# Patient Record
Sex: MALE | Race: WHITE | Employment: UNEMPLOYED | URBAN - METROPOLITAN AREA
[De-identification: names, ages, dates, MRNs, and addresses within clinical notes are randomized per-mention and may not be internally consistent; named-entity substitution may affect disease eponyms.]

---

## 2017-09-02 ENCOUNTER — APPOINTMENT (OUTPATIENT)
Dept: CT IMAGING | Age: 52
DRG: 381 | End: 2017-09-02
Attending: EMERGENCY MEDICINE
Payer: SELF-PAY

## 2017-09-02 ENCOUNTER — HOSPITAL ENCOUNTER (INPATIENT)
Age: 52
LOS: 3 days | Discharge: HOME | DRG: 381 | End: 2017-09-07
Attending: EMERGENCY MEDICINE | Admitting: INTERNAL MEDICINE
Payer: SELF-PAY

## 2017-09-02 ENCOUNTER — APPOINTMENT (OUTPATIENT)
Dept: GENERAL RADIOLOGY | Age: 52
DRG: 381 | End: 2017-09-02
Attending: EMERGENCY MEDICINE
Payer: SELF-PAY

## 2017-09-02 DIAGNOSIS — F10.939 ALCOHOL WITHDRAWAL, WITH UNSPECIFIED COMPLICATION (HCC): ICD-10-CM

## 2017-09-02 DIAGNOSIS — K92.0 HEMATEMESIS WITH NAUSEA: Primary | ICD-10-CM

## 2017-09-02 PROBLEM — R74.8 ELEVATED LIPASE: Status: ACTIVE | Noted: 2017-09-02

## 2017-09-02 PROBLEM — Z79.1 NSAID LONG-TERM USE: Status: ACTIVE | Noted: 2017-09-02

## 2017-09-02 PROBLEM — F12.10 MILD TETRAHYDROCANNABINOL (THC) ABUSE: Status: ACTIVE | Noted: 2017-09-02

## 2017-09-02 PROBLEM — R63.4 WEIGHT LOSS: Status: ACTIVE | Noted: 2017-09-02

## 2017-09-02 PROBLEM — F19.10 POLYSUBSTANCE ABUSE (HCC): Status: ACTIVE | Noted: 2017-09-02

## 2017-09-02 PROBLEM — E87.20 LACTIC ACIDEMIA: Status: ACTIVE | Noted: 2017-09-02

## 2017-09-02 PROBLEM — F11.10 OPIOID ABUSE (HCC): Status: ACTIVE | Noted: 2017-09-02

## 2017-09-02 PROBLEM — R11.2 NAUSEA & VOMITING: Status: ACTIVE | Noted: 2017-09-02

## 2017-09-02 PROBLEM — R55 SYNCOPE: Status: ACTIVE | Noted: 2017-09-02

## 2017-09-02 PROBLEM — E86.0 DEHYDRATION: Status: ACTIVE | Noted: 2017-09-02

## 2017-09-02 LAB
ALBUMIN SERPL-MCNC: 4.1 G/DL (ref 3.5–5)
ALBUMIN/GLOB SERPL: 1.2 {RATIO} (ref 1.1–2.2)
ALP SERPL-CCNC: 70 U/L (ref 45–117)
ALT SERPL-CCNC: 54 U/L (ref 12–78)
AMPHET UR QL SCN: NEGATIVE
ANION GAP SERPL CALC-SCNC: 9 MMOL/L (ref 5–15)
APPEARANCE UR: CLEAR
AST SERPL-CCNC: 52 U/L (ref 15–37)
BACTERIA URNS QL MICRO: NEGATIVE /HPF
BARBITURATES UR QL SCN: NEGATIVE
BASOPHILS # BLD: 0 K/UL (ref 0–0.1)
BASOPHILS NFR BLD: 1 % (ref 0–1)
BENZODIAZ UR QL: NEGATIVE
BILIRUB SERPL-MCNC: 1.1 MG/DL (ref 0.2–1)
BILIRUB UR QL: NEGATIVE
BUN SERPL-MCNC: 5 MG/DL (ref 6–20)
BUN/CREAT SERPL: 5 (ref 12–20)
CALCIUM SERPL-MCNC: 8.6 MG/DL (ref 8.5–10.1)
CANNABINOIDS UR QL SCN: POSITIVE
CHLORIDE SERPL-SCNC: 101 MMOL/L (ref 97–108)
CO2 SERPL-SCNC: 29 MMOL/L (ref 21–32)
COCAINE UR QL SCN: NEGATIVE
COLOR UR: ABNORMAL
CREAT SERPL-MCNC: 0.93 MG/DL (ref 0.7–1.3)
DRUG SCRN COMMENT,DRGCM: ABNORMAL
EOSINOPHIL # BLD: 0.3 K/UL (ref 0–0.4)
EOSINOPHIL NFR BLD: 5 % (ref 0–7)
EPITH CASTS URNS QL MICRO: ABNORMAL /LPF
ERYTHROCYTE [DISTWIDTH] IN BLOOD BY AUTOMATED COUNT: 12.4 % (ref 11.5–14.5)
ETHANOL SERPL-MCNC: 367 MG/DL
GLOBULIN SER CALC-MCNC: 3.4 G/DL (ref 2–4)
GLUCOSE SERPL-MCNC: 133 MG/DL (ref 65–100)
GLUCOSE UR STRIP.AUTO-MCNC: NEGATIVE MG/DL
HCT VFR BLD AUTO: 46.5 % (ref 36.6–50.3)
HEMOCCULT STL QL: NEGATIVE
HGB BLD-MCNC: 16.9 G/DL (ref 12.1–17)
HGB UR QL STRIP: ABNORMAL
HYALINE CASTS URNS QL MICRO: ABNORMAL /LPF (ref 0–5)
INR PPP: 1 (ref 0.9–1.1)
KETONES UR QL STRIP.AUTO: NEGATIVE MG/DL
LACTATE SERPL-SCNC: 2.4 MMOL/L (ref 0.4–2)
LDH SERPL L TO P-CCNC: 208 U/L (ref 85–241)
LEUKOCYTE ESTERASE UR QL STRIP.AUTO: NEGATIVE
LIPASE SERPL-CCNC: 634 U/L (ref 73–393)
LYMPHOCYTES # BLD: 2.2 K/UL (ref 0.8–3.5)
LYMPHOCYTES NFR BLD: 41 % (ref 12–49)
MCH RBC QN AUTO: 36.8 PG (ref 26–34)
MCHC RBC AUTO-ENTMCNC: 36.3 G/DL (ref 30–36.5)
MCV RBC AUTO: 101.3 FL (ref 80–99)
METHADONE UR QL: NEGATIVE
MONOCYTES # BLD: 0.5 K/UL (ref 0–1)
MONOCYTES NFR BLD: 10 % (ref 5–13)
NEUTS SEG # BLD: 2.4 K/UL (ref 1.8–8)
NEUTS SEG NFR BLD: 43 % (ref 32–75)
NITRITE UR QL STRIP.AUTO: NEGATIVE
OPIATES UR QL: POSITIVE
PCP UR QL: NEGATIVE
PH UR STRIP: 7 [PH] (ref 5–8)
PLATELET # BLD AUTO: 164 K/UL (ref 150–400)
POTASSIUM SERPL-SCNC: 3.6 MMOL/L (ref 3.5–5.1)
PROT SERPL-MCNC: 7.5 G/DL (ref 6.4–8.2)
PROT UR STRIP-MCNC: 30 MG/DL
PROTHROMBIN TIME: 9.9 SEC (ref 9–11.1)
RBC # BLD AUTO: 4.59 M/UL (ref 4.1–5.7)
RBC #/AREA URNS HPF: ABNORMAL /HPF (ref 0–5)
SODIUM SERPL-SCNC: 139 MMOL/L (ref 136–145)
SP GR UR REFRACTOMETRY: 1.01 (ref 1–1.03)
UA: UC IF INDICATED,UAUC: ABNORMAL
UROBILINOGEN UR QL STRIP.AUTO: 0.2 EU/DL (ref 0.2–1)
WBC # BLD AUTO: 5.4 K/UL (ref 4.1–11.1)
WBC URNS QL MICRO: ABNORMAL /HPF (ref 0–4)

## 2017-09-02 PROCEDURE — 96376 TX/PRO/DX INJ SAME DRUG ADON: CPT

## 2017-09-02 PROCEDURE — 81001 URINALYSIS AUTO W/SCOPE: CPT | Performed by: EMERGENCY MEDICINE

## 2017-09-02 PROCEDURE — 96375 TX/PRO/DX INJ NEW DRUG ADDON: CPT

## 2017-09-02 PROCEDURE — 74011250636 HC RX REV CODE- 250/636: Performed by: EMERGENCY MEDICINE

## 2017-09-02 PROCEDURE — 74011250636 HC RX REV CODE- 250/636: Performed by: INTERNAL MEDICINE

## 2017-09-02 PROCEDURE — 82728 ASSAY OF FERRITIN: CPT | Performed by: INTERNAL MEDICINE

## 2017-09-02 PROCEDURE — 96361 HYDRATE IV INFUSION ADD-ON: CPT

## 2017-09-02 PROCEDURE — 93005 ELECTROCARDIOGRAM TRACING: CPT

## 2017-09-02 PROCEDURE — 74011250637 HC RX REV CODE- 250/637: Performed by: INTERNAL MEDICINE

## 2017-09-02 PROCEDURE — C9113 INJ PANTOPRAZOLE SODIUM, VIA: HCPCS | Performed by: EMERGENCY MEDICINE

## 2017-09-02 PROCEDURE — 82607 VITAMIN B-12: CPT | Performed by: INTERNAL MEDICINE

## 2017-09-02 PROCEDURE — 83605 ASSAY OF LACTIC ACID: CPT | Performed by: EMERGENCY MEDICINE

## 2017-09-02 PROCEDURE — 85025 COMPLETE CBC W/AUTO DIFF WBC: CPT | Performed by: EMERGENCY MEDICINE

## 2017-09-02 PROCEDURE — 83010 ASSAY OF HAPTOGLOBIN QUANT: CPT | Performed by: INTERNAL MEDICINE

## 2017-09-02 PROCEDURE — 96374 THER/PROPH/DIAG INJ IV PUSH: CPT

## 2017-09-02 PROCEDURE — 85610 PROTHROMBIN TIME: CPT | Performed by: INTERNAL MEDICINE

## 2017-09-02 PROCEDURE — 83615 LACTATE (LD) (LDH) ENZYME: CPT | Performed by: INTERNAL MEDICINE

## 2017-09-02 PROCEDURE — 80061 LIPID PANEL: CPT | Performed by: INTERNAL MEDICINE

## 2017-09-02 PROCEDURE — 80307 DRUG TEST PRSMV CHEM ANLYZR: CPT | Performed by: EMERGENCY MEDICINE

## 2017-09-02 PROCEDURE — 83690 ASSAY OF LIPASE: CPT | Performed by: EMERGENCY MEDICINE

## 2017-09-02 PROCEDURE — 74011636320 HC RX REV CODE- 636/320: Performed by: RADIOLOGY

## 2017-09-02 PROCEDURE — 80074 ACUTE HEPATITIS PANEL: CPT | Performed by: INTERNAL MEDICINE

## 2017-09-02 PROCEDURE — 74177 CT ABD & PELVIS W/CONTRAST: CPT

## 2017-09-02 PROCEDURE — 80053 COMPREHEN METABOLIC PANEL: CPT | Performed by: EMERGENCY MEDICINE

## 2017-09-02 PROCEDURE — 82272 OCCULT BLD FECES 1-3 TESTS: CPT | Performed by: EMERGENCY MEDICINE

## 2017-09-02 PROCEDURE — 71010 XR CHEST PORT: CPT

## 2017-09-02 PROCEDURE — 82746 ASSAY OF FOLIC ACID SERUM: CPT | Performed by: INTERNAL MEDICINE

## 2017-09-02 PROCEDURE — 83540 ASSAY OF IRON: CPT | Performed by: INTERNAL MEDICINE

## 2017-09-02 PROCEDURE — 86900 BLOOD TYPING SEROLOGIC ABO: CPT | Performed by: EMERGENCY MEDICINE

## 2017-09-02 PROCEDURE — 36415 COLL VENOUS BLD VENIPUNCTURE: CPT | Performed by: EMERGENCY MEDICINE

## 2017-09-02 PROCEDURE — 99285 EMERGENCY DEPT VISIT HI MDM: CPT

## 2017-09-02 RX ORDER — LORAZEPAM 2 MG/ML
0.5 INJECTION INTRAMUSCULAR ONCE
Status: COMPLETED | OUTPATIENT
Start: 2017-09-02 | End: 2017-09-02

## 2017-09-02 RX ORDER — PROPRANOLOL HYDROCHLORIDE 10 MG/1
20 TABLET ORAL 3 TIMES DAILY
COMMUNITY
End: 2021-01-06

## 2017-09-02 RX ORDER — ONDANSETRON 2 MG/ML
4 INJECTION INTRAMUSCULAR; INTRAVENOUS
Status: COMPLETED | OUTPATIENT
Start: 2017-09-02 | End: 2017-09-02

## 2017-09-02 RX ORDER — MORPHINE SULFATE 4 MG/ML
4 INJECTION, SOLUTION INTRAMUSCULAR; INTRAVENOUS ONCE
Status: COMPLETED | OUTPATIENT
Start: 2017-09-02 | End: 2017-09-02

## 2017-09-02 RX ORDER — DIAZEPAM 10 MG/2ML
10 INJECTION INTRAMUSCULAR
Status: DISCONTINUED | OUTPATIENT
Start: 2017-09-02 | End: 2017-09-05

## 2017-09-02 RX ORDER — PROPRANOLOL HYDROCHLORIDE 10 MG/1
10 TABLET ORAL 3 TIMES DAILY
Status: DISCONTINUED | OUTPATIENT
Start: 2017-09-02 | End: 2017-09-02

## 2017-09-02 RX ORDER — SODIUM CHLORIDE 0.9 % (FLUSH) 0.9 %
5-10 SYRINGE (ML) INJECTION AS NEEDED
Status: DISCONTINUED | OUTPATIENT
Start: 2017-09-02 | End: 2017-09-07 | Stop reason: HOSPADM

## 2017-09-02 RX ORDER — ONDANSETRON 2 MG/ML
4 INJECTION INTRAMUSCULAR; INTRAVENOUS
Status: DISCONTINUED | OUTPATIENT
Start: 2017-09-02 | End: 2017-09-07 | Stop reason: HOSPADM

## 2017-09-02 RX ORDER — MORPHINE SULFATE 4 MG/ML
1 INJECTION, SOLUTION INTRAMUSCULAR; INTRAVENOUS
Status: DISCONTINUED | OUTPATIENT
Start: 2017-09-02 | End: 2017-09-03

## 2017-09-02 RX ORDER — LORAZEPAM 2 MG/ML
1 INJECTION INTRAMUSCULAR
Status: COMPLETED | OUTPATIENT
Start: 2017-09-02 | End: 2017-09-02

## 2017-09-02 RX ORDER — CLONIDINE HYDROCHLORIDE 0.1 MG/1
0.1 TABLET ORAL 2 TIMES DAILY
Status: DISCONTINUED | OUTPATIENT
Start: 2017-09-02 | End: 2017-09-05

## 2017-09-02 RX ORDER — LISINOPRIL 10 MG/1
10 TABLET ORAL DAILY
COMMUNITY
End: 2021-01-06

## 2017-09-02 RX ORDER — PANTOPRAZOLE SODIUM 40 MG/10ML
40 INJECTION, POWDER, LYOPHILIZED, FOR SOLUTION INTRAVENOUS
Status: COMPLETED | OUTPATIENT
Start: 2017-09-02 | End: 2017-09-02

## 2017-09-02 RX ORDER — NALOXONE HYDROCHLORIDE 0.4 MG/ML
0.4 INJECTION, SOLUTION INTRAMUSCULAR; INTRAVENOUS; SUBCUTANEOUS AS NEEDED
Status: DISCONTINUED | OUTPATIENT
Start: 2017-09-02 | End: 2017-09-07 | Stop reason: HOSPADM

## 2017-09-02 RX ORDER — PROPRANOLOL HYDROCHLORIDE 10 MG/1
20 TABLET ORAL 3 TIMES DAILY
Status: DISCONTINUED | OUTPATIENT
Start: 2017-09-02 | End: 2017-09-05

## 2017-09-02 RX ORDER — ACETAMINOPHEN 325 MG/1
650 TABLET ORAL
Status: DISCONTINUED | OUTPATIENT
Start: 2017-09-02 | End: 2017-09-07 | Stop reason: HOSPADM

## 2017-09-02 RX ORDER — SODIUM CHLORIDE 0.9 % (FLUSH) 0.9 %
5-10 SYRINGE (ML) INJECTION EVERY 8 HOURS
Status: DISCONTINUED | OUTPATIENT
Start: 2017-09-02 | End: 2017-09-07 | Stop reason: HOSPADM

## 2017-09-02 RX ORDER — DIAZEPAM 10 MG/2ML
5 INJECTION INTRAMUSCULAR
Status: DISCONTINUED | OUTPATIENT
Start: 2017-09-02 | End: 2017-09-05

## 2017-09-02 RX ADMIN — SODIUM CHLORIDE 1000 ML: 900 INJECTION, SOLUTION INTRAVENOUS at 20:29

## 2017-09-02 RX ADMIN — SODIUM CHLORIDE 1000 ML: 900 INJECTION, SOLUTION INTRAVENOUS at 22:02

## 2017-09-02 RX ADMIN — Medication 10 ML: at 21:19

## 2017-09-02 RX ADMIN — CLONIDINE HYDROCHLORIDE 0.1 MG: 0.1 TABLET ORAL at 23:15

## 2017-09-02 RX ADMIN — Medication 4 MG: at 20:43

## 2017-09-02 RX ADMIN — IOPAMIDOL 95 ML: 755 INJECTION, SOLUTION INTRAVENOUS at 21:20

## 2017-09-02 RX ADMIN — LORAZEPAM 1 MG: 2 INJECTION INTRAMUSCULAR; INTRAVENOUS at 21:18

## 2017-09-02 RX ADMIN — PANTOPRAZOLE SODIUM 40 MG: 40 INJECTION, POWDER, FOR SOLUTION INTRAVENOUS at 20:29

## 2017-09-02 RX ADMIN — ONDANSETRON 4 MG: 2 INJECTION INTRAMUSCULAR; INTRAVENOUS at 20:29

## 2017-09-02 RX ADMIN — LORAZEPAM 0.5 MG: 2 INJECTION INTRAMUSCULAR; INTRAVENOUS at 23:15

## 2017-09-02 NOTE — IP AVS SNAPSHOT
[address] 
[phone] Patient: Alvaro Perkins MRN: [id number] :1965 You are allergic to the following No active allergies Recent Documentation Height Weight BMI Smoking Status 1.88 m 99.8 kg 28.25 kg/m2 Never Smoker Emergency Contacts Name Discharge Info Relation Home Work Mobile [address] [facility] CAREGIVER [3] Girlfriend [18] [phone] [address] [facility] CAREGIVER [3] Son [22]   [phone] About your hospitalization You were admitted on:  2017 You last received care in the:  [facility] 3 PRO CARE TELE 1 You were discharged on:  2017 Unit phone number:  [phone] Why you were hospitalized Your primary diagnosis was:  Alcohol Withdrawal (Hcc) Your diagnoses also included:  Dehydration, Alcohol Abuse, Nausea & Vomiting, Syncope, Opioid Abuse, Mild Tetrahydrocannabinol (Thc) Abuse, Polysubstance Abuse, Elevated Lipase, Lactic Acidemia, Hematemesis, Nsaid Long-Term Use, Weight Loss Providers Seen During Your Hospitalizations Provider Role Specialty Primary office phone Deborah Slade MD Attending Provider Emergency Medicine [phone] Waldo Cervantes MD Attending Provider Internal Medicine [phone] Juan Martínez MD Attending Provider Internal Medicine [phone] Salvador Hager MD Attending Provider Hospitalist [phone] Your Primary Care Physician (PCP) Primary Care Physician Office Phone Office Fax Lorene Barton [phone] Follow-up Information Follow up With Details Comments Contact Info Karen Momin MD   [address] [facility] [address] [place] [address] 
[phone] Current Discharge Medication List  
  
START taking these medications Dose & Instructions Dispensing Information Comments Morning Noon Evening Bedtime  
 folic acid 1 mg tablet Commonly known as:  Google Your last dose was: Your next dose is:    
   
   
 Dose:  1 mg Take 1 Tab by mouth daily. Quantity:  30 Tab Refills:  1 Omeprazole delayed release 20 mg tablet Commonly known as:  PRILOSEC D/R Your last dose was: Your next dose is:    
   
   
 Dose:  20 mg Take 1 Tab by mouth daily. Quantity:  30 Tab Refills:  1  
     
   
   
   
  
 oxyCODONE-acetaminophen 2.5-325 mg per tablet Commonly known as:  PERCOCET Your last dose was: Your next dose is:    
   
   
 Dose:  2 Tab Take 2 Tabs by mouth every four (4) hours as needed for Pain. Max Daily Amount: 12 Tabs. Quantity:  15 Tab Refills:  0 PHENobarbital 30 mg tablet Commonly known as:  LUMINAL Your last dose was: Your next dose is: Take 30 mg( one tablet) by mouth twice a day for 2 days; then take 30 mg ( one tablet) daily for 3 days and stop. Quantity:  7 Tab Refills:  0  
     
   
   
   
  
 thiamine 100 mg tablet Commonly known as:  B-1 Your last dose was: Your next dose is:    
   
   
 Dose:  100 mg Take 1 Tab by mouth daily. Quantity:  30 Tab Refills:  1 CONTINUE these medications which have NOT CHANGED Dose & Instructions Dispensing Information Comments Morning Noon Evening Bedtime  
 lisinopril 10 mg tablet Commonly known as:  Therisa Allison Your last dose was: Your next dose is:    
   
   
 Dose:  10 mg Take 10 mg by mouth daily. Refills:  0  
     
   
   
   
  
 propranolol 10 mg tablet Commonly known as:  INDERAL Your last dose was: Your next dose is:    
   
   
 Dose:  20 mg Take 20 mg by mouth three (3) times daily. Refills:  0 Where to Get Your Medications Information on where to get these meds will be given to you by the nurse or doctor. ! Ask your nurse or doctor about these medications  
  folic acid 1 mg tablet Omeprazole delayed release 20 mg tablet  
 oxyCODONE-acetaminophen 2.5-325 mg per tablet PHENobarbital 30 mg tablet  
 thiamine 100 mg tablet Discharge Instructions Alcohol and Drug Problems: Care Instructions Your Care Instructions You can improve your life and health by stopping your use of alcohol or drugs. Ending dependency on alcohol or drugs may help you feel and sleep better. You may get along better with your family, friends, and coworkers. There are medicines and programs that can help. Follow-up care is a key part of your treatment and safety. Be sure to make and go to all appointments, and call your doctor if you are having problems. It's also a good idea to know your test results and keep a list of the medicines you take. How can you care for yourself at home? · If you have been given medicine to help keep you sober or reduce your cravings, be sure to take it as prescribed. · Talk to your doctor about programs that can help you stop using drugs or drinking alcohol. · If your doctor prescribes disulfiram (Antabuse), do not drink any alcohol while you are taking this medicine. You may have severe, even life-threatening, side effects from even small amounts of alcohol. · Do not tempt yourself by keeping alcohol or drugs in your home. · Learn how to say no when other people drink or use drugs. Or don't spend time with people who drink or use drugs. · Use the time and money spent on drinking or drugs to do something fun with your family or friends. Preventing a relapse · Do not drink alcohol or use drugs at all. Using any amount of alcohol or drugs greatly increases your risk for relapse.  
· Seek help from organizations such as Alcoholics Anonymous, Narcotics Anonymous, or treatment facilities if you feel the need to drink alcohol or use drugs again. · Remember that recovery is a lifelong process. · Stay away from situations, friends, or places that may lead you to drink or use drugs. · Have a plan to spot and deal with relapse. Learn to recognize changes in your thinking that lead you to drink or use drugs. These are warning signs. Get help before you start to drink or use drugs again. · Get help as soon as you can if you relapse. Some people make a plan with another person that outlines what they want that person to do for them if they relapse. The plan usually includes how to handle the relapse and who to notify in case of relapse. · Don't give up. Remember that a relapse does not mean that you have failed. Use the experience to learn the triggers that lead you to drink or use drugs. Then quit again. Many people have several relapses before they are able to quit for good. When should you call for help? Call 911 anytime you think you may need emergency care. For example, call if: 
· You feel you cannot stop from hurting yourself or someone else. Call your doctor now or seek immediate medical care if: 
· You have serious withdrawal symptoms such as confusion, hallucinations, or severe trembling. Watch closely for changes in your health, and be sure to contact your doctor if: 
· You have a relapse. · You need more help or support to stop. Where can you learn more? Go to http://melly-gertrude.info/. Enter [phone] in the search box to learn more about \"Alcohol and Drug Problems: Care Instructions. \" Current as of: November 3, 2016 Content Version: 11.3 © 4994-8855 Healthwise, Incorporated. Care instructions adapted under license by [employer] (which disclaims liability or warranty for this information).  If you have questions about a medical condition or this instruction, always ask your healthcare professional. Gualberto Alexandra, [facility] disclaims any warranty or liability for your use of this information. Learning About Alcohol Misuse What is alcohol misuse? Alcohol misuse means drinking so much that it causes problems for you or others. Early problems with alcohol can start at home. You may argue with loved ones about how much you're drinking. Your job may be affected because of drinking. You may drink when it's dangerous or illegal, such as when you drive. Drinking too much for a long time can lead to health conditions like high blood pressure and liver problems. What are the symptoms? Symptoms of alcohol misuse may include: · Drinking much more than you planned. · Drinking even though it's causing problems for you or others. · Putting yourself in situations where you might get hurt. · Wanting to cut down or stop drinking, but not being able to. · Feeling guilty about how much you're drinking. How is alcohol misuse treated? Getting help for problems with alcohol is up to you. But you don't have to do it alone. There are many people and kinds of treatments to help with alcohol problems. Talking to your doctor is the first step. When you get a doctor's help, treatment for alcohol problems can be safer and quicker. Treatment options can include: · Treatment programs. Examples are group therapy, one or more types of counseling, and alcohol education. · Medicines. A doctor or counselor can help you know what kinds of medicines might help with cravings. · Free social support groups. These groups include AA (Alcoholics Anonymous) and SMART (Self-Management and Recovery Training). Your doctor can help you decide which type of program is best for you. Follow-up care is a key part of your treatment and safety. Be sure to make and go to all appointments, and call your doctor if you are having problems. It's also a good idea to know your test results and keep a list of the medicines you take. Where can you learn more? Go to http://duane.info/. Enter [id number] in the search box to learn more about \"Learning About Alcohol Misuse. \" Current as of: January 3, 2017 Content Version: 11.3 © 2368-0679 [employer]. Care instructions adapted under license by [employer] (which disclaims liability or warranty for this information). If you have questions about a medical condition or this instruction, always ask your healthcare professional. [address] any warranty or liability for your use of this information. ACUTE DIAGNOSES: 
Dehydration Alcohol withdrawal (HCC) 
abd pain CHRONIC MEDICAL DIAGNOSES: 
Problem List as of 9/7/2017  Date Reviewed: 9/7/2017 Codes Class Noted - Resolved * (Principal)Alcohol withdrawal (HCC) ICD-10-CM: O25.100 ICD-9-CM: 291.81  9/4/2017 - Present Alcohol abuse (Chronic) ICD-10-CM: F10.10 ICD-9-CM: 305.00  Unknown - Present Opioid abuse (Chronic) ICD-10-CM: F11.10 ICD-9-CM: 305.50  9/2/2017 - Present Mild tetrahydrocannabinol (THC) abuse (Chronic) ICD-10-CM: T09.91 ICD-9-CM: 305.20  9/2/2017 - Present Polysubstance abuse (Chronic) ICD-10-CM: F19.10 ICD-9-CM: 305.90  9/2/2017 - Present NSAID long-term use (Chronic) ICD-10-CM: Z79.1 ICD-9-CM: V58.64  9/2/2017 - Present Weight loss ICD-10-CM: R63.4 ICD-9-CM: 783.21  9/2/2017 - Present RESOLVED: Dehydration ICD-10-CM: E86.0 ICD-9-CM: 276.51  9/2/2017 - 9/4/2017 RESOLVED: Nausea & vomiting ICD-10-CM: R11.2 ICD-9-CM: 787.01  9/2/2017 - 9/4/2017 RESOLVED: Syncope ICD-10-CM: R55 
ICD-9-CM: 780.2  9/2/2017 - 9/7/2017 RESOLVED: Elevated lipase ICD-10-CM: R74.8 ICD-9-CM: 790.5  9/2/2017 - 9/4/2017 RESOLVED: Lactic acidemia ICD-10-CM: E87.2 ICD-9-CM: 276.2  9/2/2017 - 9/4/2017 RESOLVED: Hematemesis ICD-10-CM: K92.0 ICD-9-CM: 578.0  9/2/2017 - 9/7/2017 DISCHARGE MEDICATIONS:  
  
 
 
 · It is important that you take the medication exactly as they are prescribed. · Keep your medication in the bottles provided by the pharmacist and keep a list of the medication names, dosages, and times to be taken in your wallet. · Do not take other medications without consulting your doctor. DIET:  Regular Diet ACTIVITY: Activity as tolerated ADDITIONAL INFORMATION: If you experience any of the following symptoms then please call your primary care physician or return to the emergency room if you cannot get hold of your doctor: Fever, chills, nausea, vomiting, diarrhea, change in mentation, falling, bleeding, shortness of breath. FOLLOW UP CARE: 
Dr. Rosy Drake MD  you are to call and set up an appointment to see them in 2 weeks. Information obtained by : 
I understand that if any problems occur once I am at home I am to contact my physician. I understand and acknowledge receipt of the instructions indicated above. Physician's or R.N.'s Signature                                                                  Date/Time Patient or Representative Signature                                                          Date/Time Discharge Orders None [place] Announcement We are excited to announce that we are making your provider's discharge notes available to you in [employer]. You will see these notes when they are completed and signed by the physician that discharged you from your recent hospital stay.   If you have any questions or concerns about any information you see in [employer], please call the Health Information Department where you were seen or reach out to your Primary Care Provider for more information about your plan of care. Introducing [facility]! Agata Brink introduces Hashable patient portal. Now you can access parts of your medical record, email your doctor's office, and request medication refills online. 1. In your internet browser, go to [email or web address] 2. Click on the First Time User? Click Here link in the Sign In box. You will see the New Member Sign Up page. 3. Enter your Hashable Access Code exactly as it appears below. You will not need to use this code after youve completed the sign-up process. If you do not sign up before the expiration date, you must request a new code. · Hashable Access Code: [email or web address] Expires: 12/2/2017 12:23 PM 
 
4. Enter the last four digits of your Social Security Number (xxxx) and Date of Birth (mm/dd/yyyy) as indicated and click Submit. You will be taken to the next sign-up page. 5. Create a Hashable ID. This will be your Hashable login ID and cannot be changed, so think of one that is secure and easy to remember. 6. Create a Hashable password. You can change your password at any time. 7. Enter your Password Reset Question and Answer. This can be used at a later time if you forget your password. 8. Enter your e-mail address. You will receive e-mail notification when new information is available in [address] 9. Click Sign Up. You can now view and download portions of your medical record. 10. Click the Download Summary menu link to download a portable copy of your medical information. If you have questions, please visit the Frequently Asked Questions section of the Hashable website. Remember, Hashable is NOT to be used for urgent needs. For medical emergencies, dial 911. Now available from your iPhone and Android! General Information Please provide this summary of care documentation to your next provider.  
  
  
    
    
 Patient Signature: ____________________________________________________________ Date:  ____________________________________________________________  
  
Roxane Yifan Provider Signature:  ____________________________________________________________ Date:  ____________________________________________________________

## 2017-09-02 NOTE — IP AVS SNAPSHOT
[address] 
[phone] Patient: Ever Rebolledo MRN: [id number] :1965 Current Discharge Medication List  
  
START taking these medications Dose & Instructions Dispensing Information Comments Morning Noon Evening Bedtime  
 folic acid 1 mg tablet Commonly known as:  Google Your last dose was: Your next dose is:    
   
   
 Dose:  1 mg Take 1 Tab by mouth daily. Quantity:  30 Tab Refills:  1 Omeprazole delayed release 20 mg tablet Commonly known as:  PRILOSEC D/R Your last dose was: Your next dose is:    
   
   
 Dose:  20 mg Take 1 Tab by mouth daily. Quantity:  30 Tab Refills:  1  
     
   
   
   
  
 oxyCODONE-acetaminophen 2.5-325 mg per tablet Commonly known as:  PERCOCET Your last dose was: Your next dose is:    
   
   
 Dose:  2 Tab Take 2 Tabs by mouth every four (4) hours as needed for Pain. Max Daily Amount: 12 Tabs. Quantity:  15 Tab Refills:  0 PHENobarbital 30 mg tablet Commonly known as:  WILFREDO Your last dose was: Your next dose is: Take 30 mg( one tablet) by mouth twice a day for 2 days; then take 30 mg ( one tablet) daily for 3 days and stop. Quantity:  7 Tab Refills:  0  
     
   
   
   
  
 thiamine 100 mg tablet Commonly known as:  B-1 Your last dose was: Your next dose is:    
   
   
 Dose:  100 mg Take 1 Tab by mouth daily. Quantity:  30 Tab Refills:  1 CONTINUE these medications which have NOT CHANGED Dose & Instructions Dispensing Information Comments Morning Noon Evening Bedtime  
 lisinopril 10 mg tablet Commonly known as:  Mary Miller Your last dose was: Your next dose is:    
   
   
 Dose:  10 mg Take 10 mg by mouth daily. Refills:  0 propranolol 10 mg tablet Commonly known as:  INDERAL Your last dose was: Your next dose is:    
   
   
 Dose:  20 mg Take 20 mg by mouth three (3) times daily. Refills:  0 Where to Get Your Medications Information on where to get these meds will be given to you by the nurse or doctor. ! Ask your nurse or doctor about these medications  
  folic acid 1 mg tablet Omeprazole delayed release 20 mg tablet  
 oxyCODONE-acetaminophen 2.5-325 mg per tablet PHENobarbital 30 mg tablet  
 thiamine 100 mg tablet

## 2017-09-03 PROBLEM — F11.10 OPIOID ABUSE (HCC): Chronic | Status: ACTIVE | Noted: 2017-09-02

## 2017-09-03 PROBLEM — F19.10 POLYSUBSTANCE ABUSE (HCC): Chronic | Status: ACTIVE | Noted: 2017-09-02

## 2017-09-03 PROBLEM — Z79.1 NSAID LONG-TERM USE: Chronic | Status: ACTIVE | Noted: 2017-09-02

## 2017-09-03 PROBLEM — F12.10 MILD TETRAHYDROCANNABINOL (THC) ABUSE: Chronic | Status: ACTIVE | Noted: 2017-09-02

## 2017-09-03 LAB
ABO + RH BLD: NORMAL
ALBUMIN SERPL-MCNC: 3.2 G/DL (ref 3.5–5)
ALBUMIN/GLOB SERPL: 1.2 {RATIO} (ref 1.1–2.2)
ALP SERPL-CCNC: 53 U/L (ref 45–117)
ALT SERPL-CCNC: 43 U/L (ref 12–78)
ANION GAP SERPL CALC-SCNC: 7 MMOL/L (ref 5–15)
AST SERPL-CCNC: 41 U/L (ref 15–37)
BILIRUB SERPL-MCNC: 1.7 MG/DL (ref 0.2–1)
BLOOD GROUP ANTIBODIES SERPL: NORMAL
BUN SERPL-MCNC: 5 MG/DL (ref 6–20)
BUN/CREAT SERPL: 5 (ref 12–20)
CALCIUM SERPL-MCNC: 7.7 MG/DL (ref 8.5–10.1)
CHLORIDE SERPL-SCNC: 103 MMOL/L (ref 97–108)
CHOLEST SERPL-MCNC: 211 MG/DL
CO2 SERPL-SCNC: 27 MMOL/L (ref 21–32)
CREAT SERPL-MCNC: 0.98 MG/DL (ref 0.7–1.3)
ERYTHROCYTE [DISTWIDTH] IN BLOOD BY AUTOMATED COUNT: 12.3 % (ref 11.5–14.5)
FERRITIN SERPL-MCNC: 521 NG/ML (ref 26–388)
FOLATE SERPL-MCNC: 6.8 NG/ML (ref 5–21)
GLOBULIN SER CALC-MCNC: 2.6 G/DL (ref 2–4)
GLUCOSE SERPL-MCNC: 111 MG/DL (ref 65–100)
HAPTOGLOB SERPL-MCNC: <8 MG/DL (ref 30–200)
HCT VFR BLD AUTO: 37.7 % (ref 36.6–50.3)
HDLC SERPL-MCNC: 83 MG/DL
HDLC SERPL: 2.5 {RATIO} (ref 0–5)
HGB BLD-MCNC: 13.6 G/DL (ref 12.1–17)
IRON SATN MFR SERPL: 86 % (ref 20–50)
IRON SERPL-MCNC: 229 UG/DL (ref 35–150)
LACTATE SERPL-SCNC: 2 MMOL/L (ref 0.4–2)
LACTATE SERPL-SCNC: 2.5 MMOL/L (ref 0.4–2)
LDLC SERPL CALC-MCNC: 98 MG/DL (ref 0–100)
LIPASE SERPL-CCNC: 157 U/L (ref 73–393)
LIPID PROFILE,FLP: ABNORMAL
MAGNESIUM SERPL-MCNC: 1.4 MG/DL (ref 1.6–2.4)
MCH RBC QN AUTO: 35.7 PG (ref 26–34)
MCHC RBC AUTO-ENTMCNC: 36.1 G/DL (ref 30–36.5)
MCV RBC AUTO: 99 FL (ref 80–99)
PHOSPHATE SERPL-MCNC: 2.9 MG/DL (ref 2.6–4.7)
PLATELET # BLD AUTO: 95 K/UL (ref 150–400)
POTASSIUM SERPL-SCNC: 3.8 MMOL/L (ref 3.5–5.1)
PROT SERPL-MCNC: 5.8 G/DL (ref 6.4–8.2)
RBC # BLD AUTO: 3.81 M/UL (ref 4.1–5.7)
SODIUM SERPL-SCNC: 137 MMOL/L (ref 136–145)
SPECIMEN EXP DATE BLD: NORMAL
TIBC SERPL-MCNC: 267 UG/DL (ref 250–450)
TRIGL SERPL-MCNC: 150 MG/DL (ref ?–150)
VIT B12 SERPL-MCNC: 305 PG/ML (ref 211–911)
VLDLC SERPL CALC-MCNC: 30 MG/DL
WBC # BLD AUTO: 3.7 K/UL (ref 4.1–11.1)

## 2017-09-03 PROCEDURE — C9113 INJ PANTOPRAZOLE SODIUM, VIA: HCPCS | Performed by: INTERNAL MEDICINE

## 2017-09-03 PROCEDURE — 74011250636 HC RX REV CODE- 250/636: Performed by: INTERNAL MEDICINE

## 2017-09-03 PROCEDURE — 77010033678 HC OXYGEN DAILY

## 2017-09-03 PROCEDURE — 74011000250 HC RX REV CODE- 250: Performed by: INTERNAL MEDICINE

## 2017-09-03 PROCEDURE — 85027 COMPLETE CBC AUTOMATED: CPT | Performed by: INTERNAL MEDICINE

## 2017-09-03 PROCEDURE — 83735 ASSAY OF MAGNESIUM: CPT | Performed by: INTERNAL MEDICINE

## 2017-09-03 PROCEDURE — 83605 ASSAY OF LACTIC ACID: CPT | Performed by: INTERNAL MEDICINE

## 2017-09-03 PROCEDURE — 84100 ASSAY OF PHOSPHORUS: CPT | Performed by: INTERNAL MEDICINE

## 2017-09-03 PROCEDURE — 80053 COMPREHEN METABOLIC PANEL: CPT | Performed by: INTERNAL MEDICINE

## 2017-09-03 PROCEDURE — 74011250637 HC RX REV CODE- 250/637: Performed by: INTERNAL MEDICINE

## 2017-09-03 PROCEDURE — 99218 HC RM OBSERVATION: CPT

## 2017-09-03 PROCEDURE — 83690 ASSAY OF LIPASE: CPT | Performed by: INTERNAL MEDICINE

## 2017-09-03 PROCEDURE — 36415 COLL VENOUS BLD VENIPUNCTURE: CPT | Performed by: INTERNAL MEDICINE

## 2017-09-03 RX ORDER — CHLORPROMAZINE HYDROCHLORIDE 25 MG/1
25 TABLET, FILM COATED ORAL
Status: DISCONTINUED | OUTPATIENT
Start: 2017-09-03 | End: 2017-09-07 | Stop reason: HOSPADM

## 2017-09-03 RX ORDER — IBUPROFEN 200 MG
1 TABLET ORAL DAILY
Status: DISCONTINUED | OUTPATIENT
Start: 2017-09-03 | End: 2017-09-07 | Stop reason: HOSPADM

## 2017-09-03 RX ORDER — DEXTROSE, SODIUM CHLORIDE, AND POTASSIUM CHLORIDE 5; .45; .15 G/100ML; G/100ML; G/100ML
125 INJECTION INTRAVENOUS CONTINUOUS
Status: DISCONTINUED | OUTPATIENT
Start: 2017-09-03 | End: 2017-09-05

## 2017-09-03 RX ORDER — MORPHINE SULFATE 4 MG/ML
2 INJECTION, SOLUTION INTRAMUSCULAR; INTRAVENOUS
Status: DISCONTINUED | OUTPATIENT
Start: 2017-09-03 | End: 2017-09-06

## 2017-09-03 RX ORDER — SODIUM CHLORIDE 9 MG/ML
100 INJECTION, SOLUTION INTRAVENOUS CONTINUOUS
Status: DISCONTINUED | OUTPATIENT
Start: 2017-09-03 | End: 2017-09-07

## 2017-09-03 RX ORDER — DIPHENHYDRAMINE HCL 25 MG
25 CAPSULE ORAL
Status: DISCONTINUED | OUTPATIENT
Start: 2017-09-03 | End: 2017-09-07 | Stop reason: HOSPADM

## 2017-09-03 RX ADMIN — DIAZEPAM 5 MG: 5 INJECTION, SOLUTION INTRAMUSCULAR; INTRAVENOUS at 08:41

## 2017-09-03 RX ADMIN — DIAZEPAM 5 MG: 5 INJECTION, SOLUTION INTRAMUSCULAR; INTRAVENOUS at 05:41

## 2017-09-03 RX ADMIN — PROPRANOLOL HYDROCHLORIDE 20 MG: 10 TABLET ORAL at 21:21

## 2017-09-03 RX ADMIN — DIAZEPAM 5 MG: 5 INJECTION, SOLUTION INTRAMUSCULAR; INTRAVENOUS at 18:03

## 2017-09-03 RX ADMIN — CHLORPROMAZINE HYDROCHLORIDE 25 MG: 25 TABLET, SUGAR COATED ORAL at 08:38

## 2017-09-03 RX ADMIN — MORPHINE SULFATE 2 MG: 4 INJECTION, SOLUTION INTRAMUSCULAR; INTRAVENOUS at 13:07

## 2017-09-03 RX ADMIN — SODIUM CHLORIDE 40 MG: 9 INJECTION INTRAMUSCULAR; INTRAVENOUS; SUBCUTANEOUS at 08:39

## 2017-09-03 RX ADMIN — CLONIDINE HYDROCHLORIDE 0.1 MG: 0.1 TABLET ORAL at 17:00

## 2017-09-03 RX ADMIN — DIAZEPAM 5 MG: 5 INJECTION, SOLUTION INTRAMUSCULAR; INTRAVENOUS at 07:54

## 2017-09-03 RX ADMIN — Medication 10 ML: at 17:00

## 2017-09-03 RX ADMIN — DIAZEPAM 5 MG: 5 INJECTION, SOLUTION INTRAMUSCULAR; INTRAVENOUS at 05:00

## 2017-09-03 RX ADMIN — SODIUM CHLORIDE 40 MG: 9 INJECTION INTRAMUSCULAR; INTRAVENOUS; SUBCUTANEOUS at 19:31

## 2017-09-03 RX ADMIN — DIAZEPAM 5 MG: 5 INJECTION, SOLUTION INTRAMUSCULAR; INTRAVENOUS at 03:25

## 2017-09-03 RX ADMIN — CLONIDINE HYDROCHLORIDE 0.1 MG: 0.1 TABLET ORAL at 08:39

## 2017-09-03 RX ADMIN — ACETAMINOPHEN 650 MG: 325 TABLET ORAL at 19:32

## 2017-09-03 RX ADMIN — SODIUM CHLORIDE 100 ML/HR: 900 INJECTION, SOLUTION INTRAVENOUS at 06:53

## 2017-09-03 RX ADMIN — PROPRANOLOL HYDROCHLORIDE 20 MG: 10 TABLET ORAL at 15:03

## 2017-09-03 RX ADMIN — DEXTROSE MONOHYDRATE, SODIUM CHLORIDE, AND POTASSIUM CHLORIDE 125 ML/HR: 50; 4.5; 1.49 INJECTION, SOLUTION INTRAVENOUS at 18:04

## 2017-09-03 RX ADMIN — FOLIC ACID: 5 INJECTION, SOLUTION INTRAMUSCULAR; INTRAVENOUS; SUBCUTANEOUS at 10:15

## 2017-09-03 RX ADMIN — PROPRANOLOL HYDROCHLORIDE 20 MG: 10 TABLET ORAL at 08:39

## 2017-09-03 RX ADMIN — DIPHENHYDRAMINE HYDROCHLORIDE 25 MG: 25 CAPSULE ORAL at 19:33

## 2017-09-03 RX ADMIN — DIAZEPAM 5 MG: 5 INJECTION, SOLUTION INTRAMUSCULAR; INTRAVENOUS at 22:54

## 2017-09-03 RX ADMIN — DIAZEPAM 5 MG: 5 INJECTION, SOLUTION INTRAMUSCULAR; INTRAVENOUS at 21:20

## 2017-09-03 RX ADMIN — CHLORPROMAZINE HYDROCHLORIDE 25 MG: 25 TABLET, SUGAR COATED ORAL at 19:31

## 2017-09-03 RX ADMIN — DIAZEPAM 5 MG: 5 INJECTION, SOLUTION INTRAMUSCULAR; INTRAVENOUS at 14:50

## 2017-09-03 RX ADMIN — PROPRANOLOL HYDROCHLORIDE 20 MG: 10 TABLET ORAL at 00:18

## 2017-09-03 RX ADMIN — DIAZEPAM 5 MG: 5 INJECTION, SOLUTION INTRAMUSCULAR; INTRAVENOUS at 00:18

## 2017-09-03 RX ADMIN — DIAZEPAM 5 MG: 5 INJECTION, SOLUTION INTRAMUSCULAR; INTRAVENOUS at 02:12

## 2017-09-03 RX ADMIN — Medication 10 ML: at 06:54

## 2017-09-03 NOTE — CONSULTS
Abad Parry. Rosie Burleson MD  [phone] office  [phone] voicemail   Gastroenterology Consultation Note      Admit Date: 9/2/2017  Consult Date: 9/3/2017   I greatly appreciate your asking me to see Onur Preciado, thank you very much for the opportunity to participate in his care. Narrative Assessment and Plan   · Epigastric pain  · Mild elevation lipase  · Alcoholism  · Diverticulosis  · Alcoholic steatohepatitis    Lipase is 2xULN but CT without inflammatory changes in pancreas. Could be PUD, could be mild pancreatitis. No evidence of surgical process. I suggest analgesics, NPO, IV fluids, PPI BID, add carafate, get abdominal ultrasound. EGD Tuesday if symptoms persist.  Following. Subjective:     Chief Complaint: Abdominal Pain    History of Present Illness: Onur Preciado is a(n) 47 y.o. male who complains of abdominal pain. The pain is located in the epigastric area without radiation. Pain is described as sharp and stabbing and measures 9/10 in intensity. Onset of pain was 3 days ago. Since onset the pain is gradually improving. Aggravating factors include none. Alleviating factors include none. Associated symptoms include none. PCP:  Katherine Miller MD    Past Medical History:   Diagnosis Date    Alcohol abuse     Diverticulitis     Diverticulitis     Hypertension         Past Surgical History:   Procedure Laterality Date    HX COLECTOMY         Social History   Substance Use Topics    Smoking status: Never Smoker    Smokeless tobacco: Current User      Comment: dip    Alcohol use Yes      Comment: heavy liquor use daily        History reviewed. No pertinent family history. No Known Allergies         Home Medications:  Prior to Admission Medications   Prescriptions Last Dose Informant Patient Reported? Taking?   lisinopril (PRINIVIL, ZESTRIL) 10 mg tablet 9/2/2017 at am  Yes Yes   Sig: Take 10 mg by mouth daily.    propranolol (INDERAL) 10 mg tablet   Yes Yes   Sig: Take 20 mg by mouth three (3) times daily. Facility-Administered Medications: None       Hospital Medications:  Current Facility-Administered Medications   Medication Dose Route Frequency    dextrose 5% - 0.45% NaCl with KCl 20 mEq/L infusion  125 mL/hr IntraVENous CONTINUOUS    diphenhydrAMINE (BENADRYL) capsule 25 mg  25 mg Oral Q4H PRN    nicotine (NICODERM CQ) 21 mg/24 hr patch 1 Patch  1 Patch TransDERmal DAILY    0.9% sodium chloride infusion  100 mL/hr IntraVENous CONTINUOUS    morphine injection 2 mg  2 mg IntraVENous Q4H PRN    chlorproMAZINE (THORAZINE) tablet 25 mg  25 mg Oral Q6H PRN    sodium chloride (NS) flush 5-10 mL  5-10 mL IntraVENous Q8H    sodium chloride (NS) flush 5-10 mL  5-10 mL IntraVENous PRN    naloxone (NARCAN) injection 0.4 mg  0.4 mg IntraVENous PRN    acetaminophen (TYLENOL) tablet 650 mg  650 mg Oral Q4H PRN    ondansetron (ZOFRAN) injection 4 mg  4 mg IntraVENous Q4H PRN    diazePAM (VALIUM) injection 10 mg  10 mg IntraVENous Q1H PRN    diazePAM (VALIUM) injection 5 mg  5 mg IntraVENous Q1H PRN    0.9% sodium chloride 1,841 mL with folic acid 1 mg, thiamine 100 mg, mvi, adult no. 4 with vit K 10 mL infusion   IntraVENous DAILY    pantoprazole (PROTONIX) 40 mg in sodium chloride 0.9 % 10 mL injection  40 mg IntraVENous Q12H    cloNIDine HCl (CATAPRES) tablet 0.1 mg  0.1 mg Oral BID    propranolol (INDERAL) tablet 20 mg  20 mg Oral TID       Review of Systems: Admission ROS by Jose David Calvin MD from 9/2/2017 were reviewed with the patient and changes (other than per HPI) include: none      Objective:     Physical Exam:  Visit Vitals    /79 (BP 1 Location: Right arm, BP Patient Position: Supine)    Pulse 74    Temp 98.9 °F (37.2 °C)    Resp 19    Ht 6' 2\" (1.88 m)    Wt 99.8 kg (220 lb)    SpO2 96%    BMI 28.25 kg/m2     SpO2 Readings from Last 6 Encounters:   09/03/17 96%    O2 Flow Rate (L/min): 2 l/min     Intake/Output Summary (Last 24 hours) at 09/03/17 1341  Last data filed at 09/03/17 1022   Gross per 24 hour   Intake             2440 ml   Output             1100 ml   Net             1340 ml      General: no distress, comfortable  Skin:  No rash or palpable dermatologic mass lesions  HEENT: Pupils equal, sclera anicteric, oropharynx with no gross lesions  Cardiovascular: No abnormal audible heart sounds, well perfused, no edema  Respiratory:  No abnormal audible breath sounds, normal respiratory effort, no throacic deformity  GI:   Abdomen nondistended, tender epig, no mass, no free fluid, no rebound or guarding. Musculoskeletal:  No skeletal deformity nor acute arthritis noted. Neurological:  Motor and sensory function intact in upper extremeties  Psychiatric:  Normal affect, memory intact, appears to have insight into current illness  Lymphatic:  No cervical, supraclavicular, or periumbilic lymphadenopathy    Laboratory:    Recent Results (from the past 24 hour(s))   EKG, 12 LEAD, INITIAL    Collection Time: 09/02/17  8:07 PM   Result Value Ref Range    Ventricular Rate 89 BPM    Atrial Rate 89 BPM    P-R Interval 174 ms    QRS Duration 84 ms    Q-T Interval 334 ms    QTC Calculation (Bezet) 406 ms    Calculated P Axis -15 degrees    Calculated R Axis 11 degrees    Calculated T Axis 5 degrees    Diagnosis       Normal sinus rhythm  Low voltage QRS  Septal infarct , age undetermined  Abnormal ECG  No previous ECGs available     TYPE & SCREEN    Collection Time: 09/02/17  8:17 PM   Result Value Ref Range    Crossmatch Expiration 09/05/2017     ABO/Rh(D) A POSITIVE     Antibody screen NEG    CBC WITH AUTOMATED DIFF    Collection Time: 09/02/17  8:17 PM   Result Value Ref Range    WBC 5.4 4.1 - 11.1 K/uL    RBC 4.59 4. 10 - 5.70 M/uL    HGB 16.9 12.1 - 17.0 g/dL    HCT 46.5 36.6 - 50.3 %    .3 (H) 80.0 - 99.0 FL    MCH 36.8 (H) 26.0 - 34.0 PG    MCHC 36.3 30.0 - 36.5 g/dL    RDW 12.4 11.5 - 14.5 %    PLATELET 241 440 - 400 K/uL    NEUTROPHILS 43 32 - 75 %    LYMPHOCYTES 41 12 - 49 %    MONOCYTES 10 5 - 13 %    EOSINOPHILS 5 0 - 7 %    BASOPHILS 1 0 - 1 %    ABS. NEUTROPHILS 2.4 1.8 - 8.0 K/UL    ABS. LYMPHOCYTES 2.2 0.8 - 3.5 K/UL    ABS. MONOCYTES 0.5 0.0 - 1.0 K/UL    ABS. EOSINOPHILS 0.3 0.0 - 0.4 K/UL    ABS. BASOPHILS 0.0 0.0 - 0.1 K/UL   METABOLIC PANEL, COMPREHENSIVE    Collection Time: 09/02/17  8:17 PM   Result Value Ref Range    Sodium 139 136 - 145 mmol/L    Potassium 3.6 3.5 - 5.1 mmol/L    Chloride 101 97 - 108 mmol/L    CO2 29 21 - 32 mmol/L    Anion gap 9 5 - 15 mmol/L    Glucose 133 (H) 65 - 100 mg/dL    BUN 5 (L) 6 - 20 MG/DL    Creatinine 0.93 0.70 - 1.30 MG/DL    BUN/Creatinine ratio 5 (L) 12 - 20      GFR est AA >60 >60 ml/min/1.73m2    GFR est non-AA >60 >60 ml/min/1.73m2    Calcium 8.6 8.5 - 10.1 MG/DL    Bilirubin, total 1.1 (H) 0.2 - 1.0 MG/DL    ALT (SGPT) 54 12 - 78 U/L    AST (SGOT) 52 (H) 15 - 37 U/L    Alk.  phosphatase 70 45 - 117 U/L    Protein, total 7.5 6.4 - 8.2 g/dL    Albumin 4.1 3.5 - 5.0 g/dL    Globulin 3.4 2.0 - 4.0 g/dL    A-G Ratio 1.2 1.1 - 2.2     LIPASE    Collection Time: 09/02/17  8:17 PM   Result Value Ref Range    Lipase 634 (H) 73 - 393 U/L   LACTIC ACID    Collection Time: 09/02/17  8:17 PM   Result Value Ref Range    Lactic acid 2.4 (HH) 0.4 - 2.0 MMOL/L   ETHYL ALCOHOL    Collection Time: 09/02/17  8:17 PM   Result Value Ref Range    ALCOHOL(ETHYL),SERUM 367 (H) <10 MG/DL   OCCULT BLOOD, STOOL    Collection Time: 09/02/17  8:17 PM   Result Value Ref Range    Occult blood, stool NEGATIVE  NEG     PROTHROMBIN TIME + INR    Collection Time: 09/02/17  8:17 PM   Result Value Ref Range    INR 1.0 0.9 - 1.1      Prothrombin time 9.9 9.0 - 11.1 sec   URINALYSIS W/ REFLEX CULTURE    Collection Time: 09/02/17  9:55 PM   Result Value Ref Range    Color YELLOW/STRAW      Appearance CLEAR CLEAR      Specific gravity 1.014 1.003 - 1.030      pH (UA) 7.0 5.0 - 8.0      Protein 30 (A) NEG mg/dL Glucose NEGATIVE  NEG mg/dL    Ketone NEGATIVE  NEG mg/dL    Bilirubin NEGATIVE  NEG      Blood TRACE (A) NEG      Urobilinogen 0.2 0.2 - 1.0 EU/dL    Nitrites NEGATIVE  NEG      Leukocyte Esterase NEGATIVE  NEG      WBC 0-4 0 - 4 /hpf    RBC 0-5 0 - 5 /hpf    Epithelial cells FEW FEW /lpf    Bacteria NEGATIVE  NEG /hpf    UA:UC IF INDICATED CULTURE NOT INDICATED BY UA RESULT CNI      Hyaline cast 0-2 0 - 5 /lpf   DRUG SCREEN, URINE    Collection Time: 09/02/17  9:55 PM   Result Value Ref Range    AMPHETAMINES NEGATIVE  NEG      BARBITURATES NEGATIVE  NEG      BENZODIAZEPINE NEGATIVE  NEG      COCAINE NEGATIVE  NEG      METHADONE NEGATIVE  NEG      OPIATES POSITIVE (A) NEG      PCP(PHENCYCLIDINE) NEGATIVE  NEG      THC (TH-CANNABINOL) POSITIVE (A) NEG      Drug screen comment (NOTE)    VITAMIN B12    Collection Time: 09/02/17 10:56 PM   Result Value Ref Range    Vitamin B12 305 211 - 911 pg/mL   LIPID PANEL    Collection Time: 09/02/17 10:56 PM   Result Value Ref Range    LIPID PROFILE          Cholesterol, total 211 (H) <200 MG/DL    Triglyceride 150 (H) <150 MG/DL    HDL Cholesterol 83 MG/DL    LDL, calculated 98 0 - 100 MG/DL    VLDL, calculated 30 MG/DL    CHOL/HDL Ratio 2.5 0 - 5.0     LD    Collection Time: 09/02/17 10:56 PM   Result Value Ref Range     85 - 241 U/L   IRON PROFILE    Collection Time: 09/02/17 10:56 PM   Result Value Ref Range    Iron 229 (H) 35 - 150 ug/dL    TIBC 267 250 - 450 ug/dL    Iron % saturation 86 (H) 20 - 50 %   HAPTOGLOBIN    Collection Time: 09/02/17 10:56 PM   Result Value Ref Range    Haptoglobin <8 (L) 30 - 200 mg/dL   FOLATE    Collection Time: 09/02/17 10:56 PM   Result Value Ref Range    Folate 6.8 5.0 - 21.0 ng/mL   FERRITIN    Collection Time: 09/02/17 10:56 PM   Result Value Ref Range    Ferritin 521 (H) 26 - 388 NG/ML   LACTIC ACID    Collection Time: 09/03/17  5:21 AM   Result Value Ref Range    Lactic acid 2.5 (HH) 0.4 - 2.0 MMOL/L   LACTIC ACID    Collection Time: 09/03/17 11:52 AM   Result Value Ref Range    Lactic acid 2.0 0.4 - 2.0 MMOL/L   CBC W/O DIFF    Collection Time: 09/03/17  1:13 PM   Result Value Ref Range    WBC 3.7 (L) 4.1 - 11.1 K/uL    RBC 3.81 (L) 4.10 - 5.70 M/uL    HGB 13.6 12.1 - 17.0 g/dL    HCT 37.7 36.6 - 50.3 %    MCV 99.0 80.0 - 99.0 FL    MCH 35.7 (H) 26.0 - 34.0 PG    MCHC 36.1 30.0 - 36.5 g/dL    RDW 12.3 11.5 - 14.5 %    PLATELET 95 (L) 401 - 400 K/uL         Assessment/Plan:     Principal Problem:    Dehydration (9/2/2017)    Active Problems:    Alcohol abuse ()      Nausea & vomiting (9/2/2017)      Syncope (9/2/2017)      Opioid abuse (9/2/2017)      Mild tetrahydrocannabinol (THC) abuse (9/2/2017)      Polysubstance abuse (9/2/2017)      Elevated lipase (9/2/2017)      Lactic acidemia (9/2/2017)      Hematemesis (9/2/2017)      NSAID long-term use (9/2/2017)      Weight loss (9/2/2017)         See above narrative for full detail.

## 2017-09-03 NOTE — PROGRESS NOTES
SHIFT REPORT:  1900- Bedside shift change report given to Yin Ospina RN (oncoming nurse) by Jose F Gonzalez RN (offgoing nurse). Report included the following information SBAR, Kardex, Procedure Summary, Intake/Output, Recent Results and Cardiac Rhythm. SHIFT SUMMARY:  1940-Valium 5 mg given  2120-Valium 5mg IV given  2255-Valium 5 mg IVP given  0010-Valium 5 mg IVP  0210-Valium 5 mg IVP  0530-up to BR w 2 assist, fairly stable once upright; med green loose stool in BR; back to bed; Morphine and valium given w stable B/P; still trying to be  Manipulative. .  END OF SHIFT REPORT:  0715-Bedside shift change report given to Monico Nina RN (oncoming nurse) by Yin Ospina RN (offgoing nurse). Report included the following information SBAR, Kardex, Procedure Summary, Intake/Output, Recent Results and Cardiac Rhythm .

## 2017-09-03 NOTE — ED TRIAGE NOTES
Patient arrives with family with c/o syncopal episode. Family states they found him on the floor. Patient states he was \"out for 10 minutes\". Patient states he has stomach cancer and has been vomiting blood.

## 2017-09-03 NOTE — PROGRESS NOTES
[facility]: MED RECONCILIATION    Significant PMH/Disease States:   Past Medical History:   Diagnosis Date    Alcohol abuse     Diverticulitis     Diverticulitis     Hypertension      Chief Complaint for this Admission:   Chief Complaint   Patient presents with    Vomiting Blood     Allergies: Review of patient's allergies indicates no known allergies. Prior to Admission Medications:     Medication Documentation Review Audit       Reviewed by Olivia Alvarez [facility] (Pharmacist) on 09/02/17 at 2233         Medication Sig Documenting Provider Last Dose Status Taking?      lisinopril (PRINIVIL, ZESTRIL) 10 mg tablet Take 10 mg by mouth daily. Historical Provider 9/2/2017 am Active Yes    propranolol (INDERAL) 10 mg tablet Take 20 mg by mouth three (3) times daily. Historical Provider  Active Yes                    Thank you,    Joey Tijerina.  Magdiel CondeBanner Thunderbird Medical Center

## 2017-09-03 NOTE — ED NOTES
TRANSFER - OUT REPORT:    Verbal report given to MIKE CARDOZO(name) on Shaheed Muñiz  being transferred to Telemetry 326(unit) for routine progression of care       Report consisted of patients Situation, Background, Assessment and   Recommendations(SBAR). Information from the following report(s) SBAR, Kardex, ED Summary, Intake/Output, MAR, Recent Results and Cardiac Rhythm SR was reviewed with the receiving nurse. Lines:   Peripheral IV 09/02/17 Left Antecubital (Active)   Site Assessment Clean, dry, & intact 9/2/2017  8:10 PM   Phlebitis Assessment 0 9/2/2017  8:10 PM   Infiltration Assessment 0 9/2/2017  8:10 PM   Hub Color/Line Status Green 9/2/2017  8:10 PM        Opportunity for questions and clarification was provided.       Patient transported with:   Monitor  O2 @ 2 liters  Registered Nurse

## 2017-09-03 NOTE — PROGRESS NOTES
Fran Burgoselsen [address] [place] [address]  [address] [facility], [address] [place] [address]  [phone]      Medical Progress Note      NAME: Alvaro Perkins   :  1965  MRM:  [id number]    Date/Time: 9/3/2017  7:49 AM         Subjective:     Chief Complaint:  Pain: upper abdomen, moderate to severe, constant, burning, associated with hiccups    ROS:  (bold if positive, if negative)                        Tolerating Diet          Objective:       Vitals:          Last 24hrs VS reviewed since prior progress note.  Most recent are:    Visit Vitals    /80 (BP 1 Location: Right arm, BP Patient Position: Supine)    Pulse 68    Temp 98.3 °F (36.8 °C)    Resp 17    Ht 6' 2\" (1.88 m)    Wt 99.8 kg (220 lb)    SpO2 95%    BMI 28.25 kg/m2     SpO2 Readings from Last 6 Encounters:   17 95%    O2 Flow Rate (L/min): 2 l/min     Intake/Output Summary (Last 24 hours) at 17 0749  Last data filed at 17 0308   Gross per 24 hour   Intake              960 ml   Output             1100 ml   Net             -140 ml          Exam:     Physical Exam:    Gen:  Well-developed, overrweight, in no acute distress  HEENT:  Pink conjunctivae, PERRL, hearing intact to voice, moist mucous membranes  Neck:  Supple, without masses, thyroid non-tender  Resp:  No accessory muscle use, clear breath sounds without wheezes rales or rhonchi  Card:  No murmurs, normal S1, S2 without thrills, bruits or peripheral edema  Abd:  Soft, diffusely tender, non-distended, normoactive bowel sounds are present, no palpable organomegaly and no detectable hernias  Lymph:  No cervical or inguinal adenopathy  Musc:  No cyanosis or clubbing  Skin:  No rashes or ulcers, skin turgor is good  Neuro:  Cranial nerves are grossly intact, no focal motor weakness, follows commands appropriately  Psych:  Good insight, oriented to person, place and time, alert       Telemetry reviewed:   normal sinus rhythm    Medications Reviewed: (see below)    Lab Data Reviewed: (see below)    ______________________________________________________________________    Medications:     Current Facility-Administered Medications   Medication Dose Route Frequency    dextrose 5% - 0.45% NaCl with KCl 20 mEq/L infusion  125 mL/hr IntraVENous CONTINUOUS    diphenhydrAMINE (BENADRYL) capsule 25 mg  25 mg Oral Q4H PRN    nicotine (NICODERM CQ) 21 mg/24 hr patch 1 Patch  1 Patch TransDERmal DAILY    0.9% sodium chloride infusion  100 mL/hr IntraVENous CONTINUOUS    morphine injection 2 mg  2 mg IntraVENous Q4H PRN    chlorproMAZINE (THORAZINE) tablet 25 mg  25 mg Oral Q6H PRN    sodium chloride (NS) flush 5-10 mL  5-10 mL IntraVENous Q8H    sodium chloride (NS) flush 5-10 mL  5-10 mL IntraVENous PRN    naloxone (NARCAN) injection 0.4 mg  0.4 mg IntraVENous PRN    acetaminophen (TYLENOL) tablet 650 mg  650 mg Oral Q4H PRN    ondansetron (ZOFRAN) injection 4 mg  4 mg IntraVENous Q4H PRN    diazePAM (VALIUM) injection 10 mg  10 mg IntraVENous Q1H PRN    diazePAM (VALIUM) injection 5 mg  5 mg IntraVENous Q1H PRN    0.9% sodium chloride 9,206 mL with folic acid 1 mg, thiamine 100 mg, mvi, adult no. 4 with vit K 10 mL infusion   IntraVENous DAILY    pantoprazole (PROTONIX) 40 mg in sodium chloride 0.9 % 10 mL injection  40 mg IntraVENous Q12H    cloNIDine HCl (CATAPRES) tablet 0.1 mg  0.1 mg Oral BID    propranolol (INDERAL) tablet 20 mg  20 mg Oral TID            Lab Review:     Recent Labs      09/02/17 2017   WBC  5.4   HGB  16.9   HCT  46.5   PLT  164     Recent Labs      09/02/17 2017   NA  139   K  3.6   CL  101   CO2  29   GLU  133*   BUN  5*   CREA  0.93   CA  8.6   ALB  4.1   TBILI  1.1*   SGOT  52*   ALT  54   INR  1.0     No results found for: GLUCPOC  No results for input(s): PH, PCO2, PO2, HCO3, FIO2 in the last 72 hours.   Recent Labs      09/02/17 2017   INR  1.0     No results found for: SDES  No results found for: CULT         Assessment: Principal Problem:    Dehydration (9/2/2017)    Active Problems:    Alcohol abuse ()      Nausea & vomiting (9/2/2017)      Syncope (9/2/2017)      Opioid abuse (9/2/2017)      Mild tetrahydrocannabinol (THC) abuse (9/2/2017)      Polysubstance abuse (9/2/2017)      Elevated lipase (9/2/2017)      Lactic acidemia (9/2/2017)      Hematemesis (9/2/2017)      NSAID long-term use (9/2/2017)      Weight loss (9/2/2017)           Plan:     Principal Problem:    Dehydration (9/2/2017)/Nausea & vomiting (9/2/2017)/Syncope (9/2/2017)/Hematemesis (9/2/2017)/Lactic acidemia (9/2/2017)/Elevated lipase (9/2/2017)/NSAID long-term use (9/2/2017)   - combination of alcohol abuse and NSAID use   - likely has underlying PUD related to both   - continue hydration   - add thorazine PRN for hiccups   - increased IV morphine for pain   - continue IV PPI   - GI tosee    Active Problems:    Alcohol abuse ()/Opioid abuse (9/2/2017)/Mild tetrahydrocannabinol (THC) abuse (9/2/2017)/Polysubstance abuse (9/2/2017)   - counseled on cessation   - follow on CIWA protocol, no signs of withdrawal so far   - at high risk for DTs      Weight loss (9/2/2017)   - Nutrition consult   - due to alcohol abuse, etc      Total time spent with patient: 35 minutes                  Care Plan discussed with: Patient, Care Manager and Nursing Staff    Discussed:  Code Status, Care Plan and D/C Planning    Prophylaxis:  SCD's    Disposition:  Home w/Family           ___________________________________________________    Attending Physician: Jesus Brannon MD

## 2017-09-03 NOTE — H&P
[address]  [phone]    Hospitalist Admission Note      NAME:  Aric Rust   :   1965   MRN:  [id number]     PCP:  Bernie Nur MD     Date/Time:  2017 11:23 PM          Subjective:     CHIEF COMPLAINT: pain    HISTORY OF PRESENT ILLNESS:     Mr. Karin Martin is a 47 y.o.  male who presented to the Emergency Department complaining of pain. He is a vague historian, as he is intoxicated. Girlfriend is present, but provides no hx. He vaguely recalls a month of weight loss, a week of abdominal pain, days of vomiting red blood. He has used Aleve and alcohol and THC for the symptoms, without relief. His friend found him in pain on the floor nearly passed out. He has a hx of alcoholism, with a hx of DTs in the past.  In our ER he appears dehydrated, hemoccult is negative, lactic acid and lipase minimally elevated, alcohol level >350, CT abd unremarkable. We will admit him for observation. Past Medical History:   Diagnosis Date    Alcohol abuse     Diverticulitis     Diverticulitis     Hypertension         Past Surgical History:   Procedure Laterality Date    HX COLECTOMY         Social History   Substance Use Topics    Smoking status: Never Smoker    Smokeless tobacco: Current User      Comment: dip    Alcohol use Yes      Comment: heavy liquor use daily        History reviewed. No pertinent family history. No Known Allergies     Prior to Admission medications    Medication Sig Start Date End Date Taking? Authorizing Provider   lisinopril (PRINIVIL, ZESTRIL) 10 mg tablet Take 10 mg by mouth daily. Yes Historical Provider   propranolol (INDERAL) 10 mg tablet Take 20 mg by mouth three (3) times daily.    Yes Historical Provider       Review of Systems:  (bold if positive, if negative)    Gen:  weight lossfatigueEyes:  ENT:  CVS:   dizziness, syncopePulm:  Cough, dyspneaGI:  Abdominal pain, nausea, emesis,  :    MS:  PainarthritisSkin:  Psych:   depression, anxiety, Endo:    Hem:  Renal:    Neuro:  weakness      Objective:      VITALS:    Vital signs reviewed; most recent are:    Visit Vitals    BP (!) 153/93    Pulse 84    Temp 98.8 °F (37.1 °C)    Resp 18    Ht 6' 2\" (1.88 m)    Wt 99.8 kg (220 lb)    SpO2 96%    BMI 28.25 kg/m2     SpO2 Readings from Last 6 Encounters:   09/02/17 96%    O2 Flow Rate (L/min): 2 l/min     Intake/Output Summary (Last 24 hours) at 09/02/17 0795  Last data filed at 09/02/17 2201   Gross per 24 hour   Intake                0 ml   Output              600 ml   Net             -600 ml        Exam:     Physical Exam:    Gen:  Well-developed, well-nourished, in mild acute distress  HEENT:  Pink conjunctivae, PERRL, hearing intact to voice, dry mucous membranes  Neck:  Supple, without masses, thyroid non-tender  Resp:  No accessory muscle use, clear breath sounds without wheezes rales or rhonchi  Card:  No murmurs, tachycardic S1, S2 without thrills, bruits or peripheral edema  Abd:  Soft, minimally tender, non-distended, normoactive bowel sounds are present, no mass  Lymph:  No cervical or inguinal adenopathy  Musc:  No cyanosis or clubbing  Skin:  No rashes or ulcers, skin turgor is reduced  Neuro:  Cranial nerves are grossly intact, mild motor weakness, follows commands vaguely  Psych:  Poor insight, oriented to person, place, tangential, evasive     Labs:    Recent Labs      09/02/17 2017   WBC  5.4   HGB  16.9   HCT  46.5   PLT  164     Recent Labs      09/02/17 2017   NA  139   K  3.6   CL  101   CO2  29   GLU  133*   BUN  5*   CREA  0.93   CA  8.6   ALB  4.1   TBILI  1.1*   SGOT  52*   ALT  54     No results found for: GLUCPOC  No results for input(s): PH, PCO2, PO2, HCO3, FIO2 in the last 72 hours.   Recent Labs      09/02/17 2017   INR  1.0     All Micro Results     None          I have reviewed previous records       Assessment and Plan:      Dehydration / Nausea & vomiting / Syncope / Lactic acidemia - POA, due to poor po intake of non-alcoholic beverages, plus vomiting, exacerbated by alcohol. Monitor tele, but I doubt arrhythmia. Hydrate and follow labs. I do not think he has sepsis. Alcohol abuse - POA, he is acutely intoxicated. Low risk of DTs today. But start CIWA protocol in case he stays longer than 24hr. Goody bag. Check serologies. I strongly and bluntly advised him he will face death within a few years at the rate he drinks. Hematemesis / NSAID long-term use - He claims hematemesis, but hemoccult was negative, and HB is actually concentrated. GI consult. PPI. Clear diet. Hold NSAIDS. I do not know if GI will think urgent inpatient EGD is needed, or rather just discharge on PPI with advice to stop alcohol and NSAIDS. Polysubstance abuse / Opioid abuse / Mild tetrahydrocannabinol (THC) abuse - He admits alcohol and THC, though he denies opioids. All tested positive. Check hepatitis panel. Advised cessation and psychiatric counseling. Elevated lipase / Abd pain - Mild, and I think due to dehydration, alcohol and vomiting, but I do not think this is acute pancreatitis, based on negative CT and pain he describes as in his lower anterior abdomen. Weight loss - I do not trust the weight hx given by the patient, but if true, could be from alcoholism, vs cancer, vs other. PCP to follow. Needs routine cancer screenings. Had prior large bowel partial resection.     Telemetry reviewed:   normal sinus rhythm    Risk of deterioration: high      Total time spent with patient: 79 895 [address] discussed with: Patient, Family, Nursing Staff and >50% of time spent in counseling and coordination of care    Discussed:  Care Plan       ___________________________________________________    Attending Physician: Jose David Calvin MD

## 2017-09-03 NOTE — ED NOTES
Initial lactic acid 2.4, per protocol, order for redraw was placed  Verbal order from Dr. Maddie Yates to not redraw lactic now, and it will be ordered for am labs

## 2017-09-03 NOTE — ED NOTES
Discussed with Dr. Luisito Arauz being admitted to telemetry in regards to elevated CIWA   Per Dr. Luisito Arauz, patient is telemetry appropriate  Verbal order received for 0.5 mg IV ativan now

## 2017-09-03 NOTE — ED NOTES
CIWA 12  Lactic 2.4     Dr. Joseph Osorio notified    9:22 PM  Order was placed for 1 mg ativan and additional 1L normal saline bolus

## 2017-09-03 NOTE — ED PROVIDER NOTES
HPI Comments: 47 y.o. male with past medical hx significant for diverticulitis, partial colectomy presents from home accompanied by friends with chief complaint of hematemesis. Per friend, pt was on the second story of their home at which time he overheard the pt hiccuping loudly. The friend went upstairs to check on him at noticed that pt had \"passed out\". Friends deonna pt to ED. While in ED, pt reports that he has been vomiting blood x \"3-4 days\", today of which was most severe. He notes that the blood is red. Pt also states that he has been \"coughing up black stuff\". Pt further c/o dizziness, abdominal pain and weakness. He has not had a bowel movement in \"a couple days\" but denies blood in stool with previous movements. Pt states that his primarily care physician suspects that he has stomach cancer but that it has not yet been confirmed. He reports the moderate use of Aleve, is a smoker and consumes EtOH daily. Pt notes hx of DTs. Pt denies any other acute medical concerns at this time. Social hx: Lives with friends.  + Smoker, + daily EtOH  PCP: Grzegorz Jones MD  No oncologist.     Note written by Wil Armijo, as dictated by Marivel Mcintyre MD 8:22 PM    The history is provided by the patient and a friend. No  was used. No past medical history on file. No past surgical history on file. No family history on file. Social History     Social History    Marital status: SINGLE     Spouse name: N/A    Number of children: N/A    Years of education: N/A     Occupational History    Not on file. Social History Main Topics    Smoking status: Not on file    Smokeless tobacco: Not on file    Alcohol use Not on file    Drug use: Not on file    Sexual activity: Not on file     Other Topics Concern    Not on file     Social History Narrative         ALLERGIES: Review of patient's allergies indicates no known allergies.     Review of Systems Constitutional: Negative for fever. HENT: Negative for sore throat and trouble swallowing. Respiratory: Positive for cough (productive \"black stuff\"). Negative for shortness of breath. Cardiovascular: Negative for chest pain. Gastrointestinal: Positive for abdominal pain, constipation and vomiting (blood). Negative for anal bleeding, blood in stool, diarrhea and nausea. Genitourinary: Negative for hematuria. Neurological: Positive for dizziness, syncope and weakness. Negative for headaches. Psychiatric/Behavioral: The patient is not nervous/anxious. Vitals:    09/02/17 2001 09/02/17 2009 09/02/17 2015   BP:   (!) 152/114   Pulse: 88  84   Resp: 20  12   Temp: 97.7 °F (36.5 °C)     SpO2: 96% 95% 94%   Weight: 99.8 kg (220 lb)     Height: 6' 2\" (1.88 m)              Physical Exam   Constitutional: He is oriented to person, place, and time. He appears well-developed and well-nourished. He appears ill (moderately). No distress. HENT:   Head: Normocephalic and atraumatic. Eyes: Conjunctivae are normal. No scleral icterus. Neck: Neck supple. No tracheal deviation present. Cardiovascular: Normal rate, regular rhythm, normal heart sounds and intact distal pulses. Exam reveals no gallop and no friction rub. No murmur heard. Pulmonary/Chest: Effort normal and breath sounds normal. He has no wheezes. He has no rales. Abdominal: Soft. He exhibits no distension. There is tenderness in the epigastric area. There is no rebound and no guarding. Genitourinary:   Genitourinary Comments: Rectal exam: brown stool   Musculoskeletal: He exhibits no edema. Neurological: He is alert and oriented to person, place, and time. Skin: Skin is warm and dry. No rash noted. Psychiatric: He exhibits a depressed mood. Nursing note and vitals reviewed.    Note written by Wil Hardin, as dictated by Benny Khalil MD 8:29 PM    [facility]  ED Course       Procedures    ED EKG interpretation:  Rhythm: normal sinus rhythm; and regular . Rate (approx.): 89; Septal Q waves. T wave: normal.  Note written by Wil Santana, as dictated by Ferd Mohs, MD 8:20 PM    Consult note: Dr. Cecilia Davidson - will admit. Ferd Mohs, MD  10:23 PM    A/P: presents after a syncopal event with complaints of hematemesis for several days - unclear if this is accurate ahas he has been hypertensive and his Hgb is 17; he admits to heavy alcohol use with a hx of withdrawal requiring an ICU stay per girlfriend; alcohol level is 367; other lab abnormalities include a lipase of > 600, lactate of 2.4. I feel admission indicated based on hx of alcohol withdrawal, HTN, CIWA of 12, tremulousness requiring ativan in ED. Ferd Mohs, MD  10:27 PM    Total critical care time spend exclusive of procedures: 35 min.   Ferd Mohs, MD  10:27 PM

## 2017-09-03 NOTE — PROGRESS NOTES
TRANSFER - IN REPORT:    Verbal report received from Trav Bonds (name) on Jose Angel Noonan  being received from ED(unit) for routine progression of care      Report consisted of patients Situation, Background, Assessment and   Recommendations(SBAR). Information from the following report(s) SBAR, Kardex, [facility] and Recent Results was reviewed with the receiving nurse. Opportunity for questions and clarification was provided. Assessment completed upon patients arrival to unit and care assumed. Primary Nurse Didier Bonilla RN and Doni Powell RN performed a dual skin assessment on this patient No impairment noted, except some scratches and faded bruises on BLE. Patient has an old scab on right posterior elbow. Parker score is 18    [id number]  Spoke with Dr. Cecilia Davidson about lactic acid 2.5. Orders given for IVF @ 100.  0730  Bedside and Verbal shift change report given to April (oncoming nurse) by Katie Crews (offgoing nurse). Report included the following information SBAR, Kardex, MAR and Recent Results.

## 2017-09-04 ENCOUNTER — APPOINTMENT (OUTPATIENT)
Dept: ULTRASOUND IMAGING | Age: 52
DRG: 381 | End: 2017-09-04
Attending: INTERNAL MEDICINE
Payer: SELF-PAY

## 2017-09-04 PROBLEM — R74.8 ELEVATED LIPASE: Status: RESOLVED | Noted: 2017-09-02 | Resolved: 2017-09-04

## 2017-09-04 PROBLEM — E87.20 LACTIC ACIDEMIA: Status: RESOLVED | Noted: 2017-09-02 | Resolved: 2017-09-04

## 2017-09-04 PROBLEM — F10.939 ALCOHOL WITHDRAWAL (HCC): Status: ACTIVE | Noted: 2017-09-04

## 2017-09-04 PROBLEM — R11.2 NAUSEA & VOMITING: Status: RESOLVED | Noted: 2017-09-02 | Resolved: 2017-09-04

## 2017-09-04 PROBLEM — E86.0 DEHYDRATION: Status: RESOLVED | Noted: 2017-09-02 | Resolved: 2017-09-04

## 2017-09-04 LAB
ATRIAL RATE: 89 BPM
CALCULATED P AXIS, ECG09: -15 DEGREES
CALCULATED R AXIS, ECG10: 11 DEGREES
CALCULATED T AXIS, ECG11: 5 DEGREES
DIAGNOSIS, 93000: NORMAL
LIPASE SERPL-CCNC: 134 U/L (ref 73–393)
P-R INTERVAL, ECG05: 174 MS
Q-T INTERVAL, ECG07: 334 MS
QRS DURATION, ECG06: 84 MS
QTC CALCULATION (BEZET), ECG08: 406 MS
VENTRICULAR RATE, ECG03: 89 BPM

## 2017-09-04 PROCEDURE — 74011250636 HC RX REV CODE- 250/636: Performed by: INTERNAL MEDICINE

## 2017-09-04 PROCEDURE — 99218 HC RM OBSERVATION: CPT

## 2017-09-04 PROCEDURE — 74011000250 HC RX REV CODE- 250: Performed by: INTERNAL MEDICINE

## 2017-09-04 PROCEDURE — C9113 INJ PANTOPRAZOLE SODIUM, VIA: HCPCS | Performed by: INTERNAL MEDICINE

## 2017-09-04 PROCEDURE — 65660000000 HC RM CCU STEPDOWN

## 2017-09-04 PROCEDURE — 76700 US EXAM ABDOM COMPLETE: CPT

## 2017-09-04 PROCEDURE — 74011250637 HC RX REV CODE- 250/637: Performed by: INTERNAL MEDICINE

## 2017-09-04 RX ORDER — MAGNESIUM SULFATE HEPTAHYDRATE 40 MG/ML
2 INJECTION, SOLUTION INTRAVENOUS
Status: COMPLETED | OUTPATIENT
Start: 2017-09-04 | End: 2017-09-04

## 2017-09-04 RX ORDER — PHENOBARBITAL 32.4 MG/1
64.8 TABLET ORAL EVERY 6 HOURS
Status: DISCONTINUED | OUTPATIENT
Start: 2017-09-04 | End: 2017-09-05

## 2017-09-04 RX ADMIN — DEXTROSE MONOHYDRATE, SODIUM CHLORIDE, AND POTASSIUM CHLORIDE 125 ML/HR: 50; 4.5; 1.49 INJECTION, SOLUTION INTRAVENOUS at 02:01

## 2017-09-04 RX ADMIN — DIAZEPAM 5 MG: 5 INJECTION, SOLUTION INTRAMUSCULAR; INTRAVENOUS at 14:09

## 2017-09-04 RX ADMIN — DIAZEPAM 10 MG: 5 INJECTION, SOLUTION INTRAMUSCULAR; INTRAVENOUS at 22:40

## 2017-09-04 RX ADMIN — MORPHINE SULFATE 2 MG: 4 INJECTION, SOLUTION INTRAMUSCULAR; INTRAVENOUS at 16:25

## 2017-09-04 RX ADMIN — PHENOBARBITAL 64.8 MG: 32.4 TABLET ORAL at 20:39

## 2017-09-04 RX ADMIN — DIAZEPAM 5 MG: 5 INJECTION, SOLUTION INTRAMUSCULAR; INTRAVENOUS at 16:25

## 2017-09-04 RX ADMIN — SODIUM CHLORIDE 40 MG: 9 INJECTION INTRAMUSCULAR; INTRAVENOUS; SUBCUTANEOUS at 08:46

## 2017-09-04 RX ADMIN — PROPRANOLOL HYDROCHLORIDE 20 MG: 10 TABLET ORAL at 21:11

## 2017-09-04 RX ADMIN — MORPHINE SULFATE 2 MG: 4 INJECTION, SOLUTION INTRAMUSCULAR; INTRAVENOUS at 12:06

## 2017-09-04 RX ADMIN — SODIUM CHLORIDE 40 MG: 9 INJECTION INTRAMUSCULAR; INTRAVENOUS; SUBCUTANEOUS at 20:40

## 2017-09-04 RX ADMIN — DIAZEPAM 5 MG: 5 INJECTION, SOLUTION INTRAMUSCULAR; INTRAVENOUS at 00:08

## 2017-09-04 RX ADMIN — PROPRANOLOL HYDROCHLORIDE 20 MG: 10 TABLET ORAL at 16:25

## 2017-09-04 RX ADMIN — CLONIDINE HYDROCHLORIDE 0.1 MG: 0.1 TABLET ORAL at 08:45

## 2017-09-04 RX ADMIN — CLONIDINE HYDROCHLORIDE 0.1 MG: 0.1 TABLET ORAL at 18:54

## 2017-09-04 RX ADMIN — MORPHINE SULFATE 2 MG: 4 INJECTION, SOLUTION INTRAMUSCULAR; INTRAVENOUS at 23:25

## 2017-09-04 RX ADMIN — DIAZEPAM 5 MG: 5 INJECTION, SOLUTION INTRAMUSCULAR; INTRAVENOUS at 21:13

## 2017-09-04 RX ADMIN — MAGNESIUM SULFATE HEPTAHYDRATE 2 G: 40 INJECTION, SOLUTION INTRAVENOUS at 08:45

## 2017-09-04 RX ADMIN — DIAZEPAM 5 MG: 5 INJECTION, SOLUTION INTRAMUSCULAR; INTRAVENOUS at 08:46

## 2017-09-04 RX ADMIN — Medication 10 ML: at 21:15

## 2017-09-04 RX ADMIN — DIAZEPAM 5 MG: 5 INJECTION, SOLUTION INTRAMUSCULAR; INTRAVENOUS at 18:54

## 2017-09-04 RX ADMIN — PHENOBARBITAL 64.8 MG: 32.4 TABLET ORAL at 14:09

## 2017-09-04 RX ADMIN — PROPRANOLOL HYDROCHLORIDE 20 MG: 10 TABLET ORAL at 08:45

## 2017-09-04 RX ADMIN — MAGNESIUM SULFATE HEPTAHYDRATE 2 G: 40 INJECTION, SOLUTION INTRAVENOUS at 09:49

## 2017-09-04 RX ADMIN — DIAZEPAM 5 MG: 5 INJECTION, SOLUTION INTRAMUSCULAR; INTRAVENOUS at 12:06

## 2017-09-04 RX ADMIN — Medication 10 ML: at 16:25

## 2017-09-04 RX ADMIN — FOLIC ACID: 5 INJECTION, SOLUTION INTRAMUSCULAR; INTRAVENOUS; SUBCUTANEOUS at 18:54

## 2017-09-04 RX ADMIN — MORPHINE SULFATE 2 MG: 4 INJECTION, SOLUTION INTRAMUSCULAR; INTRAVENOUS at 05:46

## 2017-09-04 RX ADMIN — DIAZEPAM 5 MG: 5 INJECTION, SOLUTION INTRAMUSCULAR; INTRAVENOUS at 05:45

## 2017-09-04 RX ADMIN — MORPHINE SULFATE 2 MG: 4 INJECTION, SOLUTION INTRAMUSCULAR; INTRAVENOUS at 08:52

## 2017-09-04 RX ADMIN — PHENOBARBITAL 64.8 MG: 32.4 TABLET ORAL at 08:45

## 2017-09-04 RX ADMIN — DIAZEPAM 5 MG: 5 INJECTION, SOLUTION INTRAMUSCULAR; INTRAVENOUS at 01:59

## 2017-09-04 RX ADMIN — DEXTROSE MONOHYDRATE, SODIUM CHLORIDE, AND POTASSIUM CHLORIDE 125 ML/HR: 50; 4.5; 1.49 INJECTION, SOLUTION INTRAVENOUS at 09:49

## 2017-09-04 NOTE — PROGRESS NOTES
Bedside and Verbal shift change report given to [facility] (oncoming nurse) by Juaquin RN (offgoing nurse). Report included the following information SBAR, Kardex, Procedure Summary, Intake/Output, MAR, Accordion, Recent Results, Med Rec Status and Cardiac Rhythm sinus rhythm. Ramiroter informed me that patient stated \"I think I have colon cancer and no one wants to tell me\"  He appears to be paranoid as well as anxious. Bedside and Verbal shift change report given to [address] [place] [address] (oncoming nurse) by Violetta Essex RN (offgoing nurse). Report included the following information SBAR, Kardex, Procedure Summary, Intake/Output, MAR, Accordion, Recent Results, Med Rec Status and Cardiac Rhythm Sinus rhythm.

## 2017-09-04 NOTE — PROGRESS NOTES
Lucy Cast. Kanu Carter MD  [phone] office  [phone] voicemail     Gastroenterology Progress Note    September 4, 2017  Admit Date: 9/2/2017         Narrative Assessment and Plan   · Abdominal pain - now he reports his pain in LLQ. When I queried him about change in location he admonished me its always been in the lower abdomen which is contrary to what he advised yesterday. · Ethanol abuse and withdrawal  · History of diverticulitis requiring resection    He's had BM, no blood   Would continue PPI, and I failed to order ultrasound yesterday but Dr. Adis Hernandez has corrected my mistake  If ultrasound normal and he continues to complain of pain will need to consider endoscopic study. This is not scheduled as of yet, would like that he complete his ethanol withdrawal first.    Subjective:   · I'm still hurting doc    Location:  LLQ  Duration: days  Timing: constant  Radiation: none   Associated Symptoms: no vomiting no bleeding soft/liquid stool x1 since yesterday  Aggravating/Alleviating factors:     ROS:  The previous review of systems on initial consultation / H&P is noted and reviewed. Specific changes noted above in HPI.     Current Medications:     Current Facility-Administered Medications   Medication Dose Route Frequency    PHENobarbital (LUMINAL) tablet 64.8 mg  64.8 mg Oral Q6H    dextrose 5% - 0.45% NaCl with KCl 20 mEq/L infusion  125 mL/hr IntraVENous CONTINUOUS    diphenhydrAMINE (BENADRYL) capsule 25 mg  25 mg Oral Q4H PRN    nicotine (NICODERM CQ) 21 mg/24 hr patch 1 Patch  1 Patch TransDERmal DAILY    0.9% sodium chloride infusion  100 mL/hr IntraVENous CONTINUOUS    morphine injection 2 mg  2 mg IntraVENous Q4H PRN    chlorproMAZINE (THORAZINE) tablet 25 mg  25 mg Oral Q6H PRN    sodium chloride (NS) flush 5-10 mL  5-10 mL IntraVENous Q8H    sodium chloride (NS) flush 5-10 mL  5-10 mL IntraVENous PRN    naloxone (NARCAN) injection 0.4 mg  0.4 mg IntraVENous PRN  acetaminophen (TYLENOL) tablet 650 mg  650 mg Oral Q4H PRN    ondansetron (ZOFRAN) injection 4 mg  4 mg IntraVENous Q4H PRN    diazePAM (VALIUM) injection 10 mg  10 mg IntraVENous Q1H PRN    diazePAM (VALIUM) injection 5 mg  5 mg IntraVENous Q1H PRN    0.9% sodium chloride 9,976 mL with folic acid 1 mg, thiamine 100 mg, mvi, adult no. 4 with vit K 10 mL infusion   IntraVENous DAILY    pantoprazole (PROTONIX) 40 mg in sodium chloride 0.9 % 10 mL injection  40 mg IntraVENous Q12H    cloNIDine HCl (CATAPRES) tablet 0.1 mg  0.1 mg Oral BID    propranolol (INDERAL) tablet 20 mg  20 mg Oral TID       Objective:     VITALS:   Last 24hrs VS reviewed since prior progress note. Most recent are:  Visit Vitals    /90 (BP 1 Location: Left arm, BP Patient Position: Sitting; At rest)    Pulse 65    Temp 98.8 °F (37.1 °C)    Resp 21    Ht 6' 2\" (1.88 m)    Wt 99.8 kg (220 lb)    SpO2 95%    BMI 28.25 kg/m2     Temp (24hrs), Av.4 °F (36.9 °C), Min:97.8 °F (36.6 °C), Max:98.8 °F (37.1 °C)      Intake/Output Summary (Last 24 hours) at 17 1252  Last data filed at 17 9856   Gross per 24 hour   Intake          1620.83 ml   Output             3051 ml   Net         -1430.17 ml       EXAM:  General:  Comfortable, no distress    HEENT:  Atraumatic skull, pupils equal  Lungs:   No abnormal audible breath sounds. Speaking in complete sentences  Heart:   No abnormal audible heart sounds. Well perfused  Abdomen:   Nondistended, mild tender in LLQ; but today I cannot elicit tenderness in epigastrum.   No mass, guarding or rebound  Musc:   No skeletal defects or deformities  Neurologic:   Cranial nerves grossly intact, moves all 4 extremities  Psych:    Good insight. Not anxious nor agitated  Derm:   No rash, jaundice, nor palpable dermatologic mass    Lab Data Reviewed:   Recent Labs      17   1313  17   WBC  3.7*  5.4   HGB  13.6  16.9   HCT  37.7  46.5   PLT  95*  164     Recent Labs 09/03/17   1313  09/02/17 2017   NA  137  139   K  3.8  3.6   CL  103  101   CO2  27  29   GLU  111*  133*   BUN  5*  5*   CREA  0.98  0.93   CA  7.7*  8.6   MG  1.4*   --    PHOS  2.9   --    ALB  3.2*  4.1   TBILI  1.7*  1.1*   SGOT  41*  52*   ALT  43  54   INR   --   1.0     No results found for: GLUCPOC  No results for input(s): PH, PCO2, PO2, HCO3, FIO2 in the last 72 hours.   Recent Labs      09/02/17 2017   INR  1.0           Assessment:   (See above)  Principal Problem:    Alcohol withdrawal ([facility] [address] (9/4/2017)    Active Problems:    Alcohol abuse ()      Syncope (9/2/2017)      Opioid abuse (9/2/2017)      Mild tetrahydrocannabinol (THC) abuse (9/2/2017)      Polysubstance abuse (9/2/2017)      Hematemesis (9/2/2017)      NSAID long-term use (9/2/2017)      Weight loss (9/2/2017)        Plan:   (See above)      Signed By: Vinny Marino MD     9/4/2017  12:52 PM

## 2017-09-04 NOTE — PROGRESS NOTES
[facility] [address]  [address], [place], [address] [facility]  [phone]      Medical Progress Note      NAME: Leon Galvin   :  1965  MRM:  [id number]    Date/Time: 2017  7:47 AM          Subjective:     Chief Complaint:  Pain: upper abdomen, moderate to severe, constant, burning, was associated with hiccups but those are better, pain was better last night, seems to be back this AM    ROS:  (bold if positive, if negative)                        Tolerating Diet          Objective:       Vitals:          Last 24hrs VS reviewed since prior progress note.  Most recent are:    Visit Vitals    BP (!) 107/94 (BP 1 Location: Right arm, BP Patient Position: At rest)    Pulse 71    Temp 98.7 °F (37.1 °C)    Resp 20    Ht 6' 2\" (1.88 m)    Wt 99.8 kg (220 lb)    SpO2 97%    BMI 28.25 kg/m2     SpO2 Readings from Last 6 Encounters:   17 97%    O2 Flow Rate (L/min): 2 l/min       Intake/Output Summary (Last 24 hours) at 17 0746  Last data filed at 17 0594   Gross per 24 hour   Intake          3100.83 ml   Output             3051 ml   Net            49.83 ml          Exam:     Physical Exam:    Gen:  Well-developed, overrweight, in no acute distress  HEENT:  Pink conjunctivae, PERRL, hearing intact to voice, moist mucous membranes  Neck:  Supple, without masses, thyroid non-tender  Resp:  No accessory muscle use, clear breath sounds without wheezes rales or rhonchi  Card:  No murmurs, normal S1, S2 without thrills, bruits or peripheral edema  Abd:  Soft, diffusely tender, non-distended, normoactive bowel sounds are present, no palpable organomegaly and no detectable hernias  Lymph:  No cervical or inguinal adenopathy  Musc:  No cyanosis or clubbing  Skin:  No rashes or ulcers, skin turgor is good  Neuro:  Cranial nerves are grossly intact, no focal motor weakness, follows commands appropriately  Psych:  Good insight, oriented to person, place and time, alert       Telemetry reviewed:   normal sinus rhythm    Medications Reviewed: (see below)    Lab Data Reviewed: (see below)    ______________________________________________________________________    Medications:     Current Facility-Administered Medications   Medication Dose Route Frequency    magnesium sulfate 2 g/50 ml IVPB (premix or compounded)  2 g IntraVENous Q1H    PHENobarbital (LUMINAL) tablet 64.8 mg  64.8 mg Oral Q6H    dextrose 5% - 0.45% NaCl with KCl 20 mEq/L infusion  125 mL/hr IntraVENous CONTINUOUS    diphenhydrAMINE (BENADRYL) capsule 25 mg  25 mg Oral Q4H PRN    nicotine (NICODERM CQ) 21 mg/24 hr patch 1 Patch  1 Patch TransDERmal DAILY    0.9% sodium chloride infusion  100 mL/hr IntraVENous CONTINUOUS    morphine injection 2 mg  2 mg IntraVENous Q4H PRN    chlorproMAZINE (THORAZINE) tablet 25 mg  25 mg Oral Q6H PRN    sodium chloride (NS) flush 5-10 mL  5-10 mL IntraVENous Q8H    sodium chloride (NS) flush 5-10 mL  5-10 mL IntraVENous PRN    naloxone (NARCAN) injection 0.4 mg  0.4 mg IntraVENous PRN    acetaminophen (TYLENOL) tablet 650 mg  650 mg Oral Q4H PRN    ondansetron (ZOFRAN) injection 4 mg  4 mg IntraVENous Q4H PRN    diazePAM (VALIUM) injection 10 mg  10 mg IntraVENous Q1H PRN    diazePAM (VALIUM) injection 5 mg  5 mg IntraVENous Q1H PRN    0.9% sodium chloride 0,330 mL with folic acid 1 mg, thiamine 100 mg, mvi, adult no. 4 with vit K 10 mL infusion   IntraVENous DAILY    pantoprazole (PROTONIX) 40 mg in sodium chloride 0.9 % 10 mL injection  40 mg IntraVENous Q12H    cloNIDine HCl (CATAPRES) tablet 0.1 mg  0.1 mg Oral BID    propranolol (INDERAL) tablet 20 mg  20 mg Oral TID            Lab Review:     Recent Labs      09/03/17   1313  09/02/17 2017   WBC  3.7*  5.4   HGB  13.6  16.9   HCT  37.7  46.5   PLT  95*  164     Recent Labs      09/03/17   1313  09/02/17 2017   NA  137  139   K  3.8  3.6   CL  103  101   CO2  27  29   GLU  111*  133*   BUN  5*  5* CREA  0.98  0.93   CA  7.7*  8.6   MG  1.4*   --    PHOS  2.9   --    ALB  3.2*  4.1   TBILI  1.7*  1.1*   SGOT  41*  52*   ALT  43  54   INR   --   1.0     No results found for: GLUCPOC  No results for input(s): PH, PCO2, PO2, HCO3, FIO2 in the last 72 hours.   Recent Labs      09/02/17 2017   INR  1.0     No results found for: SDES  No results found for: CULT         Assessment:     Principal Problem:    Alcohol withdrawal ([address] (9/4/2017)    Active Problems:    Alcohol abuse ()      Syncope (9/2/2017)      Opioid abuse (9/2/2017)      Mild tetrahydrocannabinol (THC) abuse (9/2/2017)      Polysubstance abuse (9/2/2017)      Hematemesis (9/2/2017)      NSAID long-term use (9/2/2017)      Weight loss (9/2/2017)           Plan:     Principal Problem:    Alcohol withdrawal   - went into definite withdrawal yesterday   - stabilized after multiple doses of diazepam   - will start phenobarbital scheduled today      Active Problems:    Dehydration (9/2/2017)/Nausea & vomiting (9/2/2017)/Syncope (9/2/2017)/Hematemesis (9/2/2017)/Lactic acidemia (9/2/2017)/Elevated lipase (9/2/2017)/NSAID long-term use (9/2/2017)   - combination of alcohol abuse and NSAID use   - likely has underlying PUD or gastritis related to both   - continue hydration   - continue thorazine PRN for hiccups   - GI input appreciated   - I ordered abdominal US per Dr. Paul Long note   - suspect will need EGD as well       Alcohol abuse ()/Opioid abuse (9/2/2017)/Mild tetrahydrocannabinol (THC) abuse (9/2/2017)/Polysubstance abuse (9/2/2017)   - counseled on cessation   - follow on CIWA protocol, no signs of withdrawal so far   - at high risk for DTs      Weight loss (9/2/2017)   - Nutrition consult   - due to alcohol abuse, etc      Total time spent with patient: 35 minutes                  Care Plan discussed with: Patient, Care Manager and Nursing Staff    Discussed:  Code Status, Care Plan and D/C Planning    Prophylaxis:  SCD's    Disposition:  Home w/Family           ___________________________________________________    Attending Physician: Octaviano Lynn MD

## 2017-09-04 NOTE — PROGRESS NOTES
Problem: Falls - Risk of  Goal: *Absence of Falls  Document Marii Fall Risk and appropriate interventions in the flowsheet.    Outcome: Progressing Towards Goal  Fall Risk Interventions:  Mobility Interventions: Assess mobility with egress test, Patient to call before getting OOB, Communicate number of staff needed for ambulation/transfer     Mentation Interventions: Room close to nurse's station, Family/sitter at bedside     Medication Interventions: Bed/chair exit alarm, Patient to call before getting OOB     Elimination Interventions: Call light in reach, Patient to call for help with toileting needs     History of Falls Interventions: Door open when patient unattended, Investigate reason for fall, Room close to nurse's station

## 2017-09-04 NOTE — ROUTINE PROCESS
Bedside and Verbal shift change report given to Evelyn Ahn (oncoming nurse) by Trinity Hutchinson RN (offgoing nurse). Report included the following information SBAR, Kardex, Intake/Output, MAR, Accordion and Recent Results. 2243- Pt presents with CIWA of 13, administering PRN Valium as ordered     0031- Pt currently sleeping peacefully in bed at this time, sitter present at bedside. Bedside and Verbal shift change report given to Erik Narayan RN (oncoming nurse) by Paulo Razo RN (offgoing nurse). Report included the following information SBAR, Kardex, Intake/Output, MAR, Accordion and Recent Results.

## 2017-09-04 NOTE — PROGRESS NOTES
Nutrition Assessment:    RECOMMENDATIONS/INTERVENTION(S):   Continue CLD - advance as able  Monitor PO intakes, wt, BG  Monitor EGD results, diet advancement, need for ONS    ASSESSMENT:   9/4: 47 yr old male admitted for pain. PMHx: Polysubstance abuse. Diverticulitis requiring resection. Pt states \"I can't drink that clear liquid stuff anymore, I need real food. \" Pt claims his wt fluctuates between 220-230. Pt is on CLD. Pt thought he was going to have an EGD today. Pt may have to wait until after Etoh withdraw is complete to have EGD. Will await EGD results and diet advancement from MD. Pt states wt loss due to not eating within the last few days. Last BM last night. Pt appears uncomfortable, /93 at time of visit. Pt states he's very active, used to play baseball and chews tobacco but didn't always spit. Doesn't smoke but chews \"a lot. \" , states he eats lunch, but most days he comes home and is too exhausted to eat so he just drinks and falls asleep. Current intakes are poor due to not liking CLD and some abdominal pain. SUBJECTIVE/OBJECTIVE:   Diet Order: Clear liquids  % Eaten:  Patient Vitals for the past 72 hrs:   % Diet Eaten   09/04/17 1253 75 %   09/04/17 0845 10 %   09/03/17 1022 25 %     Pertinent Medications: [x] Reviewed    Chemistries:  Lab Results   Component Value Date/Time    Sodium 137 09/03/2017 01:13 PM    Potassium 3.8 09/03/2017 01:13 PM    Chloride 103 09/03/2017 01:13 PM    CO2 27 09/03/2017 01:13 PM    Anion gap 7 09/03/2017 01:13 PM    Glucose 111 09/03/2017 01:13 PM    BUN 5 09/03/2017 01:13 PM    Creatinine 0.98 09/03/2017 01:13 PM    BUN/Creatinine ratio 5 09/03/2017 01:13 PM    GFR est AA >60 09/03/2017 01:13 PM    GFR est non-AA >60 09/03/2017 01:13 PM    Calcium 7.7 09/03/2017 01:13 PM    AST (SGOT) 41 09/03/2017 01:13 PM    Alk.  phosphatase 53 09/03/2017 01:13 PM    Protein, total 5.8 09/03/2017 01:13 PM    Albumin 3.2 09/03/2017 01:13 PM    Globulin 2.6 09/03/2017 01:13 PM    A-G Ratio 1.2 09/03/2017 01:13 PM    ALT (SGPT) 43 09/03/2017 01:13 PM      Anthropometrics: Height: 6' 2\" (188 cm) Weight: 99.8 kg (220 lb)    IBW (%IBW):   ( ) UBW (%UBW):   (  %)    BMI: Body mass index is 28.25 kg/(m^2). This BMI is indicative of:   [] Underweight    [] Normal    [x] Overweight    []  Obesity    []  Extreme Obesity (BMI>40)  Estimated Nutrition Needs (Based on): 2237 Kcals/day (SBD(3709I1. 3)-250) , 80 g (-100g/day (0.8-1.0g/kg)) Protein  Carbohydrate: At Least 130 g/day  Fluids: 2200 mL/day    Last BM: 9/3   [x]Active     []Hyperactive  []Hypoactive       [] Absent   BS  Skin:    [x] Intact   [] Incision  [] Breakdown   [] DTI   [] Tears/Excoriation/Abrasion  []Edema [] Other:    Wt Readings from Last 30 Encounters:   09/02/17 99.8 kg (220 lb)      NUTRITION DIAGNOSES:   Problem:  Inadequate protein-energy intake     Etiology: related to decreased ability to consume sufficient energy     Signs/Symptoms: as evidenced by CLD - poor PO intakes,       NUTRITION INTERVENTIONS:  Meals/Snacks: General/healthful diet                  GOAL:   Diet advance and tolerate within 2-4 days    Cultural, Mu-ism, or Ethnic Dietary Needs: None     LEARNING NEEDS (Diet, Food/Nutrient-Drug Interaction):    [x] None Identified   [] Identified and Education Provided/Documented   [] Identified and Pt declined/was not appropriate      [x] Interdisciplinary Care Plan Reviewed/Documented    [x] Discharge Needs:   TBD   [] No Nutrition Related Discharge Needs    NUTRITION RISK:   Pt Is At Nutrition Risk  [x]     No Nutrition Risk Identified  []       PT SEEN FOR:    [x]  MD Consult: []Calorie Count      []Diabetic Diet Education        []Diet Education     []Electrolyte Management     [x]General Nutrition Management and Supplements     []Management of Tube Feeding     []TPN Recommendations    []  RN Referral:  []MST score >=2     []Enteral/Parenteral Nutrition PTA     []Pregnant: Gestational DM or Multigestation                 [] Pressure Ulcer      []  Low BMI      []  Length of Stay       [] Dysphagia Diet         [] Ventilator      []  Follow-Up      Previous Recommendations:   [] Implemented          [] Not Implemented          [x] Not Applicable    Previous Goal:   [] Met              [] Progressing Towards Goal              [] Not Progressing Towards Goal   [x] Not Applicable              Angelo Abebe, [address] [place] [address]   Pager [phone]

## 2017-09-05 LAB
ANION GAP SERPL CALC-SCNC: 8 MMOL/L (ref 5–15)
ATRIAL RATE: 54 BPM
BUN SERPL-MCNC: 1 MG/DL (ref 6–20)
BUN/CREAT SERPL: 1 (ref 12–20)
CALCIUM SERPL-MCNC: 8.5 MG/DL (ref 8.5–10.1)
CALCULATED P AXIS, ECG09: 41 DEGREES
CALCULATED R AXIS, ECG10: 69 DEGREES
CALCULATED T AXIS, ECG11: 56 DEGREES
CHLORIDE SERPL-SCNC: 104 MMOL/L (ref 97–108)
CO2 SERPL-SCNC: 26 MMOL/L (ref 21–32)
CREAT SERPL-MCNC: 0.93 MG/DL (ref 0.7–1.3)
DIAGNOSIS, 93000: NORMAL
ERYTHROCYTE [DISTWIDTH] IN BLOOD BY AUTOMATED COUNT: 12.2 % (ref 11.5–14.5)
GLUCOSE BLD STRIP.AUTO-MCNC: 117 MG/DL (ref 65–100)
GLUCOSE SERPL-MCNC: 96 MG/DL (ref 65–100)
HCT VFR BLD AUTO: 40.1 % (ref 36.6–50.3)
HGB BLD-MCNC: 14.4 G/DL (ref 12.1–17)
MAGNESIUM SERPL-MCNC: 2 MG/DL (ref 1.6–2.4)
MCH RBC QN AUTO: 35.6 PG (ref 26–34)
MCHC RBC AUTO-ENTMCNC: 35.9 G/DL (ref 30–36.5)
MCV RBC AUTO: 99.3 FL (ref 80–99)
P-R INTERVAL, ECG05: 184 MS
PHOSPHATE SERPL-MCNC: 3.7 MG/DL (ref 2.6–4.7)
PLATELET # BLD AUTO: 91 K/UL (ref 150–400)
POTASSIUM SERPL-SCNC: 3.6 MMOL/L (ref 3.5–5.1)
Q-T INTERVAL, ECG07: 408 MS
QRS DURATION, ECG06: 98 MS
QTC CALCULATION (BEZET), ECG08: 386 MS
RBC # BLD AUTO: 4.04 M/UL (ref 4.1–5.7)
SERVICE CMNT-IMP: ABNORMAL
SODIUM SERPL-SCNC: 138 MMOL/L (ref 136–145)
VENTRICULAR RATE, ECG03: 54 BPM
WBC # BLD AUTO: 3.2 K/UL (ref 4.1–11.1)

## 2017-09-05 PROCEDURE — 84100 ASSAY OF PHOSPHORUS: CPT | Performed by: INTERNAL MEDICINE

## 2017-09-05 PROCEDURE — 36415 COLL VENOUS BLD VENIPUNCTURE: CPT | Performed by: INTERNAL MEDICINE

## 2017-09-05 PROCEDURE — 74011250637 HC RX REV CODE- 250/637: Performed by: INTERNAL MEDICINE

## 2017-09-05 PROCEDURE — 85027 COMPLETE CBC AUTOMATED: CPT | Performed by: INTERNAL MEDICINE

## 2017-09-05 PROCEDURE — 74011000250 HC RX REV CODE- 250: Performed by: SPECIALIST

## 2017-09-05 PROCEDURE — 74011000250 HC RX REV CODE- 250: Performed by: INTERNAL MEDICINE

## 2017-09-05 PROCEDURE — 83735 ASSAY OF MAGNESIUM: CPT | Performed by: INTERNAL MEDICINE

## 2017-09-05 PROCEDURE — 80048 BASIC METABOLIC PNL TOTAL CA: CPT | Performed by: INTERNAL MEDICINE

## 2017-09-05 PROCEDURE — 74011250636 HC RX REV CODE- 250/636: Performed by: INTERNAL MEDICINE

## 2017-09-05 PROCEDURE — 65660000000 HC RM CCU STEPDOWN

## 2017-09-05 PROCEDURE — 82962 GLUCOSE BLOOD TEST: CPT

## 2017-09-05 PROCEDURE — 93005 ELECTROCARDIOGRAM TRACING: CPT

## 2017-09-05 PROCEDURE — C9113 INJ PANTOPRAZOLE SODIUM, VIA: HCPCS | Performed by: INTERNAL MEDICINE

## 2017-09-05 RX ORDER — MULTIVITAMIN WITH IRON
1 TABLET ORAL DAILY
Status: DISCONTINUED | OUTPATIENT
Start: 2017-09-05 | End: 2017-09-07 | Stop reason: HOSPADM

## 2017-09-05 RX ORDER — LORAZEPAM 2 MG/ML
4 INJECTION INTRAMUSCULAR
Status: DISCONTINUED | OUTPATIENT
Start: 2017-09-05 | End: 2017-09-06

## 2017-09-05 RX ORDER — HYDRALAZINE HYDROCHLORIDE 20 MG/ML
20 INJECTION INTRAMUSCULAR; INTRAVENOUS
Status: DISCONTINUED | OUTPATIENT
Start: 2017-09-05 | End: 2017-09-07 | Stop reason: HOSPADM

## 2017-09-05 RX ORDER — PHENOBARBITAL 32.4 MG/1
32.4 TABLET ORAL
Status: COMPLETED | OUTPATIENT
Start: 2017-09-05 | End: 2017-09-05

## 2017-09-05 RX ORDER — PHENOBARBITAL 32.4 MG/1
97.2 TABLET ORAL EVERY 6 HOURS
Status: DISCONTINUED | OUTPATIENT
Start: 2017-09-05 | End: 2017-09-07 | Stop reason: HOSPADM

## 2017-09-05 RX ORDER — LANOLIN ALCOHOL/MO/W.PET/CERES
100 CREAM (GRAM) TOPICAL DAILY
Status: DISCONTINUED | OUTPATIENT
Start: 2017-09-05 | End: 2017-09-07 | Stop reason: HOSPADM

## 2017-09-05 RX ORDER — FOLIC ACID 1 MG/1
1 TABLET ORAL DAILY
Status: DISCONTINUED | OUTPATIENT
Start: 2017-09-05 | End: 2017-09-07 | Stop reason: HOSPADM

## 2017-09-05 RX ORDER — LORAZEPAM 2 MG/ML
2 INJECTION INTRAMUSCULAR
Status: DISCONTINUED | OUTPATIENT
Start: 2017-09-05 | End: 2017-09-06

## 2017-09-05 RX ADMIN — Medication 100 MG: at 11:35

## 2017-09-05 RX ADMIN — Medication 10 ML: at 15:54

## 2017-09-05 RX ADMIN — MORPHINE SULFATE 2 MG: 4 INJECTION, SOLUTION INTRAMUSCULAR; INTRAVENOUS at 07:40

## 2017-09-05 RX ADMIN — SODIUM CHLORIDE 100 ML/HR: 900 INJECTION, SOLUTION INTRAVENOUS at 22:18

## 2017-09-05 RX ADMIN — LORAZEPAM 2 MG: 2 INJECTION INTRAMUSCULAR; INTRAVENOUS at 22:05

## 2017-09-05 RX ADMIN — POLYETHYLENE GLYCOL-3350 AND ELECTROLYTES 2000 ML: 236; 6.74; 5.86; 2.97; 22.74 POWDER, FOR SOLUTION ORAL at 21:16

## 2017-09-05 RX ADMIN — PHENOBARBITAL 64.8 MG: 32.4 TABLET ORAL at 07:40

## 2017-09-05 RX ADMIN — CLONIDINE HYDROCHLORIDE 0.1 MG: 0.1 TABLET ORAL at 07:40

## 2017-09-05 RX ADMIN — SODIUM CHLORIDE 40 MG: 9 INJECTION INTRAMUSCULAR; INTRAVENOUS; SUBCUTANEOUS at 07:41

## 2017-09-05 RX ADMIN — SODIUM CHLORIDE 40 MG: 9 INJECTION INTRAMUSCULAR; INTRAVENOUS; SUBCUTANEOUS at 21:08

## 2017-09-05 RX ADMIN — MORPHINE SULFATE 2 MG: 4 INJECTION, SOLUTION INTRAMUSCULAR; INTRAVENOUS at 11:54

## 2017-09-05 RX ADMIN — LORAZEPAM 2 MG: 2 INJECTION INTRAMUSCULAR; INTRAVENOUS at 11:54

## 2017-09-05 RX ADMIN — DEXTROSE MONOHYDRATE, SODIUM CHLORIDE, AND POTASSIUM CHLORIDE 125 ML/HR: 50; 4.5; 1.49 INJECTION, SOLUTION INTRAVENOUS at 01:32

## 2017-09-05 RX ADMIN — LORAZEPAM 4 MG: 2 INJECTION INTRAMUSCULAR; INTRAVENOUS at 08:59

## 2017-09-05 RX ADMIN — LORAZEPAM 4 MG: 2 INJECTION INTRAMUSCULAR; INTRAVENOUS at 15:49

## 2017-09-05 RX ADMIN — DIAZEPAM 5 MG: 5 INJECTION, SOLUTION INTRAMUSCULAR; INTRAVENOUS at 03:30

## 2017-09-05 RX ADMIN — PHENOBARBITAL 97.2 MG: 32.4 TABLET ORAL at 20:11

## 2017-09-05 RX ADMIN — Medication 10 ML: at 22:07

## 2017-09-05 RX ADMIN — FOLIC ACID 1 MG: 1 TABLET ORAL at 11:35

## 2017-09-05 RX ADMIN — MORPHINE SULFATE 2 MG: 4 INJECTION, SOLUTION INTRAMUSCULAR; INTRAVENOUS at 15:49

## 2017-09-05 RX ADMIN — Medication 10 ML: at 06:00

## 2017-09-05 RX ADMIN — PROPRANOLOL HYDROCHLORIDE 20 MG: 10 TABLET ORAL at 07:40

## 2017-09-05 RX ADMIN — POLYETHYLENE GLYCOL-3350 AND ELECTROLYTES 2000 ML: 236; 6.74; 5.86; 2.97; 22.74 POWDER, FOR SOLUTION ORAL at 15:54

## 2017-09-05 RX ADMIN — PHENOBARBITAL 64.8 MG: 32.4 TABLET ORAL at 03:09

## 2017-09-05 RX ADMIN — PHENOBARBITAL 97.2 MG: 32.4 TABLET ORAL at 15:48

## 2017-09-05 RX ADMIN — Medication 1 TABLET: at 11:35

## 2017-09-05 RX ADMIN — MORPHINE SULFATE 2 MG: 4 INJECTION, SOLUTION INTRAMUSCULAR; INTRAVENOUS at 21:08

## 2017-09-05 RX ADMIN — PHENOBARBITAL 32.4 MG: 32.4 TABLET ORAL at 09:00

## 2017-09-05 RX ADMIN — DEXTROSE MONOHYDRATE, SODIUM CHLORIDE, AND POTASSIUM CHLORIDE 125 ML/HR: 50; 4.5; 1.49 INJECTION, SOLUTION INTRAVENOUS at 09:26

## 2017-09-05 RX ADMIN — ONDANSETRON 4 MG: 2 INJECTION INTRAMUSCULAR; INTRAVENOUS at 20:17

## 2017-09-05 NOTE — PROGRESS NOTES
[address]  [address] [facility], [address] [place] [address]  [phone]      Medical Progress Note      NAME: Jose Angel Noonan   :  1965  MRM:  [id number]    Date/Time: 2017  8:31 AM           Subjective:     Chief Complaint:  Pain: upper abdomen, moderate to severe, constant, burning, continues since admission. Also reports anxiety and tremors c/w alcohol withdrawal    ROS:  (bold if positive, if negative)                        Tolerating Diet          Objective:       Vitals:          Last 24hrs VS reviewed since prior progress note.  Most recent are:    Visit Vitals    BP (!) 149/102 (BP 1 Location: Right arm, BP Patient Position: At rest;Sitting)    Pulse 69    Temp 97.6 °F (36.4 °C)    Resp 20    Ht 6' 2\" (1.88 m)    Wt 99.8 kg (220 lb)    SpO2 99%    BMI 28.25 kg/m2     SpO2 Readings from Last 6 Encounters:   17 99%    O2 Flow Rate (L/min): 2 l/min       Intake/Output Summary (Last 24 hours) at 17 0831  Last data filed at 17 0400   Gross per 24 hour   Intake          1985.83 ml   Output             1355 ml   Net           630.83 ml          Exam:     Physical Exam:    Gen:  Well-developed, overrweight, in no acute distress  HEENT:  Pink conjunctivae, PERRL, hearing intact to voice, moist mucous membranes  Neck:  Supple, without masses, thyroid non-tender  Resp:  No accessory muscle use, clear breath sounds without wheezes rales or rhonchi  Card:  No murmurs, normal S1, S2 without thrills, bruits or peripheral edema  Abd:  Soft, diffusely tender, non-distended, normoactive bowel sounds are present, no palpable organomegaly and no detectable hernias  Lymph:  No cervical or inguinal adenopathy  Musc:  No cyanosis or clubbing  Skin:  No rashes or ulcers, skin turgor is good  Neuro:  Cranial nerves are grossly intact, no focal motor weakness, follows commands appropriately  Psych:  Good insight, oriented to person, place and time, alert Telemetry reviewed:   normal sinus rhythm    Medications Reviewed: (see below)    Lab Data Reviewed: (see below)    ______________________________________________________________________    Medications:     Current Facility-Administered Medications   Medication Dose Route Frequency    LORazepam (ATIVAN) injection 2 mg  2 mg IntraVENous Q1H PRN    LORazepam (ATIVAN) injection 4 mg  4 mg IntraVENous Q1H PRN    PHENobarbital (LUMINAL) tablet 97.2 mg  97.2 mg Oral Q6H    PHENobarbital (LUMINAL) tablet 32.4 mg  32.4 mg Oral NOW    dextrose 5% - 0.45% NaCl with KCl 20 mEq/L infusion  125 mL/hr IntraVENous CONTINUOUS    diphenhydrAMINE (BENADRYL) capsule 25 mg  25 mg Oral Q4H PRN    nicotine (NICODERM CQ) 21 mg/24 hr patch 1 Patch  1 Patch TransDERmal DAILY    0.9% sodium chloride infusion  100 mL/hr IntraVENous CONTINUOUS    morphine injection 2 mg  2 mg IntraVENous Q4H PRN    chlorproMAZINE (THORAZINE) tablet 25 mg  25 mg Oral Q6H PRN    sodium chloride (NS) flush 5-10 mL  5-10 mL IntraVENous Q8H    sodium chloride (NS) flush 5-10 mL  5-10 mL IntraVENous PRN    naloxone (NARCAN) injection 0.4 mg  0.4 mg IntraVENous PRN    acetaminophen (TYLENOL) tablet 650 mg  650 mg Oral Q4H PRN    ondansetron (ZOFRAN) injection 4 mg  4 mg IntraVENous Q4H PRN    0.9% sodium chloride 6,976 mL with folic acid 1 mg, thiamine 100 mg, mvi, adult no. 4 with vit K 10 mL infusion   IntraVENous DAILY    pantoprazole (PROTONIX) 40 mg in sodium chloride 0.9 % 10 mL injection  40 mg IntraVENous Q12H    cloNIDine HCl (CATAPRES) tablet 0.1 mg  0.1 mg Oral BID    propranolol (INDERAL) tablet 20 mg  20 mg Oral TID            Lab Review:     Recent Labs      09/05/17   0110  09/03/17   1313  09/02/17 2017   WBC  3.2*  3.7*  5.4   HGB  14.4  13.6  16.9   HCT  40.1  37.7  46.5   PLT  91*  95*  164     Recent Labs      09/05/17   0110  09/03/17   1313  09/02/17 2017   NA  138  137  139   K  3.6  3.8  3.6   CL  104  103  101   CO2 26  27  29   GLU  96  111*  133*   BUN  1*  5*  5*   CREA  0.93  0.98  0.93   CA  8.5  7.7*  8.6   MG  2.0  1.4*   --    PHOS  3.7  2.9   --    ALB   --   3.2*  4.1   TBILI   --   1.7*  1.1*   SGOT   --   41*  52*   ALT   --   43  54   INR   --    --   1.0     No results found for: GLUCPOC  No results for input(s): PH, PCO2, PO2, HCO3, FIO2 in the last 72 hours. Recent Labs      09/02/17 2017   INR  1.0     No results found for: SDES  No results found for: CULT         Assessment:     Principal Problem:    Alcohol withdrawal ([address] (9/4/2017)    Active Problems:    Alcohol abuse ()      Syncope (9/2/2017)      Opioid abuse (9/2/2017)      Mild tetrahydrocannabinol (THC) abuse (9/2/2017)      Polysubstance abuse (9/2/2017)      Hematemesis (9/2/2017)      NSAID long-term use (9/2/2017)      Weight loss (9/2/2017)           Plan:     Principal Problem:    Alcohol withdrawal   - went into definite withdrawal yesterday   - still requiring multiple doses of diazepam (changing to lorazepam due to Pharmacy shortage)   - increase phenobarbital scheduled    Active Problems:    Dehydration (9/2/2017)/Nausea & vomiting (9/2/2017)/Syncope (9/2/2017)/Hematemesis (9/2/2017)/Lactic acidemia (9/2/2017)/Elevated lipase (9/2/2017)/NSAID long-term use (9/2/2017)   - combination of alcohol abuse and NSAID use   - likely has underlying PUD or gastritis related to both   - continue hydration   - continue thorazine PRN for hiccups   - GI input appreciated   - US with steatosis, otherwise unremarkable      Alcohol abuse ()/Opioid abuse (9/2/2017)/Mild tetrahydrocannabinol (THC) abuse (9/2/2017)/Polysubstance abuse (9/2/2017)   - counseled on cessation   - follow on CIWA protocol   - at high risk for DTs      Weight loss (9/2/2017)   - Nutrition consult appreciated   - due to alcohol abuse, etc      ADDENDUM: patient had sinus bradycardia while Dr. Trinidad Das was evaluating him.   Will stop his propranolol and clonidine and continue to monitor on telemetry.       Total time spent with patient: 25 minutes                  Care Plan discussed with: Patient, Care Manager and Nursing Staff    Discussed:  Code Status, Care Plan and D/C Planning    Prophylaxis:  SCD's    Disposition:  Home w/Family           ___________________________________________________    Attending Physician: Richi Piper MD

## 2017-09-05 NOTE — PROGRESS NOTES
Jamey Mckeon. Kanu Carter MD  [phone] office  [phone] [facility]     Gastroenterology Progress Note    September 5, 2017  Admit Date: 9/2/2017         Narrative Assessment and Plan   · Malaise/fatigue  · Ectopy on monitor  · Sinus bradycardia on monitor  · Abdominal pain  · Alcohol abuse    I suggest prep for EGD and colonsocopy tomorrow but we'll need to figure out if his cardiac rhythm is actually abnormal or not. I've asked for 12 lead to be done  His somatic complaints of feeling 'weird' might be from his having been given phenobarbitol and a benzodiazepine, or might be from his ethanol withdrawal, but they have been associated with bradycardia this afternoon although BP is normal.    Once 12 lead is done I'll decide if it looks like he could have endoscopic study tomorrow or not. Subjective:   · I feel weird    Location:  Abdominal pain continues  Duration: left side midabdomen today  Timing: no vomiting   Radiation: no bleedin   Associated Symptoms:   Aggravating/Alleviating factors:     ROS:  The previous review of systems on initial consultation / H&P is noted and reviewed. Specific changes noted above in HPI.     Current Medications:     Current Facility-Administered Medications   Medication Dose Route Frequency    LORazepam (ATIVAN) injection 2 mg  2 mg IntraVENous Q1H PRN    LORazepam (ATIVAN) injection 4 mg  4 mg IntraVENous Q1H PRN    PHENobarbital (LUMINAL) tablet 97.2 mg  97.2 mg Oral Q6H    multivitamin with iron tablet 1 Tab  1 Tab Oral DAILY    thiamine (B-1) tablet 100 mg  100 mg Oral DAILY    folic acid (FOLVITE) tablet 1 mg  1 mg Oral DAILY    diphenhydrAMINE (BENADRYL) capsule 25 mg  25 mg Oral Q4H PRN    nicotine (NICODERM CQ) 21 mg/24 hr patch 1 Patch  1 Patch TransDERmal DAILY    0.9% sodium chloride infusion  100 mL/hr IntraVENous CONTINUOUS    morphine injection 2 mg  2 mg IntraVENous Q4H PRN    chlorproMAZINE (THORAZINE) tablet 25 mg  25 mg Oral Q6H PRN    sodium chloride (NS) flush 5-10 mL  5-10 mL IntraVENous Q8H    sodium chloride (NS) flush 5-10 mL  5-10 mL IntraVENous PRN    naloxone (NARCAN) injection 0.4 mg  0.4 mg IntraVENous PRN    acetaminophen (TYLENOL) tablet 650 mg  650 mg Oral Q4H PRN    ondansetron (ZOFRAN) injection 4 mg  4 mg IntraVENous Q4H PRN    pantoprazole (PROTONIX) 40 mg in sodium chloride 0.9 % 10 mL injection  40 mg IntraVENous Q12H    cloNIDine HCl (CATAPRES) tablet 0.1 mg  0.1 mg Oral BID    propranolol (INDERAL) tablet 20 mg  20 mg Oral TID       Objective:     VITALS:   Last 24hrs VS reviewed since prior progress note. Most recent are:  Visit Vitals    BP (!) 142/98 (BP 1 Location: Right arm, BP Patient Position: At rest)    Pulse 70    Temp 97.8 °F (36.6 °C)    Resp 20    Ht 6' 2\" (1.88 m)    Wt 99.8 kg (220 lb)    SpO2 99%    BMI 28.25 kg/m2     Temp (24hrs), Av.2 °F (36.8 °C), Min:97.6 °F (36.4 °C), Max:99 °F (37.2 °C)      Intake/Output Summary (Last 24 hours) at 17 1322  Last data filed at 17 0400   Gross per 24 hour   Intake          1625.83 ml   Output                5 ml   Net          1620.83 ml       EXAM:  General:  Comfortable, no distress    HEENT:  Atraumatic skull, pupils equal  Lungs:   No abnormal audible breath sounds. Speaking in complete sentences  Heart:   No abnormal audible heart sounds. Well perfused. His pulse is regular even though his telemetry shows bradycardia and strange ectopy. ON the tele it looks like the markings are being made by the unit as if its set for syncronized defib or pacing. Abdomen:   Nondistended, nontender.   No mass, guarding or rebound  Musc:   No skeletal defects or deformities  Neurologic:   Cranial nerves grossly intact, moves all 4 extremities  Psych:    Good insight. Not anxious nor agitated  Derm:   No rash, jaundice, nor palpable dermatologic mass    Lab Data Reviewed:   Recent Labs      17   0110  17   1313  17   2017 WBC  3.2*  3.7*  5.4   HGB  14.4  13.6  16.9   HCT  40.1  37.7  46.5   PLT  91*  95*  164     Recent Labs      09/05/17   0110  09/03/17   1313  09/02/17 2017   NA  138  137  139   K  3.6  3.8  3.6   CL  104  103  101   CO2  26  27  29   GLU  96  111*  133*   BUN  1*  5*  5*   CREA  0.93  0.98  0.93   CA  8.5  7.7*  8.6   MG  2.0  1.4*   --    PHOS  3.7  2.9   --    ALB   --   3.2*  4.1   TBILI   --   1.7*  1.1*   SGOT   --   41*  52*   ALT   --   43  54   INR   --    --   1.0     Lab Results   Component Value Date/Time    Glucose (POC) 117 09/05/2017 01:17 PM     No results for input(s): PH, PCO2, PO2, HCO3, FIO2 in the last 72 hours.   Recent Labs      09/02/17 2017   INR  1.0           Assessment:   (See above)  Principal Problem:    Alcohol withdrawal ([facility] (9/4/2017)    Active Problems:    Alcohol abuse ()      Syncope (9/2/2017)      Opioid abuse (9/2/2017)      Mild tetrahydrocannabinol (THC) abuse (9/2/2017)      Polysubstance abuse (9/2/2017)      Hematemesis (9/2/2017)      NSAID long-term use (9/2/2017)      Weight loss (9/2/2017)        Plan:   (See above)      Signed By: Luiz Ugarte MD     9/5/2017  1:22 PM

## 2017-09-05 NOTE — PROGRESS NOTES
Met with pt for initial assessment and d/c planning. Pt resides with two roommates in a three story house with a second floor bedroom. PTA pt was independent with ADLs and driving. 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 He has never had [facility] before and does not own any DME. Pt was provided an application for the [employer] and a discount prescription drug card. He uses the [facility] pharmacy on [address]. Pt was offered substance abuse counseling resources but declined; pt stated that he was at the [facility] about a year and a half ago and \"has a network\" of people he relies on. PCP is Dr. Kaz David. Roxy Murphy [facility]    Care Management Interventions  PCP Verified by CM: Yes (Dr. Kaz David)  Palliative Care Consult (Criteria: CHF and RRAT>21): Yes  Discharge Durable Medical Equipment: No  Physical Therapy Consult: No  Occupational Therapy Consult: No  Speech Therapy Consult: No  Current Support Network:  Other (Lives with two roommates)  Confirm Follow Up Transport: Friends  Plan discussed with Pt/Family/Caregiver: Yes  Discharge Location  Discharge Placement: Home

## 2017-09-05 NOTE — CONSULTS
Initial Psychiatric Consultation  Dr. Sharon Bryan, Psychiatrist, [facility]    John Metz male 47 y.o. Chief Complaint: Suicidal ideation    History of Present Illness:  47year old male being treated for alcohol withdrawal.  Expressed suicidal ideation previously in admission to staff - no suicidal ideation expressed today to staff. Has sitter. Examined patient in presence of sitter, spoke with staff, reviewed chart and charted. This is a patient with a long history of alcohol use disorder who has been drinking very heavily in the past several months. He understands that alcohol is very detrimental especially to his physical health. He has stopped drinking before with the help of AA/Sponsor and feels that he needs to quit again or he will die from medical complications. He does have a history of DTs but not withdrawal seizures. He has required 10 mg of ativan today on top of scheduled phenobarbital as below. He denies any hallucinations. He denies anhedonia or significant depression/anxiety. No history of psychiatric hospitalizations. He tried to commit suicidal at age 22 but denies any current or recent suicidal ideation. He states he would never kill himself because of his 23year old daughter who is in college. ROS:  Gen - WdWn patient. Psych - No Suicidal ideation/Homicidal ideation/Psychotic/Manic symptoms.     Past Medical History:  Patient Active Problem List    Diagnosis Date Noted    Alcohol withdrawal ([facility] [address] 09/04/2017    Syncope 09/02/2017    Opioid abuse 09/02/2017    Mild tetrahydrocannabinol (THC) abuse 09/02/2017    Polysubstance abuse 09/02/2017    Hematemesis 09/02/2017    NSAID long-term use 09/02/2017    Weight loss 09/02/2017    Alcohol abuse      Past Medical History:   Diagnosis Date    Alcohol abuse     Diverticulitis     Diverticulitis     Hypertension        Past Psychiatric History:  See hpi    Current Inpatient Medications:  Current Facility-Administered Medications   Medication Dose Route Frequency    LORazepam (ATIVAN) injection 2 mg  2 mg IntraVENous Q1H PRN    LORazepam (ATIVAN) injection 4 mg  4 mg IntraVENous Q1H PRN    PHENobarbital (LUMINAL) tablet 97.2 mg  97.2 mg Oral Q6H    multivitamin with iron tablet 1 Tab  1 Tab Oral DAILY    thiamine (B-1) tablet 100 mg  100 mg Oral DAILY    folic acid (FOLVITE) tablet 1 mg  1 mg Oral DAILY    peg 3350-electrolytes (COLYTE) 4000 mL  2,000 mL Oral ONCE    hydrALAZINE (APRESOLINE) 20 mg/mL injection 20 mg  20 mg IntraVENous Q6H PRN    diphenhydrAMINE (BENADRYL) capsule 25 mg  25 mg Oral Q4H PRN    nicotine (NICODERM CQ) 21 mg/24 hr patch 1 Patch  1 Patch TransDERmal DAILY    0.9% sodium chloride infusion  100 mL/hr IntraVENous CONTINUOUS    morphine injection 2 mg  2 mg IntraVENous Q4H PRN    chlorproMAZINE (THORAZINE) tablet 25 mg  25 mg Oral Q6H PRN    sodium chloride (NS) flush 5-10 mL  5-10 mL IntraVENous Q8H    sodium chloride (NS) flush 5-10 mL  5-10 mL IntraVENous PRN    naloxone (NARCAN) injection 0.4 mg  0.4 mg IntraVENous PRN    acetaminophen (TYLENOL) tablet 650 mg  650 mg Oral Q4H PRN    ondansetron (ZOFRAN) injection 4 mg  4 mg IntraVENous Q4H PRN    pantoprazole (PROTONIX) 40 mg in sodium chloride 0.9 % 10 mL injection  40 mg IntraVENous Q12H        Social History:  Has family support and support from girlfriend.  but likely unemployed. Care management has given him paperwork for care card. Alcohol as above. Also uses cannabis. Psychiatric Family History:  Extensive for alcohol. Mother has been sober 30 years. Mental Status Examination:  Level of Consciousness:  Alert  Orientation:  Fully oriented  Appearance:  Normal grooming for situation.   Cooperation:  Cooperative  Speech:  Normal rate/rhythm  Mood:  Euthymic  Affect:  Normal range  Thought process:  Linear/Goal-directed  Suicidal Ideation:  None  Homicidal Ideation: None  Perceptual Disturbances:  None  Memory:  Grossly Normal  Judgement/Insight:  Fair  Attention/Concentration:  Adequate for interview  Psychomotor Agitation:  None    Vital Signs:    Visit Vitals    BP (!) 142/98 (BP 1 Location: Right arm, BP Patient Position: At rest)    Pulse 70    Temp 97.8 °F (36.6 °C)    Resp 20    Ht 6' 2\" (1.88 m)    Wt 99.8 kg (220 lb)    SpO2 99%    BMI 28.25 kg/m2       Pertinent Labs/Studies:    Recent Results (from the past 24 hour(s))   MAGNESIUM    Collection Time: 09/05/17  1:10 AM   Result Value Ref Range    Magnesium 2.0 1.6 - 2.4 mg/dL   METABOLIC PANEL, BASIC    Collection Time: 09/05/17  1:10 AM   Result Value Ref Range    Sodium 138 136 - 145 mmol/L    Potassium 3.6 3.5 - 5.1 mmol/L    Chloride 104 97 - 108 mmol/L    CO2 26 21 - 32 mmol/L    Anion gap 8 5 - 15 mmol/L    Glucose 96 65 - 100 mg/dL    BUN 1 (L) 6 - 20 MG/DL    Creatinine 0.93 0.70 - 1.30 MG/DL    BUN/Creatinine ratio 1 (L) 12 - 20      GFR est AA >60 >60 ml/min/1.73m2    GFR est non-AA >60 >60 ml/min/1.73m2    Calcium 8.5 8.5 - 10.1 MG/DL   PHOSPHORUS    Collection Time: 09/05/17  1:10 AM   Result Value Ref Range    Phosphorus 3.7 2.6 - 4.7 MG/DL   CBC W/O DIFF    Collection Time: 09/05/17  1:10 AM   Result Value Ref Range    WBC 3.2 (L) 4.1 - 11.1 K/uL    RBC 4.04 (L) 4.10 - 5.70 M/uL    HGB 14.4 12.1 - 17.0 g/dL    HCT 40.1 36.6 - 50.3 %    MCV 99.3 (H) 80.0 - 99.0 FL    MCH 35.6 (H) 26.0 - 34.0 PG    MCHC 35.9 30.0 - 36.5 g/dL    RDW 12.2 11.5 - 14.5 %    PLATELET 91 (L) 417 - 400 K/uL   GLUCOSE, POC    Collection Time: 09/05/17  1:17 PM   Result Value Ref Range    Glucose (POC) 117 (H) 65 - 100 mg/dL    Performed by Cali Ortiz        Impression:  Alcohol use disorder severe, alcohol withdrawal, cannabis use disorder. Patient expressed suicidal ideation to staff earlier in admission but none since according to patient and staff. Upon assessment, Suicide risk is low given no ideation.   Protective factors include social support and awareness of resources (has utilized them previously). Risk factors include gender and substance abuse and medical illness. We discussed his alcohol use, resources, medical consequences. Sobriety was strongly encouraged, and we discussed what worked for him previously. Plan:  Dw pt in full. Continued medical detoxification in this patient with a history of complicated withdrawal.  Strongly recommend substance abuse treatment as an outpatient (AA/CSB or would encourage care management to provided additional services available through care card prior to discharge). At this time, I do not see a reason for psychiatric medication treatment or hospitalization. I will discontinue sitter given low suicide risk. He does have some family conflict which concerns him, and I encouraged psychotherapy as an outpatient (CSB or care card resources) as a source of helpful support. 50 minutes spent on floor. >50% in counseling/coordination of care activities as described above. I will sign off. Please call with further concerns. Heber Hassan.  COOPER Apodaca.

## 2017-09-05 NOTE — PROGRESS NOTES
GI note  Ultrasound unrevealing  If he is still having pain I'd suggest EGD and colonoscopy tomorrow  Will discuss with him on rounds today, which will be at around [address] Henning MD

## 2017-09-05 NOTE — PROGRESS NOTES
Shift Summary  9/5/2017  [id number]    Pt awake at time of bedside shift report received from [facility]. AM vitals, assessment, and meds complete without difficulty, AM rhythm strip shows NSR. Reviewed today's plan of care and goals with patient/family; plan of care today includes monitor & manage ETOH withdrawal symptoms, monitor for GI bleed. Maintain 1:1 sitter for suicide precautions. Significant Events:    0732 Pt c/o abdominal pain, morphine given. Pt having mild anxiety/restlessness, CIWA medication not indicated at this time, pt given 2mg IV morphine for abdominal pain. Pt has had periods of restlessness throughout the day, most scoring on CIWA scale r/t self-reported anxiety. Pt has been medicated with PRN Ativan, and has remained alert & oriented x4 even with 4mg IV Ativan administration. Pt began prep for colonoscopy this afternoon. No stools yet to report. Bedside and Verbal shift change report given to Elana (oncoming nurse) by Zenaida Nice (offgoing nurse). Report included the following information SBAR, Kardex, Intake/Output, MAR, Accordion, Recent Results and Cardiac Rhythm NSR.

## 2017-09-06 ENCOUNTER — ANESTHESIA EVENT (OUTPATIENT)
Dept: ENDOSCOPY | Age: 52
DRG: 381 | End: 2017-09-06
Payer: SELF-PAY

## 2017-09-06 ENCOUNTER — ANESTHESIA (OUTPATIENT)
Dept: ENDOSCOPY | Age: 52
DRG: 381 | End: 2017-09-06
Payer: SELF-PAY

## 2017-09-06 LAB
ALBUMIN SERPL-MCNC: 3.3 G/DL (ref 3.5–5)
ALBUMIN/GLOB SERPL: 1.1 {RATIO} (ref 1.1–2.2)
ALP SERPL-CCNC: 53 U/L (ref 45–117)
ALT SERPL-CCNC: 39 U/L (ref 12–78)
ANION GAP SERPL CALC-SCNC: 8 MMOL/L (ref 5–15)
AST SERPL-CCNC: 35 U/L (ref 15–37)
BILIRUB SERPL-MCNC: 1.9 MG/DL (ref 0.2–1)
BUN SERPL-MCNC: 2 MG/DL (ref 6–20)
BUN/CREAT SERPL: 2 (ref 12–20)
CALCIUM SERPL-MCNC: 8.4 MG/DL (ref 8.5–10.1)
CHLORIDE SERPL-SCNC: 101 MMOL/L (ref 97–108)
CO2 SERPL-SCNC: 27 MMOL/L (ref 21–32)
CREAT SERPL-MCNC: 0.87 MG/DL (ref 0.7–1.3)
ERYTHROCYTE [DISTWIDTH] IN BLOOD BY AUTOMATED COUNT: 12.3 % (ref 11.5–14.5)
GLOBULIN SER CALC-MCNC: 3 G/DL (ref 2–4)
GLUCOSE SERPL-MCNC: 96 MG/DL (ref 65–100)
HAV IGM SER QL: NONREACTIVE
HBV CORE IGM SER QL: NONREACTIVE
HBV SURFACE AG SER QL: <0.1 INDEX
HBV SURFACE AG SER QL: NEGATIVE
HCT VFR BLD AUTO: 39.7 % (ref 36.6–50.3)
HCV AB SERPL QL IA: NONREACTIVE
HCV COMMENT,HCGAC: NORMAL
HGB BLD-MCNC: 14.5 G/DL (ref 12.1–17)
MAGNESIUM SERPL-MCNC: 1.6 MG/DL (ref 1.6–2.4)
MCH RBC QN AUTO: 36.4 PG (ref 26–34)
MCHC RBC AUTO-ENTMCNC: 36.5 G/DL (ref 30–36.5)
MCV RBC AUTO: 99.7 FL (ref 80–99)
PHOSPHATE SERPL-MCNC: 4 MG/DL (ref 2.6–4.7)
PLATELET # BLD AUTO: 111 K/UL (ref 150–400)
POTASSIUM SERPL-SCNC: 3.5 MMOL/L (ref 3.5–5.1)
PROT SERPL-MCNC: 6.3 G/DL (ref 6.4–8.2)
RBC # BLD AUTO: 3.98 M/UL (ref 4.1–5.7)
SODIUM SERPL-SCNC: 136 MMOL/L (ref 136–145)
SP1: NORMAL
SP2: NORMAL
SP3: NORMAL
WBC # BLD AUTO: 4.6 K/UL (ref 4.1–11.1)

## 2017-09-06 PROCEDURE — 65660000000 HC RM CCU STEPDOWN

## 2017-09-06 PROCEDURE — 97535 SELF CARE MNGMENT TRAINING: CPT

## 2017-09-06 PROCEDURE — 74011000250 HC RX REV CODE- 250: Performed by: INTERNAL MEDICINE

## 2017-09-06 PROCEDURE — 80053 COMPREHEN METABOLIC PANEL: CPT | Performed by: INTERNAL MEDICINE

## 2017-09-06 PROCEDURE — 85027 COMPLETE CBC AUTOMATED: CPT | Performed by: INTERNAL MEDICINE

## 2017-09-06 PROCEDURE — 74011250636 HC RX REV CODE- 250/636

## 2017-09-06 PROCEDURE — 77030013992 HC SNR POLYP ENDOSC BSC -B: Performed by: SPECIALIST

## 2017-09-06 PROCEDURE — 74011250637 HC RX REV CODE- 250/637: Performed by: INTERNAL MEDICINE

## 2017-09-06 PROCEDURE — 88305 TISSUE EXAM BY PATHOLOGIST: CPT | Performed by: SPECIALIST

## 2017-09-06 PROCEDURE — 76040000019: Performed by: SPECIALIST

## 2017-09-06 PROCEDURE — 74011250636 HC RX REV CODE- 250/636: Performed by: INTERNAL MEDICINE

## 2017-09-06 PROCEDURE — 83735 ASSAY OF MAGNESIUM: CPT | Performed by: INTERNAL MEDICINE

## 2017-09-06 PROCEDURE — 0DJ08ZZ INSPECTION OF UPPER INTESTINAL TRACT, VIA NATURAL OR ARTIFICIAL OPENING ENDOSCOPIC: ICD-10-PCS | Performed by: SPECIALIST

## 2017-09-06 PROCEDURE — 97161 PT EVAL LOW COMPLEX 20 MIN: CPT

## 2017-09-06 PROCEDURE — 74011000250 HC RX REV CODE- 250

## 2017-09-06 PROCEDURE — 84100 ASSAY OF PHOSPHORUS: CPT | Performed by: INTERNAL MEDICINE

## 2017-09-06 PROCEDURE — 76060000031 HC ANESTHESIA FIRST 0.5 HR: Performed by: SPECIALIST

## 2017-09-06 PROCEDURE — 77030009426 HC FCPS BIOP ENDOSC BSC -B: Performed by: SPECIALIST

## 2017-09-06 PROCEDURE — 36415 COLL VENOUS BLD VENIPUNCTURE: CPT | Performed by: INTERNAL MEDICINE

## 2017-09-06 PROCEDURE — 0DBK8ZZ EXCISION OF ASCENDING COLON, VIA NATURAL OR ARTIFICIAL OPENING ENDOSCOPIC: ICD-10-PCS | Performed by: SPECIALIST

## 2017-09-06 PROCEDURE — C9113 INJ PANTOPRAZOLE SODIUM, VIA: HCPCS | Performed by: INTERNAL MEDICINE

## 2017-09-06 PROCEDURE — 97165 OT EVAL LOW COMPLEX 30 MIN: CPT

## 2017-09-06 RX ORDER — LORAZEPAM 1 MG/1
2 TABLET ORAL
Status: DISCONTINUED | OUTPATIENT
Start: 2017-09-06 | End: 2017-09-07 | Stop reason: HOSPADM

## 2017-09-06 RX ORDER — PROPOFOL 10 MG/ML
INJECTION, EMULSION INTRAVENOUS AS NEEDED
Status: DISCONTINUED | OUTPATIENT
Start: 2017-09-06 | End: 2017-09-06 | Stop reason: HOSPADM

## 2017-09-06 RX ORDER — PROPOFOL 10 MG/ML
INJECTION, EMULSION INTRAVENOUS
Status: DISCONTINUED | OUTPATIENT
Start: 2017-09-06 | End: 2017-09-06 | Stop reason: HOSPADM

## 2017-09-06 RX ORDER — MORPHINE SULFATE 2 MG/ML
2 INJECTION, SOLUTION INTRAMUSCULAR; INTRAVENOUS
Status: DISCONTINUED | OUTPATIENT
Start: 2017-09-06 | End: 2017-09-07 | Stop reason: HOSPADM

## 2017-09-06 RX ORDER — LIDOCAINE HYDROCHLORIDE 20 MG/ML
INJECTION, SOLUTION EPIDURAL; INFILTRATION; INTRACAUDAL; PERINEURAL AS NEEDED
Status: DISCONTINUED | OUTPATIENT
Start: 2017-09-06 | End: 2017-09-06 | Stop reason: HOSPADM

## 2017-09-06 RX ORDER — LORAZEPAM 1 MG/1
4 TABLET ORAL
Status: DISCONTINUED | OUTPATIENT
Start: 2017-09-06 | End: 2017-09-07 | Stop reason: HOSPADM

## 2017-09-06 RX ORDER — PANTOPRAZOLE SODIUM 40 MG/1
40 TABLET, DELAYED RELEASE ORAL
Status: DISCONTINUED | OUTPATIENT
Start: 2017-09-07 | End: 2017-09-07 | Stop reason: HOSPADM

## 2017-09-06 RX ADMIN — SODIUM CHLORIDE 40 MG: 9 INJECTION INTRAMUSCULAR; INTRAVENOUS; SUBCUTANEOUS at 13:00

## 2017-09-06 RX ADMIN — Medication 100 MG: at 12:38

## 2017-09-06 RX ADMIN — PHENOBARBITAL 97.2 MG: 32.4 TABLET ORAL at 20:16

## 2017-09-06 RX ADMIN — Medication 10 ML: at 12:52

## 2017-09-06 RX ADMIN — Medication 10 ML: at 05:34

## 2017-09-06 RX ADMIN — LIDOCAINE HYDROCHLORIDE 40 MG: 20 INJECTION, SOLUTION EPIDURAL; INFILTRATION; INTRACAUDAL; PERINEURAL at 10:36

## 2017-09-06 RX ADMIN — MORPHINE SULFATE 2 MG: 2 INJECTION, SOLUTION INTRAMUSCULAR; INTRAVENOUS at 17:58

## 2017-09-06 RX ADMIN — LORAZEPAM 2 MG: 2 INJECTION INTRAMUSCULAR; INTRAVENOUS at 03:15

## 2017-09-06 RX ADMIN — PHENOBARBITAL 97.2 MG: 32.4 TABLET ORAL at 01:06

## 2017-09-06 RX ADMIN — MORPHINE SULFATE 2 MG: 4 INJECTION, SOLUTION INTRAMUSCULAR; INTRAVENOUS at 00:49

## 2017-09-06 RX ADMIN — PROPOFOL 100 MG: 10 INJECTION, EMULSION INTRAVENOUS at 10:36

## 2017-09-06 RX ADMIN — PHENOBARBITAL 97.2 MG: 32.4 TABLET ORAL at 12:38

## 2017-09-06 RX ADMIN — PROPOFOL 160 MCG/KG/MIN: 10 INJECTION, EMULSION INTRAVENOUS at 10:36

## 2017-09-06 RX ADMIN — SODIUM CHLORIDE: 900 INJECTION, SOLUTION INTRAVENOUS at 10:15

## 2017-09-06 RX ADMIN — MORPHINE SULFATE 2 MG: 2 INJECTION, SOLUTION INTRAMUSCULAR; INTRAVENOUS at 23:30

## 2017-09-06 RX ADMIN — MORPHINE SULFATE 2 MG: 4 INJECTION, SOLUTION INTRAMUSCULAR; INTRAVENOUS at 12:35

## 2017-09-06 RX ADMIN — Medication 1 TABLET: at 12:37

## 2017-09-06 RX ADMIN — SODIUM CHLORIDE 100 ML/HR: 900 INJECTION, SOLUTION INTRAVENOUS at 12:34

## 2017-09-06 RX ADMIN — FOLIC ACID 1 MG: 1 TABLET ORAL at 12:37

## 2017-09-06 NOTE — PERIOP NOTES
Renny Gomez  1965  [id number]    Situation:    Scheduled Procedure: Procedure(s):  ESOPHAGOGASTRODUODENOSCOPY (EGD)/COLONOSCOPY   Verbal report received from: Shae Quiros RN  Preoperative diagnosis: abd pain    Background:    Procedure: Procedure(s):  ESOPHAGOGASTRODUODENOSCOPY (EGD)/COLONOSCOPY   Physician performing procedure; Dr. Liss Garber RN    NPO Status/Last PO Intake: midnight except a few ice chips    Pregnancy Test:Not applicable If yes, result: none    Is the patient taking Blood Thinners: NO If yes, list:  and last taken   Is the patient diabetic:no          Does the patient have a Pacemaker/Defibrillator in place?: no   Does the patient need antibiotics before/during/after procedure: no   If the patient is having a colon, How much prep was drank? all   What were the Colon prep results? Stools liquid yellow   Does the patient have SCD in place:no   Is patient on CONTACT precautions:no        If yes, what kind of CONTACT precautions:     Assessment:  Are the vital signs stable prior to patient coming to ENDO? Yes. Bradycardia with heart rate 43-53/min. Will discuss with Dr. Curtis Gonzalez. 12 lead EKG obtained yesterday. Is the patient alert/oriented and able to sign consent for the procedures:yes    Does the patient have a patient IV in place? yes     Recommendation:  Family or Friend present no     Permission to share finding with Family or Friend n/a    Consult/notes from Dr. Curtis Gonzalez reviewed.

## 2017-09-06 NOTE — PROGRESS NOTES
Problem: Falls - Risk of  Goal: *Absence of Falls  Document Marii Fall Risk and appropriate interventions in the flowsheet.    Outcome: Progressing Towards Goal  Fall Risk Interventions:  Mobility Interventions: Bed/chair exit alarm, Communicate number of staff needed for ambulation/transfer, Patient to call before getting OOB     Mentation Interventions: Adequate sleep, hydration, pain control, Bed/chair exit alarm, Evaluate medications/consider consulting pharmacy, More frequent rounding, Room close to nurse's station, Toileting rounds     Medication Interventions: Bed/chair exit alarm, Patient to call before getting OOB     Elimination Interventions: Bed/chair exit alarm, Call light in reach, Patient to call for help with toileting needs, Toilet paper/wipes in reach     History of Falls Interventions: Bed/chair exit alarm, Door open when patient unattended, Evaluate medications/consider consulting pharmacy, Room close to nurse's station

## 2017-09-06 NOTE — PROGRESS NOTES
Problem: Self Care Deficits Care Plan (Adult)  Goal: *Acute Goals and Plan of Care (Insert Text)  Occupational Therapy Goals  Initiated 9/6/2017  1. Patient will perform grooming with independence standing at sink for 10 minutes within 7 day(s). 2. Patient will perform lower body dressing with independence within 7 day(s). 4. Patient will perform toilet transfers with independence within 7 day(s). 5. Patient will perform all aspects of toileting with independence within 7 day(s). 6. Patient will participate in upper extremity therapeutic exercise/activities with independence for 10 minutes within 7 day(s). 7. Patient will utilize energy conservation techniques during functional activities with minimal verbal cues within 7 day(s). OCCUPATIONAL THERAPY EVALUATION  Patient: Silvestre Wilder (80 y.o. male)  Date: 9/6/2017  Primary Diagnosis: Dehydration  Alcohol withdrawal (HCC)  abd pain  Procedure(s) (LRB):  ESOPHAGOGASTRODUODENOSCOPY (EGD)/COLONOSCOPY  (N/A)  COLONOSCOPY (N/A)  ENDOSCOPIC POLYPECTOMY (N/A) Day of Surgery   Precautions: Fall, Seizure        ASSESSMENT :  Cleared by RN to see pt for therapy session. Based on the objective data described below, the patient presents with decreased balance, impulsivity, and poor motor planning following admission for dehydration/alcohol withdrawal. Pt previously I with ADLs, IADLs and functional mobility, lives in a home with 2 roommates. He reports neck pain from a previous C3-C4 compression injury due, but otherwise no complaints of pain or fatigue during session. 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 During session, pt performed bed mobility with supervision, good sitting balance at EOB. Pt doffed socks with mod I while sitting EOB. Pt ambulated in hallway with CGA to min A, demonstrated wide MANUELA, unsteadiness and mild LOB and rapid pace.  Required cueing to slow pace, pt with poor insight into balance deficits. Pt performed toilet transfer with CGA, stood from toilet without using grabbar and then returned to sitting EOB. Pt reports occasional falls prior to the one before this admission. Due to his poor insight into deficits, poor safety awareness and decreased balance, pt is a high fall risk. He reports he does not have DME at home as he \"has not needed it. \" Pt will benefit from continued OT to improve functional performance, particularly to provide home safety and fall prevention/energy conservation education. Pending progress, recommend HHOT upon discharge. Patient will benefit from skilled intervention to address the above impairments. Patients rehabilitation potential is considered to be Good  Factors which may influence rehabilitation potential include:   [X]             None noted  [ ]             Mental ability/status  [ ]             Medical condition  [ ]             Home/family situation and support systems  [ ]             Safety awareness  [ ]             Pain tolerance/management  [ ]             Other:        PLAN :  Recommendations and Planned Interventions:  [X]               Self Care Training                  [X]        Therapeutic Activities  [X]               Functional Mobility Training    [X]        Cognitive Retraining  [X]               Therapeutic Exercises           [X]        Endurance Activities  [X]               Balance Training                   [ ]        Neuromuscular Re-Education  [ ]               Visual/Perceptual Training     [X]   Home Safety Training  [X]               Patient Education                 [X]        Family Training/Education  [ ]               Other (comment):     Frequency/Duration: Patient will be followed by occupational therapy 3 times a week to address goals.   Discharge Recommendations: Home Health  Further Equipment Recommendations for Discharge: TBD, potentially shower chair       SUBJECTIVE:   Patient stated I'm a competitive , I haven't needed anything to help me walk.       OBJECTIVE DATA SUMMARY:   HISTORY:   Past Medical History:   Diagnosis Date    Alcohol abuse      Diverticulitis      Diverticulitis      Hypertension       Past Surgical History:   Procedure Laterality Date    HX COLECTOMY            Prior Level of Function/Home Situation: Pt previously I with ADLs, IADLs and functional mobility, lives in a home with 2 roommates. Home Situation  Home Environment: Private residence  # Steps to Enter: 5  Rails to Enter: Yes  One/Two Story Residence: Two story  # of Interior Steps: 8  Interior Rails: Both  Living Alone: No (lives with roommates)  Support Systems: Friends \ neighbors, Child(jareth)  Patient Expects to be Discharged to[de-identified] Private residence  Current DME Used/Available at Home: None  Tub or Shower Type: Tub/Shower combination  [X]  Right hand dominant             [ ]  Left hand dominant     EXAMINATION OF PERFORMANCE DEFICITS:  Cognitive/Behavioral Status:  Neurologic State: Alert  Orientation Level: Oriented X4  Cognition: Follows commands; Impulsive;Poor safety awareness  Perception: Appears intact  Perseveration: No perseveration noted  Safety/Judgement: Decreased awareness of need for assistance;Decreased awareness of need for safety; Fall prevention;Home safety     Hearing: Auditory  Auditory Impairment: None     Vision/Perceptual:         Acuity: Able to read employee name badge without difficulty    Corrective Lenses: Reading glasses     Range of Motion:  AROM: Within functional limits  PROM: Within functional limits     Strength:  Strength: Within functional limits     Coordination:  Coordination: Within functional limits  Fine Motor Skills-Upper: Left Intact; Right Intact    Gross Motor Skills-Upper: Left Intact; Right Intact     Tone & Sensation:  Tone: Normal  Sensation: Intact        Balance:  Sitting: Intact; Without support  Standing: Impaired; Without support  Standing - Static: Fair  Standing - Dynamic : Fair     Functional Mobility and Transfers for ADLs:  Bed Mobility:  Supine to Sit: Supervision  Sit to Supine: Supervision     Transfers:  Sit to Stand: Contact guard assistance  Stand to Sit: Contact guard assistance  Toilet Transfer : Contact guard assistance     ADL Assessment:  Feeding: Independent     Oral Facial Hygiene/Grooming: Independent     Bathing: Supervision     Upper Body Dressing: Independent     Lower Body Dressing: Supervision     Toileting: Supervision        ADL Intervention and task modifications:   Pt sat EOB to don socks with mod I. Pt with poor standing balance, will require at least CGA for standing ADLs at this time. Lower Body Dressing Assistance  Socks: Modified independent           Cognitive Retraining  Safety/Judgement: Decreased awareness of need for assistance;Decreased awareness of need for safety; Fall prevention;Home safety     Functional Measure:  Barthel Index:      Bathin  Bladder: 10  Bowels: 10  Groomin  Dressin  Feeding: 10  Mobility: 10  Stairs: 5  Toilet Use: 10  Transfer (Bed to Chair and Back): 10  Total: 75         Barthel and G-code impairment scale:  Percentage of impairment CH  0% CI  1-19% CJ  20-39% CK  40-59% CL  60-79% CM  80-99% CN  100%   Barthel Score 0-100 100 99-80 79-60 59-40 20-39 1-19    0   Barthel Score 0-20 20 17-19 13-16 9-12 5-8 1-4 0      The Barthel ADL Index: Guidelines  1. The index should be used as a record of what a patient does, not as a record of what a patient could do. 2. The main aim is to establish degree of independence from any help, physical or verbal, however minor and for whatever reason. 3. The need for supervision renders the patient not independent. 4. A patient's performance should be established using the best available evidence.  Asking the patient, friends/relatives and nurses are the usual sources, but direct observation and common sense are also important. However direct testing is not needed. 5. Usually the patient's performance over the preceding 24-48 hours is important, but occasionally longer periods will be relevant. 6. Middle categories imply that the patient supplies over 50 per cent of the effort. 7. Use of aids to be independent is allowed. Letha Settler., Barthel, D.W. ([id number]). Functional evaluation: the Barthel Index. [address] [facility] (14)2. Elzbietaesequiel Chang gordon BLAS [place], Sarah Beth Suh., Cynthia Miller., La Rodriguez, [address] [facility] (1999). Measuring the change indisability after inpatient rehabilitation; comparison of the responsiveness of the Barthel Index and Functional Pound Measure. Journal of Neurology, Neurosurgery, and Psychiatry, 66(4), 213-587. TOYIN Samaniego, VIOLETA Espinal, & Margaret Melton M.A. (2004.) Assessment of post-stroke quality of life in cost-effectiveness studies: The usefulness of the Barthel Index and the EuroQoL-5D. Quality of Life Research, 13, 721-19         G codes: In compliance with CMSs Claims Based Outcome Reporting, the following G-code set was chosen for this patient based on their primary functional limitation being treated: The outcome measure chosen to determine the severity of the functional limitation was the Barthel Index with a score of 75/100 which was correlated with the impairment scale.       · Self Care:               - CURRENT STATUS:    CJ - 20%-39% impaired, limited or restricted               - GOAL STATUS:            - 0% impaired, limited or restricted               - D/C STATUS:                       ---------------To be determined---------------      Occupational Therapy Evaluation Charge Determination   History Examination Decision-Making   LOW Complexity : Brief history review  LOW Complexity : 1-3 performance deficits relating to physical, cognitive , or psychosocial skils that result in activity limitations and / or participation restrictions  LOW Complexity : No comorbidities that affect functional and no verbal or physical assistance needed to complete eval tasks       Based on the above components, the patient evaluation is determined to be of the following complexity level: LOW   Pain:  Pain Scale 1: Visual  Pain Intensity 1: 0  Pain Location 1: Abdomen  Pain Orientation 1: Anterior  Pain Description 1: Aching  Pain Intervention(s) 1: Medication (see MAR)  Activity Tolerance:   Good  Please refer to the flowsheet for vital signs taken during this treatment. After treatment:   [ ] Patient left in no apparent distress sitting up in chair  [X] Patient left in no apparent distress in bed  [X] Call bell left within reach  [X] Nursing notified   [ ] Caregiver present  [ ] Bed alarm activated      COMMUNICATION/EDUCATION:   The patients plan of care was discussed with: Physical Therapist and Registered Nurse.  [X] Home safety education was provided and the patient/caregiver indicated understanding. [X] Patient/family have participated as able in goal setting and plan of care. [X] Patient/family agree to work toward stated goals and plan of care. [ ] Patient understands intent and goals of therapy, but is neutral about his/her participation. [ ] Patient is unable to participate in goal setting and plan of care. This patients plan of care is appropriate for delegation to hospitals.      Thank you for this referral.  Sterling Amador OT  Time Calculation: 24 mins

## 2017-09-06 NOTE — PROGRESS NOTES
Occupational Therapy Note:    Orders acknowledged and chart reviewed. Attempted to see pt for OT evaluation, and pt currently off the floor for a procedure. Will attempt to see pt again for evaluation this afternoon as able.  Thank you for this referral    Reny Elena OTR/L

## 2017-09-06 NOTE — PERIOP NOTES
Received report from [employer]. See anesthesia record. ABD remains soft and non-tender post procedure. Pt has no complaints at this time and tolerated the procedure well.

## 2017-09-06 NOTE — PROCEDURES
[address] [facility] ARON Zuniga MD  [phone]      2017    Esophagogastroduodenoscopy & Colonoscopy Procedure Note  Denzel Gomez  : 1965  [facility] Medical Record Number: [id number]      Indications:    Abdominal pain, epigastric, Abdominal pain, LUQ Abdominal pain, generalized  and Abdominal pain, LLQ  Referring Physician:  Rula Crystal MD  Anesthesia/Sedation: Conscious Sedation/Moderate Sedation/MAC  Endoscopist:  Dr. Marialuisa Porter  Complications:  None  Estimated Blood Loss:  None    Permit:  The indications, risks, benefits and alternatives were reviewed with the patient or their decision maker who was provided an opportunity to ask questions and all questions were answered. The specific risks of esophagogastroduodenoscopy with conscious sedation were reviewed, including but not limited to anesthetic complication, bleeding, adverse drug reaction, missed lesion, infection, IV site reactions, and intestinal perforation which would lead to the need for surgical repair. Alternatives to EGD and colonoscopy including radiographic imaging, observation without testing, or laboratory testing were reviewed as well as the limitations of those alternatives discussed. After considering the options and having all their questions answered, the patient or their decision maker provided both verbal and written consent to proceed. -----------EGD------------   Procedure in Detail:  After obtaining informed consent, positioning of the patient in the left lateral decubitus position, and conduction of a pre-procedure pause or \"time out\" the endoscope was introduced into the mouth and advanced to the duodenum. A careful inspection was made, and findings or interventions are described below. Findings:   Esophagus: LA Class B erosive/reflux esophagitis.     Stomach: normal   Duodenum/jejunum: normal        ----------Colonoscopy-----------    Procedure in Detail:  After obtaining informed consent, positioning of the patient in the left lateral decubitus position, and conduction of a pre-procedure pause or \"time out\" the endoscope was introduced into the anus and advanced to the cecum, which was identified by the ileocecal valve and appendiceal orifice. The quality of the colonic preparation was good. A careful inspection was made as the colonoscope was withdrawn, findings and interventions are described below. Findings:   Previous sigmoid colectomy with side to end anastomosis. The anastomosis is wide open without inflammation or narrowing at all. There is diverticulosis in the sigmoid colon without complications such as bleeding, inflammatory change, or luminal narrowing. There are two small polyps of the ascending colon 4mm and 5mm taken with cold snare and all samples retrieved. Hemostasis confirmed. ------------------------------  Specimens:    See above    Complications:   None; patient tolerated the procedure well. Impressions:  EGD:  Reflux esophagitis. Colonoscopy: Colon polyps, diverticulosis and previous surgery. Recommendations:     - Await pathology. -Acid suppression with a proton pump inhibitor.   -Discharge planning, ethanol avoidance. Thank you for entrusting me with this patient's care. Please do not hesitate to contact me with any questions or if I can be of assistance with any of your other patients' GI needs.     Signed By: Luiz Ugarte MD                        September 6, 2017

## 2017-09-06 NOTE — PERIOP NOTES
TRANSFER - OUT REPORT:    Verbal report given to Jillian BROOKS RN(name) on Juan Daley  being transferred to [facility](unit) for routine progression of care       Report consisted of patients Situation, Background, Assessment and   Recommendations(SBAR). Information from the following report(s) Procedure Summary, Intake/Output and Recent Results was reviewed with the receiving nurse. Lines:   Peripheral IV 09/05/17 Right Antecubital (Active)   Site Assessment Clean, dry, & intact 9/6/2017  7:55 AM   Phlebitis Assessment 0 9/6/2017  7:55 AM   Infiltration Assessment 0 9/6/2017  7:55 AM   Dressing Status Clean, dry, & intact 9/6/2017  7:55 AM   Dressing Type Transparent 9/6/2017  7:55 AM   Hub Color/Line Status Blue 9/6/2017  7:55 AM   Action Taken Open ports on tubing capped 9/6/2017  7:55 AM   Alcohol Cap Used Yes 9/6/2017  7:55 AM        Opportunity for questions and clarification was provided.       Patient transported with:   [employer]

## 2017-09-06 NOTE — PROGRESS NOTES
0887 Pt went to Endo. MD Samuel Capellan said hold his meds till pt gets back and Md is going to assess pt.

## 2017-09-06 NOTE — PROGRESS NOTES
Bedside and Verbal shift change report given to Gene Quintanilla (oncoming nurse) by Carlos Alberto Wilkins (offgoing nurse).  Report included the following information SBAR, Kardex, Procedure Summary, Intake/Output, MAR, Accordion, Recent Results and Cardiac Rhythm SB.

## 2017-09-06 NOTE — PROGRESS NOTES
0815 Pt went down to Endo. 1215 Pt back in room, alert.    1302 Phenobarbital given late, pt was off unit. Protonix given IV after checked with pharmacists. 1427 Pt anxious want to go home. His mom is in hospital and he needs to take care of some issues. Bedside and Verbal shift change report given to Juan (oncoming nurse) by Samantha Currie RN (offgoing nurse). Report included the following information SBAR, Kardex, Intake/Output and MAR.

## 2017-09-06 NOTE — ANESTHESIA POSTPROCEDURE EVALUATION
Post-Anesthesia Evaluation and Assessment    Patient: Carlos Harmon MRN: [id number]  SSN: [id number]    YOB: 1965  Age: 47 y.o. Sex: male       Cardiovascular Function/Vital Signs  Visit Vitals    /80    Pulse 60    Temp 36.5 °C (97.7 °F)    Resp 15    Ht 6' 2\" (1.88 m)    Wt 99.8 kg (220 lb)    SpO2 99%    BMI 28.25 kg/m2       Patient is status post MAC anesthesia for Procedure(s):  ESOPHAGOGASTRODUODENOSCOPY (EGD)/COLONOSCOPY   COLONOSCOPY  ENDOSCOPIC POLYPECTOMY. Nausea/Vomiting: None    Postoperative hydration reviewed and adequate. Pain:  Pain Scale 1: Numeric (0 - 10) (09/06/17 0855)  Pain Intensity 1: 8 (09/06/17 0855)   Managed    Neurological Status:   Neuro  Neurologic State: (P) Alert (09/06/17 0755)  Orientation Level: (P) Oriented X4 (09/06/17 0755)   At baseline    Mental Status and Level of Consciousness: Arousable    Pulmonary Status:   O2 Device: Nasal cannula (09/06/17 1048)   Adequate oxygenation and airway patent    Complications related to anesthesia: None    Post-anesthesia assessment completed.  No concerns    Signed By: Du Jordan MD     September 6, 2017

## 2017-09-06 NOTE — ANESTHESIA PREPROCEDURE EVALUATION
Anesthetic History   No history of anesthetic complications            Review of Systems / Medical History  Patient summary reviewed, nursing notes reviewed and pertinent labs reviewed    Pulmonary  Within defined limits                 Neuro/Psych   Within defined limits           Cardiovascular  Within defined limits  Hypertension              Exercise tolerance: >4 METS     GI/Hepatic/Renal  Within defined limits              Endo/Other  Within defined limits           Other Findings   Comments: Polysubstance abuse, EtOH abuse  Diverticular disease           Physical Exam    Airway  Mallampati: II    Neck ROM: normal range of motion   Mouth opening: Normal     Cardiovascular  Regular rate and rhythm,  S1 and S2 normal,  no murmur, click, rub, or gallop  Rhythm: regular  Rate: normal         Dental  No notable dental hx       Pulmonary  Breath sounds clear to auscultation               Abdominal  GI exam deferred       Other Findings            Anesthetic Plan    ASA: 3  Anesthesia type: MAC          Induction: Intravenous  Anesthetic plan and risks discussed with: Patient

## 2017-09-06 NOTE — PROGRESS NOTES
Problem: Mobility Impaired (Adult and Pediatric)  Goal: *Acute Goals and Plan of Care (Insert Text)  Physical Therapy Goals  Initiated 9/6/2017    1. Patient will ambulate with independence for 250 feet without device within 7 day(s). 2. Patient will ascend/descend 12 stairs with 1 handrail(s) with independence within 7 day(s). 3. Patient will perform higher level balance activities such that he is able to score >52/56 on NOVOA Balance Test within 7 days. PHYSICAL THERAPY EVALUATION  Patient: Camille Burgos (48 y.o. male)  Date: 9/6/2017  Primary Diagnosis: Dehydration  Alcohol withdrawal (HCC)  abd pain  Procedure(s) (LRB):  ESOPHAGOGASTRODUODENOSCOPY (EGD)/COLONOSCOPY  (N/A)  COLONOSCOPY (N/A)  ENDOSCOPIC POLYPECTOMY (N/A) Day of Surgery   Precautions: falls        ASSESSMENT :  Based on the objective data described below, the patient presents decreased insight into deficits, ataxic accelerated gait pattern with two episodes of loss of balance anteriorly. Patient does not self correct speed without prompting. Patient talkative and appears to confabulate portions of history. He reports history of toe and metatarsal fractures due to injuries sustained during tennis games. He reports he is a near pro  but currently is far from baseline. Patient admitted to [facility] with abdominal pain and dehydration after GLF at home. Patient has history of ETOH abuse as well as abuse cannibus. Patient with deficits of higher level balance activities as well as ataxic gait with impaired balance. Recommend PT 3x week while in house to address goals of reaching complete independence with gait and stairs. Patient lives with 2 room mates on 2nd floor of [address] [facility]. He is unemployed  and reports he has daughter 24 yo in college. Patient tends to digress and very talkative. Do not anticipate follow up DME needs or need for PT services at discharge.       Patient will benefit from skilled intervention to address the above impairments. Patients rehabilitation potential is considered to be Fair  Factors which may influence rehabilitation potential include:   [ ]         None noted  [X]         Mental ability/status  [X]         Medical condition  [X]         Home/family situation and support systems  [X]         Safety awareness  [ ]         Pain tolerance/management  [ ]         Other:        PLAN :  Recommendations and Planned Interventions:  [ ]           Bed Mobility Training             [X]    Neuromuscular Re-Education  [ ]           Transfer Training                   [ ]    Orthotic/Prosthetic Training  [X]           Gait Training                         [ ]    Modalities  [X]           Therapeutic Exercises           [ ]    Edema Management/Control  [ ]           Therapeutic Activities            [X]    Patient and Family Training/Education  [ ]           Other (comment):     Frequency/Duration: Patient will be followed by physical therapy  3 times a week to address goals. Discharge Recommendations: None  Further Equipment Recommendations for Discharge: none       SUBJECTIVE:   Patient stated I was playing competitive tennis regularly before I began getting sick.       OBJECTIVE DATA SUMMARY:   HISTORY:    Past Medical History:   Diagnosis Date    Alcohol abuse      Diverticulitis      Diverticulitis      Hypertension       Past Surgical History:   Procedure Laterality Date    HX COLECTOMY         Prior Level of Function/Home Situation: independent with mobility and ADLs without DME; unemployed - by trade, lives with 2 room mates on 2nd floor of home[facility]  Personal factors and/or comorbidities impacting plan of care: h/o ETOH and drug abuse, h/o diverticulitis, minimal family support,      Home Situation  Home Environment: Private residence  # Steps to Enter: 5  Rails to Enter: Yes  One/Two Story Residence: Two story  # of Interior Steps: [address]: Both  Living Alone: No (lives with roommates)  Support Systems: Friends \ neighbors, Child(jareth)  Patient Expects to be Discharged to[de-identified] Private residence  Current DME Used/Available at Home: None  Tub or Shower Type: Tub/Shower combination     EXAMINATION/PRESENTATION/DECISION MAKING:   Critical Behavior:  Neurologic State: Alert  Orientation Level: Oriented X4  Cognition: Follows commands, Impulsive, Poor safety awareness  Safety/Judgement: Decreased awareness of need for assistance, Decreased awareness of need for safety, Fall prevention, Home safety  Hearing: Auditory  Auditory Impairment: None     Range Of Motion:  AROM: Within functional limits           PROM: Within functional limits           Strength:    Strength: Within functional limits                    Tone & Sensation:   Tone: Normal              Sensation: Intact               Coordination:  Coordination: Within functional limits  Vision:   Acuity: Able to read employee name badge without difficulty  Corrective Lenses: Reading glasses  Functional Mobility:  Bed Mobility:     Supine to Sit: Supervision  Sit to Supine: Supervision     Transfers:  Sit to Stand: Contact guard assistance  Stand to Sit: Contact guard assistance  Stand Pivot Transfers: Contact guard assistance     Bed to Chair: Contact guard assistance              Balance:   Sitting: Intact; Without support  Standing: Impaired; Without support  Standing - Static: Fair  Standing - Dynamic : Fair  Ambulation/Gait Training:  Distance (ft): 250 Feet (ft)  Assistive Device: Gait belt  Ambulation - Level of Assistance: Contact guard assistance        Gait Abnormalities: Ataxic;Path deviations        Base of Support: Widened     Speed/Yelitza: Accelerated        Stairs - Level of Assistance:  (NT)                    Functional Measure:  Tinetti test:      Sitting Balance: 1  Arises: 2  Attempts to Rise: 2  Immediate Standing Balance: 2  Standing Balance: 2  Nudged: 2  Eyes Closed: 1  Turn 360 Degrees - Continuous/Discontinuous: 1  Turn 360 Degrees - Steady/Unsteady: 0  Sitting Down: 2  Balance Score: 15  Indication of Gait: 1  R Step Length/Height: 1  L Step Length/Height: 1  R Foot Clearance: 1  L Foot Clearance: 1  Step Symmetry: 1  Step Continuity: 1  Path: 2  Trunk: 2  Walking Time: 1  Gait Score: 12  Total Score: 27         Tinetti Test and G-code impairment scale:  Percentage of Impairment CH     0%    CI     1-19% CJ     20-39% CK     40-59% CL     60-79% CM     80-99% CN      100%   Tinetti  Score 0-28 28 23-27 17-22 12-16 6-11 1-5 0          Tinetti Tool Score Risk of Falls  <19 = High Fall Risk  19-24 = Moderate Fall Risk  25-28 = Low Fall Risk  Tinetti ME. Performance-Oriented Assessment of Mobility Problems in Elderly Patients. [place] 66; [id number]. (Scoring Description: PT Bulletin Feb. 10, 1993)     Older adults: Edison Rhodes et al, 2009; n = 1000 [facility] elderly evaluated with ABC, EFRA, ADL, and IADL)  · Mean EFRA score for males aged 69-68 years = 26.21(3.40)  · Mean EFRA score for females age 69-68 years = 25.16(4.30)  · Mean EFRA score for males over 80 years = 23.29(6.02)  · Mean EFRA score for females over 80 years = 17.20(8.32)         G codes: In compliance with CMSs Claims Based Outcome Reporting, the following G-code set was chosen for this patient based on their primary functional limitation being treated: The outcome measure chosen to determine the severity of the functional limitation was the Tinetti Test with a score of 27/28 which was correlated with the impairment scale.       · Mobility - Walking and Moving Around:               - CURRENT STATUS:    CI - 1%-19% impaired, limited or restricted               - GOAL STATUS:           CH - 0% impaired, limited or restricted               - D/C STATUS:                       ---------------To be determined---------------      Physical Therapy Evaluation Charge Determination   History Examination Presentation Decision-Making   MEDIUM  Complexity : 1-2 comorbidities / personal factors will impact the outcome/ POC  MEDIUM Complexity : 3 Standardized tests and measures addressing body structure, function, activity limitation and / or participation in recreation  LOW Complexity : Stable, uncomplicated  Other outcome measures Tinetti test  LOW       Based on the above components, the patient evaluation is determined to be of the following complexity level: LOW      Pain:  Pain Scale 1: Visual  Pain Intensity 1: 0  Pain Location 1: Abdomen  Pain Orientation 1: Anterior  Pain Description 1: Aching  Pain Intervention(s) 1: Medication (see MAR)  Activity Tolerance:   WFLs  Please refer to the flowsheet for vital signs taken during this treatment. After treatment:   [X]         Patient left in no apparent distress sitting up in chair  [ ]         Patient left in no apparent distress in bed  [X]         Call bell left within reach  [X]         Nursing notified  [ ]         Caregiver present  [ ]         Bed alarm activated      COMMUNICATION/EDUCATION:   The patients plan of care was discussed with: Registered Nurse.  [X]         Fall prevention education was provided and the patient/caregiver indicated understanding. [X]         Patient/family have participated as able in goal setting and plan of care. [ ]         Patient/family agree to work toward stated goals and plan of care. [ ]         Patient understands intent and goals of therapy, but is neutral about his/her participation. [ ]         Patient is unable to participate in goal setting and plan of care.      Thank you for this referral.  Cristiana Cardona, PT   Time Calculation: 20 mins

## 2017-09-06 NOTE — PROGRESS NOTES
Fran [facility] [place] 78  [address] [facility], [address] [place] [address]  [phone]      Medical Progress Note      NAME: Stephen Schneider   :  1965  MRM:  [id number]    Date/Time: 2017  7:03 AM       Assessment and Plan:   1. Alcohol withdrawal. Continue CIWA protocol. More alert. Continue phonbarb. 2. Sinus bradycardia. Asymptomatic. clonidine and propranolol discontinued.       3.  Dehydration (2017)/Nausea & vomiting (2017)/Syncope (2017)/Hematemesis (2017)/ NSAID long-term use (2017). Will have EGD and colonoscopy. Continue IVF                       4.  Alcohol abuse /Polysubstance abuse (2017). Counseled on cessation. Evaluated by psychiatry. 5.  Thrombocytopenia. Likely due to alcohol abuse. Subjective:     Chief Complaint:  Follow up of pt who was admitted with alcohol withdrawal. C/o LUQ pain. ROS:  (bold if positive, if negative)    Abd Pain  Tolerating PT  Tolerating Diet        Objective:     Last 24hrs VS reviewed since prior progress note.  Most recent are:    Visit Vitals    BP (!) 171/95    Pulse (!) 48    Temp 97.8 °F (36.6 °C)    Resp 16    Ht 6' 2\" (1.88 m)    Wt 99.8 kg (220 lb)    SpO2 98%    BMI 28.25 kg/m2     SpO2 Readings from Last 6 Encounters:   17 98%    O2 Flow Rate (L/min): 2 l/min     Intake/Output Summary (Last 24 hours) at 17 0703  Last data filed at 17 0536   Gross per 24 hour   Intake              675 ml   Output             1000 ml   Net             -325 ml        Physical Exam:    Gen:  Well-developed, well-nourished, in no acute distress  HEENT:  Pink conjunctivae, PERRL, hearing intact to voice, moist mucous membranes  Neck:  Supple, without masses, thyroid non-tender  Resp:  No accessory muscle use, clear breath sounds without wheezes rales or rhonchi  Card:  No murmurs, normal S1, S2 without thrills, bruits or peripheral edema  Abd:  Soft, non-tender, non-distended, normoactive bowel sounds are present, no palpable organomegaly and no detectable hernias  Lymph:  No cervical or inguinal adenopathy  Musc:  No cyanosis or clubbing  Skin:  No rashes or ulcers, skin turgor is good  Neuro:  Cranial nerves are grossly intact, no focal motor weakness, follows commands appropriately  Psych:  Good insight, oriented to person, place and time, alert  __________________________________________________________________  Medications Reviewed: (see below)  Medications:     Current Facility-Administered Medications   Medication Dose Route Frequency    LORazepam (ATIVAN) injection 2 mg  2 mg IntraVENous Q1H PRN    LORazepam (ATIVAN) injection 4 mg  4 mg IntraVENous Q1H PRN    PHENobarbital (LUMINAL) tablet 97.2 mg  97.2 mg Oral Q6H    multivitamin with iron tablet 1 Tab  1 Tab Oral DAILY    thiamine (B-1) tablet 100 mg  100 mg Oral DAILY    folic acid (FOLVITE) tablet 1 mg  1 mg Oral DAILY    hydrALAZINE (APRESOLINE) 20 mg/mL injection 20 mg  20 mg IntraVENous Q6H PRN    diphenhydrAMINE (BENADRYL) capsule 25 mg  25 mg Oral Q4H PRN    nicotine (NICODERM CQ) 21 mg/24 hr patch 1 Patch  1 Patch TransDERmal DAILY    0.9% sodium chloride infusion  100 mL/hr IntraVENous CONTINUOUS    morphine injection 2 mg  2 mg IntraVENous Q4H PRN    chlorproMAZINE (THORAZINE) tablet 25 mg  25 mg Oral Q6H PRN    sodium chloride (NS) flush 5-10 mL  5-10 mL IntraVENous Q8H    sodium chloride (NS) flush 5-10 mL  5-10 mL IntraVENous PRN    naloxone (NARCAN) injection 0.4 mg  0.4 mg IntraVENous PRN    acetaminophen (TYLENOL) tablet 650 mg  650 mg Oral Q4H PRN    ondansetron (ZOFRAN) injection 4 mg  4 mg IntraVENous Q4H PRN    pantoprazole (PROTONIX) 40 mg in sodium chloride 0.9 % 10 mL injection  40 mg IntraVENous Q12H        Lab Data Reviewed: (see below)  Lab Review:     Recent Labs      09/06/17   0156  09/05/17   0110  09/03/17   1313   WBC  4.6  3.2* 3.7*   HGB  14.5  14.4  13.6   HCT  39.7  40.1  37.7   PLT  111*  91*  95*     Recent Labs      09/06/17   0156  09/05/17   0110  09/03/17   1313   NA  136  138  137   K  3.5  3.6  3.8   CL  101  104  103   CO2  27  26  27   GLU  96  96  111*   BUN  2*  1*  5*   CREA  0.87  0.93  0.98   CA  8.4*  8.5  7.7*   MG  1.6  2.0  1.4*   PHOS  4.0  3.7  2.9   ALB  3.3*   --   3.2*   TBILI  1.9*   --   1.7*   SGOT  35   --   41*   ALT  39   --   43     Lab Results   Component Value Date/Time    Glucose (POC) 117 09/05/2017 01:17 PM     No results for input(s): PH, PCO2, PO2, HCO3, FIO2 in the last 72 hours. No results for input(s): INR in the last 72 hours. No lab exists for component: INREXT  All Micro Results     None          I have reviewed notes of prior 24hr. Other pertinent lab:       Total time spent with patient: [place] [address] discussed with: Patient, Nursing Staff and >50% of time spent in counseling and coordination of care    Discussed:  Care Plan    Prophylaxis:  scd    Disposition:  Home w/Family           ___________________________________________________    Attending Physician: Pedro Rendon MD

## 2017-09-06 NOTE — ROUTINE PROCESS
[facility]  1965  [id number]    Situation:  Verbal report received from: Reina Banegas RN  Procedure: Procedure(s):  ESOPHAGOGASTRODUODENOSCOPY (EGD)/COLONOSCOPY   COLONOSCOPY  ENDOSCOPIC POLYPECTOMY    Background:    Preoperative diagnosis: abd pain  Postoperative diagnosis: * No post-op diagnosis entered *    :  Dr. Odette Valencia  Assistant(s): Endoscopy Technician-1: Giovanna Yap  Endoscopy Technician-2: Sung Harrison  Endoscopy RN-1: Reina Banegas RN    Specimens:   ID Type Source Tests Collected by Time Destination   1 : polyp Preservative Colon, Ascending  Vinny Marino MD 9/6/2017 1043 Pathology     H. Pylori  no    Assessment:  Intra-procedure medications   Anesthesia gave intra-procedure sedation and medications, see anesthesia flow sheet yes    Intravenous fluids: NS@ KVO     Vital signs stable      Abdominal assessment: round and soft     Recommendation:  Discharge patient per MD order.   Return to floor  Permission to share finding with family or friend yes

## 2017-09-07 VITALS
SYSTOLIC BLOOD PRESSURE: 167 MMHG | WEIGHT: 220 LBS | HEART RATE: 97 BPM | OXYGEN SATURATION: 98 % | BODY MASS INDEX: 28.23 KG/M2 | RESPIRATION RATE: 16 BRPM | HEIGHT: 74 IN | DIASTOLIC BLOOD PRESSURE: 89 MMHG | TEMPERATURE: 98 F

## 2017-09-07 PROBLEM — K92.0 HEMATEMESIS: Status: RESOLVED | Noted: 2017-09-02 | Resolved: 2017-09-07

## 2017-09-07 PROBLEM — R55 SYNCOPE: Status: RESOLVED | Noted: 2017-09-02 | Resolved: 2017-09-07

## 2017-09-07 LAB
ANION GAP SERPL CALC-SCNC: 9 MMOL/L (ref 5–15)
BUN SERPL-MCNC: 4 MG/DL (ref 6–20)
BUN/CREAT SERPL: 4 (ref 12–20)
CALCIUM SERPL-MCNC: 8.7 MG/DL (ref 8.5–10.1)
CHLORIDE SERPL-SCNC: 100 MMOL/L (ref 97–108)
CO2 SERPL-SCNC: 27 MMOL/L (ref 21–32)
CREAT SERPL-MCNC: 1.08 MG/DL (ref 0.7–1.3)
GLUCOSE SERPL-MCNC: 131 MG/DL (ref 65–100)
MAGNESIUM SERPL-MCNC: 1.7 MG/DL (ref 1.6–2.4)
POTASSIUM SERPL-SCNC: 3.6 MMOL/L (ref 3.5–5.1)
SODIUM SERPL-SCNC: 136 MMOL/L (ref 136–145)

## 2017-09-07 PROCEDURE — 97530 THERAPEUTIC ACTIVITIES: CPT

## 2017-09-07 PROCEDURE — 74011250637 HC RX REV CODE- 250/637: Performed by: SPECIALIST

## 2017-09-07 PROCEDURE — 74011250637 HC RX REV CODE- 250/637: Performed by: INTERNAL MEDICINE

## 2017-09-07 PROCEDURE — 80048 BASIC METABOLIC PNL TOTAL CA: CPT | Performed by: INTERNAL MEDICINE

## 2017-09-07 PROCEDURE — 97116 GAIT TRAINING THERAPY: CPT

## 2017-09-07 PROCEDURE — 83735 ASSAY OF MAGNESIUM: CPT | Performed by: INTERNAL MEDICINE

## 2017-09-07 PROCEDURE — 74011250636 HC RX REV CODE- 250/636: Performed by: INTERNAL MEDICINE

## 2017-09-07 PROCEDURE — 36415 COLL VENOUS BLD VENIPUNCTURE: CPT | Performed by: INTERNAL MEDICINE

## 2017-09-07 PROCEDURE — 97535 SELF CARE MNGMENT TRAINING: CPT

## 2017-09-07 RX ORDER — OXYCODONE AND ACETAMINOPHEN 2.5; 325 MG/1; MG/1
2 TABLET ORAL
Qty: 15 TAB | Refills: 0 | Status: SHIPPED | OUTPATIENT
Start: 2017-09-07 | End: 2021-01-06

## 2017-09-07 RX ORDER — FOLIC ACID 1 MG/1
1 TABLET ORAL DAILY
Qty: 30 TAB | Refills: 1 | Status: SHIPPED | OUTPATIENT
Start: 2017-09-07 | End: 2021-01-06

## 2017-09-07 RX ORDER — PHENOBARBITAL 30 MG/1
TABLET ORAL
Qty: 7 TAB | Refills: 0 | Status: SHIPPED | OUTPATIENT
Start: 2017-09-07 | End: 2021-01-06

## 2017-09-07 RX ORDER — PHENOL/SODIUM PHENOLATE
20 AEROSOL, SPRAY (ML) MUCOUS MEMBRANE DAILY
Qty: 30 TAB | Refills: 1 | Status: SHIPPED | OUTPATIENT
Start: 2017-09-07 | End: 2021-01-06

## 2017-09-07 RX ORDER — LANOLIN ALCOHOL/MO/W.PET/CERES
100 CREAM (GRAM) TOPICAL DAILY
Qty: 30 TAB | Refills: 1 | Status: SHIPPED | OUTPATIENT
Start: 2017-09-07 | End: 2021-01-06

## 2017-09-07 RX ADMIN — Medication 1 TABLET: at 08:48

## 2017-09-07 RX ADMIN — Medication 100 MG: at 08:48

## 2017-09-07 RX ADMIN — Medication 10 ML: at 05:59

## 2017-09-07 RX ADMIN — FOLIC ACID 1 MG: 1 TABLET ORAL at 08:48

## 2017-09-07 RX ADMIN — PANTOPRAZOLE SODIUM 40 MG: 40 TABLET, DELAYED RELEASE ORAL at 08:48

## 2017-09-07 RX ADMIN — PHENOBARBITAL 97.2 MG: 32.4 TABLET ORAL at 02:31

## 2017-09-07 RX ADMIN — Medication 10 ML: at 16:50

## 2017-09-07 RX ADMIN — PHENOBARBITAL 97.2 MG: 32.4 TABLET ORAL at 17:28

## 2017-09-07 RX ADMIN — PHENOBARBITAL 97.2 MG: 32.4 TABLET ORAL at 08:48

## 2017-09-07 RX ADMIN — MORPHINE SULFATE 2 MG: 2 INJECTION, SOLUTION INTRAMUSCULAR; INTRAVENOUS at 08:49

## 2017-09-07 RX ADMIN — LORAZEPAM 2 MG: 1 TABLET ORAL at 10:29

## 2017-09-07 NOTE — PROGRESS NOTES
Bedside and Verbal shift change report given to Shannan Barry RN (oncoming nurse) by Ryan Khan RN (offgoing nurse). Report included the following information SBAR, Kardex, ED Summary, Procedure Summary, Intake/Output, Recent Results, Med Rec Status and Cardiac Rhythm NSR.     0556: Notified by [facility] of monitor displaying a 10 beat run of Wide complex of V-Tach. Pt alert, oriented, was up, standing at bedside using urinal. Pt reported sharp left sided flank pain while urinating. Denies chest pain. Skin warm, dry, denies dizziness. Pt returned to bed, left flank pain subsided quickly. Bowel sounds present on oscultation in all four quadrants, abdomen semi-soft, pt denies pain on palpation. Bedside and Verbal shift change report given to Cammie Jean RN (oncoming nurse) by Shannan Barry RN (offgoing nurse). Report included the following information SBAR, Kardex, ED Summary, Procedure Summary, Intake/Output, Recent Results, Med Rec Status and Cardiac Rhythm NSR/ SA/SB.

## 2017-09-07 NOTE — PROGRESS NOTES
9-7-2017 CASE MANAGEMENT NOTE:  I have reviewed the pt's EMR and will assist with discharge planning as needed.  KIN Dillard, CM

## 2017-09-07 NOTE — DISCHARGE SUMMARY
Hospitalist Discharge Summary     Patient ID:    Anthony Dang  [id number]  25 y.o.  1965    Admit date: 9/2/2017    Discharge date and time: 9/7/2017    Admission Diagnoses: Dehydration  Alcohol withdrawal ([facility] [address]  abd pain    Chronic Diagnoses:    Problem List as of 9/7/2017  Date Reviewed: 9/7/2017          Codes Class Noted - Resolved    * (Principal)Alcohol withdrawal ([facility] ICD-10-CM: R84.022  ICD-9-CM: 291.81  9/4/2017 - Present        Alcohol abuse (Chronic) ICD-10-CM: F10.10  ICD-9-CM: 305.00  Unknown - Present        Opioid abuse (Chronic) ICD-10-CM: F11.10  ICD-9-CM: 305.50  9/2/2017 - Present        Mild tetrahydrocannabinol (THC) abuse (Chronic) ICD-10-CM: F12.10  ICD-9-CM: 305.20  9/2/2017 - Present        Polysubstance abuse (Chronic) ICD-10-CM: F19.10  ICD-9-CM: 305.90  9/2/2017 - Present        NSAID long-term use (Chronic) ICD-10-CM: Z79.1  ICD-9-CM: V58.64  9/2/2017 - Present        Weight loss ICD-10-CM: R63.4  ICD-9-CM: 783.21  9/2/2017 - Present        RESOLVED: Dehydration ICD-10-CM: E86.0  ICD-9-CM: 276.51  9/2/2017 - 9/4/2017        RESOLVED: Nausea & vomiting ICD-10-CM: R11.2  ICD-9-CM: 787.01  9/2/2017 - 9/4/2017        RESOLVED: Syncope ICD-10-CM: R55  ICD-9-CM: 780.2  9/2/2017 - 9/7/2017        RESOLVED: Elevated lipase ICD-10-CM: R74.8  ICD-9-CM: 790.5  9/2/2017 - 9/4/2017        RESOLVED: Lactic acidemia ICD-10-CM: E87.2  ICD-9-CM: 276.2  9/2/2017 - 9/4/2017        RESOLVED: Hematemesis ICD-10-CM: K92.0  ICD-9-CM: 578.0  9/2/2017 - 9/7/2017              Discharge Medications:   Current Discharge Medication List      START taking these medications    Details   folic acid (FOLVITE) 1 mg tablet Take 1 Tab by mouth daily. Qty: 30 Tab, Refills: 1      thiamine (B-1) 100 mg tablet Take 1 Tab by mouth daily.   Qty: 30 Tab, Refills: 1      PHENobarbital (LUMINAL) 30 mg tablet Take 30 mg( one tablet) by mouth twice a day for 2 days; then take 30 mg ( one tablet) daily for 3 days and stop.  Qty: 7 Tab, Refills: 0      oxyCODONE-acetaminophen (PERCOCET) 2.5-325 mg per tablet Take 2 Tabs by mouth every four (4) hours as needed for Pain. Max Daily Amount: 12 Tabs. Qty: 15 Tab, Refills: 0         CONTINUE these medications which have NOT CHANGED    Details   lisinopril (PRINIVIL, ZESTRIL) 10 mg tablet Take 10 mg by mouth daily. propranolol (INDERAL) 10 mg tablet Take 20 mg by mouth three (3) times daily. Follow up Care:    1. Raheem Adams MD in 1-2 weeks  2. Diet:  Cardiac Diet    Disposition:  Home. Advanced Directive:    Discharge Exam:  See today's note. CONSULTATIONS: GI and Psychiatry    Significant Diagnostic Studies:   Recent Labs      09/06/17 0156 09/05/17   0110   WBC  4.6  3.2*   HGB  14.5  14.4   HCT  39.7  40.1   PLT  111*  91*     Recent Labs      09/07/17   1129  09/06/17 0156 09/05/17   0110   NA  136  136  138   K  3.6  3.5  3.6   CL  100  101  104   CO2  27  27  26   BUN  4*  2*  1*   CREA  1.08  0.87  0.93   GLU  131*  96  96   CA  8.7  8.4*  8.5   MG  1.7  1.6  2.0   PHOS   --   4.0  3.7     Recent Labs      09/06/17 0156   SGOT  35   ALT  39   AP  53   TBILI  1.9*   TP  6.3*   ALB  3.3*   GLOB  3.0     No results for input(s): INR, PTP, APTT in the last 72 hours. No lab exists for component: INREXT   No results for input(s): FE, TIBC, PSAT, FERR in the last 72 hours. No results for input(s): PH, PCO2, PO2 in the last 72 hours. No results for input(s): CPK, CKMB in the last 72 hours. No lab exists for component: TROPONINI  Lab Results   Component Value Date/Time    Glucose (POC) 117 09/05/2017 01:17 PM             HOSPITAL COURSE & TREATMENT RENDERED:   1. Alcohol withdrawal. Better and stable. Continue phonbarb in tapering dose. Advised repeatedly not to drink alcohol.       2. Sinus bradycardia. Asymptomatic. Resolved. Now adenike tachycardic. Continue propranolol.        3.   Dehydration (9/2/2017)/Nausea & vomiting (9/2/2017)/Syncope (9/2/2017)/Hematemesis (9/2/2017)/ NSAID long-term use (9/2/2017). s/p EGD and colonoscopy: erosive esophagitis and colonic polyp. CPPI      4. Alcohol abuse /Polysubstance abuse (9/2/2017). Counseled on cessation. Evaluated by psychiatry.      5. Thrombocytopenia. Likely due to alcohol abuse. 6.  HTN. Continue propranolol and lisinopril.      Pt is discharged in improved condition       Signed:  Dileep Alegria MD  9/7/2017  4:07 PM

## 2017-09-07 NOTE — PROGRESS NOTES
GI note  I will contact with polyp results when available. Treatment of esophagitis may take 8 weeks to reach maximum effect, suggest PPI daily. No alcohol- already discussed with him several times and he indicated his understanding. GI will sign off, as from my perspective he could be discharged. Call if needed.   Luiz Ugarte MD

## 2017-09-07 NOTE — DISCHARGE INSTRUCTIONS
Alcohol and Drug Problems: Care Instructions  Your Care Instructions  You can improve your life and health by stopping your use of alcohol or drugs. Ending dependency on alcohol or drugs may help you feel and sleep better. You may get along better with your family, friends, and coworkers. There are medicines and programs that can help. Follow-up care is a key part of your treatment and safety. Be sure to make and go to all appointments, and call your doctor if you are having problems. It's also a good idea to know your test results and keep a list of the medicines you take. How can you care for yourself at home? · If you have been given medicine to help keep you sober or reduce your cravings, be sure to take it as prescribed. · Talk to your doctor about programs that can help you stop using drugs or drinking alcohol. · If your doctor prescribes disulfiram (Antabuse), do not drink any alcohol while you are taking this medicine. You may have severe, even life-threatening, side effects from even small amounts of alcohol. · Do not tempt yourself by keeping alcohol or drugs in your home. · Learn how to say no when other people drink or use drugs. Or don't spend time with people who drink or use drugs. · Use the time and money spent on drinking or drugs to do something fun with your family or friends. Preventing a relapse  · Do not drink alcohol or use drugs at all. Using any amount of alcohol or drugs greatly increases your risk for relapse. · Seek help from organizations such as Alcoholics Anonymous, Narcotics Anonymous, or treatment facilities if you feel the need to drink alcohol or use drugs again. · Remember that recovery is a lifelong process. · Stay away from situations, friends, or places that may lead you to drink or use drugs. · Have a plan to spot and deal with relapse. Learn to recognize changes in your thinking that lead you to drink or use drugs. These are warning signs.  Get help before you start to drink or use drugs again. · Get help as soon as you can if you relapse. Some people make a plan with another person that outlines what they want that person to do for them if they relapse. The plan usually includes how to handle the relapse and who to notify in case of relapse. · Don't give up. Remember that a relapse does not mean that you have failed. Use the experience to learn the triggers that lead you to drink or use drugs. Then quit again. Many people have several relapses before they are able to quit for good. When should you call for help? Call 911 anytime you think you may need emergency care. For example, call if:  · You feel you cannot stop from hurting yourself or someone else. Call your doctor now or seek immediate medical care if:  · You have serious withdrawal symptoms such as confusion, hallucinations, or severe trembling. Watch closely for changes in your health, and be sure to contact your doctor if:  · You have a relapse. · You need more help or support to stop. Where can you learn more? Go to http://melly-gertrude.info/. Enter [phone] in the search box to learn more about \"Alcohol and Drug Problems: Care Instructions. \"  Current as of: November 3, 2016  Content Version: 11.3  © 1300-1010 [employer]. Care instructions adapted under license by [employer] (which disclaims liability or warranty for this information). If you have questions about a medical condition or this instruction, always ask your healthcare professional. [place] [address] any warranty or liability for your use of this information. Learning About Alcohol Misuse  What is alcohol misuse? Alcohol misuse means drinking so much that it causes problems for you or others. Early problems with alcohol can start at home. You may argue with loved ones about how much you're drinking. Your job may be affected because of drinking.  You may drink when it's dangerous or illegal, such as when you drive. Drinking too much for a long time can lead to health conditions like high blood pressure and liver problems. What are the symptoms? Symptoms of alcohol misuse may include:  · Drinking much more than you planned. · Drinking even though it's causing problems for you or others. · Putting yourself in situations where you might get hurt. · Wanting to cut down or stop drinking, but not being able to. · Feeling guilty about how much you're drinking. How is alcohol misuse treated? Getting help for problems with alcohol is up to you. But you don't have to do it alone. There are many people and kinds of treatments to help with alcohol problems. Talking to your doctor is the first step. When you get a doctor's help, treatment for alcohol problems can be safer and quicker. Treatment options can include:  · Treatment programs. Examples are group therapy, one or more types of counseling, and alcohol education. · Medicines. A doctor or counselor can help you know what kinds of medicines might help with cravings. · Free social support groups. These groups include AA (Alcoholics Anonymous) and SMART (Self-Management and Recovery Training). Your doctor can help you decide which type of program is best for you. Follow-up care is a key part of your treatment and safety. Be sure to make and go to all appointments, and call your doctor if you are having problems. It's also a good idea to know your test results and keep a list of the medicines you take. Where can you learn more? Go to http://melly-gertrude.info/. Enter [id number] in the search box to learn more about \"Learning About Alcohol Misuse. \"  Current as of: January 3, 2017  Content Version: 11.3  © 0192-0531 [employer]. Care instructions adapted under license by [employer] (which disclaims liability or warranty for this information).  If you have questions about a medical condition or this instruction, always ask your healthcare professional. [place] [address] any warranty or liability for your use of this information. ACUTE DIAGNOSES:  Dehydration  Alcohol withdrawal (HCC)  abd pain    CHRONIC MEDICAL DIAGNOSES:  Problem List as of 9/7/2017  Date Reviewed: 9/7/2017          Codes Class Noted - Resolved    * (Principal)Alcohol withdrawal ([facility] [address] ICD-10-CM: N12.163  ICD-9-CM: 291.81  9/4/2017 - Present        Alcohol abuse (Chronic) ICD-10-CM: F10.10  ICD-9-CM: 305.00  Unknown - Present        Opioid abuse (Chronic) ICD-10-CM: F11.10  ICD-9-CM: 305.50  9/2/2017 - Present        Mild tetrahydrocannabinol (THC) abuse (Chronic) ICD-10-CM: F12.10  ICD-9-CM: 305.20  9/2/2017 - Present        Polysubstance abuse (Chronic) ICD-10-CM: F19.10  ICD-9-CM: 305.90  9/2/2017 - Present        NSAID long-term use (Chronic) ICD-10-CM: Z79.1  ICD-9-CM: V58.64  9/2/2017 - Present        Weight loss ICD-10-CM: R63.4  ICD-9-CM: 783.21  9/2/2017 - Present        RESOLVED: Dehydration ICD-10-CM: E86.0  ICD-9-CM: 276.51  9/2/2017 - 9/4/2017        RESOLVED: Nausea & vomiting ICD-10-CM: R11.2  ICD-9-CM: 787.01  9/2/2017 - 9/4/2017        RESOLVED: Syncope ICD-10-CM: R55  ICD-9-CM: 780.2  9/2/2017 - 9/7/2017        RESOLVED: Elevated lipase ICD-10-CM: R74.8  ICD-9-CM: 790.5  9/2/2017 - 9/4/2017        RESOLVED: Lactic acidemia ICD-10-CM: F43.9  ICD-9-CM: 276.2  9/2/2017 - 9/4/2017        RESOLVED: Hematemesis ICD-10-CM: K92.0  ICD-9-CM: 578.0  9/2/2017 - 9/7/2017              DISCHARGE MEDICATIONS:          · It is important that you take the medication exactly as they are prescribed. · Keep your medication in the bottles provided by the pharmacist and keep a list of the medication names, dosages, and times to be taken in your wallet. · Do not take other medications without consulting your doctor.        DIET:  Regular Diet    ACTIVITY: Activity as tolerated    ADDITIONAL INFORMATION: If you experience any of the following symptoms then please call your primary care physician or return to the emergency room if you cannot get hold of your doctor: Fever, chills, nausea, vomiting, diarrhea, change in mentation, falling, bleeding, shortness of breath. FOLLOW UP CARE:  Dr. Rosa Hernandez MD  you are to call and set up an appointment to see them in 2 weeks. Information obtained by :  I understand that if any problems occur once I am at home I am to contact my physician. I understand and acknowledge receipt of the instructions indicated above.                                                                                                                                            Physician's or R.N.'s Signature                                                                  Date/Time                                                                                                                                              Patient or Representative Signature                                                          Date/Time

## 2017-09-07 NOTE — PROGRESS NOTES
Problem: Patient Education: Go to Patient Education Activity  Goal: Patient/Family Education  PHYSICAL THERAPY TREATMENT  Patient: Leon Galvin (6 y.o. male)  Date: 9/7/2017  Diagnosis: Dehydration  Alcohol withdrawal ([facility] [address]  abd pain Alcohol withdrawal ([facility] [address]  Procedure(s) (LRB):  ESOPHAGOGASTRODUODENOSCOPY (EGD)/COLONOSCOPY  (N/A)  COLONOSCOPY (N/A)  ENDOSCOPIC POLYPECTOMY (N/A) 1 Day Post-Op  Precautions:    Chart, physical therapy assessment, plan of care and goals were reviewed. ASSESSMENT:  Pt SBA sit to stand. Pt ambulated 180ft with no device SBA. Pts balance fair to good on level surface. Pt progressing well with mobility. Continue goals. Progression toward goals:  [X]      Improving appropriately and progressing toward goals  [ ]      Improving slowly and progressing toward goals  [ ]      Not making progress toward goals and plan of care will be adjusted       PLAN:  Patient continues to benefit from skilled intervention to address the above impairments. Continue treatment per established plan of care. Discharge Recommendations:  None  Further Equipment Recommendations for Discharge:  none       SUBJECTIVE:          OBJECTIVE DATA SUMMARY:   Critical Behavior:  Neurologic State: Alert, Appropriate for age, Eyes open spontaneously  Orientation Level: Appropriate for age, Oriented X4  Cognition: Appropriate decision making, Appropriate for age attention/concentration, Appropriate safety awareness, Follows commands  Safety/Judgement: Decreased awareness of need for assistance, Decreased awareness of need for safety, Fall prevention, Home safety  Functional Mobility Training:                       Transfers:  Sit to Stand: Stand-by asssistance  Stand to Sit: Stand-by asssistance                             Balance:  Sitting: Intact  Standing: Intact; With support  Ambulation/Gait Training:  Distance (ft): 180 Feet (ft)  Assistive Device: Gait belt  Ambulation - Level of Assistance: Stand-by asssistance Base of Support: Narrowed     Speed/Yelitza: Pace decreased (<100 feet/min)           Pain:  Pain Scale 1: Numeric (0 - 10)  Pain Intensity 1: 7  Pain Location 1: Abdomen  Pain Orientation 1: Lower  Pain Description 1: Aching;Dull  Pain Intervention(s) 1: Medication (see MAR)  Activity Tolerance:   Pt tolerated treatment well. Please refer to the flowsheet for vital signs taken during this treatment.   After treatment:   [X] Patient left in no apparent distress sitting up in chair  [ ] Patient left in no apparent distress in bed  [ ] Call bell left within reach  [ ] Nursing notified  [ ] Caregiver present  [ ] Bed alarm activated      COMMUNICATION/COLLABORATION:   The patients plan of care was discussed with: Physical Therapist     Paulo Cantu PTA   Time Calculation: 23 mins

## 2017-09-07 NOTE — PROGRESS NOTES
Fran Goodman [facility] [place] [address]  [address] [facility], [address] [place] [address]  [phone]      Medical Progress Note      NAME: Juan Daley   :  1965  MRM:  [id number]    Date/Time: 2017  7:03 AM       Assessment and Plan:   1. Alcohol withdrawal. Continue CIWA protocol. More alert. Continue phonbarb. 2. Sinus bradycardia. Asymptomatic. Resolved. clonidine and propranolol discontinued.       3.  Dehydration (2017)/Nausea & vomiting (2017)/Syncope (2017)/Hematemesis (2017)/ NSAID long-term use (2017). s/p EGD and colonoscopy: erosive esophagitis and colonic polyp. CPPI                       4.  Alcohol abuse /Polysubstance abuse (2017). Counseled on cessation. Evaluated by psychiatry. 5.  Thrombocytopenia. Likely due to alcohol abuse. Subjective:     Chief Complaint:  Follow up of pt who was admitted with alcohol withdrawal. C/o LT side abd pain. ROS:  (bold if positive, if negative)    Abd Pain  Tolerating PT  Tolerating Diet        Objective:     Last 24hrs VS reviewed since prior progress note. Most recent are:    Visit Vitals    /85 (BP 1 Location: Left arm, BP Patient Position: At rest;Supine; Head of bed elevated (Comment degrees))    Pulse 67    Temp 97.9 °F (36.6 °C)    Resp 16    Ht 6' 2\" (1.88 m)    Wt 99.8 kg (220 lb)    SpO2 100%    BMI 28.25 kg/m2     SpO2 Readings from Last 6 Encounters:   17 100%    O2 Flow Rate (L/min): 2 l/min       Intake/Output Summary (Last 24 hours) at 17 0658  Last data filed at 17 0559   Gross per 24 hour   Intake          1818.34 ml   Output             2250 ml   Net          -431.66 ml        Physical Exam:    Gen:  Well-developed, well-nourished, in no acute distress  HEENT:  Pink conjunctivae, PERRL, hearing intact to voice, moist mucous membranes  Neck:  Supple, without masses, thyroid non-tender  Resp:  No accessory muscle use, clear breath sounds without wheezes rales or rhonchi  Card:  No murmurs, normal S1, S2 without thrills, bruits or peripheral edema  Abd:  Soft, non-tender, non-distended, normoactive bowel sounds are present, no palpable organomegaly and no detectable hernias  Lymph:  No cervical or inguinal adenopathy  Musc:  No cyanosis or clubbing  Skin:  No rashes or ulcers, skin turgor is good  Neuro:  Cranial nerves are grossly intact, no focal motor weakness, follows commands appropriately  Psych:  Good insight, oriented to person, place and time, alert  __________________________________________________________________  Medications Reviewed: (see below)  Medications:     Current Facility-Administered Medications   Medication Dose Route Frequency    LORazepam (ATIVAN) tablet 2 mg  2 mg Oral Q1H PRN    LORazepam (ATIVAN) tablet 4 mg  4 mg Oral Q1H PRN    pantoprazole (PROTONIX) tablet 40 mg  40 mg Oral ACB    morphine injection 2 mg  2 mg IntraVENous Q4H PRN    PHENobarbital (LUMINAL) tablet 97.2 mg  97.2 mg Oral Q6H    multivitamin with iron tablet 1 Tab  1 Tab Oral DAILY    thiamine (B-1) tablet 100 mg  100 mg Oral DAILY    folic acid (FOLVITE) tablet 1 mg  1 mg Oral DAILY    hydrALAZINE (APRESOLINE) 20 mg/mL injection 20 mg  20 mg IntraVENous Q6H PRN    diphenhydrAMINE (BENADRYL) capsule 25 mg  25 mg Oral Q4H PRN    nicotine (NICODERM CQ) 21 mg/24 hr patch 1 Patch  1 Patch TransDERmal DAILY    0.9% sodium chloride infusion  100 mL/hr IntraVENous CONTINUOUS    chlorproMAZINE (THORAZINE) tablet 25 mg  25 mg Oral Q6H PRN    sodium chloride (NS) flush 5-10 mL  5-10 mL IntraVENous Q8H    sodium chloride (NS) flush 5-10 mL  5-10 mL IntraVENous PRN    naloxone (NARCAN) injection 0.4 mg  0.4 mg IntraVENous PRN    acetaminophen (TYLENOL) tablet 650 mg  650 mg Oral Q4H PRN    ondansetron (ZOFRAN) injection 4 mg  4 mg IntraVENous Q4H PRN        Lab Data Reviewed: (see below)  Lab Review: Recent Labs      09/06/17   0156  09/05/17   0110   WBC  4.6  3.2*   HGB  14.5  14.4   HCT  39.7  40.1   PLT  111*  91*     Recent Labs      09/06/17   0156  09/05/17   0110   NA  136  138   K  3.5  3.6   CL  101  104   CO2  27  26   GLU  96  96   BUN  2*  1*   CREA  0.87  0.93   CA  8.4*  8.5   MG  1.6  2.0   PHOS  4.0  3.7   ALB  3.3*   --    TBILI  1.9*   --    SGOT  35   --    ALT  39   --      Lab Results   Component Value Date/Time    Glucose (POC) 117 09/05/2017 01:17 PM     No results for input(s): PH, PCO2, PO2, HCO3, FIO2 in the last 72 hours. No results for input(s): INR in the last 72 hours. No lab exists for component: INREXT, INREXT  All Micro Results     None          I have reviewed notes of prior 24hr. Other pertinent lab:       Total time spent with patient: [place] 59 discussed with: Patient, Nursing Staff and >50% of time spent in counseling and coordination of care    Discussed:  Care Plan    Prophylaxis:  scd    Disposition:  Home w/Family           ___________________________________________________    Attending Physician: Mushtaq Lai MD

## 2017-09-07 NOTE — PROGRESS NOTES
Problem: Self Care Deficits Care Plan (Adult)  Goal: *Acute Goals and Plan of Care (Insert Text)  Occupational Therapy Goals  Initiated 9/6/2017  1. Patient will perform grooming with independence standing at sink for 10 minutes within 7 day(s). 2. Patient will perform lower body dressing with independence within 7 day(s). 4. Patient will perform toilet transfers with independence within 7 day(s). 5. Patient will perform all aspects of toileting with independence within 7 day(s). 6. Patient will participate in upper extremity therapeutic exercise/activities with independence for 10 minutes within 7 day(s). 7. Patient will utilize energy conservation techniques during functional activities with minimal verbal cues within 7 day(s). OCCUPATIONAL THERAPY TREATMENT/DISCHARGE  Patient: Camille Burgos (51 y.o. male)  Date: 9/7/2017  Diagnosis: Dehydration  Alcohol withdrawal (HCC)  abd pain Alcohol withdrawal ([address]  Procedure(s) (LRB):  ESOPHAGOGASTRODUODENOSCOPY (EGD)/COLONOSCOPY  (N/A)  COLONOSCOPY (N/A)  ENDOSCOPIC POLYPECTOMY (N/A) 1 Day Post-Op  Precautions:  fall  Chart, occupational therapy assessment, plan of care, and goals were reviewed. ASSESSMENT:  Mr. Taran Gil has made good progress, met all goals, and discharged from OT services. Patient requires supervision for all ADL transfers. He was independent to mod I level for ADLs including grooming (standing at sink), toileting, and dressing. He is able to stand at sink for >8 minutes with no LOB. Education provided regarding general home safety, pacing techniques, energy conservation techniques, and general body mechanics with functional activities. Patient is expected to potentially discharge home today and will not need any follow-up OT services.     Progression toward goals:  [X]          Improving appropriately and progressing toward goals  [ ]          Improving slowly and progressing toward goals  [ ]          Not making progress toward goals and plan of care will be adjusted       PLAN:  Patient will be discharged from occupational therapy at this time. Rationale for discharge:  [X] Goals Achieved  [ ] Richad Bee  [ ] Patient not participating in therapy  [ ] Other:  Discharge Recommendations:  None  Further Equipment Recommendations for Discharge:  none       SUBJECTIVE:   Patient agreeable to tx. OBJECTIVE DATA SUMMARY:   Cognitive/Behavioral Status:  Neurologic State: Alert  Orientation Level: Oriented X4  Cognition: Follows commands  Safety/Judgement: Awareness of environment  Functional Mobility and Transfers for ADLs:     Transfers:  Sit to Stand: Supervision              Functional Transfers  Toilet Transfer : Supervision      Balance:  Sitting: Intact  Standing: Intact; With support      ADL Intervention:  Feeding  Feeding Assistance: Independent     Grooming  Grooming Assistance: Independent  Washing Face: Independent  Washing Hands: Independent  Cues: Verbal cues provided (safety + pacing)     Upper Body Dressing Assistance  Dressing Assistance: Independent     Lower Body Dressing Assistance  Dressing Assistance: Independent  Socks: Independent  Leg Crossed Method Used: Yes  Position Performed: Seated edge of bed  Cues: Don;Doff     Toileting  Toileting Assistance: Independent  Bladder Hygiene: Independent  Bowel Hygiene: Independent  Clothing Management: Independent  Cues: Verbal cues provided     Cognitive Retraining  Safety/Judgement: Awareness of environment     Activity Tolerance:    Patient tolerated tx well. After treatment:   [X]  Patient left in no apparent distress sitting up in chair  [ ]  Patient left in no apparent distress in bed  [X]  Call bell left within reach  [X]  Nursing notified  [ ]  Caregiver present  [ ]  Bed alarm activated      COMMUNICATION/COLLABORATION:   The patients plan of care was discussed with: Registered Nurse and patient.      Nico Strong OTR/L  Time Calculation: 24 mins

## 2017-09-08 NOTE — PROGRESS NOTES
Bedside and Verbal shift change report given to Sonia Lei RN (oncoming nurse) by Valeriy Burr RN (offgoing nurse). Report included the following information SBAR, Kardex, [facility] and Cardiac Rhythm Sinus Arrhythmia. 1010 -  Patient very emotional with tears. Complains of headache pain 4/10, moderate agitation and moderate anxiety. CIWA score of 9. Patient stated \"Was just told I don't have a place to stay anymore. \" Asked the patient if he would like to see a  or [facility] or needed to talk with someone. Patient refused to speak with anyone. \"I've seen so many  and counselors. \" \"I already know what they're going to say. \" Patient stated he has a friend who will let him stay with them. Will continue to monitor. Reviewed discharge instructions with patient. Alcohol education provided by nurse. Scripts given to patient. IVs removed. Telemetry box removed. Patient receiving cab voucher from Nursing supervisor. 0899 - Patient taken to discharge lobby via wheelchair and placed in cab by nurse tech.

## 2021-01-06 ENCOUNTER — NURSE TRIAGE (OUTPATIENT)
Dept: OTHER | Facility: CLINIC | Age: 56
End: 2021-01-06

## 2021-01-06 ENCOUNTER — APPOINTMENT (OUTPATIENT)
Dept: GENERAL RADIOLOGY | Age: 56
End: 2021-01-06
Attending: EMERGENCY MEDICINE
Payer: MEDICAID

## 2021-01-06 ENCOUNTER — HOSPITAL ENCOUNTER (INPATIENT)
Age: 56
LOS: 3 days | Discharge: PSYCHIATRIC OR SUBSTANCE USE FACILITY | End: 2021-01-10
Attending: EMERGENCY MEDICINE | Admitting: HOSPITALIST
Payer: MEDICAID

## 2021-01-06 DIAGNOSIS — R51.9 ACUTE NONINTRACTABLE HEADACHE, UNSPECIFIED HEADACHE TYPE: ICD-10-CM

## 2021-01-06 DIAGNOSIS — R45.851 SUICIDAL IDEATION: ICD-10-CM

## 2021-01-06 DIAGNOSIS — R06.02 SOB (SHORTNESS OF BREATH): ICD-10-CM

## 2021-01-06 DIAGNOSIS — F10.930 ALCOHOL WITHDRAWAL SYNDROME WITHOUT COMPLICATION (HCC): Primary | ICD-10-CM

## 2021-01-06 PROBLEM — R06.00 ACUTE DYSPNEA: Status: ACTIVE | Noted: 2021-01-06

## 2021-01-06 PROBLEM — E87.1 HYPONATREMIA: Status: ACTIVE | Noted: 2021-01-06

## 2021-01-06 PROBLEM — E87.6 HYPOKALEMIA: Status: ACTIVE | Noted: 2021-01-06

## 2021-01-06 PROBLEM — Z20.822 SUSPECTED COVID-19 VIRUS INFECTION: Status: ACTIVE | Noted: 2021-01-06

## 2021-01-06 PROBLEM — D69.6 THROMBOCYTOPENIA (HCC): Status: ACTIVE | Noted: 2021-01-06

## 2021-01-06 PROBLEM — R17 ELEVATED BILIRUBIN: Status: ACTIVE | Noted: 2021-01-06

## 2021-01-06 LAB
ALBUMIN SERPL-MCNC: 3.7 G/DL (ref 3.5–5)
ALBUMIN/GLOB SERPL: 1.3 {RATIO} (ref 1.1–2.2)
ALP SERPL-CCNC: 64 U/L (ref 45–117)
ALT SERPL-CCNC: 40 U/L (ref 12–78)
AMPHET UR QL SCN: NEGATIVE
ANION GAP SERPL CALC-SCNC: 14 MMOL/L (ref 5–15)
APAP SERPL-MCNC: <2 UG/ML (ref 10–30)
AST SERPL-CCNC: 26 U/L (ref 15–37)
ATRIAL RATE: 85 BPM
BARBITURATES UR QL SCN: NEGATIVE
BASOPHILS # BLD: 0 K/UL (ref 0–0.1)
BASOPHILS NFR BLD: 0 % (ref 0–1)
BENZODIAZ UR QL: NEGATIVE
BILIRUB SERPL-MCNC: 1.6 MG/DL (ref 0.2–1)
BUN SERPL-MCNC: 12 MG/DL (ref 6–20)
BUN/CREAT SERPL: 14 (ref 12–20)
CALCIUM SERPL-MCNC: 8.4 MG/DL (ref 8.5–10.1)
CALCULATED P AXIS, ECG09: 76 DEGREES
CALCULATED R AXIS, ECG10: 34 DEGREES
CALCULATED T AXIS, ECG11: 47 DEGREES
CANNABINOIDS UR QL SCN: POSITIVE
CHLORIDE SERPL-SCNC: 97 MMOL/L (ref 97–108)
CO2 SERPL-SCNC: 23 MMOL/L (ref 21–32)
COCAINE UR QL SCN: NEGATIVE
COMMENT, HOLDF: NORMAL
COVID-19 RAPID TEST, COVR: NOT DETECTED
CREAT SERPL-MCNC: 0.84 MG/DL (ref 0.7–1.3)
DIAGNOSIS, 93000: NORMAL
DIFFERENTIAL METHOD BLD: ABNORMAL
DRUG SCRN COMMENT,DRGCM: ABNORMAL
EOSINOPHIL # BLD: 0 K/UL (ref 0–0.4)
EOSINOPHIL NFR BLD: 0 % (ref 0–7)
ERYTHROCYTE [DISTWIDTH] IN BLOOD BY AUTOMATED COUNT: 11.9 % (ref 11.5–14.5)
ETHANOL SERPL-MCNC: 310 MG/DL
GLOBULIN SER CALC-MCNC: 2.9 G/DL (ref 2–4)
GLUCOSE SERPL-MCNC: 206 MG/DL (ref 65–100)
HCT VFR BLD AUTO: 37.1 % (ref 36.6–50.3)
HEALTH STATUS, XMCV2T: NORMAL
HEMOCCULT STL QL: NEGATIVE
HGB BLD-MCNC: 13.8 G/DL (ref 12.1–17)
IMM GRANULOCYTES # BLD AUTO: 0.1 K/UL (ref 0–0.04)
IMM GRANULOCYTES NFR BLD AUTO: 1 % (ref 0–0.5)
INR PPP: 1 (ref 0.9–1.1)
LYMPHOCYTES # BLD: 1.2 K/UL (ref 0.8–3.5)
LYMPHOCYTES NFR BLD: 20 % (ref 12–49)
MCH RBC QN AUTO: 35.8 PG (ref 26–34)
MCHC RBC AUTO-ENTMCNC: 37.2 G/DL (ref 30–36.5)
MCV RBC AUTO: 96.4 FL (ref 80–99)
METHADONE UR QL: NEGATIVE
MONOCYTES # BLD: 0.6 K/UL (ref 0–1)
MONOCYTES NFR BLD: 9 % (ref 5–13)
NEUTS SEG # BLD: 4.3 K/UL (ref 1.8–8)
NEUTS SEG NFR BLD: 70 % (ref 32–75)
NRBC # BLD: 0 K/UL (ref 0–0.01)
NRBC BLD-RTO: 0 PER 100 WBC
OPIATES UR QL: NEGATIVE
P-R INTERVAL, ECG05: 166 MS
PCP UR QL: NEGATIVE
PLATELET # BLD AUTO: 126 K/UL (ref 150–400)
PMV BLD AUTO: 8.6 FL (ref 8.9–12.9)
POTASSIUM SERPL-SCNC: 3.4 MMOL/L (ref 3.5–5.1)
PROT SERPL-MCNC: 6.6 G/DL (ref 6.4–8.2)
PROTHROMBIN TIME: 9.9 SEC (ref 9–11.1)
Q-T INTERVAL, ECG07: 346 MS
QRS DURATION, ECG06: 88 MS
QTC CALCULATION (BEZET), ECG08: 411 MS
RBC # BLD AUTO: 3.85 M/UL (ref 4.1–5.7)
SALICYLATES SERPL-MCNC: <1.7 MG/DL (ref 2.8–20)
SAMPLES BEING HELD,HOLD: NORMAL
SODIUM SERPL-SCNC: 134 MMOL/L (ref 136–145)
SOURCE, COVRS: NORMAL
SPECIMEN SOURCE, FCOV2M: NORMAL
SPECIMEN TYPE, XMCV1T: NORMAL
TROPONIN I SERPL-MCNC: <0.05 NG/ML
VENTRICULAR RATE, ECG03: 85 BPM
WBC # BLD AUTO: 6.2 K/UL (ref 4.1–11.1)

## 2021-01-06 PROCEDURE — 80307 DRUG TEST PRSMV CHEM ANLYZR: CPT

## 2021-01-06 PROCEDURE — 96366 THER/PROPH/DIAG IV INF ADDON: CPT

## 2021-01-06 PROCEDURE — 85610 PROTHROMBIN TIME: CPT

## 2021-01-06 PROCEDURE — 82272 OCCULT BLD FECES 1-3 TESTS: CPT

## 2021-01-06 PROCEDURE — 80053 COMPREHEN METABOLIC PANEL: CPT

## 2021-01-06 PROCEDURE — 84484 ASSAY OF TROPONIN QUANT: CPT

## 2021-01-06 PROCEDURE — 71045 X-RAY EXAM CHEST 1 VIEW: CPT

## 2021-01-06 PROCEDURE — 82077 ASSAY SPEC XCP UR&BREATH IA: CPT

## 2021-01-06 PROCEDURE — 74011000250 HC RX REV CODE- 250: Performed by: EMERGENCY MEDICINE

## 2021-01-06 PROCEDURE — 74011250637 HC RX REV CODE- 250/637: Performed by: EMERGENCY MEDICINE

## 2021-01-06 PROCEDURE — 36415 COLL VENOUS BLD VENIPUNCTURE: CPT

## 2021-01-06 PROCEDURE — 74011250636 HC RX REV CODE- 250/636: Performed by: EMERGENCY MEDICINE

## 2021-01-06 PROCEDURE — 99285 EMERGENCY DEPT VISIT HI MDM: CPT

## 2021-01-06 PROCEDURE — 74011250636 HC RX REV CODE- 250/636: Performed by: HOSPITALIST

## 2021-01-06 PROCEDURE — 85025 COMPLETE CBC W/AUTO DIFF WBC: CPT

## 2021-01-06 PROCEDURE — 96375 TX/PRO/DX INJ NEW DRUG ADDON: CPT

## 2021-01-06 PROCEDURE — 80179 DRUG ASSAY SALICYLATE: CPT

## 2021-01-06 PROCEDURE — 87635 SARS-COV-2 COVID-19 AMP PRB: CPT

## 2021-01-06 PROCEDURE — 93005 ELECTROCARDIOGRAM TRACING: CPT

## 2021-01-06 PROCEDURE — 96376 TX/PRO/DX INJ SAME DRUG ADON: CPT

## 2021-01-06 PROCEDURE — 80143 DRUG ASSAY ACETAMINOPHEN: CPT

## 2021-01-06 PROCEDURE — 96365 THER/PROPH/DIAG IV INF INIT: CPT

## 2021-01-06 RX ORDER — ZINC SULFATE 50(220)MG
1 CAPSULE ORAL DAILY
Status: DISCONTINUED | OUTPATIENT
Start: 2021-01-07 | End: 2021-01-08

## 2021-01-06 RX ORDER — LORAZEPAM 2 MG/ML
1 INJECTION INTRAMUSCULAR
Status: COMPLETED | OUTPATIENT
Start: 2021-01-06 | End: 2021-01-06

## 2021-01-06 RX ORDER — DIPHENHYDRAMINE HYDROCHLORIDE 50 MG/ML
25 INJECTION, SOLUTION INTRAMUSCULAR; INTRAVENOUS
Status: DISCONTINUED | OUTPATIENT
Start: 2021-01-06 | End: 2021-01-10 | Stop reason: HOSPADM

## 2021-01-06 RX ORDER — OXYCODONE HYDROCHLORIDE 5 MG/1
10 TABLET ORAL
Status: DISCONTINUED | OUTPATIENT
Start: 2021-01-06 | End: 2021-01-06

## 2021-01-06 RX ORDER — IBUPROFEN 200 MG
1 TABLET ORAL DAILY
Status: DISCONTINUED | OUTPATIENT
Start: 2021-01-06 | End: 2021-01-10 | Stop reason: HOSPADM

## 2021-01-06 RX ORDER — LORAZEPAM 2 MG/ML
4 INJECTION INTRAMUSCULAR
Status: DISCONTINUED | OUTPATIENT
Start: 2021-01-06 | End: 2021-01-10 | Stop reason: HOSPADM

## 2021-01-06 RX ORDER — LABETALOL HCL 20 MG/4 ML
20 SYRINGE (ML) INTRAVENOUS
Status: DISCONTINUED | OUTPATIENT
Start: 2021-01-06 | End: 2021-01-10 | Stop reason: HOSPADM

## 2021-01-06 RX ORDER — ALBUTEROL SULFATE 0.83 MG/ML
2.5 SOLUTION RESPIRATORY (INHALATION)
Status: DISCONTINUED | OUTPATIENT
Start: 2021-01-06 | End: 2021-01-07 | Stop reason: DRUGHIGH

## 2021-01-06 RX ORDER — IBUPROFEN 200 MG
1 TABLET ORAL DAILY
Status: DISCONTINUED | OUTPATIENT
Start: 2021-01-07 | End: 2021-01-06

## 2021-01-06 RX ORDER — MAG HYDROX/ALUMINUM HYD/SIMETH 200-200-20
30 SUSPENSION, ORAL (FINAL DOSE FORM) ORAL
Status: DISCONTINUED | OUTPATIENT
Start: 2021-01-06 | End: 2021-01-10 | Stop reason: HOSPADM

## 2021-01-06 RX ORDER — DIPHENHYDRAMINE HCL 25 MG
25 CAPSULE ORAL
Status: DISCONTINUED | OUTPATIENT
Start: 2021-01-06 | End: 2021-01-10 | Stop reason: HOSPADM

## 2021-01-06 RX ORDER — LORAZEPAM 2 MG/ML
2 INJECTION INTRAMUSCULAR
Status: DISCONTINUED | OUTPATIENT
Start: 2021-01-06 | End: 2021-01-10 | Stop reason: HOSPADM

## 2021-01-06 RX ORDER — ACETAMINOPHEN 325 MG/1
650 TABLET ORAL
Status: DISCONTINUED | OUTPATIENT
Start: 2021-01-06 | End: 2021-01-10 | Stop reason: HOSPADM

## 2021-01-06 RX ORDER — HYDRALAZINE HYDROCHLORIDE 20 MG/ML
20 INJECTION INTRAMUSCULAR; INTRAVENOUS
Status: DISCONTINUED | OUTPATIENT
Start: 2021-01-06 | End: 2021-01-08

## 2021-01-06 RX ORDER — HYDROXYZINE HYDROCHLORIDE 25 MG/ML
25 INJECTION, SOLUTION INTRAMUSCULAR
Status: DISCONTINUED | OUTPATIENT
Start: 2021-01-06 | End: 2021-01-10 | Stop reason: HOSPADM

## 2021-01-06 RX ORDER — LANOLIN ALCOHOL/MO/W.PET/CERES
3 CREAM (GRAM) TOPICAL
Status: DISCONTINUED | OUTPATIENT
Start: 2021-01-06 | End: 2021-01-10 | Stop reason: HOSPADM

## 2021-01-06 RX ORDER — ASCORBIC ACID 500 MG
500 TABLET ORAL 2 TIMES DAILY
Status: DISCONTINUED | OUTPATIENT
Start: 2021-01-07 | End: 2021-01-08

## 2021-01-06 RX ORDER — MORPHINE SULFATE 2 MG/ML
2 INJECTION, SOLUTION INTRAMUSCULAR; INTRAVENOUS
Status: DISCONTINUED | OUTPATIENT
Start: 2021-01-06 | End: 2021-01-06

## 2021-01-06 RX ORDER — SIMETHICONE 80 MG
80 TABLET,CHEWABLE ORAL
Status: DISCONTINUED | OUTPATIENT
Start: 2021-01-06 | End: 2021-01-10 | Stop reason: HOSPADM

## 2021-01-06 RX ORDER — MELATONIN
2000 DAILY
Status: DISCONTINUED | OUTPATIENT
Start: 2021-01-07 | End: 2021-01-10 | Stop reason: HOSPADM

## 2021-01-06 RX ORDER — ACETAMINOPHEN 650 MG/1
650 SUPPOSITORY RECTAL
Status: DISCONTINUED | OUTPATIENT
Start: 2021-01-06 | End: 2021-01-10 | Stop reason: HOSPADM

## 2021-01-06 RX ORDER — SODIUM CHLORIDE AND POTASSIUM CHLORIDE .9; .15 G/100ML; G/100ML
SOLUTION INTRAVENOUS CONTINUOUS
Status: DISCONTINUED | OUTPATIENT
Start: 2021-01-06 | End: 2021-01-10

## 2021-01-06 RX ORDER — OXYCODONE HYDROCHLORIDE 5 MG/1
5 TABLET ORAL
Status: DISCONTINUED | OUTPATIENT
Start: 2021-01-06 | End: 2021-01-06

## 2021-01-06 RX ORDER — LORAZEPAM 2 MG/ML
2 INJECTION INTRAMUSCULAR
Status: DISPENSED | OUTPATIENT
Start: 2021-01-06 | End: 2021-01-07

## 2021-01-06 RX ORDER — ENOXAPARIN SODIUM 100 MG/ML
30 INJECTION SUBCUTANEOUS EVERY 12 HOURS
Status: DISCONTINUED | OUTPATIENT
Start: 2021-01-06 | End: 2021-01-10 | Stop reason: HOSPADM

## 2021-01-06 RX ADMIN — FOLIC ACID: 5 INJECTION, SOLUTION INTRAMUSCULAR; INTRAVENOUS; SUBCUTANEOUS at 15:11

## 2021-01-06 RX ADMIN — LORAZEPAM 1 MG: 2 INJECTION INTRAMUSCULAR; INTRAVENOUS at 18:47

## 2021-01-06 RX ADMIN — LORAZEPAM 2 MG: 2 INJECTION INTRAMUSCULAR; INTRAVENOUS at 23:47

## 2021-01-06 RX ADMIN — POTASSIUM CHLORIDE AND SODIUM CHLORIDE: 900; 150 INJECTION, SOLUTION INTRAVENOUS at 23:45

## 2021-01-06 RX ADMIN — LORAZEPAM 1 MG: 2 INJECTION INTRAMUSCULAR; INTRAVENOUS at 21:15

## 2021-01-06 NOTE — TELEPHONE ENCOUNTER
Reason for Disposition   MODERATE difficulty breathing (e.g., speaks in phrases, SOB even at rest, pulse 100-120)    Answer Assessment - Initial Assessment Questions  1. COVID-19 DIAGNOSIS: \"Who made your Coronavirus (COVID-19) diagnosis? \" \"Was it confirmed by a positive lab test?\" If not diagnosed by a HCP, ask \"Are there lots of cases (community spread) where you live? \" (See public health department website, if unsure)      Not tested    2. COVID-19 EXPOSURE: \"Was there any known exposure to COVID before the symptoms began? \" CDC Definition of close contact: within 6 feet (2 meters) for a total of 15 minutes or more over a 24-hour period. Yes    3. ONSET: \"When did the COVID-19 symptoms start?\"       1/6    4. WORST SYMPTOM: \"What is your worst symptom? \" (e.g., cough, fever, shortness of breath, muscle aches)      Sob    5. COUGH: \"Do you have a cough? \" If so, ask: \"How bad is the cough? \"          6. FEVER: \"Do you have a fever? \" If so, ask: \"What is your temperature, how was it measured, and when did it start?\"        7. RESPIRATORY STATUS: \"Describe your breathing? \" (e.g., shortness of breath, wheezing, unable to speak)       Sob wheezing    8. BETTER-SAME-WORSE: Gregery Johann you getting better, staying the same or getting worse compared to yesterday? \"  If getting worse, ask, \"In what way? \"      Worse    9. HIGH RISK DISEASE: \"Do you have any chronic medical problems? \" (e.g., asthma, heart or lung disease, weak immune system, obesity, etc.)      Copd, hx pe,     10. PREGNANCY: \"Is there any chance you are pregnant? \" \"When was your last menstrual period? \"          11. OTHER SYMPTOMS: \"Do you have any other symptoms? \"  (e.g., chills, fatigue, headache, loss of smell or taste, muscle pain, sore throat; new loss of smell or taste especially support the diagnosis of COVID-19)        Sob, wheezing, fatigue doesn't feel well    Protocols used: CORONAVIRUS (COVID-19) DIAGNOSED OR SUSPECTED-ADULT-    Pt called on covid line    Triage as above pt to go to er now for sob wheezing and extreme fatigue    Triage to go to er now    Care advice given and pt verbalized understanding    Please do  respond to this writer for this encounter

## 2021-01-06 NOTE — ED TRIAGE NOTES
Pt here for SOB starting several days ago. Reports headache, nasal congestion, and sore throat. Pt reports 2 exposures to covid at work. Pt has COPD. Of note, pt reports he is an alcoholic and last drink was this a.m .

## 2021-01-06 NOTE — ED NOTES
Monitoring patient due to increased CIWA score and pt agitation. Provider aware. Will continue to monitor.

## 2021-01-06 NOTE — BSMART NOTE
Comprehensive Assessment Form Part 1    Section I - Disposition    Alcohol Use Disorder, Severe  Alcohol Intoxication Syndrome  Cannabis Use Disorder  Major Depressive Disorder, Recurrent, Severe without Psychotic features vs. Alcohol-Induced depressive disorder    The Medical Doctor to Psychiatrist conference was not completed. The Medical Doctor is in agreement with Psychiatrist disposition because of (reason) the pt meets criteria for inpatient admission and he consents to a voluntary admission. The plan is for the pt to be admitted to an accepting facility. The on-call Psychiatrist consulted was Dr. Tuan Leon. The admitting Psychiatrist will be Dr. Carla Aguilar. The admitting Diagnosis is depression. The Payor source is ____. Assessment completed via [employer]. Per Dr. Tuan Leon, pt is a high COVID-19 risk due to exposure and COPD. He recommends a medical admission with psychiatric follow-up. Section II - Integrated Summary  Summary:  Pt is a 59-year-old male who drove himself to the ED from his home in [place]. Pt's BAL upon admission was 310. Pt states that he was exposed to 2 people with COVID-19 several days ago and he started feeling strange today. Pt notes that he is a long-timer alcoholic and he has been in several placements to address this, including [place] [id number] (unsure of last admission). I can find no record of an admission at [facility]. Pt has been drinking since he was an adolescent. His longest period of sobriety was 3 years after completing a program a [employer] [facility] and regularly attending [place] [id number] meetings. Pt is also a long-time cannabis smoker. Of concern is growing depression and hopelessness from the pt, starting in November when he was injured and underwent spinal surgery. He has been trying to get help for his depression from [facility], but has not had any luck. Mr. Lauren Crowley lives by himself on a large tract of land in [place].  He has been isolating himself more from others. His only daughter lives in [place] and his brother lives closer. He states he has been having thoughts of killing himself for several days as he believes things will not get better. He recently asked to borrow a shotgun from his brother and he states that is how he would kill himself. Pt made a suicide attempt as a young man by overdosing on his father's medication, resulting in a medical and then psychiatric hospital admission. Pt has also experienced withdrawal seizures in the past.    Mr. Maddie Ramirez denies any homicidal ideations. Rapport was quickly established, and despite a high BAL, pt's speech was clear. Pt very tearful and hopeless. States he wants help, but is unsure if he can sustain help. He denies hallucinations and did not appear to be responding to internal stimuli. No evidence of delusions. Pt realizes that he needs help and he is willing to accept help. The patient has demonstrated mental capacity to provide informed consent. The information is given by the patient. The Chief Complaint is suicidal, alcohol withdrawal.  The Precipitant Factors are psychosocial, BING. Previous Hospitalizations: several, including [facility]--unsure of last admission  The patient has not previously been in restraints. Current Psychiatrist and/or  is none. Lethality Assessment:    The potential for suicide noted by the following: previous history of attempts which occured when the pt was a young man in the form(s) of overdose, defined plan, ideation and current substance abuse. The potential for homicide is not noted. The patient has not been a perpetrator of sexual or physical abuse. There are not pending charges. 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 The patient is felt to be at risk for self harm or harm to others.   The attending nurse was advised to remove potentially harmful or dangerous items from the patient's room , to remove patient clothing and place it out of immediate access to the patient, the patient is at risk for self harm and the patient needs supervision. Section III - Psychosocial  The patient's overall mood and attitude is depressed. Feelings of helplessness and hopelessness are observed by pt's statements of hopelessness and suicidal ideation with a plan. Generalized anxiety is not observed. Panic is not observed. Phobias are not observed. Obsessive compulsive tendencies are not observed. Section IV - Mental Status Exam  The patient's appearance shows no evidence of impairment. The patient's behavior shows no evidence of impairment. The patient is oriented to time, place, person and situation. The patient's speech shows no evidence of impairment. The patient's mood is depressed and is sad. The range of affect is constricted. The patient's thought content demonstrates no evidence of impairment. The thought process shows no evidence of impairment. The patient's perception shows no evidence of impairment. The patient's memory is impaired. The patient's appetite shows no evidence of impairment. The patient's sleep shows no evidence of impairment. The patient shows little insight. The patient's judgement shows no evidence of impairment. Section V - Substance Abuse  The patient is using substances. The patient is using alcohol for greater than 10 years with last use on today and cannabis by inhalation for greater than 10 years with last use on today. The patient has experienced the following withdrawal symptoms: tremors and seizures. Section VI - Living Arrangements  The patient is single. The patient lives alone. The patient has one child age 25. The patient does plan to return home upon discharge. The patient does not have legal issues pending. The patient's source of income comes from unemployment benefits. Mosque and cultural practices have been noted and include: Orthodoxy.     The patient's greatest support comes from his brother and this person will be involved with the treatment. The patient has not been in an event described as horrible or outside the realm of ordinary life experience either currently or in the past.  It is not known if the patient has been a victim of sexual/physical abuse. Section VII - Other Areas of Clinical Concern  The highest grade achieved is 1 year technical training () with the overall quality of school experience being described as good. The patient is currently unemployed and speaks [place] as a primary language. The patient has no communication impairments affecting communication. The patient's preference for learning can be described as: can read and write adequately.   The patient's hearing is normal.  The patient's vision is normal.      Fanny Nath LCSW

## 2021-01-07 LAB
ALBUMIN SERPL-MCNC: 3.3 G/DL (ref 3.5–5)
ALBUMIN/GLOB SERPL: 1.3 {RATIO} (ref 1.1–2.2)
ALP SERPL-CCNC: 56 U/L (ref 45–117)
ALT SERPL-CCNC: 34 U/L (ref 12–78)
ANION GAP SERPL CALC-SCNC: 6 MMOL/L (ref 5–15)
AST SERPL-CCNC: 25 U/L (ref 15–37)
BILIRUB SERPL-MCNC: 2.1 MG/DL (ref 0.2–1)
BUN SERPL-MCNC: 13 MG/DL (ref 6–20)
BUN/CREAT SERPL: 16 (ref 12–20)
CALCIUM SERPL-MCNC: 8.2 MG/DL (ref 8.5–10.1)
CHLORIDE SERPL-SCNC: 101 MMOL/L (ref 97–108)
CO2 SERPL-SCNC: 27 MMOL/L (ref 21–32)
CREAT SERPL-MCNC: 0.8 MG/DL (ref 0.7–1.3)
D DIMER PPP FEU-MCNC: 0.99 MG/L FEU (ref 0–0.65)
ERYTHROCYTE [DISTWIDTH] IN BLOOD BY AUTOMATED COUNT: 11.8 % (ref 11.5–14.5)
GLOBULIN SER CALC-MCNC: 2.6 G/DL (ref 2–4)
GLUCOSE SERPL-MCNC: 158 MG/DL (ref 65–100)
HCT VFR BLD AUTO: 34.6 % (ref 36.6–50.3)
HEALTH STATUS, XMCV2T: NORMAL
HGB BLD-MCNC: 12.4 G/DL (ref 12.1–17)
MAGNESIUM SERPL-MCNC: 2 MG/DL (ref 1.6–2.4)
MCH RBC QN AUTO: 35.6 PG (ref 26–34)
MCHC RBC AUTO-ENTMCNC: 35.8 G/DL (ref 30–36.5)
MCV RBC AUTO: 99.4 FL (ref 80–99)
NRBC # BLD: 0.02 K/UL (ref 0–0.01)
NRBC BLD-RTO: 0.5 PER 100 WBC
PLATELET # BLD AUTO: 113 K/UL (ref 150–400)
PMV BLD AUTO: 8.9 FL (ref 8.9–12.9)
POTASSIUM SERPL-SCNC: 3.3 MMOL/L (ref 3.5–5.1)
PROT SERPL-MCNC: 5.9 G/DL (ref 6.4–8.2)
RBC # BLD AUTO: 3.48 M/UL (ref 4.1–5.7)
SARS-COV-2, COV2: NOT DETECTED
SODIUM SERPL-SCNC: 134 MMOL/L (ref 136–145)
SOURCE, COVRS: NORMAL
SPECIMEN SOURCE, FCOV2M: NORMAL
SPECIMEN TYPE, XMCV1T: NORMAL
WBC # BLD AUTO: 3.8 K/UL (ref 4.1–11.1)

## 2021-01-07 PROCEDURE — 94640 AIRWAY INHALATION TREATMENT: CPT

## 2021-01-07 PROCEDURE — 74011250636 HC RX REV CODE- 250/636: Performed by: INTERNAL MEDICINE

## 2021-01-07 PROCEDURE — 65660000000 HC RM CCU STEPDOWN

## 2021-01-07 PROCEDURE — 74011000250 HC RX REV CODE- 250: Performed by: INTERNAL MEDICINE

## 2021-01-07 PROCEDURE — 36415 COLL VENOUS BLD VENIPUNCTURE: CPT

## 2021-01-07 PROCEDURE — 83735 ASSAY OF MAGNESIUM: CPT

## 2021-01-07 PROCEDURE — 74011250636 HC RX REV CODE- 250/636: Performed by: HOSPITALIST

## 2021-01-07 PROCEDURE — 94664 DEMO&/EVAL PT USE INHALER: CPT

## 2021-01-07 PROCEDURE — 85379 FIBRIN DEGRADATION QUANT: CPT

## 2021-01-07 PROCEDURE — 74011250637 HC RX REV CODE- 250/637: Performed by: EMERGENCY MEDICINE

## 2021-01-07 PROCEDURE — 80053 COMPREHEN METABOLIC PANEL: CPT

## 2021-01-07 PROCEDURE — 74011250637 HC RX REV CODE- 250/637: Performed by: HOSPITALIST

## 2021-01-07 PROCEDURE — 85027 COMPLETE CBC AUTOMATED: CPT

## 2021-01-07 PROCEDURE — 74011250637 HC RX REV CODE- 250/637: Performed by: INTERNAL MEDICINE

## 2021-01-07 RX ORDER — BACLOFEN 10 MG/1
5 TABLET ORAL
Status: DISCONTINUED | OUTPATIENT
Start: 2021-01-07 | End: 2021-01-10 | Stop reason: HOSPADM

## 2021-01-07 RX ORDER — PHENOBARBITAL SODIUM 65 MG/ML
65 INJECTION INTRAMUSCULAR 3 TIMES DAILY
Status: DISCONTINUED | OUTPATIENT
Start: 2021-01-07 | End: 2021-01-08

## 2021-01-07 RX ORDER — ALBUTEROL SULFATE 90 UG/1
2 AEROSOL, METERED RESPIRATORY (INHALATION)
Status: DISCONTINUED | OUTPATIENT
Start: 2021-01-07 | End: 2021-01-10 | Stop reason: HOSPADM

## 2021-01-07 RX ADMIN — LORAZEPAM 4 MG: 2 INJECTION INTRAMUSCULAR; INTRAVENOUS at 15:17

## 2021-01-07 RX ADMIN — Medication 2 TABLET: at 08:45

## 2021-01-07 RX ADMIN — THIAMINE HYDROCHLORIDE: 100 INJECTION, SOLUTION INTRAMUSCULAR; INTRAVENOUS at 20:12

## 2021-01-07 RX ADMIN — ZINC SULFATE 220 MG (50 MG) CAPSULE 1 CAPSULE: CAPSULE at 08:45

## 2021-01-07 RX ADMIN — BACLOFEN 5 MG: 10 TABLET ORAL at 09:54

## 2021-01-07 RX ADMIN — OXYCODONE HYDROCHLORIDE AND ACETAMINOPHEN 500 MG: 500 TABLET ORAL at 08:45

## 2021-01-07 RX ADMIN — LORAZEPAM 4 MG: 2 INJECTION INTRAMUSCULAR; INTRAVENOUS at 13:40

## 2021-01-07 RX ADMIN — LORAZEPAM 4 MG: 2 INJECTION INTRAMUSCULAR; INTRAVENOUS at 07:36

## 2021-01-07 RX ADMIN — POTASSIUM CHLORIDE AND SODIUM CHLORIDE: 900; 150 INJECTION, SOLUTION INTRAVENOUS at 03:06

## 2021-01-07 RX ADMIN — LORAZEPAM 4 MG: 2 INJECTION INTRAMUSCULAR; INTRAVENOUS at 17:18

## 2021-01-07 RX ADMIN — LORAZEPAM 2 MG: 2 INJECTION INTRAMUSCULAR; INTRAVENOUS at 00:46

## 2021-01-07 RX ADMIN — LORAZEPAM 4 MG: 2 INJECTION INTRAMUSCULAR; INTRAVENOUS at 21:08

## 2021-01-07 RX ADMIN — BACLOFEN 5 MG: 10 TABLET ORAL at 17:17

## 2021-01-07 RX ADMIN — OXYCODONE HYDROCHLORIDE AND ACETAMINOPHEN 500 MG: 500 TABLET ORAL at 17:17

## 2021-01-07 RX ADMIN — LORAZEPAM 2 MG: 2 INJECTION INTRAMUSCULAR; INTRAVENOUS at 08:46

## 2021-01-07 RX ADMIN — POTASSIUM CHLORIDE AND SODIUM CHLORIDE: 900; 150 INJECTION, SOLUTION INTRAVENOUS at 12:23

## 2021-01-07 RX ADMIN — ENOXAPARIN SODIUM 30 MG: 30 INJECTION SUBCUTANEOUS at 08:43

## 2021-01-07 RX ADMIN — ALBUTEROL SULFATE 2 PUFF: 90 AEROSOL, METERED RESPIRATORY (INHALATION) at 12:22

## 2021-01-07 RX ADMIN — LORAZEPAM 4 MG: 2 INJECTION INTRAMUSCULAR; INTRAVENOUS at 11:13

## 2021-01-07 RX ADMIN — LORAZEPAM 4 MG: 2 INJECTION INTRAMUSCULAR; INTRAVENOUS at 12:25

## 2021-01-07 RX ADMIN — LORAZEPAM 2 MG: 2 INJECTION INTRAMUSCULAR; INTRAVENOUS at 03:15

## 2021-01-07 RX ADMIN — PHENOBARBITAL SODIUM 65 MG: 65 INJECTION INTRAMUSCULAR at 21:08

## 2021-01-07 RX ADMIN — LORAZEPAM 4 MG: 2 INJECTION INTRAMUSCULAR; INTRAVENOUS at 19:04

## 2021-01-07 RX ADMIN — ENOXAPARIN SODIUM 30 MG: 30 INJECTION SUBCUTANEOUS at 21:08

## 2021-01-07 RX ADMIN — BACLOFEN 5 MG: 10 TABLET ORAL at 21:09

## 2021-01-07 RX ADMIN — PHENOBARBITAL SODIUM 65 MG: 65 INJECTION INTRAMUSCULAR at 15:17

## 2021-01-07 RX ADMIN — PHENOBARBITAL SODIUM 65 MG: 65 INJECTION INTRAMUSCULAR at 13:41

## 2021-01-07 NOTE — PROGRESS NOTES
[facility]: MED RECONCILIATION    Comments/Recommendations:   Patient states he should be on Losartan (dose unknown) and Viibryd, but cannot afford it due to lack of insurance. He ran out of his medication a month (or so) ago. Case management will need to follow up for medication assistance. Allergies: Patient has no known allergies.     Prior to Admission Medications:     NONE      Med Witt, ANTONYD

## 2021-01-07 NOTE — PROGRESS NOTES
Bedside and Verbal shift change report given to Benjamin Short (oncoming nurse) by Onofre Tripathi (offgoing nurse). Report included the following information SBAR, Kardex, Intake/Output, MAR and Recent Results. [facility] Dr. Christy Reddy notified of [address] of 21. [place] would like to continue to monitor and re-assess    1215 pt requesting something for breathing. Pt states he feels SOB to the sitter in the room. RN will notify respiratory therapy    [facility] notified Dr. Christy Reddy about concerns for ICU level of care and phenobarbital medication orders. Orders for meds will be placed by [place]    [phone] test negative, will notify [place]    [phone] Notified Dr. Christy Reddy that pt CIWA 15 at this time. [place] would like to monitor how phenobarbital will effect pt. Will continue to monitor    1616 pt asleep. Sitter at bedside    Bedside and Verbal shift change report given to Wendie Barraza (oncoming nurse) by Benjamin Short (offgoing nurse). Report included the following information SBAR, Kardex, Intake/Output, MAR and Recent Results.

## 2021-01-07 NOTE — ED NOTES
TRANSFER - OUT REPORT:    Verbal report given to [facility], RN on Nancy Amor  being transferred to [address] [place] [address] for routine progression of care       Report consisted of patients Situation, Background, Assessment and   Recommendations(SBAR). Information from the following report(s) SBAR, ED Summary, [facility] and Recent Results was reviewed with the receiving nurse. Opportunity for questions and clarification was provided.

## 2021-01-07 NOTE — H&P
[address]  [address] [facility]  [phone]     Hospitalist Admission Note      NAME: José Doyle   :  1965   MRN:  [id number]     Date/Time:  2021 11:24 PM    Patient PCP: Nadine Parry MD    Emergency Contact:    Extended Emergency Contact Information  Primary Emergency Contact: [address] Phone: [phone]  Relation: Girlfriend  Secondary Emergency Contact: [address] [address]  Mobile Phone: [phone]  Relation: Son      Code: Full Code    Isolation Precautions: Droplet Plus        Subjective:     CHIEF COMPLAINT: SOB. HISTORY OF PRESENT ILLNESS:     Mr. Lauren Crowley is a 63 y.o. male with history of COPD not on home O2,  EtOH abuse, polysubstance abuse, and DTs presented to ER because of feeling very lethargic, weak, headache, with mild to moderate SOB worse on exertion a/w dry cough and dyspnea which started this morning and has been worsening. He found out that he had recent exposure to 2 people with COVID-19 one of the contacts stayed at his home. He also wants help with his alcohol abuse and suicidal ideations especially when he feels lonely and with alcohol. He wants to live but feels still continues having the fleeting thoughts. He reports an attempted suicide in his mid 25s. No Known Allergies    Home Medications:  Losartan or lisinopril he is not sure. Vybrid for anxiety.       Past Medical History:   Diagnosis Date    Alcohol abuse     Diverticulitis     Diverticulitis     Hypertension         Past Surgical History:   Procedure Laterality Date    COLONOSCOPY N/A 2017    COLONOSCOPY performed by Chintan Bloom MD at [facility] HX COLECTOMY         Social History     Tobacco Use    Smoking status: Never Smoker    Smokeless tobacco: Current User    Tobacco comment: dip   Substance Use Topics    Alcohol use: Yes     Comment: heavy liquor use daily        FH: Mother  of lung cancer    [address] and medications were reviewed. Review of Systems (14 point ROS):  (bold if positive, if negative)    Gen:   , , , chills, fatigueEyes:  , , ENT:   , , , , CVS:   , , , , , , , Pulm:  Cough, dyspnea, , , wheezingGI:       , , , , , , , :     , , , , MS:     , , , Skin:   , , , , Psy:    , , , , , , Endo: , , , Hem:  , , , Jeremías:   , , , , Melissa:   , , , , , , headache,         Objective:      Visit Vitals  BP (!) 140/75   Pulse 99   Temp 98.7 °F (37.1 °C)   Resp 23   Ht 6' 2\" (1.88 m)   Wt 99.8 kg (220 lb)   SpO2 97%   BMI 28.25 kg/m²       Exam:     Physical Exam:    General:  Alert, cooperative, NAD  Head: Normocephalic, atraumatic  Eyes: PERRL and EOMI sclera clear  ENT: Lips, mucosa, and tongue normal.   Neck: supple, no tenderness  Lungs:  Normal respiratory effort but with decreased BS bilaterally and wheezing. Heart: S1-S2, tachycardic. Abd: SNTBS(+), No HSM  Ext: no cyanosis, no edema    Pulses: 2+ and symmetric  Skin: Skin color, texture, turgor normal. No rashes or lesions  Neuro: Alert and oriented x 4. CN II-XII grossly intact. Fine tremor a/w EtOH WD. Psych: Not anxious, cooperative, normal affect    Labs:    Recent Labs     21  1417   WBC 6.2   HGB 13.8   HCT 37.1   *     Recent Labs     21  1417   *   K 3.4*   CL 97   CO2 23   *   BUN 12   CREA 0.84   CA 8.4*   ALB 3.7   TBILI 1.6*   ALT 40     Lab Results   Component Value Date/Time    Glucose (POC) 117 (H) 2017 01:17 PM     No results for input(s): PH, PCO2, PO2, HCO3, FIO2 in the last 72 hours. Recent Labs     21  1417   INR 1.0       Radiology and EKG reviewed:     Xr Chest Port    Result Date: 2021  IMPRESSION: No acute cardiopulmonary process radiographically. Mild bibasilar atelectasis. I personally reviewed and interpreted the imaging studies and agree with official reading.      **Chart reviewed in [facility]** Assessment/Plan:      Alcohol withdrawal ([facility] / Alcohol abuse /  Mild tetrahydrocannabinol (THC) abuse / Polysubstance abuse ([facility]):  EtOH withdrawal protocol: CIWA, consider librium or ativan IV prn, banana bag & seizure precautions. Psych consult. Avoid narcotic uses as much as possible. Advised to quit use. Suicidal ideations (1/6/2021):  Not currvently suicidal but still has fleeting thoughts due to apparent depression. Psych consult. Suicidal precautions. Acute dyspnea / Suspected COVID-19 virus infection (1/6/2021):  COVID-19 order set with DROPLET PLUS precautions. Give Zinc, Vit. C, Vit. D, pepcid. Bronchodilator(s), systemic steroids if dyspneic and hypoxic. Maintain O2 sats at least 92-94% oxygen per protocol. IVFs if needed, supportive care, incentive spirometry. Follow inflammatory markers and renal function. Headache (1/6/2021): Likely related to viral syndrome vs tension vs EtOH WD. Tread above issues and try using tylenol initially bur may require stronger medications. Hyponatremia / Hypokalemia (1/6/2021): Replace (for now IV) and monitor. Thrombocytopenia ([facility] (1/6/2021):  Likely related to alcohol abuse. No active bleeding. Will use SCDs. Monitor labs. Elevated bilirubin (1/6/2021): Will recheck in am.  If still elevated consider direct bili and imaging studies (i.e. US). Body mass index is 28.25 kg/m².: 25.0 - 29.9 Overweight        Risk of deterioration: high      Total time spent with patient: 79 Minutes **I personally saw and examined the patient during this time period**                 Care Plan discussed with:  [x]Patient  []Family  []Care Giver  [x]ED Doc  [x]RN  []Specialist  []Care Manager     Discussed:  Code Status and Care Plan    Prophylaxis:  SCD's    Probable Disposition:  TBD depending on psych evaluation outcome.     Patient was seen:  Before midnight 1/6/2021 ___________________________________________________    Admitting Physician: Griffin Massey MD

## 2021-01-07 NOTE — PROGRESS NOTES
[facility] [place] [facility]  [address] [facility], [place], [id number] [place] [address]  [phone]      Medical Progress Note      NAME:         Shakira Curran   :        1965  MRM:        [id number]    Date of service: 2021      Subjective: Patient has been seen and examined as a follow up for multiple medical issues. Chart, labs, diagnostics reviewed. He feels anxious. Has chronic back pain. Mildly SOB. Mild cough. No fever    Objective:    Vital Signs:    Visit Vitals  BP (!) 146/95   Pulse 78   Temp 97.9 °F (36.6 °C)   Resp 24   Ht 6' 2\" (1.88 m)   Wt 99.8 kg (220 lb)   SpO2 97%   BMI 28.25 kg/m²          Intake/Output Summary (Last 24 hours) at 2021 0851  Last data filed at 2021 2221  Gross per 24 hour   Intake 1000 ml   Output --   Net 1000 ml        Physical Examination:    General:   Weak looking but not in any acute distress   Eyes:   pink conjunctivae, PERRLA with no discharge. ENT:   no ottorrhea or rhinorrhea with dry mucous membranes  Neck: no masses, thyroid non-tender and trachea central.  Pulm:  no accessory muscle use, decreased but clear breath sounds without crackles or wheezes  Card:  no JVD or murmurs, has regular and normal S1, S2 without thrills, bruits or peripheral edema  Abd:  Soft, non-tender, non-distended, normoactive bowel sounds with no palpable organomegaly  Musc:  No cyanosis, clubbing, atrophy or deformities. Skin:  No rashes, bruising or ulcers. Neuro: Awake and alert. Anxious. Tremulous.  Generally a non focal exam.   Psych:  Has a good insight and is oriented x 3    Current Facility-Administered Medications   Medication Dose Route Frequency    nicotine (NICODERM CQ) 21 mg/24 hr patch 1 Patch  1 Patch TransDERmal DAILY    LORazepam (ATIVAN) injection 2 mg  2 mg IntraVENous Q1H PRN    LORazepam (ATIVAN) injection 4 mg  4 mg IntraVENous Q1H PRN    0.9% sodium chloride 6,892 mL with folic acid 1 mg, thiamine 100 mg, mvi, adult no. 4 with vit K 10 mL infusion   IntraVENous DAILY    prochlorperazine (COMPAZINE) with saline injection 10 mg  10 mg IntraVENous Q6H PRN    0.9% sodium chloride with KCl 20 mEq/L infusion   IntraVENous CONTINUOUS    enoxaparin (LOVENOX) injection 30 mg  30 mg SubCUTAneous Q12H    acetaminophen (TYLENOL) tablet 650 mg  650 mg Oral Q6H PRN    Or    acetaminophen (TYLENOL) suppository 650 mg  650 mg Rectal Q6H PRN    cholecalciferol (VITAMIN D3) (1000 Units /25 mcg) tablet 2 Tab  2,000 Units Oral DAILY    ascorbic acid (vitamin C) (VITAMIN C) tablet 500 mg  500 mg Oral BID    zinc sulfate (ZINCATE) 220 (50) mg capsule 1 Cap  1 Cap Oral DAILY    labetaloL (NORMODYNE;TRANDATE) 20 mg/4 mL (5 mg/mL) injection 20 mg  20 mg IntraVENous Q4H PRN    hydrALAZINE (APRESOLINE) 20 mg/mL injection 20 mg  20 mg IntraVENous Q4H PRN    melatonin tablet 3 mg  3 mg Oral QHS PRN    alum-mag hydroxide-simeth (MYLANTA) oral suspension 30 mL  30 mL Oral Q4H PRN    diphenhydrAMINE (BENADRYL) injection 25 mg  25 mg IntraVENous Q6H PRN    diphenhydrAMINE (BENADRYL) capsule 25 mg  25 mg Oral Q6H PRN    hydrOXYzine (VISTARIL) 25 mg/mL injection 25 mg  25 mg IntraMUSCular Q6H PRN    albuterol (PROVENTIL VENTOLIN) nebulizer solution 2.5 mg  2.5 mg Nebulization Q4H PRN    simethicone (MYLICON) tablet 80 mg  80 mg Oral QID PRN        Laboratory data and review:    Recent Labs     01/07/21  0340 01/06/21  1417   WBC 3.8* 6.2   HGB 12.4 13.8   HCT 34.6* 37.1   * 126*     Recent Labs     01/07/21  0340 01/06/21  1417   * 134*   K 3.3* 3.4*    97   CO2 27 23   * 206*   BUN 13 12   CREA 0.80 0.84   CA 8.2* 8.4*   ALB 3.3* 3.7   ALT 34 40   INR  --  1.0     No components found for: [place]    Diagnostics: Imaging studies have been reviewed    Telemetry reviewed by me:   normal sinus rhythm    Assessment and Plan:    Alcohol withdrawal ([facility] [address] (9/4/2017) / Alcohol abuse / Polysubstance abuse (HCC) (9/2/2017) POA: symptomatic. UDS+ for THC. Tremulous. At risk for worsening. Continue CIWA protocol, goody bag    Depression / Suicidal ideations (1/6/2021) POA: he confirms he has had thoughts prior but not now. Could not afford Viibryd. Suicide precautions pending a psychiatry evaluation    Hypokalemia (1/6/2021) / Hyponatremia POA: mild. Likely from poor intake. Hydrate and replete K+ . Check Mag    Thrombocytopenia (HCC) (1/6/2021) POA: likely due to chronic alcohol use. Not bleeding. Monitor    Suspected COVID-19 virus infection (1/6/2021) / Dyspnea POA: CXR clear. I suspect partly his symptoms are anxiety driven. Not hypoxic. Follow SARS-CoV    Chronic back pain POA: he says he was due for follow up. Pain control, muscle relaxants PRN    Total time spent for the patient's care: 35 Minutes                  Care Plan discussed with: Patient, Care Manager and Nursing Staff    Discussed:  Care Plan and D/C Planning    Prophylaxis:  Lovenox    Anticipated Disposition:  Home w/Family           ___________________________________________________    Attending Physician:   Aron Arias MD

## 2021-01-07 NOTE — PROGRESS NOTES
3:22 PM  01/07/21       Reason for Admission:   alcohol withdrawal                    RUR Score:   15%                  Plan for utilizing home health:      None anticipated     PCP: First and Last name:  Noe Almaraz    Name of Practice:  Family Medicine   Are you a current patient: Yes/No: Yes    Approximate date of last visit: 12/10/2020   Can you participate in a virtual visit with your PCP: Yes                    Current Advanced Directive/Advance Care Plan: Full code                          Transition of Care Plan:   The patient was admitted with alcohol withdrawal . The patient reports he lives alone and that his brother Hetal Eagle is his emergency Contact [phone]     The brother is supportive and will transport . The patient does not have any DME and has never used home health services. The patient lives in a 2 story home with 3 steps to enter and his bedroom and bathroom are on the second floor . The patient has prescription coverage and obtains his proscriptions at [facility]. Gave patient outpatient substance abuse centers he refused         Transition of Care Plan   1. Monitor Medical management  2. Phychiatric Consult pending   Discharge home to family   3. Outpateint follow up with PCP and specialities   4. Brother supportive will transport   5.  CM to follow for discharge needs               Care Management Interventions  PCP Verified by CM: Yes(Gil Pillai )  Last Visit to PCP: 12/10/20  Mode of Transport at Discharge: Self  Transition of Care Consult (CM Consult): Discharge Planning  Discharge Durable Medical Equipment: No  Current Support Network: Lives Alone  Confirm Follow Up Transport: Family       Devika Bedoya RN [facility]

## 2021-01-07 NOTE — PROGRESS NOTES
Problem: Alcohol Withdrawal  Goal: *STG: Participates in treatment plan  Outcome: Progressing Towards Goal  Goal: *STG: Remains safe in hospital  Outcome: Progressing Towards Goal  Goal: *STG: Vital signs within defined limits  Outcome: Progressing Towards Goal  Goal: Interventions  Outcome: Progressing Towards Goal     Problem: Suicide  Goal: *STG: Remains safe in hospital  Outcome: Progressing Towards Goal  Goal: *STG/LTG: No longer expresses self destructive or suicidal thoughts  Outcome: Progressing Towards Goal

## 2021-01-07 NOTE — PROGRESS NOTES
0245-TRANSFER - IN REPORT:    Verbal report received from [address] [facility] (name) on Facundo Tan  being received from ED (unit) for routine progression of care      Report consisted of patients Situation, Background, Assessment and   Recommendations(SBAR). Information from the following report(s) SBAR, Kardex, ED Summary, Intake/Output, MAR, Accordion, Recent Results, Med Rec Status and Cardiac Rhythm NSR. was reviewed with the receiving nurse. Opportunity for questions and clarification was provided. Assessment completed upon patients arrival to unit and care assumed. 0300- CIWA 8.    0315- 2mg ativan administered. 0415- CIWA 4.     0700- Bedside and Verbal shift change report given to Geno Lowery (oncoming nurse) by Branden Maynard (offgoing nurse). Report included the following information Intake/Output, MAR, Accordion, Recent Results and Med Rec Status. This patient was assisted with Intentional Toileting every 2 hours during this shift. Documentation of ambulation and output reflected on Flowsheet.

## 2021-01-07 NOTE — ED PROVIDER NOTES
26-year-old male with a history of COPD, alcohol abuse, marijuana abuse presents with multiple complaints including shortness of breath, headache. He had exposure to individuals with coronavirus 2 to 3 days ago. He also states that he has been having nosebleeds. He also complains of black stools. He has chronic neck pain secondary to an old injury. He is an alcoholic and drinks 63-76 shots a day as well as several beers. He also endorses thoughts of suicide for the past few days. He does not have a specific plan. He states that [place] and [place] very [place] He last drank alcohol this morning. Shortness of Breath  Associated symptoms include headaches. Headache   Associated symptoms include shortness of breath. Past Medical History:   Diagnosis Date    Alcohol abuse     Diverticulitis     Diverticulitis     Hypertension        Past Surgical History:   Procedure Laterality Date    COLONOSCOPY N/A 9/6/2017    COLONOSCOPY performed by Theo Tai MD at [facility] 112 HX COLECTOMY           No family history on file.     Social History     Socioeconomic History    Marital status: SINGLE     Spouse name: Not on file    Number of children: Not on file    Years of education: Not on file    Highest education level: Not on file   Occupational History    Not on file   Social Needs    Financial resource strain: Not on file    Food insecurity     Worry: Not on file     Inability: Not on file    Transportation needs     Medical: Not on file     Non-medical: Not on file   Tobacco Use    Smoking status: Never Smoker    Smokeless tobacco: Current User    Tobacco comment: dip   Substance and Sexual Activity    Alcohol use: Yes     Comment: heavy liquor use daily    Drug use: Yes     Types: Marijuana     Comment: occasional    Sexual activity: Not on file   Lifestyle    Physical activity Days per week: Not on file     Minutes per session: Not on file    Stress: Not on file   Relationships    Social connections     Talks on phone: Not on file     Gets together: Not on file     Attends Advent service: Not on file     Active member of club or organization: Not on file     Attends meetings of clubs or organizations: Not on file     Relationship status: Not on file    Intimate partner violence     Fear of current or ex partner: Not on file     Emotionally abused: Not on file     Physically abused: Not on file     Forced sexual activity: Not on file   Other Topics Concern    Not on file   Social History Narrative    Not on file         ALLERGIES: Patient has no known allergies. Review of Systems   Respiratory: Positive for shortness of breath. Neurological: Positive for headaches. All other systems reviewed and are negative. Vitals:    01/06/21 1600 01/06/21 1700 01/06/21 1800 01/06/21 1819   BP: 134/69 (!) 145/95 (!) 144/75 (!) 145/75   Pulse:       Resp:       Temp:       SpO2: 93% 96% 97% 98%   Weight:       Height:                Physical Exam  Vitals signs and nursing note reviewed. Constitutional:       General: He is not in acute distress. HENT:      Head: Normocephalic and atraumatic. Eyes:      General: No scleral icterus. Conjunctiva/sclera: Conjunctivae normal.      Pupils: Pupils are equal, round, and reactive to light. Neck:      Musculoskeletal: No neck rigidity. Trachea: No tracheal deviation. Cardiovascular:      Rate and Rhythm: Normal rate and regular rhythm. Pulmonary:      Effort: Pulmonary effort is normal. No respiratory distress. Breath sounds: Normal breath sounds. No stridor. Abdominal:      General: There is no distension. Palpations: Abdomen is soft. Tenderness: There is no abdominal tenderness. Genitourinary:     Comments: deferred  Musculoskeletal:         General: No deformity.    Skin:     General: Skin is warm and dry.   Neurological:      General: No focal deficit present. Mental Status: He is alert. Psychiatric:         Mood and Affect: Mood normal.         Behavior: Behavior normal.          MDM  Number of Diagnoses or Management Options  Acute nonintractable headache, unspecified headache type  Alcohol withdrawal syndrome without complication (HCC)  SOB (shortness of breath)  Suicidal ideation  Diagnosis management comments: 76-year-old male presents with multiple complaints including suicidal ideation. Psychiatry was consulted and the behavioral health counselor wanted to admit the patient however the psychiatrist refused. I spoke to the psychiatrist Dr. Foreign An on the phone. He was unwilling to admit the patient because of the high alcohol level. Stated that the patient can be reassessed on the alcohol was below 200. Patient required multiple doses of Ativan for his anxiety and what he states was was withdrawal.  He will be admitted for alcohol withdrawal and then psych placement. ED Course as of Jan 06 2112 Wed Jan 06, 2021   1409 EKG shows normal sinus rhythm at rate 85, normal intervals, normal axis, normal ST segments and T waves, possible prior septal infarct    [TT]      ED Course User Index  [TT] Jennie Zuluaga MD       Procedures          Perfect Serve Consult for Admission  9:12 PM    ED Room Number: ER07/07  Patient Name and age:  Franck English 63 y.o.  male  Working Diagnosis:   1. Alcohol withdrawal syndrome without complication ([address]    2. Suicidal ideation    3. SOB (shortness of breath)    4. Acute nonintractable headache, unspecified headache type        COVID-19 Suspicion:  yes  Sepsis present:  no  Reassessment needed: N/A  Code Status:  Full Code  Readmission: no  Isolation Requirements:  no  Recommended Level of Care:  telemetry  Department: ED - [phone]  Other: Needs treatment for alcohol withdrawal before he can go to psych.   Came in also because of concern for Covid. Rapid test negative.

## 2021-01-08 LAB
ALBUMIN SERPL-MCNC: 3 G/DL (ref 3.5–5)
ALBUMIN/GLOB SERPL: 1.1 {RATIO} (ref 1.1–2.2)
ALP SERPL-CCNC: 55 U/L (ref 45–117)
ALT SERPL-CCNC: 30 U/L (ref 12–78)
ANION GAP SERPL CALC-SCNC: 2 MMOL/L (ref 5–15)
AST SERPL-CCNC: 20 U/L (ref 15–37)
BASOPHILS # BLD: 0 K/UL (ref 0–0.1)
BASOPHILS NFR BLD: 0 % (ref 0–1)
BILIRUB SERPL-MCNC: 1.7 MG/DL (ref 0.2–1)
BUN SERPL-MCNC: 7 MG/DL (ref 6–20)
BUN/CREAT SERPL: 10 (ref 12–20)
CALCIUM SERPL-MCNC: 8 MG/DL (ref 8.5–10.1)
CHLORIDE SERPL-SCNC: 104 MMOL/L (ref 97–108)
CO2 SERPL-SCNC: 28 MMOL/L (ref 21–32)
COMMENT, HOLDF: NORMAL
CREAT SERPL-MCNC: 0.72 MG/DL (ref 0.7–1.3)
DIFFERENTIAL METHOD BLD: ABNORMAL
EOSINOPHIL # BLD: 0 K/UL (ref 0–0.4)
EOSINOPHIL NFR BLD: 0 % (ref 0–7)
ERYTHROCYTE [DISTWIDTH] IN BLOOD BY AUTOMATED COUNT: 11.8 % (ref 11.5–14.5)
EST. AVERAGE GLUCOSE BLD GHB EST-MCNC: 186 MG/DL
GLOBULIN SER CALC-MCNC: 2.7 G/DL (ref 2–4)
GLUCOSE BLD STRIP.AUTO-MCNC: 168 MG/DL (ref 65–100)
GLUCOSE BLD STRIP.AUTO-MCNC: 184 MG/DL (ref 65–100)
GLUCOSE BLD STRIP.AUTO-MCNC: 203 MG/DL (ref 65–100)
GLUCOSE SERPL-MCNC: 210 MG/DL (ref 65–100)
HBA1C MFR BLD: 8.1 % (ref 4–5.6)
HCT VFR BLD AUTO: 34 % (ref 36.6–50.3)
HGB BLD-MCNC: 12.2 G/DL (ref 12.1–17)
IMM GRANULOCYTES # BLD AUTO: 0 K/UL (ref 0–0.04)
IMM GRANULOCYTES NFR BLD AUTO: 1 % (ref 0–0.5)
LYMPHOCYTES # BLD: 0.7 K/UL (ref 0.8–3.5)
LYMPHOCYTES NFR BLD: 26 % (ref 12–49)
MAGNESIUM SERPL-MCNC: 1.9 MG/DL (ref 1.6–2.4)
MCH RBC QN AUTO: 35.5 PG (ref 26–34)
MCHC RBC AUTO-ENTMCNC: 35.9 G/DL (ref 30–36.5)
MCV RBC AUTO: 98.8 FL (ref 80–99)
MONOCYTES # BLD: 0.3 K/UL (ref 0–1)
MONOCYTES NFR BLD: 9 % (ref 5–13)
NEUTS SEG # BLD: 1.8 K/UL (ref 1.8–8)
NEUTS SEG NFR BLD: 64 % (ref 32–75)
NRBC # BLD: 0.03 K/UL (ref 0–0.01)
NRBC BLD-RTO: 1.1 PER 100 WBC
PHOSPHATE SERPL-MCNC: 2.7 MG/DL (ref 2.6–4.7)
PLATELET # BLD AUTO: 92 K/UL (ref 150–400)
PMV BLD AUTO: 9.2 FL (ref 8.9–12.9)
POTASSIUM SERPL-SCNC: 3 MMOL/L (ref 3.5–5.1)
PROT SERPL-MCNC: 5.7 G/DL (ref 6.4–8.2)
RBC # BLD AUTO: 3.44 M/UL (ref 4.1–5.7)
RBC MORPH BLD: ABNORMAL
SAMPLES BEING HELD,HOLD: NORMAL
SERVICE CMNT-IMP: ABNORMAL
SODIUM SERPL-SCNC: 134 MMOL/L (ref 136–145)
WBC # BLD AUTO: 2.8 K/UL (ref 4.1–11.1)

## 2021-01-08 PROCEDURE — 74011250637 HC RX REV CODE- 250/637: Performed by: HOSPITALIST

## 2021-01-08 PROCEDURE — 65660000000 HC RM CCU STEPDOWN

## 2021-01-08 PROCEDURE — 82962 GLUCOSE BLOOD TEST: CPT

## 2021-01-08 PROCEDURE — 80053 COMPREHEN METABOLIC PANEL: CPT

## 2021-01-08 PROCEDURE — 36415 COLL VENOUS BLD VENIPUNCTURE: CPT

## 2021-01-08 PROCEDURE — 74011250637 HC RX REV CODE- 250/637: Performed by: EMERGENCY MEDICINE

## 2021-01-08 PROCEDURE — 83735 ASSAY OF MAGNESIUM: CPT

## 2021-01-08 PROCEDURE — 74011250637 HC RX REV CODE- 250/637: Performed by: INTERNAL MEDICINE

## 2021-01-08 PROCEDURE — 74011250636 HC RX REV CODE- 250/636: Performed by: HOSPITALIST

## 2021-01-08 PROCEDURE — 74011636637 HC RX REV CODE- 636/637: Performed by: INTERNAL MEDICINE

## 2021-01-08 PROCEDURE — 84100 ASSAY OF PHOSPHORUS: CPT

## 2021-01-08 PROCEDURE — 83036 HEMOGLOBIN GLYCOSYLATED A1C: CPT

## 2021-01-08 PROCEDURE — 85025 COMPLETE CBC W/AUTO DIFF WBC: CPT

## 2021-01-08 RX ORDER — GLIPIZIDE 5 MG/1
5 TABLET ORAL
Status: DISCONTINUED | OUTPATIENT
Start: 2021-01-09 | End: 2021-01-10 | Stop reason: HOSPADM

## 2021-01-08 RX ORDER — ASPIRIN 325 MG/1
100 TABLET, FILM COATED ORAL DAILY
Status: DISCONTINUED | OUTPATIENT
Start: 2021-01-08 | End: 2021-01-10 | Stop reason: HOSPADM

## 2021-01-08 RX ORDER — DEXTROSE 50 % IN WATER (D50W) INTRAVENOUS SYRINGE
12.5-25 AS NEEDED
Status: DISCONTINUED | OUTPATIENT
Start: 2021-01-08 | End: 2021-01-10 | Stop reason: HOSPADM

## 2021-01-08 RX ORDER — POTASSIUM CHLORIDE 750 MG/1
40 TABLET, FILM COATED, EXTENDED RELEASE ORAL
Status: COMPLETED | OUTPATIENT
Start: 2021-01-08 | End: 2021-01-08

## 2021-01-08 RX ORDER — THERA TABS 400 MCG
1 TAB ORAL DAILY
Status: DISCONTINUED | OUTPATIENT
Start: 2021-01-08 | End: 2021-01-10 | Stop reason: HOSPADM

## 2021-01-08 RX ORDER — FOLIC ACID 1 MG/1
1 TABLET ORAL DAILY
Status: DISCONTINUED | OUTPATIENT
Start: 2021-01-08 | End: 2021-01-10 | Stop reason: HOSPADM

## 2021-01-08 RX ORDER — PHENOBARBITAL 32.4 MG/1
32.4 TABLET ORAL EVERY 8 HOURS
Status: DISCONTINUED | OUTPATIENT
Start: 2021-01-08 | End: 2021-01-10

## 2021-01-08 RX ORDER — METFORMIN HYDROCHLORIDE 500 MG/1
500 TABLET ORAL 2 TIMES DAILY WITH MEALS
Status: DISCONTINUED | OUTPATIENT
Start: 2021-01-08 | End: 2021-01-10 | Stop reason: HOSPADM

## 2021-01-08 RX ORDER — MAGNESIUM SULFATE 100 %
4 CRYSTALS MISCELLANEOUS AS NEEDED
Status: DISCONTINUED | OUTPATIENT
Start: 2021-01-08 | End: 2021-01-10 | Stop reason: HOSPADM

## 2021-01-08 RX ORDER — INSULIN LISPRO 100 [IU]/ML
INJECTION, SOLUTION INTRAVENOUS; SUBCUTANEOUS
Status: DISCONTINUED | OUTPATIENT
Start: 2021-01-08 | End: 2021-01-10 | Stop reason: HOSPADM

## 2021-01-08 RX ORDER — OXYCODONE HYDROCHLORIDE 5 MG/1
5 TABLET ORAL
Status: DISCONTINUED | OUTPATIENT
Start: 2021-01-08 | End: 2021-01-10 | Stop reason: HOSPADM

## 2021-01-08 RX ADMIN — ACETAMINOPHEN 650 MG: 325 TABLET ORAL at 08:42

## 2021-01-08 RX ADMIN — POTASSIUM CHLORIDE 40 MEQ: 750 TABLET, FILM COATED, EXTENDED RELEASE ORAL at 08:42

## 2021-01-08 RX ADMIN — OXYCODONE HYDROCHLORIDE 5 MG: 5 TABLET ORAL at 13:49

## 2021-01-08 RX ADMIN — Medication 2 TABLET: at 08:42

## 2021-01-08 RX ADMIN — BACLOFEN 5 MG: 10 TABLET ORAL at 03:54

## 2021-01-08 RX ADMIN — ENOXAPARIN SODIUM 30 MG: 30 INJECTION SUBCUTANEOUS at 21:39

## 2021-01-08 RX ADMIN — FOLIC ACID 1 MG: 1 TABLET ORAL at 09:34

## 2021-01-08 RX ADMIN — LORAZEPAM 4 MG: 2 INJECTION INTRAMUSCULAR; INTRAVENOUS at 23:47

## 2021-01-08 RX ADMIN — ZINC SULFATE 220 MG (50 MG) CAPSULE 1 CAPSULE: CAPSULE at 08:42

## 2021-01-08 RX ADMIN — LORAZEPAM 4 MG: 2 INJECTION INTRAMUSCULAR; INTRAVENOUS at 04:44

## 2021-01-08 RX ADMIN — OXYCODONE HYDROCHLORIDE AND ACETAMINOPHEN 500 MG: 500 TABLET ORAL at 08:42

## 2021-01-08 RX ADMIN — BACLOFEN 5 MG: 10 TABLET ORAL at 15:07

## 2021-01-08 RX ADMIN — PHENOBARBITAL 32.4 MG: 32.4 TABLET ORAL at 08:42

## 2021-01-08 RX ADMIN — INSULIN LISPRO 2 UNITS: 100 INJECTION, SOLUTION INTRAVENOUS; SUBCUTANEOUS at 18:10

## 2021-01-08 RX ADMIN — THERA TABS 1 TABLET: TAB at 09:34

## 2021-01-08 RX ADMIN — LABETALOL HYDROCHLORIDE 20 MG: 5 INJECTION, SOLUTION INTRAVENOUS at 22:12

## 2021-01-08 RX ADMIN — LORAZEPAM 2 MG: 2 INJECTION INTRAMUSCULAR; INTRAVENOUS at 15:07

## 2021-01-08 RX ADMIN — Medication 100 MG: at 10:47

## 2021-01-08 RX ADMIN — METFORMIN HYDROCHLORIDE 500 MG: 500 TABLET ORAL at 18:10

## 2021-01-08 RX ADMIN — OXYCODONE HYDROCHLORIDE 5 MG: 5 TABLET ORAL at 21:39

## 2021-01-08 RX ADMIN — ENOXAPARIN SODIUM 30 MG: 30 INJECTION SUBCUTANEOUS at 08:42

## 2021-01-08 RX ADMIN — LORAZEPAM 4 MG: 2 INJECTION INTRAMUSCULAR; INTRAVENOUS at 09:34

## 2021-01-08 RX ADMIN — LORAZEPAM 2 MG: 2 INJECTION INTRAMUSCULAR; INTRAVENOUS at 05:47

## 2021-01-08 RX ADMIN — INSULIN LISPRO 2 UNITS: 100 INJECTION, SOLUTION INTRAVENOUS; SUBCUTANEOUS at 13:49

## 2021-01-08 RX ADMIN — PHENOBARBITAL 32.4 MG: 32.4 TABLET ORAL at 21:39

## 2021-01-08 RX ADMIN — LORAZEPAM 4 MG: 2 INJECTION INTRAMUSCULAR; INTRAVENOUS at 03:47

## 2021-01-08 RX ADMIN — INSULIN LISPRO 3 UNITS: 100 INJECTION, SOLUTION INTRAVENOUS; SUBCUTANEOUS at 09:34

## 2021-01-08 RX ADMIN — LORAZEPAM 4 MG: 2 INJECTION INTRAMUSCULAR; INTRAVENOUS at 10:46

## 2021-01-08 RX ADMIN — PHENOBARBITAL 32.4 MG: 32.4 TABLET ORAL at 13:49

## 2021-01-08 NOTE — ROUTINE PROCESS
Bedside and Verbal shift change report given to Mary Smalls (oncoming nurse) by Tucker Kerr (offgoing nurse). Report included the following information SBAR, Kardex, ED Summary, Procedure Summary, Intake/Output, MAR, Recent Results and Cardiac Rhythm NSR.

## 2021-01-08 NOTE — PROGRESS NOTES
0730- Bedside and Verbal shift change report given to Levy Cage RN (oncoming nurse) by Estelita Aldridge RN (offgoing nurse). Report included the following information SBAR, Kardex, ED Summary, OR Summary, Procedure Summary, Intake/Output, MAR, Accordion, and Recent Results. 1:1 sitter at bedside     This patient was assisted with Intentional Toileting every 2 hours during this shift. Documentation of ambulation and output reflected on Flowsheet. 1930-Bedside and Verbal shift change report given to Isak Meredith RN (oncoming nurse) by Levy Cage RN(offgoing nurse). Report included the following information SBAR, Kardex, ED Summary, OR Summary, Procedure Summary, Intake/Output, MAR, Accordion, Recent Results, and Med Rec Status.

## 2021-01-08 NOTE — CONSULTS
PSYCHIATRY CONSULT NOTE:    REASON FOR CONSULT: SI/ ETOH withdrawal      HISTORY OF PRESENTING COMPLAINT:  Shakira Curran is a 63 y.o. WHITE OR  male who is currently admitted to the medical floor at [place] [address] for alcohol withdrawal. This morning he is cooperative but states \"you can't expect me to answer questions right now. \" He endorses hallucinations due to alcohol withdrawal, he denies having AH/VH outside of the context of alcohol withdrawal. He does express suicidal ideation at this time and also states \"just take me outside and shoot me. \" He endorses low mood over the last 2 weeks. Has been coping with his emotions by consuming alcohol. PAST PSYCHIATRIC HISTORY and SUBSTANCE ABUSE HISTORY:  Reports a suicide attempt in his 25s      PAST MEDICAL HISTORY:  Please see H&P for details. Past Medical History:   Diagnosis Date    Alcohol abuse     Diverticulitis     Diverticulitis     Hypertension            Lab Results   Component Value Date/Time    WBC 2.8 (L) 01/08/2021 03:28 AM    HGB 12.2 01/08/2021 03:28 AM    HCT 34.0 (L) 01/08/2021 03:28 AM    PLATELET 92 (L) 23/83/4799 03:28 AM    MCV 98.8 01/08/2021 03:28 AM      Lab Results   Component Value Date/Time    Sodium 134 (L) 01/08/2021 03:28 AM    Potassium 3.0 (L) 01/08/2021 03:28 AM    Chloride 104 01/08/2021 03:28 AM    CO2 28 01/08/2021 03:28 AM    Anion gap 2 (L) 01/08/2021 03:28 AM    Glucose 210 (H) 01/08/2021 03:28 AM    BUN 7 01/08/2021 03:28 AM    Creatinine 0.72 01/08/2021 03:28 AM    BUN/Creatinine ratio 10 (L) 01/08/2021 03:28 AM    GFR est AA >60 01/08/2021 03:28 AM    GFR est non-AA >60 01/08/2021 03:28 AM    Calcium 8.0 (L) 01/08/2021 03:28 AM    Bilirubin, total 1.7 (H) 01/08/2021 03:28 AM    Alk.  phosphatase 55 01/08/2021 03:28 AM    Protein, total 5.7 (L) 01/08/2021 03:28 AM    Albumin 3.0 (L) 01/08/2021 03:28 AM    Globulin 2.7 01/08/2021 03:28 AM    A-G Ratio 1.1 01/08/2021 03:28 AM    ALT (SGPT) 30 01/08/2021 03:28 AM          PSYCHOSOCIAL HISTORY:  States that he lives by himself, is disabled      MENTAL STATUS EXAM:    General appearance: Lying in hospital bed with eyes closed   Eye contact: poor  Speech: Spontaneous, soft, decreased output. Affect : flat  Mood: \"bad \"  Thought Process: Logical, goal directed  Perception: +AH/VH  Thought Content: +SI  Insight: Partial  Judgement: Fair  Cognition: Intact grossly. ASSESSMENT AND PLAN:  Pretty Truong meets criteria for a diagnosis of major depressive disorder, recurrent, severe and alcohol use disorder. He requests admission to inpatient psychiatry after he is medically clear. He repeatedly states \"I just want help. \"     Continue 1:1 sitter at this time due to current suicidal ideation    Thank you for the consultation.     Matheus Morales, GERMANP-BC  January 8, 2021

## 2021-01-08 NOTE — PROGRESS NOTES
Transition Plan of Care  RUR 18%    Plan:  1. Monitor patient's response to treatment. 2. Psych was consulted and recommended inpt psych. I started the process at [address] When patient Is ready to be discharged to [facility] acute [facility] call Jaydon Rodgers [phone] for bed availability. 3. Patient has a supportive brother. 4. Patient is agreeable to go to inpatient psych to receive treatment. 5. Case Management to follow.       Eric Gonzalez BS

## 2021-01-08 NOTE — PROGRESS NOTES
Problem: Alcohol Withdrawal  Goal: *STG: Participates in treatment plan  1/7/2021 2335 by Cecelia Gains., RN  Outcome: Progressing Towards Goal  1/7/2021 2335 by Cecelia Gains., RN  Outcome: Progressing Towards Goal  Goal: *STG: Remains safe in hospital  1/7/2021 2335 by Cecelia Gains., RN  Outcome: Progressing Towards Goal  1/7/2021 2335 by Cecelia Gains., RN  Outcome: Progressing Towards Goal  Goal: *STG: Seeks staff when symptoms of withdrawal increase  1/7/2021 2335 by Cecelia Gains., RN  Outcome: Progressing Towards Goal  1/7/2021 2335 by Cecelia Gains., RN  Outcome: Progressing Towards Goal  Goal: *STG: Complies with medication therapy  1/7/2021 2335 by Cecelia Gains., RN  Outcome: Progressing Towards Goal  1/7/2021 2335 by Cecelia Gains., RN  Outcome: Progressing Towards Goal  Goal: *STG: Attends activities and groups  1/7/2021 2335 by Cecelia Gains., RN  Outcome: Progressing Towards Goal  1/7/2021 2335 by Cecelia Gains., RN  Outcome: Progressing Towards Goal  Goal: *STG: Will identify negative impact of chemical dependency including the use of tobacco, alcohol, and other substances  1/7/2021 2335 by Cecelia Gains., RN  Outcome: Progressing Towards Goal  1/7/2021 2335 by Cecelia Gains., RN  Outcome: Progressing Towards Goal  Goal: *STG: Verbalizes abstinence as an achievable goal  1/7/2021 2335 by Cecelia Gains., RN  Outcome: Progressing Towards Goal  1/7/2021 2335 by Cecelia Gains., RN  Outcome: Progressing Towards Goal  Goal: *STG: Agrees to participate in outpatient after care program to support ongoing mental health  1/7/2021 2335 by Cecelia Gains., RN  Outcome: Progressing Towards Goal  1/7/2021 2335 by Cecelia Gains., RN  Outcome: Progressing Towards Goal  Goal: *STG: Able to indentify relapse triggers including interpersonal/social and familial factors  1/7/2021 2335 by Cecelia Gains., RN  Outcome: Progressing Towards Goal  1/7/2021 2335 by Cecelia Gains., RN  Outcome: Progressing Towards Goal  Goal: *STG: Identify lifestyle changes to support long term sobriety such as vocation, employment, education, and legal issues  1/7/2021 2335 by Marly Messina., RN  Outcome: Progressing Towards Goal  1/7/2021 2335 by Marly Messina., RN  Outcome: Progressing Towards Goal  Goal: *STG: Maintains appropriate nutrition and hydration  1/7/2021 2335 by Marly Messina., RN  Outcome: Progressing Towards Goal  1/7/2021 2335 by Marly Messina., RN  Outcome: Progressing Towards Goal  Goal: *STG: Vital signs within defined limits  1/7/2021 2335 by Marly Messina., RN  Outcome: Progressing Towards Goal  1/7/2021 2335 by Marly Messina., RN  Outcome: Progressing Towards Goal  Goal: *STG/LTG: Relapse prevention plan in place to include housing/aftercare, leisure activities, and spirituality  1/7/2021 2335 by Marly Messina., RN  Outcome: Progressing Towards Goal  1/7/2021 2335 by Marly Messina., RN  Outcome: Progressing Towards Goal  Goal: Interventions  1/7/2021 2335 by Marly Messina., RN  Outcome: Progressing Towards Goal  1/7/2021 2335 by Marly Messina., RN  Outcome: Progressing Towards Goal     Problem: Patient Education: Go to Patient Education Activity  Goal: Patient/Family Education  1/7/2021 2335 by Marly Messina., RN  Outcome: Progressing Towards Goal  1/7/2021 2335 by Marly Messina., RN  Outcome: Progressing Towards Goal     Problem: Suicide  Goal: *STG: Remains safe in hospital  1/7/2021 2335 by Marly Messina., RN  Outcome: Progressing Towards Goal  1/7/2021 2335 by Marly Messina., RN  Outcome: Progressing Towards Goal  Goal: *STG: Seeks staff when feelings of self harm or harm towards others arise  1/7/2021 2335 by Mraly Messina., RN  Outcome: Progressing Towards Goal  1/7/2021 2335 by Marly Messina., RN  Outcome: Progressing Towards Goal  Goal: *STG: Attends activities and groups  1/7/2021 2335 by Marly Messina., RN  Outcome: Progressing Towards Goal  1/7/2021 2335 by Marly Messina., RN  Outcome: Progressing Towards Goal  Goal: *STG:  Verbalizes alternative ways of dealing with maladaptive feelings/behaviors  1/7/2021 2335 by Cecelia Gains., RN  Outcome: Progressing Towards Goal  1/7/2021 2335 by Cecelia Gains., RN  Outcome: Progressing Towards Goal  Goal: *STG/LTG: Complies with medication therapy  1/7/2021 2335 by Cecelia Gains., RN  Outcome: Progressing Towards Goal  1/7/2021 2335 by Cecelia Gains., RN  Outcome: Progressing Towards Goal  Goal: *STG/LTG: No longer expresses self destructive or suicidal thoughts  1/7/2021 2335 by Cecelia Gains., RN  Outcome: Progressing Towards Goal  1/7/2021 2335 by Cecelia Gains., RN  Outcome: Progressing Towards Goal  Goal: *LTG:  Identifies available community resources  1/7/2021 2335 by Cecelia Gains., RN  Outcome: Progressing Towards Goal  1/7/2021 2335 by Cecelia Gains., RN  Outcome: Progressing Towards Goal  Goal: *LTG:  Develops proactive suicide prevention plan  1/7/2021 2335 by Cecelia Gains., RN  Outcome: Progressing Towards Goal  1/7/2021 2335 by Cecelia Gains., RN  Outcome: Progressing Towards Goal  Goal: Interventions  1/7/2021 2335 by Cecelia Gains., RN  Outcome: Progressing Towards Goal  1/7/2021 2335 by Cecelia Yip., RN  Outcome: Progressing Towards Goal     Problem: Patient Education: Go to Patient Education Activity  Goal: Patient/Family Education  1/7/2021 2335 by Cecelia Yip., RN  Outcome: Progressing Towards Goal  1/7/2021 2335 by Cecelia Yip., RN  Outcome: Progressing Towards Goal     Problem: Falls - Risk of  Goal: *Absence of Falls  Description: Document Evens Chris Fall Risk and appropriate interventions in the flowsheet.   1/7/2021 2335 by Cecelia Yip., RN  Outcome: Progressing Towards Goal  Note: Fall Risk Interventions:  Mobility Interventions: Assess mobility with egress test, Bed/chair exit alarm, Communicate number of staff needed for ambulation/transfer, Patient to call before getting OOB         Medication Interventions: Bed/chair exit alarm, Patient to call before getting OOB, Teach patient to arise slowly                1/7/2021 2335 by Cecelia Charles RN  Outcome: Progressing Towards Goal  Note: Fall Risk Interventions:  Mobility Interventions: Assess mobility with egress test, Bed/chair exit alarm, Communicate number of staff needed for ambulation/transfer, Patient to call before getting OOB         Medication Interventions: Bed/chair exit alarm, Patient to call before getting OOB, Teach patient to arise slowly                   Problem: Patient Education: Go to Patient Education Activity  Goal: Patient/Family Education  1/7/2021 2335 by Cecelia Charles RN  Outcome: Progressing Towards Goal  1/7/2021 2335 by Cecelia Charles RN  Outcome: Progressing Towards Goal

## 2021-01-09 LAB
ANION GAP SERPL CALC-SCNC: 6 MMOL/L (ref 5–15)
BUN SERPL-MCNC: 4 MG/DL (ref 6–20)
BUN/CREAT SERPL: 6 (ref 12–20)
CALCIUM SERPL-MCNC: 8.3 MG/DL (ref 8.5–10.1)
CHLORIDE SERPL-SCNC: 101 MMOL/L (ref 97–108)
CO2 SERPL-SCNC: 27 MMOL/L (ref 21–32)
COMMENT, HOLDF: NORMAL
CREAT SERPL-MCNC: 0.68 MG/DL (ref 0.7–1.3)
ERYTHROCYTE [DISTWIDTH] IN BLOOD BY AUTOMATED COUNT: 11.9 % (ref 11.5–14.5)
GLUCOSE BLD STRIP.AUTO-MCNC: 139 MG/DL (ref 65–100)
GLUCOSE BLD STRIP.AUTO-MCNC: 154 MG/DL (ref 65–100)
GLUCOSE BLD STRIP.AUTO-MCNC: 179 MG/DL (ref 65–100)
GLUCOSE SERPL-MCNC: 185 MG/DL (ref 65–100)
HCT VFR BLD AUTO: 34.2 % (ref 36.6–50.3)
HGB BLD-MCNC: 12.4 G/DL (ref 12.1–17)
MAGNESIUM SERPL-MCNC: 1.9 MG/DL (ref 1.6–2.4)
MCH RBC QN AUTO: 35.4 PG (ref 26–34)
MCHC RBC AUTO-ENTMCNC: 36.3 G/DL (ref 30–36.5)
MCV RBC AUTO: 97.7 FL (ref 80–99)
NRBC # BLD: 0.02 K/UL (ref 0–0.01)
NRBC BLD-RTO: 0.8 PER 100 WBC
PHOSPHATE SERPL-MCNC: 3.5 MG/DL (ref 2.6–4.7)
PLATELET # BLD AUTO: 103 K/UL (ref 150–400)
PMV BLD AUTO: 8.8 FL (ref 8.9–12.9)
POTASSIUM SERPL-SCNC: 3.2 MMOL/L (ref 3.5–5.1)
RBC # BLD AUTO: 3.5 M/UL (ref 4.1–5.7)
SAMPLES BEING HELD,HOLD: NORMAL
SERVICE CMNT-IMP: ABNORMAL
SODIUM SERPL-SCNC: 134 MMOL/L (ref 136–145)
WBC # BLD AUTO: 2.6 K/UL (ref 4.1–11.1)

## 2021-01-09 PROCEDURE — 74011250637 HC RX REV CODE- 250/637: Performed by: HOSPITALIST

## 2021-01-09 PROCEDURE — 80048 BASIC METABOLIC PNL TOTAL CA: CPT

## 2021-01-09 PROCEDURE — 36415 COLL VENOUS BLD VENIPUNCTURE: CPT

## 2021-01-09 PROCEDURE — 84100 ASSAY OF PHOSPHORUS: CPT

## 2021-01-09 PROCEDURE — 74011250637 HC RX REV CODE- 250/637: Performed by: INTERNAL MEDICINE

## 2021-01-09 PROCEDURE — 74011250636 HC RX REV CODE- 250/636: Performed by: INTERNAL MEDICINE

## 2021-01-09 PROCEDURE — 82962 GLUCOSE BLOOD TEST: CPT

## 2021-01-09 PROCEDURE — 83735 ASSAY OF MAGNESIUM: CPT

## 2021-01-09 PROCEDURE — 85027 COMPLETE CBC AUTOMATED: CPT

## 2021-01-09 PROCEDURE — 74011636637 HC RX REV CODE- 636/637: Performed by: INTERNAL MEDICINE

## 2021-01-09 PROCEDURE — 74011250636 HC RX REV CODE- 250/636: Performed by: HOSPITALIST

## 2021-01-09 PROCEDURE — 65660000000 HC RM CCU STEPDOWN

## 2021-01-09 PROCEDURE — 74011250637 HC RX REV CODE- 250/637: Performed by: EMERGENCY MEDICINE

## 2021-01-09 RX ORDER — POTASSIUM CHLORIDE 750 MG/1
40 TABLET, FILM COATED, EXTENDED RELEASE ORAL 2 TIMES DAILY
Status: COMPLETED | OUTPATIENT
Start: 2021-01-09 | End: 2021-01-09

## 2021-01-09 RX ADMIN — THERA TABS 1 TABLET: TAB at 08:52

## 2021-01-09 RX ADMIN — GLIPIZIDE 5 MG: 5 TABLET ORAL at 06:24

## 2021-01-09 RX ADMIN — POTASSIUM CHLORIDE 40 MEQ: 750 TABLET, FILM COATED, EXTENDED RELEASE ORAL at 12:21

## 2021-01-09 RX ADMIN — FOLIC ACID 1 MG: 1 TABLET ORAL at 08:52

## 2021-01-09 RX ADMIN — OXYCODONE HYDROCHLORIDE 5 MG: 5 TABLET ORAL at 13:54

## 2021-01-09 RX ADMIN — OXYCODONE HYDROCHLORIDE 5 MG: 5 TABLET ORAL at 08:49

## 2021-01-09 RX ADMIN — PHENOBARBITAL 32.4 MG: 32.4 TABLET ORAL at 13:54

## 2021-01-09 RX ADMIN — PHENOBARBITAL 32.4 MG: 32.4 TABLET ORAL at 06:24

## 2021-01-09 RX ADMIN — BACLOFEN 5 MG: 10 TABLET ORAL at 12:24

## 2021-01-09 RX ADMIN — Medication 100 MG: at 08:52

## 2021-01-09 RX ADMIN — POTASSIUM CHLORIDE AND SODIUM CHLORIDE: 900; 150 INJECTION, SOLUTION INTRAVENOUS at 13:54

## 2021-01-09 RX ADMIN — METFORMIN HYDROCHLORIDE 500 MG: 500 TABLET ORAL at 17:19

## 2021-01-09 RX ADMIN — OXYCODONE HYDROCHLORIDE 5 MG: 5 TABLET ORAL at 21:08

## 2021-01-09 RX ADMIN — Medication 2 TABLET: at 08:52

## 2021-01-09 RX ADMIN — INSULIN LISPRO 2 UNITS: 100 INJECTION, SOLUTION INTRAVENOUS; SUBCUTANEOUS at 08:52

## 2021-01-09 RX ADMIN — LORAZEPAM 2 MG: 2 INJECTION INTRAMUSCULAR; INTRAVENOUS at 15:14

## 2021-01-09 RX ADMIN — LORAZEPAM 4 MG: 2 INJECTION INTRAMUSCULAR; INTRAVENOUS at 08:49

## 2021-01-09 RX ADMIN — ENOXAPARIN SODIUM 30 MG: 30 INJECTION SUBCUTANEOUS at 08:51

## 2021-01-09 RX ADMIN — INSULIN LISPRO 2 UNITS: 100 INJECTION, SOLUTION INTRAVENOUS; SUBCUTANEOUS at 17:19

## 2021-01-09 RX ADMIN — POTASSIUM CHLORIDE 40 MEQ: 750 TABLET, FILM COATED, EXTENDED RELEASE ORAL at 17:19

## 2021-01-09 RX ADMIN — METFORMIN HYDROCHLORIDE 500 MG: 500 TABLET ORAL at 08:52

## 2021-01-09 RX ADMIN — ENOXAPARIN SODIUM 30 MG: 30 INJECTION SUBCUTANEOUS at 21:08

## 2021-01-09 RX ADMIN — PHENOBARBITAL 32.4 MG: 32.4 TABLET ORAL at 21:08

## 2021-01-09 NOTE — PROGRESS NOTES
[facility] [address]  [address] [place] [address]  [phone]      Medical Progress Note      NAME: Lily Julian   :  1965  MRM:  [id number]    Date/Time of service: 2021  10:06 AM       Subjective:     Chief Complaint:  Patient was personally seen and examined by me during this time period. Chart reviewed. More awake. Tremors improving       Objective:       Vitals:       Last 24hrs VS reviewed since prior progress note. Most recent are:    Visit Vitals  BP (!) 141/96 (BP 1 Location: Left arm, BP Patient Position: At rest)   Pulse 91   Temp 98.3 °F (36.8 °C)   Resp 17   Ht 6' 2\" (1.88 m)   Wt 99.8 kg (220 lb)   SpO2 95%   BMI 28.25 kg/m²     SpO2 Readings from Last 6 Encounters:   21 95%   17 98%            Intake/Output Summary (Last 24 hours) at 2021 1006  Last data filed at 2021 0404  Gross per 24 hour   Intake 1540 ml   Output 1200 ml   Net 340 ml        Exam:     Physical Exam:    Gen:  Disheveled, ill-appearing, NAD  HEENT:  Pink conjunctivae, PERRL, hearing intact to voice, moist mucous membranes  Neck:  Supple, without masses, thyroid non-tender  Resp:  No accessory muscle use, clear breath sounds without wheezes rales or rhonchi  Card:  No murmurs, normal S1, S2 without thrills, bruits or peripheral edema  Abd:  Soft, non-tender, non-distended, normoactive bowel sounds are present  Musc:  No cyanosis or clubbing  Skin:  No rashes   Neuro:  Cranial nerves 3-12 are grossly intact, follows commands appropriately  Psych:  Fair insight, oriented to person, place and time, alert.   Depressed     Medications Reviewed: (see below)    Lab Data Reviewed: (see below)    ______________________________________________________________________    Medications:     Current Facility-Administered Medications   Medication Dose Route Frequency    potassium chloride SR (KLOR-CON 10) tablet 40 mEq  40 mEq Oral BID    therapeutic multivitamin (THERAGRAN) tablet 1 Tab  1 Tab Oral DAILY    thiamine mononitrate (B-1) tablet 100 mg  100 mg Oral DAILY    folic acid (FOLVITE) tablet 1 mg  1 mg Oral DAILY    insulin lispro (HUMALOG) injection   SubCUTAneous TIDAC    glucose chewable tablet 16 g  4 Tab Oral PRN    dextrose (D50W) injection syrg 12.5-25 g  12.5-25 g IntraVENous PRN    glucagon (GLUCAGEN) injection 1 mg  1 mg IntraMUSCular PRN    PHENobarbitaL (LUMINAL) tablet 32.4 mg  32.4 mg Oral Q8H    oxyCODONE IR (ROXICODONE) tablet 5 mg  5 mg Oral Q4H PRN    metFORMIN (GLUCOPHAGE) tablet 500 mg  500 mg Oral BID WITH MEALS    glipiZIDE (GLUCOTROL) tablet 5 mg  5 mg Oral ACB    baclofen (LIORESAL) tablet 5 mg  5 mg Oral TID PRN    albuterol (PROVENTIL HFA, VENTOLIN HFA, PROAIR HFA) inhaler 2 Puff  2 Puff Inhalation Q4H PRN    influenza vaccine 2020-21 (6 mos+)(PF) (FLUARIX/FLULAVAL/FLUZONE QUAD) injection 0.5 mL  0.5 mL IntraMUSCular PRIOR TO DISCHARGE    nicotine (NICODERM CQ) 21 mg/24 hr patch 1 Patch  1 Patch TransDERmal DAILY    LORazepam (ATIVAN) injection 2 mg  2 mg IntraVENous Q1H PRN    LORazepam (ATIVAN) injection 4 mg  4 mg IntraVENous Q1H PRN    prochlorperazine (COMPAZINE) with saline injection 10 mg  10 mg IntraVENous Q6H PRN    0.9% sodium chloride with KCl 20 mEq/L infusion   IntraVENous CONTINUOUS    enoxaparin (LOVENOX) injection 30 mg  30 mg SubCUTAneous Q12H    acetaminophen (TYLENOL) tablet 650 mg  650 mg Oral Q6H PRN    Or    acetaminophen (TYLENOL) suppository 650 mg  650 mg Rectal Q6H PRN    cholecalciferol (VITAMIN D3) (1000 Units /25 mcg) tablet 2 Tab  2,000 Units Oral DAILY    labetaloL (NORMODYNE;TRANDATE) 20 mg/4 mL (5 mg/mL) injection 20 mg  20 mg IntraVENous Q4H PRN    melatonin tablet 3 mg  3 mg Oral QHS PRN    alum-mag hydroxide-simeth (MYLANTA) oral suspension 30 mL  30 mL Oral Q4H PRN    diphenhydrAMINE (BENADRYL) injection 25 mg  25 mg IntraVENous Q6H PRN    diphenhydrAMINE (BENADRYL) capsule 25 mg  25 mg Oral Q6H PRN    hydrOXYzine (VISTARIL) 25 mg/mL injection 25 mg  25 mg IntraMUSCular Q6H PRN    simethicone (MYLICON) tablet 80 mg  80 mg Oral QID PRN          Lab Review:     Recent Labs     01/09/21 0355 01/08/21 0328 01/07/21  0340   WBC 2.6* 2.8* 3.8*   HGB 12.4 12.2 12.4   HCT 34.2* 34.0* 34.6*   * 92* 113*     Recent Labs     01/09/21 0355 01/08/21 0328 01/07/21  0340 01/06/21  1417   * 134* 134* 134*   K 3.2* 3.0* 3.3* 3.4*    104 101 97   CO2 27 28 27 23   * 210* 158* 206*   BUN 4* 7 13 12   CREA 0.68* 0.72 0.80 0.84   CA 8.3* 8.0* 8.2* 8.4*   MG 1.9 1.9 2.0  --    PHOS 3.5 2.7  --   --    ALB  --  3.0* 3.3* 3.7   TBILI  --  1.7* 2.1* 1.6*   ALT  --  30 34 40   INR  --   --   --  1.0     Lab Results   Component Value Date/Time    Glucose (POC) 179 (H) 01/09/2021 08:08 AM    Glucose (POC) 168 (H) 01/08/2021 05:07 PM    Glucose (POC) 184 (H) 01/08/2021 12:13 PM    Glucose (POC) 203 (H) 01/08/2021 08:42 AM    Glucose (POC) 117 (H) 09/05/2017 01:17 PM          Assessment / Plan:     62 yo hx of HTN, etoh abuse, presented w/ dyspnea, etoh withdrawal, suicidal ideation    1) Alcohol withdrawal/abuse: still active. Cont CIWA, IV Ativan prn, phenorbarb, MVI, thiamine, folic acid    2) Depression/suicidal ideation: still active. Cont suicide precaution, 1:1 sitter. Psych recommended inpatient psych once etoh w/d improves    3) Leukopenia/thrombocytopenia: due to etoh abuse. Will monitor     4) HypoK/phos: cont IV repletion     5) Chronic pain: cont pain control, muscle relaxants prn    6) New diabetes/hyperglycemia: A1C 8.1%. Started glipizide, metformin. Educated about diet, weight loss, exercise.   Needs outpatient f/u     Total time spent with patient: 35 min  **I personally saw and examined the patient during this time period**                 Care Plan discussed with: Patient, nursing     Discussed:  Care Plan    Prophylaxis:  Lovenox    Disposition:  inpatient psych           ___________________________________________________    Attending Physician: Heather Farmer, MD

## 2021-01-09 NOTE — PROGRESS NOTES
0730- Bedside and Verbal shift change report given to Kurt Anthony RN (oncoming nurse) by Jonas Galindo RN (offgoing nurse). Report included the following information SBAR, Kardex, ED Summary, OR Summary, Procedure Summary, Intake/Output, MAR, Accordion, and Recent Results. This patient was assisted with Intentional Toileting every 2 hours during this shift. Documentation of ambulation and output reflected on Flowsheet. 1930-Bedside and Verbal shift change report given to RN (oncoming nurse) by Kurt Anthony RN(offgoing nurse). Report included the following information SBAR, Kardex, ED Summary, OR Summary, Procedure Summary, Intake/Output, MAR, Accordion, Recent Results, and Med Rec Status.

## 2021-01-09 NOTE — PROGRESS NOTES
1:45 PM   01/09/21     RUR 19%    Transition Plan of Care    1. Monitor patient's response to treatment. 2. Psych recommended inpt psych. When patient Is ready to be discharged to [facility] call [facility] [phone] for bed availability. 3. Patient has a supportive brother. 4. Case Management to follow.     Ofe Samuel RN CCM

## 2021-01-09 NOTE — PROGRESS NOTES
Shift Change:    Bedside and Verbal shift change report given to Edel (oncoming nurse) by Emma Cobb (offgoing nurse). Report included the following information SBAR, Kardex, and Recent Results. Summary:  3543: /114. PRN labetalol given. Will reassess in an hr. Shift Report:    Bedside and Verbal shift change report given to Mary Smalls (oncoming nurse) by Jim Koehler (offgoing nurse). Report included the following information SBAR, Kardex, and Recent Results.

## 2021-01-10 ENCOUNTER — HOSPITAL ENCOUNTER (INPATIENT)
Age: 56
LOS: 2 days | Discharge: HOME | DRG: 753 | End: 2021-01-12
Attending: PSYCHIATRY & NEUROLOGY | Admitting: PSYCHIATRY & NEUROLOGY
Payer: MEDICAID

## 2021-01-10 VITALS
RESPIRATION RATE: 11 BRPM | BODY MASS INDEX: 28.23 KG/M2 | HEIGHT: 74 IN | HEART RATE: 112 BPM | TEMPERATURE: 99.1 F | SYSTOLIC BLOOD PRESSURE: 142 MMHG | DIASTOLIC BLOOD PRESSURE: 93 MMHG | WEIGHT: 220 LBS | OXYGEN SATURATION: 94 %

## 2021-01-10 PROBLEM — F32.9 MAJOR DEPRESSION: Status: ACTIVE | Noted: 2021-01-10

## 2021-01-10 LAB
GLUCOSE BLD STRIP.AUTO-MCNC: 160 MG/DL (ref 65–100)
GLUCOSE BLD STRIP.AUTO-MCNC: 99 MG/DL (ref 65–100)
SERVICE CMNT-IMP: ABNORMAL
SERVICE CMNT-IMP: NORMAL

## 2021-01-10 PROCEDURE — 65220000003 HC RM SEMIPRIVATE PSYCH

## 2021-01-10 PROCEDURE — 74011250636 HC RX REV CODE- 250/636: Performed by: HOSPITALIST

## 2021-01-10 PROCEDURE — 74011250637 HC RX REV CODE- 250/637: Performed by: INTERNAL MEDICINE

## 2021-01-10 PROCEDURE — 74011250636 HC RX REV CODE- 250/636: Performed by: INTERNAL MEDICINE

## 2021-01-10 PROCEDURE — 82962 GLUCOSE BLOOD TEST: CPT

## 2021-01-10 PROCEDURE — 74011250637 HC RX REV CODE- 250/637: Performed by: EMERGENCY MEDICINE

## 2021-01-10 PROCEDURE — 74011250637 HC RX REV CODE- 250/637: Performed by: NURSE PRACTITIONER

## 2021-01-10 PROCEDURE — 74011636637 HC RX REV CODE- 636/637: Performed by: INTERNAL MEDICINE

## 2021-01-10 PROCEDURE — 74011250637 HC RX REV CODE- 250/637: Performed by: HOSPITALIST

## 2021-01-10 RX ORDER — HALOPERIDOL 5 MG/ML
5 INJECTION INTRAMUSCULAR
Status: DISCONTINUED | OUTPATIENT
Start: 2021-01-10 | End: 2021-01-12 | Stop reason: HOSPADM

## 2021-01-10 RX ORDER — BENZTROPINE MESYLATE 1 MG/1
1 TABLET ORAL
Status: DISCONTINUED | OUTPATIENT
Start: 2021-01-10 | End: 2021-01-12 | Stop reason: HOSPADM

## 2021-01-10 RX ORDER — ALPRAZOLAM 0.5 MG/1
0.5 TABLET ORAL
Status: DISCONTINUED | OUTPATIENT
Start: 2021-01-10 | End: 2021-01-10 | Stop reason: HOSPADM

## 2021-01-10 RX ORDER — DIPHENHYDRAMINE HYDROCHLORIDE 50 MG/ML
50 INJECTION, SOLUTION INTRAMUSCULAR; INTRAVENOUS
Status: DISCONTINUED | OUTPATIENT
Start: 2021-01-10 | End: 2021-01-12 | Stop reason: HOSPADM

## 2021-01-10 RX ORDER — DM/P-EPHED/ACETAMINOPH/DOXYLAM 30-7.5/3
2 LIQUID (ML) ORAL
Status: DISCONTINUED | OUTPATIENT
Start: 2021-01-10 | End: 2021-01-11

## 2021-01-10 RX ORDER — TRAZODONE HYDROCHLORIDE 50 MG/1
50 TABLET ORAL
Status: DISCONTINUED | OUTPATIENT
Start: 2021-01-10 | End: 2021-01-11

## 2021-01-10 RX ORDER — PHENOBARBITAL 32.4 MG/1
32.4 TABLET ORAL 2 TIMES DAILY
Status: DISCONTINUED | OUTPATIENT
Start: 2021-01-10 | End: 2021-01-10 | Stop reason: HOSPADM

## 2021-01-10 RX ORDER — METFORMIN HYDROCHLORIDE 500 MG/1
500 TABLET ORAL 2 TIMES DAILY WITH MEALS
Qty: 60 TAB | Refills: 0 | Status: SHIPPED
Start: 2021-01-10 | End: 2021-02-08 | Stop reason: DRUGHIGH

## 2021-01-10 RX ORDER — TRAMADOL HYDROCHLORIDE 50 MG/1
50 TABLET ORAL
Status: DISCONTINUED | OUTPATIENT
Start: 2021-01-10 | End: 2021-01-10

## 2021-01-10 RX ORDER — CAPSAICIN 0.03 G/100G
CREAM TOPICAL
Status: DISCONTINUED | OUTPATIENT
Start: 2021-01-10 | End: 2021-01-12 | Stop reason: HOSPADM

## 2021-01-10 RX ORDER — LIDOCAINE 4 G/100G
1-3 PATCH TOPICAL EVERY 24 HOURS
Status: DISCONTINUED | OUTPATIENT
Start: 2021-01-11 | End: 2021-01-12 | Stop reason: HOSPADM

## 2021-01-10 RX ORDER — ADHESIVE BANDAGE
30 BANDAGE TOPICAL DAILY PRN
Status: DISCONTINUED | OUTPATIENT
Start: 2021-01-10 | End: 2021-01-12 | Stop reason: HOSPADM

## 2021-01-10 RX ORDER — LANOLIN ALCOHOL/MO/W.PET/CERES
100 CREAM (GRAM) TOPICAL DAILY
Status: DISCONTINUED | OUTPATIENT
Start: 2021-01-11 | End: 2021-01-12 | Stop reason: HOSPADM

## 2021-01-10 RX ORDER — PHENOBARBITAL 64.8 MG/1
64.8 TABLET ORAL
Status: DISCONTINUED | OUTPATIENT
Start: 2021-01-10 | End: 2021-01-11

## 2021-01-10 RX ORDER — LISINOPRIL 5 MG/1
5 TABLET ORAL DAILY
Status: DISCONTINUED | OUTPATIENT
Start: 2021-01-11 | End: 2021-01-11

## 2021-01-10 RX ORDER — LORAZEPAM 2 MG/ML
1 INJECTION INTRAMUSCULAR
Status: DISCONTINUED | OUTPATIENT
Start: 2021-01-10 | End: 2021-01-12 | Stop reason: HOSPADM

## 2021-01-10 RX ORDER — GLIPIZIDE 5 MG/1
5 TABLET ORAL
Qty: 30 TAB | Refills: 0 | Status: SHIPPED
Start: 2021-01-11 | End: 2021-01-12

## 2021-01-10 RX ORDER — PHENOBARBITAL 64.8 MG/1
64.8 TABLET ORAL EVERY 6 HOURS
Status: DISCONTINUED | OUTPATIENT
Start: 2021-01-10 | End: 2021-01-11

## 2021-01-10 RX ORDER — THERA TABS 400 MCG
1 TAB ORAL DAILY
Qty: 30 TAB | Refills: 0 | Status: SHIPPED
Start: 2021-01-11

## 2021-01-10 RX ORDER — THERA TABS 400 MCG
1 TAB ORAL DAILY
Status: DISCONTINUED | OUTPATIENT
Start: 2021-01-11 | End: 2021-01-12 | Stop reason: HOSPADM

## 2021-01-10 RX ORDER — HYDROXYZINE 50 MG/1
50 TABLET, FILM COATED ORAL
Status: DISCONTINUED | OUTPATIENT
Start: 2021-01-10 | End: 2021-01-11

## 2021-01-10 RX ORDER — GLIPIZIDE 5 MG/1
5 TABLET ORAL
Status: DISCONTINUED | OUTPATIENT
Start: 2021-01-11 | End: 2021-01-10

## 2021-01-10 RX ORDER — ACETAMINOPHEN 325 MG/1
650 TABLET ORAL
Status: DISCONTINUED | OUTPATIENT
Start: 2021-01-10 | End: 2021-01-12 | Stop reason: HOSPADM

## 2021-01-10 RX ORDER — OLANZAPINE 5 MG/1
5 TABLET ORAL
Status: DISCONTINUED | OUTPATIENT
Start: 2021-01-10 | End: 2021-01-12 | Stop reason: HOSPADM

## 2021-01-10 RX ORDER — METFORMIN HYDROCHLORIDE 500 MG/1
500 TABLET ORAL 2 TIMES DAILY WITH MEALS
Status: DISCONTINUED | OUTPATIENT
Start: 2021-01-11 | End: 2021-01-12 | Stop reason: HOSPADM

## 2021-01-10 RX ADMIN — TRAMADOL HYDROCHLORIDE 50 MG: 50 TABLET, FILM COATED ORAL at 21:36

## 2021-01-10 RX ADMIN — INSULIN LISPRO 2 UNITS: 100 INJECTION, SOLUTION INTRAVENOUS; SUBCUTANEOUS at 08:17

## 2021-01-10 RX ADMIN — HYDROXYZINE HYDROCHLORIDE 50 MG: 50 TABLET, FILM COATED ORAL at 20:08

## 2021-01-10 RX ADMIN — NICOTINE POLACRILEX 2 MG: 2 LOZENGE ORAL at 21:36

## 2021-01-10 RX ADMIN — LORAZEPAM 2 MG: 2 INJECTION INTRAMUSCULAR; INTRAVENOUS at 14:16

## 2021-01-10 RX ADMIN — GLIPIZIDE 5 MG: 5 TABLET ORAL at 06:36

## 2021-01-10 RX ADMIN — ACETAMINOPHEN 650 MG: 325 TABLET ORAL at 13:24

## 2021-01-10 RX ADMIN — THERA TABS 1 TABLET: TAB at 09:49

## 2021-01-10 RX ADMIN — PHENOBARBITAL 32.4 MG: 32.4 TABLET ORAL at 06:36

## 2021-01-10 RX ADMIN — ENOXAPARIN SODIUM 30 MG: 30 INJECTION SUBCUTANEOUS at 09:48

## 2021-01-10 RX ADMIN — LORAZEPAM 2 MG: 2 INJECTION INTRAMUSCULAR; INTRAVENOUS at 02:32

## 2021-01-10 RX ADMIN — FOLIC ACID 1 MG: 1 TABLET ORAL at 09:49

## 2021-01-10 RX ADMIN — TRAZODONE HYDROCHLORIDE 50 MG: 50 TABLET ORAL at 21:36

## 2021-01-10 RX ADMIN — BACLOFEN 5 MG: 10 TABLET ORAL at 02:32

## 2021-01-10 RX ADMIN — ALPRAZOLAM 0.5 MG: 0.5 TABLET ORAL at 11:04

## 2021-01-10 RX ADMIN — OXYCODONE HYDROCHLORIDE 5 MG: 5 TABLET ORAL at 06:36

## 2021-01-10 RX ADMIN — Medication 100 MG: at 09:48

## 2021-01-10 RX ADMIN — PHENOBARBITAL 64.8 MG: 64.8 TABLET ORAL at 19:18

## 2021-01-10 RX ADMIN — METFORMIN HYDROCHLORIDE 500 MG: 500 TABLET ORAL at 09:49

## 2021-01-10 RX ADMIN — POTASSIUM CHLORIDE AND SODIUM CHLORIDE: 900; 150 INJECTION, SOLUTION INTRAVENOUS at 08:16

## 2021-01-10 RX ADMIN — Medication 2 TABLET: at 09:48

## 2021-01-10 RX ADMIN — LORAZEPAM 2 MG: 2 INJECTION INTRAMUSCULAR; INTRAVENOUS at 08:17

## 2021-01-10 NOTE — PROGRESS NOTES
1915 Bedside and Verbal shift change report given to Veronica RN (oncoming nurse) by Argentina Leung RN (offgoing nurse). Report included the following information SBAR, Kardex, Intake/Output, MAR, Recent Results and Cardiac Rhythm NSR/ST. Sitter at bedside. 0715 Bedside and Verbal shift change report given to [facility], RN (oncoming nurse) by Ralf Coleman RN (offgoing nurse). Report included the following information SBAR, Kardex, Intake/Output, MAR, Recent Results and Cardiac Rhythm NSR/ST.

## 2021-01-10 NOTE — PROGRESS NOTES
[facility]  [address] [facility], [address] [place] [address]  [phone]      Medical Progress Note      NAME: Riddhi Garcia   :  1965  MRM:  [id number]    Date/Time of service: 1/10/2021  9:21 AM       Subjective:     Chief Complaint:  Patient was personally seen and examined by me during this time period. Chart reviewed. No tremors. C/o anxiety        Objective:       Vitals:       Last 24hrs VS reviewed since prior progress note. Most recent are:    Visit Vitals  BP (!) 149/92 (BP 1 Location: Left arm, BP Patient Position: At rest)   Pulse (!) 101   Temp 98.7 °F (37.1 °C)   Resp 18   Ht 6' 2\" (1.88 m)   Wt 99.8 kg (220 lb)   SpO2 99%   BMI 28.25 kg/m²     SpO2 Readings from Last 6 Encounters:   01/10/21 99%   17 98%            Intake/Output Summary (Last 24 hours) at 1/10/2021 9048  Last data filed at 1/10/2021 0016  Gross per 24 hour   Intake 480 ml   Output 1200 ml   Net -720 ml        Exam:     Physical Exam:    Gen:  Disheveled, ill-appearing, NAD  HEENT:  Pink conjunctivae, PERRL, hearing intact to voice, moist mucous membranes  Neck:  Supple, without masses, thyroid non-tender  Resp:  No accessory muscle use, clear breath sounds without wheezes rales or rhonchi  Card:  No murmurs, normal S1, S2 without thrills, bruits or peripheral edema  Abd:  Soft, non-tender, non-distended, normoactive bowel sounds are present  Musc:  No cyanosis or clubbing  Skin:  No rashes   Neuro:  Cranial nerves 3-12 are grossly intact, follows commands appropriately  Psych:  Fair insight, oriented to person, place and time, alert.   Depressed     Medications Reviewed: (see below)    Lab Data Reviewed: (see below)    ______________________________________________________________________    Medications:     Current Facility-Administered Medications   Medication Dose Route Frequency    PHENobarbitaL (LUMINAL) tablet 32.4 mg  32.4 mg Oral BID    ALPRAZolam (XANAX) tablet 0.5 mg  0.5 mg Oral QID PRN    therapeutic multivitamin (THERAGRAN) tablet 1 Tab  1 Tab Oral DAILY    thiamine mononitrate (B-1) tablet 100 mg  100 mg Oral DAILY    folic acid (FOLVITE) tablet 1 mg  1 mg Oral DAILY    insulin lispro (HUMALOG) injection   SubCUTAneous TIDAC    glucose chewable tablet 16 g  4 Tab Oral PRN    dextrose (D50W) injection syrg 12.5-25 g  12.5-25 g IntraVENous PRN    glucagon (GLUCAGEN) injection 1 mg  1 mg IntraMUSCular PRN    oxyCODONE IR (ROXICODONE) tablet 5 mg  5 mg Oral Q4H PRN    metFORMIN (GLUCOPHAGE) tablet 500 mg  500 mg Oral BID WITH MEALS    glipiZIDE (GLUCOTROL) tablet 5 mg  5 mg Oral ACB    baclofen (LIORESAL) tablet 5 mg  5 mg Oral TID PRN    albuterol (PROVENTIL HFA, VENTOLIN HFA, PROAIR HFA) inhaler 2 Puff  2 Puff Inhalation Q4H PRN    influenza vaccine 2020-21 (6 mos+)(PF) (FLUARIX/FLULAVAL/FLUZONE QUAD) injection 0.5 mL  0.5 mL IntraMUSCular PRIOR TO DISCHARGE    nicotine (NICODERM CQ) 21 mg/24 hr patch 1 Patch  1 Patch TransDERmal DAILY    LORazepam (ATIVAN) injection 2 mg  2 mg IntraVENous Q1H PRN    LORazepam (ATIVAN) injection 4 mg  4 mg IntraVENous Q1H PRN    prochlorperazine (COMPAZINE) with saline injection 10 mg  10 mg IntraVENous Q6H PRN    enoxaparin (LOVENOX) injection 30 mg  30 mg SubCUTAneous Q12H    acetaminophen (TYLENOL) tablet 650 mg  650 mg Oral Q6H PRN    Or    acetaminophen (TYLENOL) suppository 650 mg  650 mg Rectal Q6H PRN    cholecalciferol (VITAMIN D3) (1000 Units /25 mcg) tablet 2 Tab  2,000 Units Oral DAILY    labetaloL (NORMODYNE;TRANDATE) 20 mg/4 mL (5 mg/mL) injection 20 mg  20 mg IntraVENous Q4H PRN    melatonin tablet 3 mg  3 mg Oral QHS PRN    alum-mag hydroxide-simeth (MYLANTA) oral suspension 30 mL  30 mL Oral Q4H PRN    diphenhydrAMINE (BENADRYL) injection 25 mg  25 mg IntraVENous Q6H PRN    diphenhydrAMINE (BENADRYL) capsule 25 mg  25 mg Oral Q6H PRN    hydrOXYzine (VISTARIL) 25 mg/mL injection 25 mg  25 mg IntraMUSCular Q6H PRN  simethicone (MYLICON) tablet 80 mg  80 mg Oral QID PRN          Lab Review:     Recent Labs     01/09/21  0355 01/08/21  0328   WBC 2.6* 2.8*   HGB 12.4 12.2   HCT 34.2* 34.0*   * 92*     Recent Labs     01/09/21  0355 01/08/21  0328   * 134*   K 3.2* 3.0*    104   CO2 27 28   * 210*   BUN 4* 7   CREA 0.68* 0.72   CA 8.3* 8.0*   MG 1.9 1.9   PHOS 3.5 2.7   ALB  --  3.0*   TBILI  --  1.7*   ALT  --  30     Lab Results   Component Value Date/Time    Glucose (POC) 160 (H) 01/10/2021 08:09 AM    Glucose (POC) 154 (H) 01/09/2021 04:56 PM    Glucose (POC) 139 (H) 01/09/2021 11:22 AM    Glucose (POC) 179 (H) 01/09/2021 08:08 AM    Glucose (POC) 168 (H) 01/08/2021 05:07 PM          Assessment / Plan:     62 yo hx of HTN, etoh abuse, presented w/ dyspnea, etoh withdrawal, suicidal ideation    1) Alcohol withdrawal/abuse: much improved. Cont CIWA, IV Ativan prn, phenorbarb (weaning), MVI, thiamine, folic acid    2) Depression/suicidal ideation: no longer suicidal, but depressed, has anxiety. Cont suicide precaution, 1:1 sitter. Psych recommended inpatient psych. He is stable for psych if a bed is available     3) Leukopenia/thrombocytopenia: due to etoh abuse. Will monitor     4) HypoK/phos: cont IV repletion prn     5) Chronic pain: cont pain control, muscle relaxants prn    6) New diabetes/hyperglycemia: A1C 8.1%. Started glipizide, metformin. Educated about diet, weight loss, exercise.   Needs outpatient f/u     Total time spent with patient: 25 min  **I personally saw and examined the patient during this time period**                 Care Plan discussed with: Patient, nursing     Discussed:  Care Plan    Prophylaxis:  Lovenox    Disposition:  inpatient psych           ___________________________________________________    Attending Physician: Andrey Rivera MD

## 2021-01-10 NOTE — PROGRESS NOTES
12:45 PM   01/10/21      [facility] has a bed ready and can accept patient today . Please call report after 2:30 PM to [phone]. The patient will be going to room 731-A. The patient will be under the service of YENI Marti and  Dr Edyta Clifford. [facility] is scheduled to pick patient up after 3:00 PM . [facility] packet is on the chart .     Landy Gonzales RN CCM

## 2021-01-10 NOTE — PROGRESS NOTES
9:31 AM  01/10/21     Call placed to [facility] [phone] and inquired on bed availability as the patient is stable to be transferred. They are checking and will call CM back with bed availability .      Fransisca Gutierrez RN CCM

## 2021-01-10 NOTE — DISCHARGE SUMMARY
Fran Goodman [address] [place] [address]  [address]  [phone]    Physician Discharge Summary     Patient ID:  Terra Andrea  [id number]  19 y.o.  1965    Admit date: 1/6/2021    Discharge date and time: 1/10/2021 4:19 PM    Admission Diagnoses: Alcohol withdrawal ([address] [F10.239]    Discharge Diagnoses:  Principal Diagnosis Suicidal ideations                                            Principal Problem:    Suicidal ideations (1/6/2021)    Active Problems:    Alcohol abuse ()      Polysubstance abuse ([address] (9/2/2017)      Alcohol withdrawal ([address] (9/4/2017)      Hypokalemia (1/6/2021)      Thrombocytopenia ([address] (1/6/2021)      Suspected COVID-19 virus infection (1/6/2021)      Headache (1/6/2021)      Elevated bilirubin (1/6/2021)           Hospital Course:     64 yo hx of HTN, etoh abuse, presented w/ dyspnea, etoh withdrawal, suicidal ideation     1) Alcohol withdrawal/abuse: much improved. Was on CIWA, IV Ativan prn, phenorbarb (weaning), MVI, thiamine, folic acid. No acute issues on discharge     2) Depression/suicidal ideation: no longer suicidal, but depressed, has anxiety. Cont suicide precaution, 1:1 sitter. Psych recommended inpatient psych. He will go to [address] [place] [address] psychiatry      3) Leukopenia/thrombocytopenia: due to etoh abuse. Will monitor      4) HypoK/phos: repleted      5) Chronic pain: cont pain control, muscle relaxants prn     6) New diabetes/hyperglycemia: A1C 8.1%. Started glipizide, metformin. Educated about diet, weight loss, exercise.   Needs outpatient f/u     PCP: Mignon Vazquez MD     Consults: Psychiatry    Significant Diagnostic Studies: none    Discharge Exam:  Physical Exam:    See daily note     Disposition: inpatient psych  Discharge Condition: Stable    Patient Instructions:   Current Discharge Medication List      START taking these medications    Details   glipiZIDE (GLUCOTROL) 5 mg tablet Take 1 Tab by mouth Daily (before breakfast). Qty: 30 Tab, Refills: 0      metFORMIN (GLUCOPHAGE) 500 mg tablet Take 1 Tab by mouth two (2) times daily (with meals). Qty: 60 Tab, Refills: 0      therapeutic multivitamin (THERAGRAN) tablet Take 1 Tab by mouth daily. Qty: 30 Tab, Refills: 0           Activity: Activity as tolerated  Diet: Regular Diet  Wound Care: None needed    Follow-up with  Follow-up Information     Follow up With Specialties Details Why Contact Info    [facility] Clinical Assessments  As needed [facility] [address]    Yolis Lindsay MD Sports Medicine Schedule an appointment as soon as possible for a visit in 2 weeks   [address] [facility] [address]            Follow-up tests/labs none    Signed:  Jadon Ibarra MD  1/10/2021  4:19 PM  **I personally spent 35 min on discharge**

## 2021-01-10 NOTE — PROGRESS NOTES
Bedside shift change report given to Richar Coker (oncoming nurse) by Galina Workman (offgoing nurse). Report included the following information SBAR, Kardex, ED Summary, Intake/Output, MAR, Recent Results and Cardiac Rhythm NSR/ST. TRANSFER - OUT REPORT:    Verbal report given to Linus(name) on [facility] Susan  being transferred to [facility](unit) for routine progression of care       Report consisted of patients Situation, Background, Assessment and   Recommendations(SBAR). Information from the following report(s) SBAR, Kardex, ED Summary, Intake/Output, MAR, Recent Results and Cardiac Rhythm NSR/ST was reviewed with the receiving nurse. Lines:   Peripheral IV 01/06/21 Right Antecubital (Active)   Site Assessment Clean, dry, & intact 01/10/21 0800   Phlebitis Assessment 0 01/10/21 0800   Infiltration Assessment 0 01/10/21 0800   Dressing Status Clean, dry, & intact 01/10/21 0800   Dressing Type Transparent 01/10/21 0800   Hub Color/Line Status Pink 01/10/21 0800   Action Taken Open ports on tubing capped 01/10/21 0800   Alcohol Cap Used Yes 01/10/21 0800       Peripheral IV 01/07/21 Posterior;Right Hand (Active)   Site Assessment Clean, dry, & intact 01/10/21 0800   Phlebitis Assessment 0 01/10/21 0800   Infiltration Assessment 0 01/10/21 0800   Dressing Status Clean, dry, & intact 01/10/21 0800   Dressing Type Transparent 01/10/21 0800   Hub Color/Line Status Pink 01/10/21 0800   Action Taken Open ports on tubing capped 01/10/21 0800   Alcohol Cap Used Yes 01/10/21 0800        Opportunity for questions and clarification was provided.       Patient transported with:   Monitor

## 2021-01-10 NOTE — DISCHARGE INSTRUCTIONS
HOSPITALIST DISCHARGE INSTRUCTIONS  NAME: José Doyle   :  1965   MRN:  [id number]     Date/Time:  1/10/2021 4:17 PM    ADMIT DATE: 2021     DISCHARGE DATE: 1/10/2021     ADMITTING DIAGNOSIS:  Alcohol withdrawal, depression, suicidal ideation, new type 2 diabetes     DISCHARGE DIAGNOSIS:  same    MEDICATIONS:  See after visit summary        · It is important that you take the medication exactly as they are prescribed. · Keep your medication in the bottles provided by the pharmacist and keep a list of the medication names, dosages, and times to be taken in your wallet. · Do not take other medications without consulting your doctor     Pain Management: per above medications    What to do at Home    Recommended diet:  Regular Diet    Recommended activity: Activity as tolerated    1) Return to the hospital if you feel worse    2) If you experience any of the following symptoms then please call your primary care physician or return to the emergency room if you cannot get hold of your doctor:  Fever, chills, nausea, vomiting, diarrhea, change in mentation, falling, bleeding, shortness of breath, chest pain, severe headache, severe abdominal pain,     3) Stop drinking alcohol    4) Follows psychiatry instructions    Follow Up: Follow-up Information     Follow up With Specialties Details Why Contact Info    [facility]  As needed [facility] [facility]  69 Antonino Parry MD Sports Medicine Schedule an appointment as soon as possible for a visit in 2 weeks   [facility]               Information obtained by :  I understand that if any problems occur once I am at home I am to contact my physician. I understand and acknowledge receipt of the instructions indicated above. Physician's or R.N.'s Signature                                                                  Date/Time                                                                                                                                              Patient or Representative Signature                                                          Date/Time      Patient Education        Alcohol Detoxification and Withdrawal: Care Instructions  Your Care Instructions     If you drink alcohol regularly and then suddenly stop, you may go through some physical and emotional problems while the alcohol clears out of your system. Clearing the alcohol from your body is called detoxification, or detox. Physical and emotional problems that may happen during detox are called withdrawal.  Symptoms of withdrawal can be scary and dangerous. Mild symptoms include nausea and vomiting, sweating, shakiness, and intense worry. Severe symptoms include being confused and irritable, feeling things on your body that are not there, seeing or hearing things that are not there, and trembling. You may even have seizures. If your symptoms become severe you must see a doctor. People who drink large amounts of alcohol should not try to detox at home. A person can die of severe alcohol withdrawal.  Symptoms of alcohol withdrawal may begin from 4 to 12 hours after you stop drinking. But they may not start for several days after the last drink. They can last a few days. It is hard to stop drinking. But when you have cleared the alcohol from your system, you will be able to start the next part of your life, free from the burden of being dependent. Follow-up care is a key part of your treatment and safety. Be sure to make and go to all appointments, and call your doctor if you are having problems. It's also a good idea to know your test results and keep a list of the medicines you take.   How can you care for yourself at home? · Before you stop drinking, talk to your doctor about how you plan to stop. Be sure to be completely honest with him or her about how much you have been drinking. Your doctor will figure out whether you need to detox in a supervised medical center. · Take your medicines exactly as prescribed. Call your doctor if you think you are having a problem with your medicine. · Make sure someone you trust is with you the whole time. Have friends and family members take turns staying with you until you are done with detox. · Put a list of emergency numbers near the phone. This should include your doctor, the police, the nearest hospital and emergency room, and neighbors who can help if needed. · Make sure all alcohol is removed from the house before you start. This includes beverages as well as medicines, rubbing alcohol, and certain flavorings like vanilla extract. · Keep \"drinking buddies\" away during the time you are going through detox. · Make your surroundings calm. Soft lights, soft music, and a comfortable place to sit or lie down can help make the process easier. · Drink lots of fluids and eat snacks such as fruit, cheese and crackers, and pretzels. Foods high in carbohydrate may help reduce the craving for alcohol. · Understand that detox is going to be hard. · Keep in mind that the people watching over you during detox are there to help. Explain to them before you start that you may not act like yourself until detox is finished. · Consider joining a support group such as Alcoholics Anonymous. Sharing your experiences with other people who face similar challenges may help you feel less overwhelmed. · Keep the numbers for these national suicide hotlines: 1-060-278-TALK (0-887.664.5259) and 5-706-QLLBRYK (7-352.336.9395). If you or someone you know talks about suicide or feeling hopeless, get help right away. When should you call for help?    Call 911 anytime you think you may need emergency care. For example, call if:    · You feel you cannot stop from hurting yourself or someone else.     · You vomit many times and cannot stop.     · You vomit blood or what looks like coffee grounds.     · You have trouble breathing or are breathing very fast.     · Your heart beats more than 120 times a minute and will not slow down.     · You have chest pain.     · You have a seizure.     · You see or feel things that are not there (hallucinate). If you are caring for someone who is going through detox, call if:    · The person passes out (loses consciousness).     · The person sees or feels things that are not there and sees or hears the same things many times.     · The person is very agitated and will not calm down.     · The person becomes violent or threatens to be violent.     · The person has a seizure. Call your doctor now or seek immediate medical care if:    · You have a high fever.     · You have severe belly pain.     · You are very shaky. Watch closely for changes in your health, and be sure to contact your doctor if:    · You do not get better as expected. Where can you learn more? Go to http://www.HESKA.com/  Enter D051 in the search box to learn more about \"Alcohol Detoxification and Withdrawal: Care Instructions. \"  Current as of: June 29, 2020               Content Version: 12.6  © 9457-9188 [employer]. Care instructions adapted under license by [employer] (which disclaims liability or warranty for this information). If you have questions about a medical condition or this instruction, always ask your healthcare professional. [place] [address] any warranty or liability for your use of this information.

## 2021-01-10 NOTE — PROGRESS NOTES
Problem: Falls - Risk of  Goal: *Absence of Falls  Description: Document Tavia Sidhu Fall Risk and appropriate interventions in the flowsheet.   Outcome: Progressing Towards Goal  Note: Fall Risk Interventions:  Mobility Interventions: Bed/chair exit alarm  Medication Interventions: Bed/chair exit alarm  Elimination Interventions: Bed/chair exit alarm    PRN Medication Documentation    Specific patient behavior that led to need for PRN medication: Anxious, agitated, pacing   Staff interventions attempted prior to PRN being given: relaxation techniques  PRN medication given: atarax

## 2021-01-10 NOTE — ROUTINE PROCESS
TRANSFER - IN REPORT:    Verbal report received from Kandis Kearney (name) on Leon Holt  being received from [facility] ED (unit) for routine progression of care      Report consisted of patients Situation, Background, Assessment and   Recommendations(SBAR). Information from the following report(s) SBAR was reviewed with the receiving nurse. Opportunity for questions and clarification was provided. Assessment completed upon patients arrival to unit and care assumed. Patient admitted Voluntariliy to general Psychiatry,  under the services of Dr. Rigo Sy  Patient currently denies suicidal ideation. Patient currently denies homicidal ideation. Patient denies psychotic symptoms. Pt verbalizes ETOH use.   Pt denies drug use

## 2021-01-10 NOTE — PROGRESS NOTES
2:57 PM   01/10/21     RUR 19%     Transition Plan of Care     1. Monitor patient's response to treatment. 2. Patient is being transferred to General acute hospital via [facility]  At 1600. Packet on chart for transfer. 3. Patient has a supportive brother. 4. Case Management to follow.     Arin Zhou RN CCM

## 2021-01-10 NOTE — PROGRESS NOTES
11:59 AM   01/10/21     [facility] will take patient today. [facility] placed in will call. [facility] to call back with bed number.     Ryan Abad RN CCM

## 2021-01-11 LAB
ANION GAP SERPL CALC-SCNC: 4 MMOL/L (ref 5–15)
BASOPHILS # BLD: 0 K/UL (ref 0–0.1)
BASOPHILS NFR BLD: 1 % (ref 0–1)
BUN SERPL-MCNC: 6 MG/DL (ref 6–20)
BUN/CREAT SERPL: 7 (ref 12–20)
CALCIUM SERPL-MCNC: 9.2 MG/DL (ref 8.5–10.1)
CHLORIDE SERPL-SCNC: 104 MMOL/L (ref 97–108)
CO2 SERPL-SCNC: 29 MMOL/L (ref 21–32)
CREAT SERPL-MCNC: 0.92 MG/DL (ref 0.7–1.3)
DIFFERENTIAL METHOD BLD: ABNORMAL
EOSINOPHIL # BLD: 0 K/UL (ref 0–0.4)
EOSINOPHIL NFR BLD: 0 % (ref 0–7)
ERYTHROCYTE [DISTWIDTH] IN BLOOD BY AUTOMATED COUNT: 12.3 % (ref 11.5–14.5)
GLUCOSE BLD STRIP.AUTO-MCNC: 169 MG/DL (ref 65–100)
GLUCOSE BLD STRIP.AUTO-MCNC: 189 MG/DL (ref 65–100)
GLUCOSE BLD STRIP.AUTO-MCNC: 205 MG/DL (ref 65–100)
GLUCOSE SERPL-MCNC: 159 MG/DL (ref 65–100)
HCT VFR BLD AUTO: 36.3 % (ref 36.6–50.3)
HGB BLD-MCNC: 13.2 G/DL (ref 12.1–17)
IMM GRANULOCYTES # BLD AUTO: 0.1 K/UL (ref 0–0.04)
IMM GRANULOCYTES NFR BLD AUTO: 3 % (ref 0–0.5)
LYMPHOCYTES # BLD: 0.8 K/UL (ref 0.8–3.5)
LYMPHOCYTES NFR BLD: 26 % (ref 12–49)
MCH RBC QN AUTO: 35.4 PG (ref 26–34)
MCHC RBC AUTO-ENTMCNC: 36.4 G/DL (ref 30–36.5)
MCV RBC AUTO: 97.3 FL (ref 80–99)
MONOCYTES # BLD: 0.5 K/UL (ref 0–1)
MONOCYTES NFR BLD: 18 % (ref 5–13)
NEUTS SEG # BLD: 1.6 K/UL (ref 1.8–8)
NEUTS SEG NFR BLD: 52 % (ref 32–75)
NRBC # BLD: 0 K/UL (ref 0–0.01)
NRBC BLD-RTO: 0 PER 100 WBC
PLATELET # BLD AUTO: 130 K/UL (ref 150–400)
PMV BLD AUTO: 8.9 FL (ref 8.9–12.9)
POTASSIUM SERPL-SCNC: 3.7 MMOL/L (ref 3.5–5.1)
RBC # BLD AUTO: 3.73 M/UL (ref 4.1–5.7)
RBC MORPH BLD: ABNORMAL
SERVICE CMNT-IMP: ABNORMAL
SODIUM SERPL-SCNC: 137 MMOL/L (ref 136–145)
WBC # BLD AUTO: 3 K/UL (ref 4.1–11.1)

## 2021-01-11 PROCEDURE — 74011250637 HC RX REV CODE- 250/637: Performed by: INTERNAL MEDICINE

## 2021-01-11 PROCEDURE — 36415 COLL VENOUS BLD VENIPUNCTURE: CPT

## 2021-01-11 PROCEDURE — 74011250637 HC RX REV CODE- 250/637: Performed by: NURSE PRACTITIONER

## 2021-01-11 PROCEDURE — 65220000003 HC RM SEMIPRIVATE PSYCH

## 2021-01-11 PROCEDURE — 85025 COMPLETE CBC W/AUTO DIFF WBC: CPT

## 2021-01-11 PROCEDURE — 82962 GLUCOSE BLOOD TEST: CPT

## 2021-01-11 PROCEDURE — 74011636637 HC RX REV CODE- 636/637: Performed by: NURSE PRACTITIONER

## 2021-01-11 PROCEDURE — 80048 BASIC METABOLIC PNL TOTAL CA: CPT

## 2021-01-11 RX ORDER — QUETIAPINE FUMARATE 25 MG/1
50 TABLET, FILM COATED ORAL
Status: DISCONTINUED | OUTPATIENT
Start: 2021-01-11 | End: 2021-01-12 | Stop reason: HOSPADM

## 2021-01-11 RX ORDER — LISINOPRIL 20 MG/1
20 TABLET ORAL DAILY
Status: DISCONTINUED | OUTPATIENT
Start: 2021-01-12 | End: 2021-01-12 | Stop reason: HOSPADM

## 2021-01-11 RX ORDER — PHENOBARBITAL 32.4 MG/1
32.4 TABLET ORAL
Status: DISCONTINUED | OUTPATIENT
Start: 2021-01-11 | End: 2021-01-12 | Stop reason: HOSPADM

## 2021-01-11 RX ORDER — METOPROLOL TARTRATE 25 MG/1
50 TABLET, FILM COATED ORAL 2 TIMES DAILY
Status: DISCONTINUED | OUTPATIENT
Start: 2021-01-11 | End: 2021-01-12 | Stop reason: HOSPADM

## 2021-01-11 RX ORDER — MAGNESIUM SULFATE 100 %
4 CRYSTALS MISCELLANEOUS AS NEEDED
Status: DISCONTINUED | OUTPATIENT
Start: 2021-01-11 | End: 2021-01-12 | Stop reason: HOSPADM

## 2021-01-11 RX ORDER — IBUPROFEN 200 MG
1 TABLET ORAL DAILY
Status: DISCONTINUED | OUTPATIENT
Start: 2021-01-11 | End: 2021-01-12 | Stop reason: HOSPADM

## 2021-01-11 RX ORDER — DEXTROSE 50 % IN WATER (D50W) INTRAVENOUS SYRINGE
25-50 AS NEEDED
Status: DISCONTINUED | OUTPATIENT
Start: 2021-01-11 | End: 2021-01-12 | Stop reason: HOSPADM

## 2021-01-11 RX ORDER — FOLIC ACID 1 MG/1
1 TABLET ORAL DAILY
Status: DISCONTINUED | OUTPATIENT
Start: 2021-01-11 | End: 2021-01-12 | Stop reason: HOSPADM

## 2021-01-11 RX ORDER — IBUPROFEN 200 MG
1 TABLET ORAL DAILY
Status: DISCONTINUED | OUTPATIENT
Start: 2021-01-12 | End: 2021-01-11

## 2021-01-11 RX ORDER — INSULIN LISPRO 100 [IU]/ML
INJECTION, SOLUTION INTRAVENOUS; SUBCUTANEOUS
Status: DISCONTINUED | OUTPATIENT
Start: 2021-01-11 | End: 2021-01-12 | Stop reason: HOSPADM

## 2021-01-11 RX ORDER — HYDROXYZINE 50 MG/1
100 TABLET, FILM COATED ORAL
Status: DISCONTINUED | OUTPATIENT
Start: 2021-01-11 | End: 2021-01-12 | Stop reason: HOSPADM

## 2021-01-11 RX ORDER — IBUPROFEN 600 MG/1
600 TABLET ORAL
Status: DISCONTINUED | OUTPATIENT
Start: 2021-01-11 | End: 2021-01-12 | Stop reason: HOSPADM

## 2021-01-11 RX ORDER — TRAZODONE HYDROCHLORIDE 100 MG/1
100 TABLET ORAL
Status: DISCONTINUED | OUTPATIENT
Start: 2021-01-11 | End: 2021-01-12 | Stop reason: HOSPADM

## 2021-01-11 RX ADMIN — METOPROLOL TARTRATE 50 MG: 25 TABLET, FILM COATED ORAL at 11:22

## 2021-01-11 RX ADMIN — METFORMIN HYDROCHLORIDE 500 MG: 500 TABLET ORAL at 08:40

## 2021-01-11 RX ADMIN — LISINOPRIL 5 MG: 5 TABLET ORAL at 08:40

## 2021-01-11 RX ADMIN — FOLIC ACID 1 MG: 1 TABLET ORAL at 11:22

## 2021-01-11 RX ADMIN — INSULIN LISPRO 1 UNITS: 100 INJECTION, SOLUTION INTRAVENOUS; SUBCUTANEOUS at 22:03

## 2021-01-11 RX ADMIN — THERA TABS 1 TABLET: TAB at 08:40

## 2021-01-11 RX ADMIN — Medication 100 MG: at 08:40

## 2021-01-11 RX ADMIN — PHENOBARBITAL 64.8 MG: 64.8 TABLET ORAL at 05:43

## 2021-01-11 RX ADMIN — METFORMIN HYDROCHLORIDE 500 MG: 500 TABLET ORAL at 16:52

## 2021-01-11 RX ADMIN — HYDROXYZINE HYDROCHLORIDE 100 MG: 50 TABLET, FILM COATED ORAL at 11:22

## 2021-01-11 RX ADMIN — HYDROXYZINE HYDROCHLORIDE 100 MG: 50 TABLET, FILM COATED ORAL at 19:04

## 2021-01-11 RX ADMIN — NICOTINE POLACRILEX 2 MG: 2 LOZENGE ORAL at 08:42

## 2021-01-11 NOTE — BH NOTES
GROUP THERAPY PROGRESS NOTE    Patient is participating in Anxiety Group. Group time: 50 minutes    Personal goal for participation: Increase self-awareness of internal and external triggers. Goal orientation: Personal    Group therapy participation: active    Therapeutic interventions reviewed and discussed: Group discussion was focused on internal and external triggers, and the feelings/behaviors elicited as a result. Group members analyzed their emotional triggers and how it affects their anxiety. Each person was asked to color in or write where they felt the most anxiety on the stick figure given. Then, the group discussed ways to de-escalate situational anxiety, and use the Emotional Freedom Technique, known as the tapping technique. Impression of participation: Nadira Flood came a little later to group, but was able to engage with other group members and MSW explained the activities to patient.      Chaka Arteaga, Supervisee in Social Work

## 2021-01-11 NOTE — PROGRESS NOTES
01/11/21 1113   Vital Signs   Temp 98.2 °F (36.8 °C)   Temp Source Oral   Pulse (Heart Rate) (!) 126   Resp Rate 17   O2 Sat (%) 100 %   Level of Consciousness Alert   BP (!) 149/95   MAP (Calculated) 113   MEWS Score 3   reviewed in tx team. hypertension addressed with medication adjustments. CIWA unremarkable and phenobarbital was addressed.

## 2021-01-11 NOTE — PROGRESS NOTES
Pt resting in bed comfortably, appears asleep. No distress noted. Respirations WNL. Will continue to monitor q15 for safety. Problem: Falls - Risk of  Goal: *Absence of Falls  Description: Document Justice Led Fall Risk and appropriate interventions in the flowsheet.   Outcome: Progressing Towards Goal  Note: Fall Risk Interventions:           Medication Interventions: Teach patient to arise slowly

## 2021-01-11 NOTE — CONSULTS
Hospitalist Consult  Albania Collins MD  Answering service: [phone] from in house phone        Date of Service:  1/10/2021  NAME:  Rosemaire Stone  :  1965  MRN:  [id number]  Primary Care Provider: Sadie Starr MD    Chief Complaint: Diabetes mellitus    History of Present Illness:     Rosemarie Stone is a 63 y.o. male with past medical history of diabetes, hypertension, alcohol abuse and smoking who has been agreed to inpatient psych for severe depression and we have been consulted for HPI. Patient is awake, alert and oriented, able to answer my question and follow my request.  Patient reports that he has had issues with alcohol abuse, smoking and pulse reviewed with history of COPD not on home oxygen who came to ED on 2021 to [facility] because of lethargy headache and shortness of breath with exertion, noted to have acute alcohol withdrawal.  He was also suicidal and depressed. He was treated for acute alcohol withdrawal and then transferred to [facility] inpatient Psych for further management. I examined the patient with the bedside, he is awake, alert and oriented. He is anxious, denies suicidal ideation but appears depressed, reports that he wants to get sober but he is in pain and wants medication for pain as well as anxiety. He also wants to have access to his phone as he has communicated his  and Worker's Comp. people otherwise he is hemodynamically stable offers no new complaints otherwise. Chart reviewed at length. Review of Systems:  Pertinent positives noted in HPI. All other systems were reviewed and are negative.     Past Medical and surgical history:   Past Medical History:   Diagnosis Date    Alcohol abuse     Diverticulitis     Diverticulitis     Hypertension       Past Surgical History:   Procedure Laterality Date    COLONOSCOPY N/A 2017    COLONOSCOPY performed by Raymond Alcantar MD at [facility] medications:  Prior to Admission medications    Medication Sig Start Date End Date Taking? Authorizing Provider   glipiZIDE (GLUCOTROL) 5 mg tablet Take 1 Tab by mouth Daily (before breakfast). 1/11/21  Yes Michael Newman MD   metFORMIN (GLUCOPHAGE) 500 mg tablet Take 1 Tab by mouth two (2) times daily (with meals). 1/10/21  Yes Michael Newman MD   therapeutic multivitamin (THERAGRAN) tablet Take 1 Tab by mouth daily. 1/11/21  Yes Michael Newman MD       Allergies:  No Known Allergies    Family history:   No family history on file. SOCIAL HISTORY:  Patient resides at Home. Patient ambulates with out device. Smoking history: reports that he has never smoked. He uses smokeless tobacco.  Drug History:  reports current drug use. Drug: Marijuana. Alcohol history:  reports current alcohol use. Objective:       Physical Exam:   Visit Vitals  BP (!) 154/100 (BP 1 Location: Right arm, BP Patient Position: At rest)   Pulse (!) 110   Temp 97.6 °F (36.4 °C)   Resp 12   SpO2 95%     General appearance: fatigued, cooperative, no distress, appears stated age  Head: Normocephalic, without obvious abnormality, atraumatic  Throat: normal findings: lips normal without lesions  Lungs: clear to auscultation bilaterally, no W/R  Heart: RRR,NM  Abdomen: soft, obese, NT  Extremities: extremities normal, atraumatic, no cyanosis or edema  Skin: Skin color, texture, turgor normal. No rashes or lesions  Neurologic: Grossly normal    Laboratory and other diagnostic Data Review: All diagnostic labs and studies have been reviewed. Xr Chest Port    Result Date: 1/6/2021  EXAM:  XR CHEST PORT INDICATION:   sob COMPARISON: Chest radiograph 9/2/2017. FINDINGS: AP radiograph of the chest was obtained. No evidence of focal consolidation. Mild bibasilar atelectasis. No pleural effusion or pneumothorax. Heart, darnell, mediastinum are within normal limits. No acute osseous abnormalities.      IMPRESSION: No acute cardiopulmonary process radiographically. Mild bibasilar atelectasis. Patient Vitals for the past 12 hrs:   Temp Pulse Resp BP SpO2   01/10/21 1800 97.6 °F (36.4 °C) (!) 110 12 (!) 154/100 95 %       Recent Labs     01/09/21  0355 01/08/21 0328   WBC 2.6* 2.8*   HGB 12.4 12.2   HCT 34.2* 34.0*   * 92*     Recent Labs     01/09/21  0355 01/08/21 0328   * 134*   K 3.2* 3.0*    104   CO2 27 28   BUN 4* 7   CREA 0.68* 0.72   * 210*   CA 8.3* 8.0*   MG 1.9 1.9   PHOS 3.5 2.7     Recent Labs     01/08/21 0328   ALT 30   AP 55   TBILI 1.7*   TP 5.7*   ALB 3.0*   GLOB 2.7     No results for input(s): INR, PTP, APTT, INREXT in the last 72 hours. No results for input(s): FE, TIBC, PSAT, FERR in the last 72 hours. Lab Results   Component Value Date/Time    Folate 6.8 09/02/2017 10:56 PM      No results for input(s): PH, PCO2, PO2 in the last 72 hours. No results for input(s): CPK, CKNDX, TROIQ in the last 72 hours.     No lab exists for component: CPKMB  Lab Results   Component Value Date/Time    Cholesterol, total 211 (H) 09/02/2017 10:56 PM    HDL Cholesterol 83 09/02/2017 10:56 PM    LDL, calculated 98 09/02/2017 10:56 PM    Triglyceride 150 (H) 09/02/2017 10:56 PM    CHOL/HDL Ratio 2.5 09/02/2017 10:56 PM     Lab Results   Component Value Date/Time    Glucose (POC) 99 01/10/2021 10:59 AM    Glucose (POC) 160 (H) 01/10/2021 08:09 AM    Glucose (POC) 154 (H) 01/09/2021 04:56 PM    Glucose (POC) 139 (H) 01/09/2021 11:22 AM    Glucose (POC) 179 (H) 01/09/2021 08:08 AM     Lab Results   Component Value Date/Time    Color YELLOW/STRAW 09/02/2017 09:55 PM    Appearance CLEAR 09/02/2017 09:55 PM    Specific gravity 1.014 09/02/2017 09:55 PM    pH (UA) 7.0 09/02/2017 09:55 PM    Protein 30 (A) 09/02/2017 09:55 PM    Glucose NEGATIVE  09/02/2017 09:55 PM    Ketone NEGATIVE  09/02/2017 09:55 PM    Bilirubin NEGATIVE  09/02/2017 09:55 PM    Urobilinogen 0.2 09/02/2017 09:55 PM    Nitrites NEGATIVE  09/02/2017 09:55 PM Leukocyte Esterase NEGATIVE  09/02/2017 09:55 PM    Epithelial cells FEW 09/02/2017 09:55 PM    Bacteria NEGATIVE  09/02/2017 09:55 PM    WBC 0-4 09/02/2017 09:55 PM    RBC 0-5 09/02/2017 09:55 PM       Assessment:         My assessment of this patient's clinical condition and my plan of care is as follows. Active Problems:    Major depression (1/10/2021)        Plan:     -Acute alcohol withdrawal/abuse, improving  On Ativan as needed, continue multivitamins, thiamine and folic acid    -Depression/suicidal ideation  Not actively suicidal but very depressed, has anxiety, currently on psych floor. He is very anxious about his alcohol withdrawal, including requests need for opiates and hypnotics. Psych to manage    -Leukopenia/neutropenia unlikely to direct alcohol effect on the bone marrow  Repeat CBC in the morning, monitor    -Chronic pain, reports history of accident in November 2020, need for surgery has been deferred because of his social situation  Will start on tramadol PRN for 3 days  Avoiding stronger opiates due to risk of abuse.    -Hypokalemia and hyponatremia-Repeat lab in the morning    -New onset diabetes type 2  HbA1c 8.1  Continue Metformin    -Hypertension, uncontrolled  Start on lisinopril    - Chronic smoker:  Reports that Nicotine patch is not enough for him, will add nicotine lozenges PRN instead. Diet: Diabetic Diet  Code status: Full code      Signed By: Leida Sharpe MD     01/10/21  9:03 PM        Patient's emergency contacts:  Extended Emergency Contact Information  Primary Emergency Contact: [address] [facility] Phone: [phone]  Relation: Girlfriend  Secondary Emergency Contact: [address] [place] [address]  Mobile Phone: [phone]  Relation: Son     Please note that this dictation was completed with [employer], the [employer] voice recognition software.   Quite often unanticipated grammatical, syntax, homophones, and other interpretive errors are inadvertently transcribed by the computer software. Please disregard these errors. Please excuse any errors that have escaped final proofreading.

## 2021-01-11 NOTE — BH NOTES
GROUP THERAPY PROGRESS NOTE    Patient did not participate in Process Group.     Chaka rAteaga, Supervisee in Social Work

## 2021-01-11 NOTE — INTERDISCIPLINARY ROUNDS
Behavioral Health Interdisciplinary Rounds     Patient Name: Sil Gardner  Age: 63 y.o. Room/Bed:  731/  Primary Diagnosis: <principal problem not specified>   Admission Status: Voluntary     Readmission within 30 days: no  Power of  in place: no  Patient requires a blocked bed: no          Reason for blocked bed:     VTE Prophylaxis: No    Mobility needs/Fall risk: no  Flu Vaccine : yes  Nutritional Plan: no  Consults:          Labs/Testing due today?: yes    Sleep hours: 6       Participation in Care/Groups:  yes  Medication Compliant?: Yes  PRNS (last 24 hours): Antianxiety, Sleep Aid and nicotine    Restraints (last 24 hours):  no     CIWA (range last 24 hours): CIWA-Ar Total: 0   COWS (range last 24 hours):      Alcohol screening (AUDIT) completed -   AUDIT Score: 35     If applicable, date SBIRT discussed in treatment team AND documented:   AUDIT Screen Score: AUDIT Score: 35      Document Brief Intervention (corresponds directly with the 5 A's, Ask, Advise, Assess, Assist, and Arrange): At- Risk Patients (Score 7-15 for women; 8-15 for men)  Discuss concern patient is drinking at unhealthy levels known to increase risk of alcohol-related health problems. Is Patient ready to commit to change? Preparation stage    If No:   Encourage reflection   Discuss short term and long term health risks of consuming alcohol   Barriers to change   Reaffirm willingness to help / Educational materials provided  If Yes:   Set goal: pt states he wants to stop drinking   Plan: PHP/IOP  [employer] provided: relapse prevention plan    Harmful use or Dependence (Score 16 or greater)   Discuss short term and long term health risks of consuming alcohol   Recommendations   Negotiate drinking goal   Recommend addiction specialist/center   Arrange follow-up appointments.     Tobacco - patient is a smoker: Have You Used Tobacco in the Past 30 Days: Yes  Illegal Drugs use: Have You Used Any Illegal Substances Over the Past 12 Months: Yes    24 hour chart check complete: yes     Patient goal(s) for today: participate in discharge planning  Treatment team focus/goals: medication mgmt and safe discharge  Progress note:  Lives alone, [facility] [address] [facility] [address] - helped with rent/power/oil. Pt is requesting BING treatment - no residential.  Denies SI/HI/AVH. Suicide attempt in his 18's by ETOH and OD. Spine injury, 11/2020 - surgery pending. Previously very active in [place]. SW confirmed available spots with [employer] [facility], [facility] and [facility] - gave pt information and contact numbers to call today. LOS:  1  Expected LOS:     Financial concerns/prescription coverage: Medicaid/unemployed - $200-$250 on EBT card every month  Family contact:    Jossue Lowry: [phone]  GirlfriendMindi: [phone]     Family requesting physician contact today: no  Discharge plan: TBD Dr. Aletha Larsen.  Dr. Татьяна Meredith  Access to weapons:           Outpatient provider(s): Jojo KING  Patient's preferred phone number for follow up call: [phone] (Mobile)  Patient's preferred e-mail address: NA  Participating treatment team members: ANGELIA Turner; Lisa Sr NP; Anirudh Toscano, RN

## 2021-01-11 NOTE — PROGRESS NOTES
Problem: Falls - Risk of  Goal: *Absence of Falls  Description: Document Best Reason Fall Risk and appropriate interventions in the flowsheet.   Outcome: Progressing Towards Goal  Note: Fall Risk Interventions:  Mobility Interventions: Bed/chair exit alarm         Medication Interventions: Teach patient to arise slowly    Elimination Interventions: Bed/chair exit alarm    1905: PRN Medication Documentation    Specific patient behavior that led to need for PRN medication: patient anxious  Staff interventions attempted prior to PRN being given: relaxation techniques  PRN medication given: atarax

## 2021-01-11 NOTE — BH NOTES
PRN Medication Documentation    Specific patient behavior that led to need for PRN medication: 9/10 back pain  Staff interventions attempted prior to PRN being given: Rest  PRN medication given: Tramadol  Patient response/effectiveness of PRN medication: will reassess      PRN Medication Documentation    Specific patient behavior that led to need for PRN medication: Restless  Staff interventions attempted prior to PRN being given: Relaxation  PRN medication given: Trazodone  Patient response/effectiveness of PRN medication: will reassess

## 2021-01-11 NOTE — PROGRESS NOTES
Problem: Diabetes Self-Management  Goal: *Monitoring blood glucose, interpreting and using results  Description: Identify recommended blood glucose targets  and personal targets. Outcome: Progressing Towards Goal     Problem: Depressed Mood (Adult/Pediatric)  Goal: *STG: Participates in treatment plan  Outcome: Progressing Towards Goal  Note: Out on unit for meals only. Depressed and slightly irritable. Hopeless and helpless at this time. CIWA unremarkable. Denies SI, future focused with problem solving. States primary goal is to focus on sobriety at this time.  Staff focus is on d/c planning, medication education and coping skills education  Goal: *STG: Attends activities and groups  Outcome: Progressing Towards Goal  Goal: *STG: Demonstrates reduction in symptoms and increase in insight into coping skills/future focused  Outcome: Progressing Towards Goal  Goal: Interventions  Outcome: Progressing Towards Goal     Problem: Alcohol Withdrawal  Goal: *STG: Seeks staff when symptoms of withdrawal increase  Outcome: Progressing Towards Goal  Goal: *STG: Will identify negative impact of chemical dependency including the use of tobacco, alcohol, and other substances  Outcome: Progressing Towards Goal  Goal: *STG: Agrees to participate in outpatient after care program to support ongoing mental health  Outcome: Progressing Towards Goal  Goal: Interventions  Outcome: Progressing Towards Goal

## 2021-01-11 NOTE — PROGRESS NOTES
01/11/21 0804   Vital Signs   Temp 98.8 °F (37.1 °C)   Temp Source Oral   Pulse (Heart Rate) (!) 121   Resp Rate 18   O2 Sat (%) 99 %   Level of Consciousness Alert   BP (!) 142/81   MAP (Calculated) 101   MEWS Score 3   pt reports feeling irritable and angry at this time. CIWA unremarkable scheduled phenobarb will be offered.

## 2021-01-12 VITALS
BODY MASS INDEX: 28.23 KG/M2 | WEIGHT: 220 LBS | RESPIRATION RATE: 16 BRPM | DIASTOLIC BLOOD PRESSURE: 83 MMHG | HEART RATE: 88 BPM | OXYGEN SATURATION: 98 % | SYSTOLIC BLOOD PRESSURE: 119 MMHG | HEIGHT: 74 IN | TEMPERATURE: 98.6 F

## 2021-01-12 LAB
BASOPHILS # BLD: 0 K/UL (ref 0–0.1)
BASOPHILS NFR BLD: 1 % (ref 0–1)
CHOLEST SERPL-MCNC: 171 MG/DL
DIFFERENTIAL METHOD BLD: ABNORMAL
EOSINOPHIL # BLD: 0 K/UL (ref 0–0.4)
EOSINOPHIL NFR BLD: 0 % (ref 0–7)
ERYTHROCYTE [DISTWIDTH] IN BLOOD BY AUTOMATED COUNT: 12.2 % (ref 11.5–14.5)
GLUCOSE BLD STRIP.AUTO-MCNC: 147 MG/DL (ref 65–100)
GLUCOSE BLD STRIP.AUTO-MCNC: 221 MG/DL (ref 65–100)
HCT VFR BLD AUTO: 38.5 % (ref 36.6–50.3)
HDLC SERPL-MCNC: 40 MG/DL
HDLC SERPL: 4.3 {RATIO} (ref 0–5)
HGB BLD-MCNC: 14 G/DL (ref 12.1–17)
IMM GRANULOCYTES # BLD AUTO: 0.1 K/UL (ref 0–0.04)
IMM GRANULOCYTES NFR BLD AUTO: 3 % (ref 0–0.5)
LDLC SERPL CALC-MCNC: 86.4 MG/DL (ref 0–100)
LIPID PROFILE,FLP: ABNORMAL
LYMPHOCYTES # BLD: 1 K/UL (ref 0.8–3.5)
LYMPHOCYTES NFR BLD: 28 % (ref 12–49)
MCH RBC QN AUTO: 36 PG (ref 26–34)
MCHC RBC AUTO-ENTMCNC: 36.4 G/DL (ref 30–36.5)
MCV RBC AUTO: 99 FL (ref 80–99)
MONOCYTES # BLD: 0.8 K/UL (ref 0–1)
MONOCYTES NFR BLD: 23 % (ref 5–13)
NEUTS SEG # BLD: 1.6 K/UL (ref 1.8–8)
NEUTS SEG NFR BLD: 45 % (ref 32–75)
NRBC # BLD: 0 K/UL (ref 0–0.01)
NRBC BLD-RTO: 0 PER 100 WBC
PLATELET # BLD AUTO: 155 K/UL (ref 150–400)
PMV BLD AUTO: 9.8 FL (ref 8.9–12.9)
RBC # BLD AUTO: 3.89 M/UL (ref 4.1–5.7)
RBC MORPH BLD: ABNORMAL
SERVICE CMNT-IMP: ABNORMAL
SERVICE CMNT-IMP: ABNORMAL
TRIGL SERPL-MCNC: 223 MG/DL (ref ?–150)
VLDLC SERPL CALC-MCNC: 44.6 MG/DL
WBC # BLD AUTO: 3.5 K/UL (ref 4.1–11.1)

## 2021-01-12 PROCEDURE — 74011250637 HC RX REV CODE- 250/637: Performed by: NURSE PRACTITIONER

## 2021-01-12 PROCEDURE — 74011636637 HC RX REV CODE- 636/637: Performed by: NURSE PRACTITIONER

## 2021-01-12 PROCEDURE — 85025 COMPLETE CBC W/AUTO DIFF WBC: CPT

## 2021-01-12 PROCEDURE — 80061 LIPID PANEL: CPT

## 2021-01-12 PROCEDURE — 74011250637 HC RX REV CODE- 250/637: Performed by: INTERNAL MEDICINE

## 2021-01-12 PROCEDURE — 82962 GLUCOSE BLOOD TEST: CPT

## 2021-01-12 PROCEDURE — 36415 COLL VENOUS BLD VENIPUNCTURE: CPT

## 2021-01-12 PROCEDURE — 99232 SBSQ HOSP IP/OBS MODERATE 35: CPT | Performed by: CLINICAL NURSE SPECIALIST

## 2021-01-12 RX ORDER — LIDOCAINE 4 G/100G
PATCH TOPICAL
Qty: 30 PATCH | Refills: 0 | Status: SHIPPED | OUTPATIENT
Start: 2021-01-12 | End: 2021-02-08 | Stop reason: ALTCHOICE

## 2021-01-12 RX ORDER — ONDANSETRON 4 MG/1
4 TABLET, ORALLY DISINTEGRATING ORAL
Status: DISCONTINUED | OUTPATIENT
Start: 2021-01-12 | End: 2021-01-12 | Stop reason: HOSPADM

## 2021-01-12 RX ORDER — HYDROXYZINE 50 MG/1
100 TABLET, FILM COATED ORAL
Qty: 30 TAB | Refills: 0 | Status: SHIPPED | OUTPATIENT
Start: 2021-01-12 | End: 2022-01-18

## 2021-01-12 RX ORDER — METOPROLOL TARTRATE 50 MG/1
50 TABLET ORAL 2 TIMES DAILY
Qty: 60 TAB | Refills: 0 | Status: SHIPPED | OUTPATIENT
Start: 2021-01-12 | End: 2021-02-08 | Stop reason: SDUPTHER

## 2021-01-12 RX ORDER — LISINOPRIL 20 MG/1
20 TABLET ORAL DAILY
Qty: 30 TAB | Refills: 0 | Status: SHIPPED | OUTPATIENT
Start: 2021-01-13 | End: 2021-02-08 | Stop reason: SDUPTHER

## 2021-01-12 RX ADMIN — INSULIN LISPRO 3 UNITS: 100 INJECTION, SOLUTION INTRAVENOUS; SUBCUTANEOUS at 09:14

## 2021-01-12 RX ADMIN — THERA TABS 1 TABLET: TAB at 09:14

## 2021-01-12 RX ADMIN — HYDROXYZINE HYDROCHLORIDE 100 MG: 50 TABLET, FILM COATED ORAL at 09:16

## 2021-01-12 RX ADMIN — METFORMIN HYDROCHLORIDE 500 MG: 500 TABLET ORAL at 09:14

## 2021-01-12 RX ADMIN — METOPROLOL TARTRATE 50 MG: 25 TABLET, FILM COATED ORAL at 09:15

## 2021-01-12 RX ADMIN — LISINOPRIL 20 MG: 20 TABLET ORAL at 09:15

## 2021-01-12 RX ADMIN — FOLIC ACID 1 MG: 1 TABLET ORAL at 09:15

## 2021-01-12 RX ADMIN — ONDANSETRON 4 MG: 4 TABLET, ORALLY DISINTEGRATING ORAL at 12:59

## 2021-01-12 RX ADMIN — Medication 100 MG: at 09:15

## 2021-01-12 NOTE — H&P
[address] [place] [address] HISTORY AND PHYSICAL    Name:  Mor Conway  MR#:  [id number]  :  1965  ACCOUNT #:  [de-identified]  ADMIT DATE:  01/10/2021    INITIAL PSYCHIATRIC EVALUATION    DATE OF SERVICE:  2021    CHIEF COMPLAINT:  \"My drinking got increased. \"    HISTORY OF PRESENT ILLNESS:  The patient is a 79-year-old male who is currently admitted at [address] [place] [address] on a voluntary basis. He is a transfer from [address] [place] [address]. He was there for a few days and was treated for alcohol dependence and shortness of breath. While he was there, Psychiatry was consulted, and he shared that his mood has been low for the past 2 weeks. He has been coping with his emotions by consuming alcohol. He has a prolific history of alcohol dependence. He has been to several different rehab facilities in the past.  He also has been admitted at [facility], but could not recall when his last admission was. He states that he has been drinking since he was an adolescent. His longest sobriety was about 3 years after completing a program in a rehab facility and regularly attending [place] [address] meetings. His blood alcohol level on admission was 310. Urine drug screen is positive for THC. He also uses THC on a regular basis. He states for the last few months since 2020, his drinking got increased. He was having shortness of breath in the middle of doing his job, so he went to [facility] to get evaluated. He states he has been drinking 10-15 shots a day plus 10 beers. He states that he suffers from major anxiety and depression because he has been unemployed after he suffered a spinal injury at work. He also is in worker's comp. He states he feels alone. He has no support. His only daughter lives in [place]. On admission, he was having passive thoughts of hurting himself but denies that now. He has a history of overdosing in his 25s.   He has history of alcohol withdrawal seizures. He denies suicidal ideation, homicidal ideation, auditory or visual hallucination. He states that his main goal is to get into a treatment center. PAST MEDICAL HISTORY:  See H and P. Past Medical History:   Diagnosis Date    Alcohol abuse     Diverticulitis     Diverticulitis     Hypertension        Labs: (reviewed/updated 1/12/2021)  Patient Vitals for the past 8 hrs:   BP Temp Pulse Resp SpO2   01/12/21 1212 119/83 98.6 °F (37 °C) 88 16 98 %     Labs Reviewed   CBC WITH AUTOMATED DIFF - Abnormal; Notable for the following components:       Result Value    WBC 3.0 (*)     RBC 3.73 (*)     HCT 36.3 (*)     MCH 35.4 (*)     PLATELET 646 (*)     MONOCYTES 18 (*)     IMMATURE GRANULOCYTES 3 (*)     ABS. NEUTROPHILS 1.6 (*)     ABS. IMM. GRANS. 0.1 (*)     All other components within normal limits   METABOLIC PANEL, BASIC - Abnormal; Notable for the following components:    Anion gap 4 (*)     Glucose 159 (*)     BUN/Creatinine ratio 7 (*)     All other components within normal limits   CBC WITH AUTOMATED DIFF - Abnormal; Notable for the following components:    WBC 3.5 (*)     RBC 3.89 (*)     MCH 36.0 (*)     MONOCYTES 23 (*)     IMMATURE GRANULOCYTES 3 (*)     ABS. NEUTROPHILS 1.6 (*)     ABS. IMM.  GRANS. 0.1 (*)     All other components within normal limits   LIPID PANEL - Abnormal; Notable for the following components:    Triglyceride 223 (*)     All other components within normal limits   GLUCOSE, POC - Abnormal; Notable for the following components:    Glucose (POC) 189 (*)     All other components within normal limits   GLUCOSE, POC - Abnormal; Notable for the following components:    Glucose (POC) 169 (*)     All other components within normal limits   GLUCOSE, POC - Abnormal; Notable for the following components:    Glucose (POC) 205 (*)     All other components within normal limits   GLUCOSE, POC - Abnormal; Notable for the following components:    Glucose (POC) 221 (*)     All other components within normal limits   GLUCOSE, POC - Abnormal; Notable for the following components:    Glucose (POC) 147 (*)     All other components within normal limits     Lab Results   Component Value Date/Time    Sodium 137 01/11/2021 05:53 AM    Potassium 3.7 01/11/2021 05:53 AM    Chloride 104 01/11/2021 05:53 AM    CO2 29 01/11/2021 05:53 AM    Anion gap 4 (L) 01/11/2021 05:53 AM    Glucose 159 (H) 01/11/2021 05:53 AM    BUN 6 01/11/2021 05:53 AM    Creatinine 0.92 01/11/2021 05:53 AM    BUN/Creatinine ratio 7 (L) 01/11/2021 05:53 AM    GFR est AA >60 01/11/2021 05:53 AM    GFR est non-AA >60 01/11/2021 05:53 AM    Calcium 9.2 01/11/2021 05:53 AM    Bilirubin, total 1.7 (H) 01/08/2021 03:28 AM    Alk. phosphatase 55 01/08/2021 03:28 AM    Protein, total 5.7 (L) 01/08/2021 03:28 AM    Albumin 3.0 (L) 01/08/2021 03:28 AM    Globulin 2.7 01/08/2021 03:28 AM    A-G Ratio 1.1 01/08/2021 03:28 AM    ALT (SGPT) 30 01/08/2021 03:28 AM     Admission on 01/10/2021, Discharged on 01/12/2021   Component Date Value Ref Range Status    WBC 01/11/2021 3.0* 4.1 - 11.1 K/uL Final    RBC 01/11/2021 3.73* 4.10 - 5.70 M/uL Final    HGB 01/11/2021 13.2  12.1 - 17.0 g/dL Final    HCT 01/11/2021 36.3* 36.6 - 50.3 % Final    MCV 01/11/2021 97.3  80.0 - 99.0 FL Final    MCH 01/11/2021 35.4* 26.0 - 34.0 PG Final    MCHC 01/11/2021 36.4  30.0 - 36.5 g/dL Final    RDW 01/11/2021 12.3  11.5 - 14.5 % Final    PLATELET 19/30/1363 555* 150 - 400 K/uL Final    MPV 01/11/2021 8.9  8.9 - 12.9 FL Final    NRBC 01/11/2021 0.0  0  WBC Final    ABSOLUTE NRBC 01/11/2021 0.00  0.00 - 0.01 K/uL Final    NEUTROPHILS 01/11/2021 52  32 - 75 % Final    LYMPHOCYTES 01/11/2021 26  12 - 49 % Final    MONOCYTES 01/11/2021 18* 5 - 13 % Final    EOSINOPHILS 01/11/2021 0  0 - 7 % Final    BASOPHILS 01/11/2021 1  0 - 1 % Final    IMMATURE GRANULOCYTES 01/11/2021 3* 0.0 - 0.5 % Final    ABS.  NEUTROPHILS 01/11/2021 1.6* 1.8 - 8.0 K/UL Final    ABS. LYMPHOCYTES 01/11/2021 0.8  0.8 - 3.5 K/UL Final    ABS. MONOCYTES 01/11/2021 0.5  0.0 - 1.0 K/UL Final    ABS. EOSINOPHILS 01/11/2021 0.0  0.0 - 0.4 K/UL Final    ABS. BASOPHILS 01/11/2021 0.0  0.0 - 0.1 K/UL Final    ABS. IMM.  GRANS. 01/11/2021 0.1* 0.00 - 0.04 K/UL Final    DF 01/11/2021 SMEAR SCANNED    Final    RBC COMMENTS 01/11/2021 NORMOCYTIC, NORMOCHROMIC    Final    Sodium 01/11/2021 137  136 - 145 mmol/L Final    Potassium 01/11/2021 3.7  3.5 - 5.1 mmol/L Final    Chloride 01/11/2021 104  97 - 108 mmol/L Final    CO2 01/11/2021 29  21 - 32 mmol/L Final    Anion gap 01/11/2021 4* 5 - 15 mmol/L Final    Glucose 01/11/2021 159* 65 - 100 mg/dL Final    BUN 01/11/2021 6  6 - 20 MG/DL Final    Creatinine 01/11/2021 0.92  0.70 - 1.30 MG/DL Final    BUN/Creatinine ratio 01/11/2021 7* 12 - 20   Final    GFR est AA 01/11/2021 >60  >60 ml/min/1.73m2 Final    GFR est non-AA 01/11/2021 >60  >60 ml/min/1.73m2 Final    Calcium 01/11/2021 9.2  8.5 - 10.1 MG/DL Final    Glucose (POC) 01/11/2021 189* 65 - 100 mg/dL Final    Performed by 01/11/2021 Ronen Nuria   Final    Glucose (POC) 01/11/2021 169* 65 - 100 mg/dL Final    Performed by 01/11/2021 Tomas Means   Final    Glucose (POC) 01/11/2021 205* 65 - 100 mg/dL Final    Performed by 01/11/2021 SUZETTE Owensessa   Final    WBC 01/12/2021 3.5* 4.1 - 11.1 K/uL Final    RBC 01/12/2021 3.89* 4.10 - 5.70 M/uL Final    HGB 01/12/2021 14.0  12.1 - 17.0 g/dL Final    HCT 01/12/2021 38.5  36.6 - 50.3 % Final    MCV 01/12/2021 99.0  80.0 - 99.0 FL Final    MCH 01/12/2021 36.0* 26.0 - 34.0 PG Final    MCHC 01/12/2021 36.4  30.0 - 36.5 g/dL Final    RDW 01/12/2021 12.2  11.5 - 14.5 % Final    PLATELET 20/88/4018 288  150 - 400 K/uL Final    MPV 01/12/2021 9.8  8.9 - 12.9 FL Final    NRBC 01/12/2021 0.0  0  WBC Final    ABSOLUTE NRBC 01/12/2021 0.00  0.00 - 0.01 K/uL Final    NEUTROPHILS 01/12/2021 45 32 - 75 % Final    LYMPHOCYTES 01/12/2021 28  12 - 49 % Final    MONOCYTES 01/12/2021 23* 5 - 13 % Final    EOSINOPHILS 01/12/2021 0  0 - 7 % Final    BASOPHILS 01/12/2021 1  0 - 1 % Final    IMMATURE GRANULOCYTES 01/12/2021 3* 0.0 - 0.5 % Final    ABS. NEUTROPHILS 01/12/2021 1.6* 1.8 - 8.0 K/UL Final    ABS. LYMPHOCYTES 01/12/2021 1.0  0.8 - 3.5 K/UL Final    ABS. MONOCYTES 01/12/2021 0.8  0.0 - 1.0 K/UL Final    ABS. EOSINOPHILS 01/12/2021 0.0  0.0 - 0.4 K/UL Final    ABS. BASOPHILS 01/12/2021 0.0  0.0 - 0.1 K/UL Final    ABS. IMM.  GRANS. 01/12/2021 0.1* 0.00 - 0.04 K/UL Final    DF 01/12/2021 SMEAR SCANNED    Final    RBC COMMENTS 01/12/2021 NORMOCYTIC, NORMOCHROMIC    Final    LIPID PROFILE 01/12/2021        Final    Cholesterol, total 01/12/2021 171  <200 MG/DL Final    Triglyceride 01/12/2021 223* <150 MG/DL Final    HDL Cholesterol 01/12/2021 40  MG/DL Final    LDL, calculated 01/12/2021 86.4  0 - 100 MG/DL Final    VLDL, calculated 01/12/2021 44.6  MG/DL Final    CHOL/HDL Ratio 01/12/2021 4.3  0.0 - 5.0   Final    Glucose (POC) 01/12/2021 221* 65 - 100 mg/dL Final    Performed by 01/12/2021 LISSA ORIANA (PCT)   Final    Glucose (POC) 01/12/2021 147* 65 - 100 mg/dL Final    Performed by 01/12/2021 LISSA ORIANA (PCT)   Final     Vitals:    01/11/21 1630 01/11/21 2020 01/12/21 0846 01/12/21 1212   BP: 115/76 128/89 (!) 138/97 119/83   Pulse: 93 (!) 107 (!) 113 88   Resp: 20 18 16 16   Temp: 98.8 °F (37.1 °C) 98.6 °F (37 °C) 98.6 °F (37 °C) 98.6 °F (37 °C)   SpO2:  98% 97% 98%   Weight:       Height:         Recent Results (from the past 24 hour(s))   GLUCOSE, POC    Collection Time: 01/11/21  8:26 PM   Result Value Ref Range    Glucose (POC) 205 (H) 65 - 100 mg/dL    Performed by Petra Bello    CBC WITH AUTOMATED DIFF    Collection Time: 01/12/21  6:00 AM   Result Value Ref Range    WBC 3.5 (L) 4.1 - 11.1 K/uL    RBC 3.89 (L) 4.10 - 5.70 M/uL    HGB 14.0 12.1 - 17.0 g/dL HCT 38.5 36.6 - 50.3 %    MCV 99.0 80.0 - 99.0 FL    MCH 36.0 (H) 26.0 - 34.0 PG    MCHC 36.4 30.0 - 36.5 g/dL    RDW 12.2 11.5 - 14.5 %    PLATELET 463 739 - 827 K/uL    MPV 9.8 8.9 - 12.9 FL    NRBC 0.0 0  WBC    ABSOLUTE NRBC 0.00 0.00 - 0.01 K/uL    NEUTROPHILS 45 32 - 75 %    LYMPHOCYTES 28 12 - 49 %    MONOCYTES 23 (H) 5 - 13 %    EOSINOPHILS 0 0 - 7 %    BASOPHILS 1 0 - 1 %    IMMATURE GRANULOCYTES 3 (H) 0.0 - 0.5 %    ABS. NEUTROPHILS 1.6 (L) 1.8 - 8.0 K/UL    ABS. LYMPHOCYTES 1.0 0.8 - 3.5 K/UL    ABS. MONOCYTES 0.8 0.0 - 1.0 K/UL    ABS. EOSINOPHILS 0.0 0.0 - 0.4 K/UL    ABS. BASOPHILS 0.0 0.0 - 0.1 K/UL    ABS. IMM. GRANS. 0.1 (H) 0.00 - 0.04 K/UL    DF SMEAR SCANNED      RBC COMMENTS NORMOCYTIC, NORMOCHROMIC     LIPID PANEL    Collection Time: 01/12/21  6:00 AM   Result Value Ref Range    LIPID PROFILE          Cholesterol, total 171 <200 MG/DL    Triglyceride 223 (H) <150 MG/DL    HDL Cholesterol 40 MG/DL    LDL, calculated 86.4 0 - 100 MG/DL    VLDL, calculated 44.6 MG/DL    CHOL/HDL Ratio 4.3 0.0 - 5.0     GLUCOSE, POC    Collection Time: 01/12/21  8:35 AM   Result Value Ref Range    Glucose (POC) 221 (H) 65 - 100 mg/dL    Performed by Imani Figueroa (PCT)    GLUCOSE, POC    Collection Time: 01/12/21 11:41 AM   Result Value Ref Range    Glucose (POC) 147 (H) 65 - 100 mg/dL    Performed by Imani Figueroa (PCT)        RADIOLOGY REPORTS:  Results from Hospital Encounter encounter on 01/06/21   XR CHEST PORT    Narrative EXAM:  XR CHEST PORT    INDICATION:   sob    COMPARISON: Chest radiograph 9/2/2017. FINDINGS: AP radiograph of the chest was obtained. No evidence of focal consolidation. Mild bibasilar atelectasis. No pleural  effusion or pneumothorax. Heart, darnell, mediastinum are within normal limits. No  acute osseous abnormalities. Impression IMPRESSION: No acute cardiopulmonary process radiographically. Mild bibasilar  atelectasis.      Xr Chest Port    Result Date: 1/6/2021  EXAM: XR CHEST PORT INDICATION:   sob COMPARISON: Chest radiograph 9/2/2017. FINDINGS: AP radiograph of the chest was obtained. No evidence of focal consolidation. Mild bibasilar atelectasis. No pleural effusion or pneumothorax. Heart, darnell, mediastinum are within normal limits. No acute osseous abnormalities. IMPRESSION: No acute cardiopulmonary process radiographically. Mild bibasilar atelectasis. PAST PSYCHIATRIC HISTORY:  He is currently not seeing any psychiatrist.  He has been admitted to different rehab facilities for his alcohol dependence. He has been admitted to [facility] in the past.  He is currently not taking any psychotropic medications. PSYCHOSOCIAL HISTORY:  He is . He was  twice. He has an adult daughter who lives in [place]. He is currently unemployed. He is in the middle of worker's compensation from a spinal injury back in 11/2020. He lives by himself. MENTAL STATUS EXAM:  He is alert and oriented in all spheres. He is dressed in street clothes. He reports his mood is okay. Affect is mildly constricted. Speech:  Normal rate and rhythm. Thought process:  Logical and goal directed. He denies suicidal ideation, homicidal ideation, auditory or visual hallucination. Memory seems intact. Intelligence seems average. Insight is partial.  Judgment is fair. DIAGNOSES:  Substance-induced mood disorder. Alcohol use disorder, severe. Cannabis use disorder, severe. TREATMENT PLAN:  I will continue his inpatient stay. He will be provided with support and encouraged to attend groups. His safety will be monitored. His medications will be modified and assessed. Case Management will work on discharge planning. ASSETS AND STRENGTHS:  He is willing to seek help. He is willing to take medications. ESTIMATED LENGTH OF STAY:  5-7 days.       KATIA REICH NP      [id number]  D:  01/11/2021 17:35  T:  01/11/2021 19:35  JOB #: [id number]

## 2021-01-12 NOTE — BH NOTES
Behavioral Health Transition Record to Provider    Patient Name: Ximena Varner  YOB: 1965  Medical Record Number: [id number]  Date of Admission: 1/10/2021  Date of Discharge: 1/12/2021    Attending Provider: No att. providers found  Discharging Provider: Adriana Oneal NP   To contact this individual call [phone] and ask the  to page. If unavailable, ask to be transferred to [facility] Provider on call. [facility] Provider will be available on call 24/7 and during holidays. Primary Care Provider: Yvon Donald MD    No Known Allergies    Reason for Admission: CHIEF COMPLAINT:  \"My drinking got increased. \"     HISTORY OF PRESENT ILLNESS:  The patient is a 53-year-old male who is currently admitted at [address] [place] [address] on a voluntary basis. He is a transfer from [address] [place] [address]. He was there for a few days and was treated for alcohol dependence and shortness of breath. While he was there, Psychiatry was consulted, and he shared that his mood has been low for the past 2 weeks. He has been coping with his emotions by consuming alcohol. He has a prolific history of alcohol dependence. He has been to several different rehab facilities in the past.  He also has been admitted at [facility], but could not recall when his last admission was. He states that he has been drinking since he was an adolescent. His longest sobriety was about 3 years after completing a program and regularly attending [place] [address] meetings. His blood alcohol level on admission was 310. Urine drug screen is positive for THC. He also uses THC on a regular basis. He states for the last few months since 11/2020, his drinking got increased. He was having shortness of breath in the middle of doing his job, so he went to [address] [place] [address] to get evaluated. He states he has been drinking 10-15 shots a day plus 10 beers.   He states that he suffers from major anxiety and depression because he has been unemployed after he suffered a spinal injury at work. He also is in worker's comp. He states he feels alone. He has no support. His only daughter lives in [place]. On admission, he was having passive thoughts of hurting himself but denies that now. He has a history of overdosing in his 25s. He has history of alcohol withdrawal seizures. He denies suicidal ideation, homicidal ideation, auditory or visual hallucination. He states that his main goal is to get into a treatment center. Admission Diagnosis: Major depression [F32.9]    * No surgery found *    Results for orders placed or performed during the hospital encounter of 01/10/21   CBC WITH AUTOMATED DIFF   Result Value Ref Range    WBC 3.0 (L) 4.1 - 11.1 K/uL    RBC 3.73 (L) 4.10 - 5.70 M/uL    HGB 13.2 12.1 - 17.0 g/dL    HCT 36.3 (L) 36.6 - 50.3 %    MCV 97.3 80.0 - 99.0 FL    MCH 35.4 (H) 26.0 - 34.0 PG    MCHC 36.4 30.0 - 36.5 g/dL    RDW 12.3 11.5 - 14.5 %    PLATELET 523 (L) 268 - 400 K/uL    MPV 8.9 8.9 - 12.9 FL    NRBC 0.0 0  WBC    ABSOLUTE NRBC 0.00 0.00 - 0.01 K/uL    NEUTROPHILS 52 32 - 75 %    LYMPHOCYTES 26 12 - 49 %    MONOCYTES 18 (H) 5 - 13 %    EOSINOPHILS 0 0 - 7 %    BASOPHILS 1 0 - 1 %    IMMATURE GRANULOCYTES 3 (H) 0.0 - 0.5 %    ABS. NEUTROPHILS 1.6 (L) 1.8 - 8.0 K/UL    ABS. LYMPHOCYTES 0.8 0.8 - 3.5 K/UL    ABS. MONOCYTES 0.5 0.0 - 1.0 K/UL    ABS. EOSINOPHILS 0.0 0.0 - 0.4 K/UL    ABS. BASOPHILS 0.0 0.0 - 0.1 K/UL    ABS. IMM.  GRANS. 0.1 (H) 0.00 - 0.04 K/UL    DF SMEAR SCANNED      RBC COMMENTS NORMOCYTIC, NORMOCHROMIC     METABOLIC PANEL, BASIC   Result Value Ref Range    Sodium 137 136 - 145 mmol/L    Potassium 3.7 3.5 - 5.1 mmol/L    Chloride 104 97 - 108 mmol/L    CO2 29 21 - 32 mmol/L    Anion gap 4 (L) 5 - 15 mmol/L    Glucose 159 (H) 65 - 100 mg/dL    BUN 6 6 - 20 MG/DL    Creatinine 0.92 0.70 - 1.30 MG/DL    BUN/Creatinine ratio 7 (L) 12 - 20      GFR est AA >60 >60 ml/min/1.73m2    GFR est non-AA >60 >60 ml/min/1.73m2    Calcium 9.2 8.5 - 10.1 MG/DL   CBC WITH AUTOMATED DIFF   Result Value Ref Range    WBC 3.5 (L) 4.1 - 11.1 K/uL    RBC 3.89 (L) 4.10 - 5.70 M/uL    HGB 14.0 12.1 - 17.0 g/dL    HCT 38.5 36.6 - 50.3 %    MCV 99.0 80.0 - 99.0 FL    MCH 36.0 (H) 26.0 - 34.0 PG    MCHC 36.4 30.0 - 36.5 g/dL    RDW 12.2 11.5 - 14.5 %    PLATELET 082 349 - 335 K/uL    MPV 9.8 8.9 - 12.9 FL    NRBC 0.0 0  WBC    ABSOLUTE NRBC 0.00 0.00 - 0.01 K/uL    NEUTROPHILS 45 32 - 75 %    LYMPHOCYTES 28 12 - 49 %    MONOCYTES 23 (H) 5 - 13 %    EOSINOPHILS 0 0 - 7 %    BASOPHILS 1 0 - 1 %    IMMATURE GRANULOCYTES 3 (H) 0.0 - 0.5 %    ABS. NEUTROPHILS 1.6 (L) 1.8 - 8.0 K/UL    ABS. LYMPHOCYTES 1.0 0.8 - 3.5 K/UL    ABS. MONOCYTES 0.8 0.0 - 1.0 K/UL    ABS. EOSINOPHILS 0.0 0.0 - 0.4 K/UL    ABS. BASOPHILS 0.0 0.0 - 0.1 K/UL    ABS. IMM. GRANS. 0.1 (H) 0.00 - 0.04 K/UL    DF SMEAR SCANNED      RBC COMMENTS NORMOCYTIC, NORMOCHROMIC     LIPID PANEL   Result Value Ref Range    LIPID PROFILE          Cholesterol, total 171 <200 MG/DL    Triglyceride 223 (H) <150 MG/DL    HDL Cholesterol 40 MG/DL    LDL, calculated 86.4 0 - 100 MG/DL    VLDL, calculated 44.6 MG/DL    CHOL/HDL Ratio 4.3 0.0 - 5.0     GLUCOSE, POC   Result Value Ref Range    Glucose (POC) 189 (H) 65 - 100 mg/dL    Performed by [address] [place] [address] Michaela Patel, POC   Result Value Ref Range    Glucose (POC) 169 (H) 65 - 100 mg/dL    Performed by [facility]    GLUCOSE, POC   Result Value Ref Range    Glucose (POC) 205 (H) 65 - 100 mg/dL    Performed by [facility] Susy Cai    GLUCOSE, POC   Result Value Ref Range    Glucose (POC) 221 (H) 65 - 100 mg/dL    Performed by LISSA GASTELUM (PCT)    GLUCOSE, POC   Result Value Ref Range    Glucose (POC) 147 (H) 65 - 100 mg/dL    Performed by Paty Mares (PCT)        Immunizations administered during this encounter:  There is no immunization history for the selected administration types on file for this patient. Screening for Metabolic Disorders for Patients on Antipsychotic Medications  (Data obtained from the EMR)    Estimated Body Mass Index  Estimated body mass index is 28.25 kg/m² as calculated from the following:    Height as of this encounter: 6' 2\" (1.88 m). Weight as of this encounter: 99.8 kg (220 lb). Vital Signs/Blood Pressure  Visit Vitals  /83   Pulse 88   Temp 98.6 °F (37 °C)   Resp 16   Ht 6' 2\" (1.88 m)   Wt 99.8 kg (220 lb)   SpO2 98%   BMI 28.25 kg/m²       Blood Glucose/Hemoglobin A1c  Lab Results   Component Value Date/Time    Glucose 159 (H) 01/11/2021 05:53 AM    Glucose (POC) 147 (H) 01/12/2021 11:41 AM       Lab Results   Component Value Date/Time    Hemoglobin A1c 8.1 (H) 01/08/2021 03:28 AM        Lipid Panel  Lab Results   Component Value Date/Time    Cholesterol, total 171 01/12/2021 06:00 AM    HDL Cholesterol 40 01/12/2021 06:00 AM    LDL, calculated 86.4 01/12/2021 06:00 AM    Triglyceride 223 (H) 01/12/2021 06:00 AM    CHOL/HDL Ratio 4.3 01/12/2021 06:00 AM        Discharge Diagnosis: Substance-induced mood disorder. Alcohol use disorder, severe. Cannabis use disorder, severe. Discharge Plan: Patient discharged home via Lyft with [facility]. The patient Manjinder Miguel exhibits the ability to control behavior in a less restrictive environment. Patient's level of functioning is improving. No assaultive/destructive behavior has been observed for the past 24 hours. No suicidal/homicidal threat or behavior has been observed for the past 24 hours. There is no evidence of serious medication side effects. Patient has not been in physical or protective restraints for at least the past 24 hours. If weapons involved, how are they secured?  No weapons per his brother, Erik Miller    Is patient aware of and in agreement with discharge plan? yes    Arrangements for medication:  Prescriptions sent to pt pharmacy    Copy of discharge instructions to provider?:  [address]     Arrangements for transportation home:  [place] follow up appointments as scheduled, continue to take prescribed medications per physician instructions. CRISIS Information:  911 or [address] DJXR:[phone]     Mental Health Emergency WARM LINE      [phone])      M-F: 9am to 9pm      Sat & Sun: 5pm - 9pm  National suicide prevention lines:                             4-248-PJELVGS ([phone])       [phone] ([phone])   24/7 Crisis Text Line:  Text HOME to [phone]    Discharge Medication List and Instructions:   Discharge Medication List as of 1/12/2021  1:53 PM      START taking these medications    Details   hydrOXYzine HCL (ATARAX) 50 mg tablet Take 2 Tabs by mouth every six (6) hours as needed for Anxiety. Indications: anxious, Print, Disp-30 Tab, R-0      lidocaine 4 % patch Apply to back  Indications: nerve pain after herpes, Print, Disp-30 Patch, R-0      lisinopriL (PRINIVIL, ZESTRIL) 20 mg tablet Take 1 Tab by mouth daily. Indications: high blood pressure, Print, Disp-30 Tab, R-0      metoprolol tartrate (LOPRESSOR) 50 mg tablet Take 1 Tab by mouth two (2) times a day. Indications: high blood pressure, Print, Disp-60 Tab, R-0         CONTINUE these medications which have NOT CHANGED    Details   metFORMIN (GLUCOPHAGE) 500 mg tablet Take 1 Tab by mouth two (2) times daily (with meals). , No Print, Disp-60 Tab, R-0      therapeutic multivitamin (THERAGRAN) tablet Take 1 Tab by mouth daily. , No Print, Disp-30 Tab, R-0         STOP taking these medications       glipiZIDE (GLUCOTROL) 5 mg tablet Comments:   Reason for Stopping:               Unresulted Labs (24h ago, onward)    None        To obtain results of studies pending at discharge, please contact [phone]    Follow-up Information     Follow up With Specialties Details Why Contact Info    Elicia [address]: PHP  Go on 1/14/2021 9:30 IN-PERSON Appt for your initial assessment. Program Hours: M-TH 9am-3pm and Fridays 9am-12pm [facility]  [facility] [address]  [place] [address]  Office: [phone]   Fax: [phone]          Advanced Directive:   Does the patient have an appointed surrogate decision maker? No  Does the patient have a Medical Advance Directive? No  Does the patient have a Psychiatric Advance Directive? No  If the patient does not have a surrogate or Medical Advance Directive AND Psychiatric Advance Directive, the patient was offered information on these advance directives Patient declined to complete    Patient Instructions: Please continue all medications until otherwise directed by physician. Tobacco Cessation Discharge Plan:   Is the patient a smoker and needs referral for smoking cessation? Yes  Patient referred to the following for smoking cessation with an appointment? Refused     Patient was offered medication to assist with smoking cessation at discharge? Refused  Was education for smoking cessation added to the discharge instructions? Yes    Alcohol/Substance Abuse Discharge Plan:   Does the patient have a history of substance/alcohol abuse and requires a referral for treatment? Yes  Patient referred to the following for substance/alcohol abuse treatment with an appointment? Yes - [facility]  Patient was offered medication to assist with alcohol cessation at discharge? Refused  Was education for substance/alcohol abuse added to discharge instructions? Yes    Patient discharged to Home; discussed with patient/caregiver and provided to the patient/caregiver either in hard copy or electronically.

## 2021-01-12 NOTE — PROGRESS NOTES
Hospitalist Progress Note          Loy Cordova NP  Please call  and page for questions. Call physician on-call through the  7pm-7am    Daily Progress Note: 1/12/2021    Primary care provider:Corinne Pillai MD    Date of admission: 1/10/2021  5:51 PM    Admission Summary and Hospital Course:      From H&P 01/10/2021:  Viola Denny is a 63 y.o. male with past medical history of diabetes, hypertension, alcohol abuse and smoking who has been agreed to inpatient psych for severe depression and we have been consulted for HPI. Patient is awake, alert and oriented, able to answer my question and follow my request.  Patient reports that he has had issues with alcohol abuse, smoking and pulse reviewed with history of COPD not on home oxygen who came to ED on 1/6/2021 to [facility] because of lethargy headache and shortness of breath with exertion, noted to have acute alcohol withdrawal.  He was also suicidal and depressed. He was treated for acute alcohol withdrawal and then transferred to [facility] inpatient Psych for further management. I examined the patient with the bedside, he is awake, alert and oriented. He is anxious, denies suicidal ideation but appears depressed, reports that he wants to get sober but he is in pain and wants medication for pain as well as anxiety. He also wants to have access to his phone as he has communicated his  and Worker's Comp. people otherwise he is hemodynamically stable offers no new complaints otherwise. \"    Subjective:   Pt seen today in NAD. Reports he was seen by hematology today. He is feeling well today and looking forward to staying sober.     Assessment/Plan:       Acute alcohol withdrawal/abuse  -Mgt per primary team     Depression/suicidal ideation  -Mgt per primary team     Leukopenia/neutropenia: Unlikely to direct alcohol effect on the bone marrow  -Improved; significant family hx of cancer  -Seen by hem/onc today  -Recommend follow up with hem/onc upon discharge for further workup      Chronic pain: reports history of accident in 2020, need for surgery has been deferred because of his social situation  -Denies pain currently  -Cont lidocaine patches  -PRN capsaicin cream, tylenol, ibuprofen     Hypokalemia  -Resolved    Hyponatremia  -Resolved     New onset diabetes type 2: HbA1c 8.1  -Elevated but stable  -Cont AC/HS accuchecks w/ SSI - high sensitivity scale  -Seen by DMT for teaching  -Resume home mgt upon discharge  -f/u with PCP     Hypertension: uncontrolled  -Improved  -Cont lisinopril  -Cont metoprolol  -Monitor BP  -f/u with PCP upon discharge - may not need to stay on metoprolol once etoh withdrawal has resolved     Chronic smoker:  -Counseled for cessation  -Cont nicotine patches, lozenges      The hospitalist team will sign off at this time. Please feel free to call with questions. Review of Systems:     Full ROS complete with pertinent positives and negatives as per HPI, otherwise negative  Objective:   Physical Exam:     Visit Vitals  /83   Pulse 88   Temp 98.6 °F (37 °C)   Resp 16   Ht 6' 2\" (1.88 m)   Wt 99.8 kg (220 lb)   SpO2 98%   BMI 28.25 kg/m²           Temp (24hrs), Av.6 °F (37 °C), Min:98.6 °F (37 °C), Max:98.6 °F (37 °C)    No intake/output data recorded. No intake/output data recorded. General:  Alert, cooperative, no distress, appears stated age, obese   Lungs:   Clear to auscultation bilaterally. Heart:  Regular rate and rhythm, S1, S2 normal, no murmur, click, rub or gallop. Abdomen:   Soft, non-tender, non-distended. Bowel sounds normal.    Extremities: Extremities normal, atraumatic, no cyanosis or edema.    Skin: Skin color, texture, turgor normal. No rashes or lesions   Neurologic: CNII-XII intact, A&Ox4, VALENTINE     Data Review:       Recent Days:  Recent Labs     21  0600 21  0553   WBC 3.5* 3.0*   HGB 14.0 13.2   HCT 38.5 36.3*   PLT 155 130*     Recent Labs     01/11/21  0553      K 3.7      CO2 29   *   BUN 6   CREA 0.92   CA 9.2     No results for input(s): PH, PCO2, PO2, HCO3, FIO2 in the last 72 hours. 24 Hour Results:  Recent Results (from the past 24 hour(s))   GLUCOSE, POC    Collection Time: 01/11/21  4:41 PM   Result Value Ref Range    Glucose (POC) 169 (H) 65 - 100 mg/dL    Performed by [facility] [address], POC    Collection Time: 01/11/21  8:26 PM   Result Value Ref Range    Glucose (POC) 205 (H) 65 - 100 mg/dL    Performed by Mindi Ortiz    CBC WITH AUTOMATED DIFF    Collection Time: 01/12/21  6:00 AM   Result Value Ref Range    WBC 3.5 (L) 4.1 - 11.1 K/uL    RBC 3.89 (L) 4.10 - 5.70 M/uL    HGB 14.0 12.1 - 17.0 g/dL    HCT 38.5 36.6 - 50.3 %    MCV 99.0 80.0 - 99.0 FL    MCH 36.0 (H) 26.0 - 34.0 PG    MCHC 36.4 30.0 - 36.5 g/dL    RDW 12.2 11.5 - 14.5 %    PLATELET 714 535 - 032 K/uL    MPV 9.8 8.9 - 12.9 FL    NRBC 0.0 0  WBC    ABSOLUTE NRBC 0.00 0.00 - 0.01 K/uL    NEUTROPHILS 45 32 - 75 %    LYMPHOCYTES 28 12 - 49 %    MONOCYTES 23 (H) 5 - 13 %    EOSINOPHILS 0 0 - 7 %    BASOPHILS 1 0 - 1 %    IMMATURE GRANULOCYTES 3 (H) 0.0 - 0.5 %    ABS. NEUTROPHILS 1.6 (L) 1.8 - 8.0 K/UL    ABS. LYMPHOCYTES 1.0 0.8 - 3.5 K/UL    ABS. MONOCYTES 0.8 0.0 - 1.0 K/UL    ABS. EOSINOPHILS 0.0 0.0 - 0.4 K/UL    ABS. BASOPHILS 0.0 0.0 - 0.1 K/UL    ABS. IMM.  GRANS. 0.1 (H) 0.00 - 0.04 K/UL    DF SMEAR SCANNED      RBC COMMENTS NORMOCYTIC, NORMOCHROMIC     LIPID PANEL    Collection Time: 01/12/21  6:00 AM   Result Value Ref Range    LIPID PROFILE          Cholesterol, total 171 <200 MG/DL    Triglyceride 223 (H) <150 MG/DL    HDL Cholesterol 40 MG/DL    LDL, calculated 86.4 0 - 100 MG/DL    VLDL, calculated 44.6 MG/DL    CHOL/HDL Ratio 4.3 0.0 - 5.0     GLUCOSE, POC    Collection Time: 01/12/21  8:35 AM   Result Value Ref Range    Glucose (POC) 221 (H) 65 - 100 mg/dL    Performed by Carmine De La O (PCT) GLUCOSE, POC    Collection Time: 01/12/21 11:41 AM   Result Value Ref Range    Glucose (POC) 147 (H) 65 - 100 mg/dL    Performed by Gisselle Burgess (PCT)        Problem List:  Problem List as of 1/12/2021 Date Reviewed: 1/10/2021          Codes Class Noted - Resolved    Major depression ICD-10-CM: F32.9  ICD-9-CM: 296.20  1/10/2021 - Present        Hypokalemia ICD-10-CM: E87.6  ICD-9-CM: 276.8  1/6/2021 - Present        Thrombocytopenia ([facility] ICD-10-CM: D69.6  ICD-9-CM: 287.5  1/6/2021 - Present        Suspected COVID-19 virus infection ICD-10-CM: Z20.822  ICD-9-CM: V01.79  1/6/2021 - Present        Suicidal ideations ICD-10-CM: R45.851  ICD-9-CM: V62.84  1/6/2021 - Present        Headache ICD-10-CM: R51.9  ICD-9-CM: 784.0  1/6/2021 - Present        Elevated bilirubin ICD-10-CM: R17  ICD-9-CM: 277.4  1/6/2021 - Present        Alcohol withdrawal ([facility] ICD-10-CM: P39.973  ICD-9-CM: 291.81  9/4/2017 - Present        Alcohol abuse (Chronic) ICD-10-CM: F10.10  ICD-9-CM: 305.00  Unknown - Present        Opioid abuse ([facility] (Chronic) ICD-10-CM: F11.10  ICD-9-CM: 305.50  9/2/2017 - Present        Mild tetrahydrocannabinol (THC) abuse (Chronic) ICD-10-CM: F12.10  ICD-9-CM: 305.20  9/2/2017 - Present        Polysubstance abuse (HCC) (Chronic) ICD-10-CM: F19.10  ICD-9-CM: 305.90  9/2/2017 - Present        NSAID long-term use (Chronic) ICD-10-CM: Z79.1  ICD-9-CM: V58.64  9/2/2017 - Present        Weight loss ICD-10-CM: R63.4  ICD-9-CM: 783.21  9/2/2017 - Present        RESOLVED: Dehydration ICD-10-CM: E86.0  ICD-9-CM: 276.51  9/2/2017 - 9/4/2017        RESOLVED: Nausea & vomiting ICD-10-CM: R11.2  ICD-9-CM: 787.01  9/2/2017 - 9/4/2017        RESOLVED: Syncope ICD-10-CM: R55  ICD-9-CM: 780.2  9/2/2017 - 9/7/2017        RESOLVED: Elevated lipase ICD-10-CM: R74.8  ICD-9-CM: 790.5  9/2/2017 - 9/4/2017        RESOLVED: Lactic acidemia ICD-10-CM: F53.2  ICD-9-CM: 276.2  9/2/2017 - 9/4/2017        RESOLVED: Hematemesis ICD-10-CM: K92.0  ICD-9-CM: 578.0  9/2/2017 - 9/7/2017              Medications reviewed  Current Facility-Administered Medications   Medication Dose Route Frequency    ondansetron (ZOFRAN ODT) tablet 4 mg  4 mg Oral Q6H PRN    PHENobarbitaL (LUMINAL) tablet 32.4 mg  32.4 mg Oral Q4H PRN    traZODone (DESYREL) tablet 100 mg  100 mg Oral QHS PRN    hydrOXYzine HCL (ATARAX) tablet 100 mg  100 mg Oral Q6H PRN    QUEtiapine (SEROquel) tablet 50 mg  50 mg Oral Q6H PRN    lisinopriL (PRINIVIL, ZESTRIL) tablet 20 mg  20 mg Oral DAILY    metoprolol tartrate (LOPRESSOR) tablet 50 mg  50 mg Oral BID    ibuprofen (MOTRIN) tablet 600 mg  600 mg Oral O1U PRN    folic acid (FOLVITE) tablet 1 mg  1 mg Oral DAILY    glucose chewable tablet 16 g  4 Tab Oral PRN    dextrose (D50W) injection syrg 12.5-25 g  25-50 mL IntraVENous PRN    glucagon (GLUCAGEN) injection 1 mg  1 mg IntraMUSCular PRN    insulin lispro (HUMALOG) injection   SubCUTAneous AC&HS    nicotine (NICODERM CQ) 21 mg/24 hr patch 1 Patch  1 Patch TransDERmal DAILY    OLANZapine (ZyPREXA) tablet 5 mg  5 mg Oral Q6H PRN    haloperidol lactate (HALDOL) injection 5 mg  5 mg IntraMUSCular Q6H PRN    benztropine (COGENTIN) tablet 1 mg  1 mg Oral BID PRN    diphenhydrAMINE (BENADRYL) injection 50 mg  50 mg IntraMUSCular BID PRN    LORazepam (ATIVAN) injection 1 mg  1 mg IntraMUSCular Q4H PRN    acetaminophen (TYLENOL) tablet 650 mg  650 mg Oral Q4H PRN    magnesium hydroxide (MILK OF MAGNESIA) 400 mg/5 mL oral suspension 30 mL  30 mL Oral DAILY PRN    metFORMIN (GLUCOPHAGE) tablet 500 mg  500 mg Oral BID WITH MEALS    therapeutic multivitamin (THERAGRAN) tablet 1 Tab  1 Tab Oral DAILY    thiamine HCL (B-1) tablet 100 mg  100 mg Oral DAILY    lidocaine 4 % patch 1-3 Patch  1-3 Patch TransDERmal Q24H    capsicum oleoresin 0.025 % cream   Topical Q8H PRN     Current Outpatient Medications   Medication Sig    hydrOXYzine HCL (ATARAX) 50 mg tablet Take 2 Tabs by mouth every six (6) hours as needed for Anxiety. Indications: anxious    lidocaine 4 % patch Apply to back  Indications: nerve pain after herpes    [START ON 1/13/2021] lisinopriL (PRINIVIL, ZESTRIL) 20 mg tablet Take 1 Tab by mouth daily. Indications: high blood pressure    metoprolol tartrate (LOPRESSOR) 50 mg tablet Take 1 Tab by mouth two (2) times a day. Indications: high blood pressure    metFORMIN (GLUCOPHAGE) 500 mg tablet Take 1 Tab by mouth two (2) times daily (with meals).  therapeutic multivitamin (THERAGRAN) tablet Take 1 Tab by mouth daily.        Care Plan discussed with: Patient/family, nurse      Aron Coppola NP  Hospitalist  Providers can reach me on PerfectServe

## 2021-01-12 NOTE — INTERDISCIPLINARY ROUNDS
Behavioral Health Interdisciplinary Rounds     Patient Name: Miguel Kennye  Age: 63 y.o. Room/Bed:  731/  Primary Diagnosis: <principal problem not specified>   Admission Status: Voluntary     Readmission within 30 days: no  Power of  in place: no  Patient requires a blocked bed: no          Reason for blocked bed:     VTE Prophylaxis: No    Mobility needs/Fall risk: no  Flu Vaccine : yes   Nutritional Plan: no  Consults:          Labs/Testing due today?: yes    Sleep hours:  7      Participation in Care/Groups:  yes  Medication Compliant?: Yes  PRNS (last 24 hours): Antianxiety    Restraints (last 24 hours):  no     CIWA (range last 24 hours): CIWA-Ar Total: 0   COWS (range last 24 hours):      Alcohol screening (AUDIT) completed -   AUDIT Score: 35     If applicable, date SBIRT discussed in treatment team AND documented:   AUDIT Screen Score: AUDIT Score: 35      Document Brief Intervention (corresponds directly with the 5 A's, Ask, Advise, Assess, Assist, and Arrange): At- Risk Patients (Score 7-15 for women; 8-15 for men)  Discuss concern patient is drinking at unhealthy levels known to increase risk of alcohol-related health problems. Is Patient ready to commit to change? If No:   Encourage reflection   Discuss short term and long term health risks of consuming alcohol   Barriers to change   Reaffirm willingness to help / Educational materials provided  If Yes:   Set goal  [employer] provided    Harmful use or Dependence (Score 16 or greater)   Discuss short term and long term health risks of consuming alcohol   Recommendations   Negotiate drinking goal   Recommend addiction specialist/center   Arrange follow-up appointments.     Tobacco - patient is a smoker: Have You Used Tobacco in the Past 30 Days: Yes  Illegal Drugs use: Have You Used Any Illegal Substances Over the Past 12 Months: Yes    24 hour chart check complete:     Patient goal(s) for today: Treatment team focus/goals:   Progress note     LOS:  2  Expected LOS:     Financial concerns/prescription coverage:    Family contact:       Family requesting physician contact today:  Discharge plan:   Access to weapons :       Outpatient provider(s):  Patient's preferred phone number for follow up call :   Patient's preferred e-mail address :  Participating treatment team members: Gautam Alcantar, * (assigned SW),

## 2021-01-12 NOTE — DISCHARGE SUMMARY
PSYCHIATRIC DISCHARGE SUMMARY    Patient: Rosa Cornejo MRN: [id number]  SSN: [id number]    YOB: 1965  Age: 63 y.o. Sex: male        Date of Admission: 1/10/2021  Date of Discharge:1/12/2021      Type of Discharge:  REGULAR     Admission data:  CHIEF COMPLAINT:  \"My drinking got increased. \"     HISTORY OF PRESENT ILLNESS:  The patient is a 65-year-old male who is currently admitted at [address] [place] [address] on a voluntary basis. He is a transfer from [address] [place] [address]. He was there for a few days and was treated for alcohol dependence and shortness of breath. While he was there, Psychiatry was consulted, and he shared that his mood has been low for the past 2 weeks. He has been coping with his emotions by consuming alcohol. He has a prolific history of alcohol dependence. He has been to several different rehab facilities in the past.  He also has been admitted at [facility], but could not recall when his last admission was. He states that he has been drinking since he was an adolescent. His longest sobriety was about 3 years after completing a program in a rehab facility and regularly attending [place] [address] meetings. His blood alcohol level on admission was 310. Urine drug screen is positive for THC. He also uses THC on a regular basis. He states for the last few months since 11/2020, his drinking got increased. He was having shortness of breath in the middle of doing his job, so he went to [address] [place] [address] to get evaluated. He states he has been drinking 10-15 shots a day plus 10 beers. He states that he suffers from major anxiety and depression because he has been unemployed after he suffered a spinal injury at work. He also is in worker's comp. He states he feels alone. He has no support. His only daughter lives in [place]. On admission, he was having passive thoughts of hurting himself but denies that now. He has a history of overdosing in his 25s. He has history of alcohol withdrawal seizures. He denies suicidal ideation, homicidal ideation, auditory or visual hallucination. He states that his main goal is to get into a treatment center.     PAST MEDICAL HISTORY:  See H and P.          Past Medical History:   Diagnosis Date    Alcohol abuse      Diverticulitis      Diverticulitis      Hypertension           Labs: (reviewed/updated 1/12/2021)  Patient Vitals for the past 8 hrs:    BP Temp Pulse Resp SpO2   01/12/21 1212 119/83 98.6 °F (37 °C) 88 16 98 %            Labs Reviewed   CBC WITH AUTOMATED DIFF - Abnormal; Notable for the following components:       Result Value      WBC 3.0 (*)       RBC 3.73 (*)       HCT 36.3 (*)       MCH 35.4 (*)       PLATELET 884 (*)       MONOCYTES 18 (*)       IMMATURE GRANULOCYTES 3 (*)       ABS. NEUTROPHILS 1.6 (*)       ABS. IMM. GRANS. 0.1 (*)       All other components within normal limits   METABOLIC PANEL, BASIC - Abnormal; Notable for the following components:     Anion gap 4 (*)       Glucose 159 (*)       BUN/Creatinine ratio 7 (*)       All other components within normal limits   CBC WITH AUTOMATED DIFF - Abnormal; Notable for the following components:     WBC 3.5 (*)       RBC 3.89 (*)       MCH 36.0 (*)       MONOCYTES 23 (*)       IMMATURE GRANULOCYTES 3 (*)       ABS. NEUTROPHILS 1.6 (*)       ABS. IMM.  GRANS. 0.1 (*)       All other components within normal limits   LIPID PANEL - Abnormal; Notable for the following components:     Triglyceride 223 (*)       All other components within normal limits   GLUCOSE, POC - Abnormal; Notable for the following components:     Glucose (POC) 189 (*)       All other components within normal limits   GLUCOSE, POC - Abnormal; Notable for the following components:     Glucose (POC) 169 (*)       All other components within normal limits   GLUCOSE, POC - Abnormal; Notable for the following components:     Glucose (POC) 205 (*)       All other components within normal limits   GLUCOSE, POC - Abnormal; Notable for the following components:     Glucose (POC) 221 (*)       All other components within normal limits   GLUCOSE, POC - Abnormal; Notable for the following components:     Glucose (POC) 147 (*)       All other components within normal limits            Lab Results   Component Value Date/Time     Sodium 137 01/11/2021 05:53 AM     Potassium 3.7 01/11/2021 05:53 AM     Chloride 104 01/11/2021 05:53 AM     CO2 29 01/11/2021 05:53 AM     Anion gap 4 (L) 01/11/2021 05:53 AM     Glucose 159 (H) 01/11/2021 05:53 AM     BUN 6 01/11/2021 05:53 AM     Creatinine 0.92 01/11/2021 05:53 AM     BUN/Creatinine ratio 7 (L) 01/11/2021 05:53 AM     GFR est AA >60 01/11/2021 05:53 AM     GFR est non-AA >60 01/11/2021 05:53 AM     Calcium 9.2 01/11/2021 05:53 AM     Bilirubin, total 1.7 (H) 01/08/2021 03:28 AM     Alk.  phosphatase 55 01/08/2021 03:28 AM     Protein, total 5.7 (L) 01/08/2021 03:28 AM     Albumin 3.0 (L) 01/08/2021 03:28 AM     Globulin 2.7 01/08/2021 03:28 AM     A-G Ratio 1.1 01/08/2021 03:28 AM     ALT (SGPT) 30 01/08/2021 03:28 AM              Admission on 01/10/2021, Discharged on 01/12/2021   Component Date Value Ref Range Status    WBC 01/11/2021 3.0* 4.1 - 11.1 K/uL Final    RBC 01/11/2021 3.73* 4.10 - 5.70 M/uL Final    HGB 01/11/2021 13.2  12.1 - 17.0 g/dL Final    HCT 01/11/2021 36.3* 36.6 - 50.3 % Final    MCV 01/11/2021 97.3  80.0 - 99.0 FL Final    MCH 01/11/2021 35.4* 26.0 - 34.0 PG Final    MCHC 01/11/2021 36.4  30.0 - 36.5 g/dL Final    RDW 01/11/2021 12.3  11.5 - 14.5 % Final    PLATELET 27/11/4209 815* 150 - 400 K/uL Final    MPV 01/11/2021 8.9  8.9 - 12.9 FL Final    NRBC 01/11/2021 0.0  0  WBC Final    ABSOLUTE NRBC 01/11/2021 0.00  0.00 - 0.01 K/uL Final    NEUTROPHILS 01/11/2021 52  32 - 75 % Final    LYMPHOCYTES 01/11/2021 26  12 - 49 % Final    MONOCYTES 01/11/2021 18* 5 - 13 % Final    EOSINOPHILS 01/11/2021 0  0 - 7 % Final    BASOPHILS 01/11/2021 1  0 - 1 % Final    IMMATURE GRANULOCYTES 01/11/2021 3* 0.0 - 0.5 % Final    ABS. NEUTROPHILS 01/11/2021 1.6* 1.8 - 8.0 K/UL Final    ABS. LYMPHOCYTES 01/11/2021 0.8  0.8 - 3.5 K/UL Final    ABS. MONOCYTES 01/11/2021 0.5  0.0 - 1.0 K/UL Final    ABS. EOSINOPHILS 01/11/2021 0.0  0.0 - 0.4 K/UL Final    ABS. BASOPHILS 01/11/2021 0.0  0.0 - 0.1 K/UL Final    ABS. IMM.  GRANS. 01/11/2021 0.1* 0.00 - 0.04 K/UL Final    DF 01/11/2021 SMEAR SCANNED    Final    RBC COMMENTS 01/11/2021 NORMOCYTIC, NORMOCHROMIC    Final    Sodium 01/11/2021 137  136 - 145 mmol/L Final    Potassium 01/11/2021 3.7  3.5 - 5.1 mmol/L Final    Chloride 01/11/2021 104  97 - 108 mmol/L Final    CO2 01/11/2021 29  21 - 32 mmol/L Final    Anion gap 01/11/2021 4* 5 - 15 mmol/L Final    Glucose 01/11/2021 159* 65 - 100 mg/dL Final    BUN 01/11/2021 6  6 - 20 MG/DL Final    Creatinine 01/11/2021 0.92  0.70 - 1.30 MG/DL Final    BUN/Creatinine ratio 01/11/2021 7* 12 - 20   Final    GFR est AA 01/11/2021 >60  >60 ml/min/1.73m2 Final    GFR est non-AA 01/11/2021 >60  >60 ml/min/1.73m2 Final    Calcium 01/11/2021 9.2  8.5 - 10.1 MG/DL Final    Glucose (POC) 01/11/2021 189* 65 - 100 mg/dL Final    Performed by 01/11/2021 Ronen Quiñones    Final    Glucose (POC) 01/11/2021 169* 65 - 100 mg/dL Final    Performed by 01/11/2021 hospitals    Final    Glucose (POC) 01/11/2021 205* 65 - 100 mg/dL Final    Performed by 01/11/2021 hospitals    Final    WBC 01/12/2021 3.5* 4.1 - 11.1 K/uL Final    RBC 01/12/2021 3.89* 4.10 - 5.70 M/uL Final    HGB 01/12/2021 14.0  12.1 - 17.0 g/dL Final    HCT 01/12/2021 38.5  36.6 - 50.3 % Final    MCV 01/12/2021 99.0  80.0 - 99.0 FL Final    MCH 01/12/2021 36.0* 26.0 - 34.0 PG Final    MCHC 01/12/2021 36.4  30.0 - 36.5 g/dL Final    RDW 01/12/2021 12.2  11.5 - 14.5 % Final    PLATELET 94/22/1558 541  150 - 400 K/uL Final    MPV 01/12/2021 9.8  8.9 - 12.9 FL Final    NRBC 01/12/2021 0.0  0  WBC Final    ABSOLUTE NRBC 01/12/2021 0.00  0.00 - 0.01 K/uL Final    NEUTROPHILS 01/12/2021 45  32 - 75 % Final    LYMPHOCYTES 01/12/2021 28  12 - 49 % Final    MONOCYTES 01/12/2021 23* 5 - 13 % Final    EOSINOPHILS 01/12/2021 0  0 - 7 % Final    BASOPHILS 01/12/2021 1  0 - 1 % Final    IMMATURE GRANULOCYTES 01/12/2021 3* 0.0 - 0.5 % Final    ABS. NEUTROPHILS 01/12/2021 1.6* 1.8 - 8.0 K/UL Final    ABS. LYMPHOCYTES 01/12/2021 1.0  0.8 - 3.5 K/UL Final    ABS. MONOCYTES 01/12/2021 0.8  0.0 - 1.0 K/UL Final    ABS. EOSINOPHILS 01/12/2021 0.0  0.0 - 0.4 K/UL Final    ABS. BASOPHILS 01/12/2021 0.0  0.0 - 0.1 K/UL Final    ABS. IMM.  GRANS. 01/12/2021 0.1* 0.00 - 0.04 K/UL Final    DF 01/12/2021 SMEAR SCANNED    Final    RBC COMMENTS 01/12/2021 NORMOCYTIC, NORMOCHROMIC    Final    LIPID PROFILE 01/12/2021        Final    Cholesterol, total 01/12/2021 171  <200 MG/DL Final    Triglyceride 01/12/2021 223* <150 MG/DL Final    HDL Cholesterol 01/12/2021 40  MG/DL Final    LDL, calculated 01/12/2021 86.4  0 - 100 MG/DL Final    VLDL, calculated 01/12/2021 44.6  MG/DL Final    CHOL/HDL Ratio 01/12/2021 4.3  0.0 - 5.0   Final    Glucose (POC) 01/12/2021 221* 65 - 100 mg/dL Final    Performed by 01/12/2021 LISSA GASTELUM (PCT)    Final    Glucose (POC) 01/12/2021 147* 65 - 100 mg/dL Final    Performed by 01/12/2021 LISSA GASTELUM (PCT)    Final             Vitals:     01/11/21 1630 01/11/21 2020 01/12/21 0846 01/12/21 1212   BP: 115/76 128/89 (!) 138/97 119/83   Pulse: 93 (!) 107 (!) 113 88   Resp: 20 18 16 16   Temp: 98.8 °F (37.1 °C) 98.6 °F (37 °C) 98.6 °F (37 °C) 98.6 °F (37 °C)   SpO2:   98% 97% 98%   Weight:           Height:              Recent Results         Recent Results (from the past 24 hour(s))   GLUCOSE, POC     Collection Time: 01/11/21  8:26 PM   Result Value Ref Range     Glucose (POC) 205 (H) 65 - 100 mg/dL     Performed by Supriya Bruce   CBC WITH AUTOMATED DIFF     Collection Time: 01/12/21  6:00 AM   Result Value Ref Range     WBC 3.5 (L) 4.1 - 11.1 K/uL     RBC 3.89 (L) 4.10 - 5.70 M/uL     HGB 14.0 12.1 - 17.0 g/dL     HCT 38.5 36.6 - 50.3 %     MCV 99.0 80.0 - 99.0 FL     MCH 36.0 (H) 26.0 - 34.0 PG     MCHC 36.4 30.0 - 36.5 g/dL     RDW 12.2 11.5 - 14.5 %     PLATELET 683 702 - 488 K/uL     MPV 9.8 8.9 - 12.9 FL     NRBC 0.0 0  WBC     ABSOLUTE NRBC 0.00 0.00 - 0.01 K/uL     NEUTROPHILS 45 32 - 75 %     LYMPHOCYTES 28 12 - 49 %     MONOCYTES 23 (H) 5 - 13 %     EOSINOPHILS 0 0 - 7 %     BASOPHILS 1 0 - 1 %     IMMATURE GRANULOCYTES 3 (H) 0.0 - 0.5 %     ABS. NEUTROPHILS 1.6 (L) 1.8 - 8.0 K/UL     ABS. LYMPHOCYTES 1.0 0.8 - 3.5 K/UL     ABS. MONOCYTES 0.8 0.0 - 1.0 K/UL     ABS. EOSINOPHILS 0.0 0.0 - 0.4 K/UL     ABS. BASOPHILS 0.0 0.0 - 0.1 K/UL     ABS. IMM. GRANS. 0.1 (H) 0.00 - 0.04 K/UL     DF SMEAR SCANNED       RBC COMMENTS NORMOCYTIC, NORMOCHROMIC     LIPID PANEL     Collection Time: 01/12/21  6:00 AM   Result Value Ref Range     LIPID PROFILE           Cholesterol, total 171 <200 MG/DL     Triglyceride 223 (H) <150 MG/DL     HDL Cholesterol 40 MG/DL     LDL, calculated 86.4 0 - 100 MG/DL     VLDL, calculated 44.6 MG/DL     CHOL/HDL Ratio 4.3 0.0 - 5.0     GLUCOSE, POC     Collection Time: 01/12/21  8:35 AM   Result Value Ref Range     Glucose (POC) 221 (H) 65 - 100 mg/dL     Performed by Fela Khan (PCT)     GLUCOSE, POC     Collection Time: 01/12/21 11:41 AM   Result Value Ref Range     Glucose (POC) 147 (H) 65 - 100 mg/dL     Performed by Fela Khan (PCT)              RADIOLOGY REPORTS:       Results from Hospital Encounter encounter on 01/06/21   XR CHEST PORT     Narrative EXAM:  XR CHEST PORT     INDICATION:   sob     COMPARISON: Chest radiograph 9/2/2017.     FINDINGS: AP radiograph of the chest was obtained.     No evidence of focal consolidation. Mild bibasilar atelectasis. No pleural  effusion or pneumothorax. Heart, darnell, mediastinum are within normal limits. No  acute osseous abnormalities.        Impression IMPRESSION: No acute cardiopulmonary process radiographically. Mild bibasilar  atelectasis.      Xr Chest Port     Result Date: 1/6/2021  EXAM:  XR CHEST PORT INDICATION:   sob COMPARISON: Chest radiograph 9/2/2017. FINDINGS: AP radiograph of the chest was obtained. No evidence of focal consolidation. Mild bibasilar atelectasis. No pleural effusion or pneumothorax. Heart, darnell, mediastinum are within normal limits. No acute osseous abnormalities.      IMPRESSION: No acute cardiopulmonary process radiographically. Mild bibasilar atelectasis.          PAST PSYCHIATRIC HISTORY:  He is currently not seeing any psychiatrist.  He has been admitted to different rehab facilities for his alcohol dependence. He has been admitted to [facility] in the past.  He is currently not taking any psychotropic medications.     PSYCHOSOCIAL HISTORY:  He is . He was  twice. He has an adult daughter who lives in [place]. He is currently unemployed. He is in the middle of worker's compensation from a spinal injury back in 11/2020. He lives by himself.     MENTAL STATUS EXAM:  He is alert and oriented in all spheres. He is dressed in street clothes. He reports his mood is okay. Affect is mildly constricted. Speech:  Normal rate and rhythm. Thought process:  Logical and goal directed. He denies suicidal ideation, homicidal ideation, auditory or visual hallucination. Memory seems intact. Intelligence seems average. Insight is partial.  Judgment is fair.     DIAGNOSES:  Substance-induced mood disorder. Alcohol use disorder, severe. Cannabis use disorder, severe. Hospital Course:    Patient was admitted to the Psychiatric services for acute psychiatric stabilization in regards to symptomatology as described in the HPI above and placed on Q15 minute checks and suicide and withdrawal precautions.  He was started back on his usual medication regimen as well as PRN medications including metformin. He was started on metoprolol, atarax, lidocaine patch, and lisinopril. He was placed on detox per protocol. While on the unit Shahana Godinez was involved in individual, group, occupational and milieu therapy. He improved gradually and was able to integrate into the milieu with help from the nursing staff. Patients symptoms improved gradually including withdrawal symptoms, depressed mood, anxiety, si. He was appropriate in his interactions, and cooperative with medications and the unit routine. Please see individual progress notes for more specific details regarding patient's hospitalization course. Patient was discharged as per the plan. He had been doing well on the unit as per the report of the nursing staff and my observations. No PRN medication for agitation, seclusion or restraints were required during the last 48 hours of his stay. Shahana Godinez had improved progressively to the point of being stable for discharge and outpatient FU. At this time he did not offer any complaints. Patient denied any SI or HI. Denied any AH or VH. He denied any delusions. Was not considered a danger to self or to others and is safe for discharge. Will FU with his appointments and remains motivated to be in treatment. The patient verbalized understanding of his discharge instructions. Some parts of the discharge summary are from the initial Psychiatric interview that was done on admission by the admitting psychiatrist.       Allergies:(reviewed/updated 1/12/2021)  No Known Allergies    Side Effects: (reviewed/updated 1/12/2021)  None reported or admitted to.     Vital Signs:  Patient Vitals for the past 24 hrs:   Temp Pulse Resp BP SpO2   01/12/21 1212 98.6 °F (37 °C) 88 16 119/83 98 %   01/12/21 0846 98.6 °F (37 °C) (!) 113 16 (!) 138/97 97 %   01/11/21 2020 98.6 °F (37 °C) (!) 107 18 128/89 98 %     Wt Readings from Last 3 Encounters:   01/10/21 99.8 kg (220 lb)   01/06/21 99.8 kg (220 lb)   09/02/17 99.8 kg (220 lb)     Temp Readings from Last 3 Encounters:   01/12/21 98.6 °F (37 °C)   01/10/21 99.1 °F (37.3 °C)   09/07/17 98 °F (36.7 °C)     BP Readings from Last 3 Encounters:   01/12/21 119/83   01/10/21 (!) 142/93   09/07/17 167/89     Pulse Readings from Last 3 Encounters:   01/12/21 88   01/10/21 (!) 112   09/07/17 97       Labs: (reviewed/updated 1/12/2021)  Recent Results (from the past 24 hour(s))   GLUCOSE, POC    Collection Time: 01/11/21  8:26 PM   Result Value Ref Range    Glucose (POC) 205 (H) 65 - 100 mg/dL    Performed by Petra Bello    CBC WITH AUTOMATED DIFF    Collection Time: 01/12/21  6:00 AM   Result Value Ref Range    WBC 3.5 (L) 4.1 - 11.1 K/uL    RBC 3.89 (L) 4.10 - 5.70 M/uL    HGB 14.0 12.1 - 17.0 g/dL    HCT 38.5 36.6 - 50.3 %    MCV 99.0 80.0 - 99.0 FL    MCH 36.0 (H) 26.0 - 34.0 PG    MCHC 36.4 30.0 - 36.5 g/dL    RDW 12.2 11.5 - 14.5 %    PLATELET 492 105 - 635 K/uL    MPV 9.8 8.9 - 12.9 FL    NRBC 0.0 0  WBC    ABSOLUTE NRBC 0.00 0.00 - 0.01 K/uL    NEUTROPHILS 45 32 - 75 %    LYMPHOCYTES 28 12 - 49 %    MONOCYTES 23 (H) 5 - 13 %    EOSINOPHILS 0 0 - 7 %    BASOPHILS 1 0 - 1 %    IMMATURE GRANULOCYTES 3 (H) 0.0 - 0.5 %    ABS. NEUTROPHILS 1.6 (L) 1.8 - 8.0 K/UL    ABS. LYMPHOCYTES 1.0 0.8 - 3.5 K/UL    ABS. MONOCYTES 0.8 0.0 - 1.0 K/UL    ABS. EOSINOPHILS 0.0 0.0 - 0.4 K/UL    ABS. BASOPHILS 0.0 0.0 - 0.1 K/UL    ABS. IMM.  GRANS. 0.1 (H) 0.00 - 0.04 K/UL    DF SMEAR SCANNED      RBC COMMENTS NORMOCYTIC, NORMOCHROMIC     LIPID PANEL    Collection Time: 01/12/21  6:00 AM   Result Value Ref Range    LIPID PROFILE          Cholesterol, total 171 <200 MG/DL    Triglyceride 223 (H) <150 MG/DL    HDL Cholesterol 40 MG/DL    LDL, calculated 86.4 0 - 100 MG/DL    VLDL, calculated 44.6 MG/DL    CHOL/HDL Ratio 4.3 0.0 - 5.0     GLUCOSE, POC    Collection Time: 01/12/21  8:35 AM   Result Value Ref Range    Glucose (POC) 221 (H) 65 - 100 mg/dL    Performed by Amy Hoffman (PCT)    GLUCOSE, POC    Collection Time: 01/12/21 11:41 AM   Result Value Ref Range    Glucose (POC) 147 (H) 65 - 100 mg/dL    Performed by Amy Hoffman (PCT)      No results found for: VALF2, VALAC, VALP, VALPR, DS6, CRBAM, CRBAMP, CARB2, XCRBAM  No results found for: [facility]    Radiology (reviewed/updated 1/12/2021)  Xr Chest Port    Result Date: 1/6/2021  EXAM:  XR CHEST PORT INDICATION:   sob COMPARISON: Chest radiograph 9/2/2017. FINDINGS: AP radiograph of the chest was obtained. No evidence of focal consolidation. Mild bibasilar atelectasis. No pleural effusion or pneumothorax. Heart, darnell, mediastinum are within normal limits. No acute osseous abnormalities. IMPRESSION: No acute cardiopulmonary process radiographically. Mild bibasilar atelectasis. Mental Status Exam on Discharge:  General appearance:   Rosa Cornejo is a 63 y.o. WHITE [place]  male who is well groomed, psychomotor activity is WNL  Eye contact: makes good eye contact  Speech: Spontaneous and coherent  Affect : Euthymic  Mood: \"OK\"  Thought Process: Logical, goal directed  Perception: Denies any AH or VH. Thought Content: Denies any SI or Plan  Insight: Partial  Judgement: Fair  Cognition: Intact grossly. Discharge Diagnoses:  Substance-induced mood disorder. Alcohol use disorder, severe. Cannabis use disorder, severe. Discharge Medication List as of 1/12/2021  1:53 PM      START taking these medications    Details   hydrOXYzine HCL (ATARAX) 50 mg tablet Take 2 Tabs by mouth every six (6) hours as needed for Anxiety. Indications: anxious, Print, Disp-30 Tab, R-0      lidocaine 4 % patch Apply to back  Indications: nerve pain after herpes, Print, Disp-30 Patch, R-0      lisinopriL (PRINIVIL, ZESTRIL) 20 mg tablet Take 1 Tab by mouth daily.  Indications: high blood pressure, Print, Disp-30 Tab, R-0      metoprolol tartrate (LOPRESSOR) 50 mg tablet Take 1 Tab by mouth two (2) times a day. Indications: high blood pressure, Print, Disp-60 Tab, R-0         CONTINUE these medications which have NOT CHANGED    Details   metFORMIN (GLUCOPHAGE) 500 mg tablet Take 1 Tab by mouth two (2) times daily (with meals). , No Print, Disp-60 Tab, R-0      therapeutic multivitamin (THERAGRAN) tablet Take 1 Tab by mouth daily. , No Print, Disp-30 Tab, R-0         STOP taking these medications       glipiZIDE (GLUCOTROL) 5 mg tablet Comments:   Reason for Stopping: Follow-up Information     Follow up With Specialties Details Why Contact Info    [facility]: [facility]  Go on 1/14/2021 9:30 IN-PERSON Appt for your initial assessment. Program Hours: M-TH 9am-3pm and Fridays 9am-12pm [place] [id number]  [facility] [id number]        [address]  Office: [phone]   Fax: [phone]        WOUND CARE: none needed. Prognosis:   Good / Lam Aj based on nature of patient's pathology/ies and treatment compliance issues. Prognosis is greatly dependent upon patient's ability to  follow up on psychiatric/psychotherapy appointments as well as to comply with psychiatric medications as prescribed. I certify that this patients inpatient psychiatric hospital services furnished since the previous certification were, and continue to be, required for treatment that could reasonably be expected to improve the patient's condition, or for diagnostic study, and that the patient continues to need, on a daily basis, active treatment furnished directly by or requiring the supervision of inpatient psychiatric facility personnel. In addition, the hospital records show that services furnished were intensive treatment services, admission or related services, or equivalent services.      Signed:  Kelechi Murdokc NP  1/12/2021

## 2021-01-12 NOTE — PROGRESS NOTES
Pt resting in bed comfortably, appears asleep. No distress noted. Respirations WNL. Will continue to monitor q15 for safety. Problem: Falls - Risk of  Goal: *Absence of Falls  Description: Document Nichelle Metzger Fall Risk and appropriate interventions in the flowsheet.   Outcome: Progressing Towards Goal  Note: Fall Risk Interventions:  Mobility Interventions: Bed/chair exit alarm         Medication Interventions: Teach patient to arise slowly    Elimination Interventions: Bed/chair exit alarm

## 2021-01-12 NOTE — BH NOTES
GALO THERAPY PROGRESS NOTE    Patient is participating in Process Group. Group time: 50 minutes    Personal goal for participation: Removing negative thoughts from your life    Goal orientation: Personal    Group therapy participation: active    Therapeutic interventions reviewed and discussed: Group discussion around negative thoughts and how to remove them from an individuals life. Group members were given a piece of paper that they cut up into sections. On these pieces, patients wrote down negative thoughts or negative things people have told them. They then ripped the sheet of paper and threw it in the trash and explained why that isnt true. Group discussion involved other ways to remove these thoughts: distraction, expressing gratitude, labeling your thoughts, and changing your relationship with your brain. Patients completed a toot your own horn worksheet filling in positive affirmations. To wrap up group, members were encouraged to say two positive things about themselves, and one compliment when hey look in the mirror. Impression of participation: Daniel Berger actively participated in group. Patient shared that his negative thought is around his alcoholism. Patient shared his \"functioning\" alcoholic characteristics, and became tearful when talking about it. Calm and pleasant in group. Engaged in conversation. Supportive of other group members.      Chaka Arteaga, Supervisee in Social Work

## 2021-01-12 NOTE — SUICIDE SAFETY PLAN
SAFETY PLAN    A suicide Safety Plan is a document that supports someone when they are having thoughts of suicide. Warning Signs that indicate a suicidal crisis may be developing: What (situations, thoughts, feelings, body sensations, behaviors, etc.) do you experience that lets you know you are beginning to think about suicide? 1. Feeling overwhelmed  2. Feeling impulsive  3. Drinking alcohol    Internal Coping Strategies:  What things can I do (relaxation techniques, hobbies, physical activities, etc.) to take my mind off my problems without contacting another person? 1. Going outside  2. Staying active with tasks at home  3. Music or tv    People and social settings that provide distraction: Who can I call or where can I go to distract me? 1. Name: children, Gray Franco, brother and friends  Phone: numbers are programed into your phone  2. Place: children's home and visiting friends    People whom I can ask for help: Who can I call when I need help - for example, friends, family, clergy, someone else? 1. Name: Gray Franco and Gil Jaffe or [facility] agencies I can contact during a crisis: Who can I call for help - for example, my doctor, my psychiatrist, my psychologist, a mental health provider, a suicide hotline? 1. Clinician Name: [facility]   Phone: [phone]        3. Suicide Prevention Lifeline: 9-517-122-TALK (7141)    4. [address] [place] [address] Emergency Services -  for example, [facility] suicide hotline, [facility] Hotline: 211      Emergency Services Address: [address] [place] [address]     Emergency Services Phone: [phone]    Making the environment safe: How can I make my environment (house/apartment/living space) safer? For example, can I remove guns, medications, and other items? 1. Drug and alcohol free home  2.  Share with my support system your safety plan, local crisis number and contact information of staff at [facility]

## 2021-01-12 NOTE — CONSULTS
[address]    Name:  Caitlin Powell  MR#:  [id number]  :  1965  ACCOUNT #:  [de-identified]  DATE OF SERVICE:  2021      HEMATOLOGY/ONCOLOGY CONSULT NOTE    REASON FOR ADMISSION:  Involuntary admission for suicidal ideation. REASON FOR CONSULTATION:  Leukopenia with a white count of 3.5. HISTORY OF PRESENT ILLNESS:  The patient is a 48-year-old man with a history of alcohol addiction. He explains that prior to last Thursday, he was drinking 15-20 shots of bourbon per day along with 10-15 beers. He presented to [facility] and underwent detox, and then he felt that he was obtaining admission to an alcohol rehab center, and therefore, explained he wanted to harm himself. He has now been transferred to [facility], and he has been involuntarily admitted and he is frustrated that this hospital does not provide alcohol rehab services. He had lab testing done on admission that showed a white count of 3.5 along with an increased monocyte count, and our service was asked to comment on this leukopenia. On review of his chart, in 2017, he had a CT scan of his abdomen that revealed fatty infiltration of his liver consistent with steatohepatitis. He denies any fevers, chills, night sweats, enlarged lymph nodes in his neck, underarm or groin. He opted not to wear a mask during our interview. He explained he would prefer that several of his medical problems be addressed while in the hospital as it would be more convenient for him explaining to me that he had an appointment with Dr. Yesenia Stout from Pulmonary to discuss ground-glass opacity seen on an image. His appointment with Dr. Yesenia Stout was yesterday, but he was unable to make it. He also explained he has hypertension, type 2 diabetes and COPD and he has now been offered an inhaler here.   He explains he does not have a primary care doctor, but in the remote past, was on lisinopril for his blood pressure. ALLERGIES:  NONE. MEDICATIONS:  None. SOCIAL HISTORY:  He explains he lives on [place] acres in [facility] [place] [facility] by himself. He is not a vegan or vegetarian. He explains he drinks anywhere from 25-35 drinks per day, 15-20 of which were shots of bourbon. FAMILY HISTORY:  Father  from an addiction following narcotic pill use after below the knee amputation. Mother  in 62s of lung cancer. He has two siblings, a brother and sister. Brother has type 2 diabetes. REVIEW OF SYSTEMS:  GENERAL:  No fevers or chills. HEENT:  No auditory or visual change. LYMPHATIC:  No lumps or bumps in neck, underarm, or groin. CARDIOVASCULAR:  No chest pain or palpitations. PULMONARY:  He admits to cough and shortness of breath on exertion. GASTROINTESTINAL:  No abdominal pain, diarrhea, or constipation. GENITOURINARY:  No dysuria or incontinence. SKIN:  No rash or changing mole. MUSCULOSKELETAL:  No red or swollen joints. NEUROPSYCHIATRIC:  No irritability or syncope. OBJECTIVE:  GENERAL:  Pleasant, in no acute distress. VITAL SIGNS:  /78, pulse 72. HEENT:  Sclerae anicteric. Oropharynx clear. NECK:  Supple without lymphadenopathy or thyromegaly. HEART:  Regular rate and rhythm, without murmur, rub, or gallop. LUNGS:  Clear to auscultation bilaterally. ABDOMEN:  Nontender, nondistended. Normoactive bowel sounds. No hepatosplenomegaly. EXTREMITIES: No clubbing, cyanosis, or edema. PSYCHIATRIC:  The patient asked me to close the door and position myself between himself and the door during the interview, which I politely declined. This made him somewhat frustrated at the completion of our interview and exam.  The patient also opted not to wear mask during our exam, which given his mental state I did not confront him about.     ASSESSMENT AND PLAN:  A 70-year-old man with relatively dramatic alcohol history, now admitted with alcohol detox and suicidal ideation, asked to see for white count of 3.5 in the context of previous imaging showing hepatic steatosis. 1.  Leukopenia in the context of hepatic steatosis:  I told him this is most likely related to alcohol-induced liver dysfunction and I have encouraged him to stop using alcohol. I did not feel he needs further workup, inpatient or outpatient followup outside of his alcohol addiction. Thank you for the consult. We will sign off.       Komal Cardona MD      [id number]  D:  01/12/2021 9:26  T:  01/12/2021 11:27  JOB #:  [id number]

## 2021-01-12 NOTE — BH NOTES
GROUP THERAPY PROGRESS NOTE    Patient is participating in [address] OMGP[facility] group. Group time: 50 minutes    Personal goal for participation: Identify healthy strategies and skills to increase overall wellness. Examine daily habits/routines that help sustain recovery, as well area that are lacking and need to be strengthened by behaviors or resources. Goal orientation: Take personal inventory of habits that are helpful or not towards healthy living and \"staying well. \"    Group therapy participation: active    Therapeutic interventions reviewed and discussed: Pt was given opportunity to complete an 16 item inventory for healthy habits (How well are you prepared to stay well). The goal was for the patient to improve their ability to recognize both the positive and negative aspects of recovery, and to develop goals around which area they would like to see improvement. Impression of participation: Pt actively participated in group. He completed and shared his healthy habits inventory. He had realization that he does a better job taking care of himself than he realized, specifically when it comes to relaxing and exercising. He was surprised to see how high his score was and engaged in conversation about how focusing on the positive vs. allowing himself to be consumed with barriers.

## 2021-01-12 NOTE — PROGRESS NOTES
Problem: Discharge Planning  Goal: *Discharge to safe environment  Outcome: Progressing Towards Goal  Note: Pt is actively involved in creating a safe discharge plan    Goal: *Knowledge of medication management  Outcome: Progressing Towards Goal  Note: Pt verbalizes an understanding of all medications prescribed    Goal: *Knowledge of discharge instructions  Outcome: Progressing Towards Goal  Note: Pt is actively involved in discharge planning

## 2021-01-12 NOTE — CONSULTS
[facility] SPECIALIST CONSULT NOTE    Presentation   Sil Gardner is a 63 y.o. male voluntarily admitted from [facility] to [facility]. He was at [facility] at treated for ETOH dependance and SOB. While there he verbalized feelings of passive thoughts of harming himself. HX:   Past Medical History:   Diagnosis Date    Alcohol abuse     Diverticulitis     Diverticulitis     Hypertension        TX: ETOH withdrawl    Current clinical course has been complicated by new diabetes diagnosis. Diabetes: Patient has new on set Type 2 diabetes. . Family history positive/negative for diabetes. Admission  and A1c 8.1%.l. Consulted by Nursing for advanced diabetes nursing assessment and care, specifically related to   [] Transitioning off [place]   [x] Inpatient management strategy  [] Home management assessment  [x] Survival skill education    Diabetes-related medical history  Acute complications  Intermittent hyperglycemia  Neurological complications  TBD  Microvascular disease  NONE  Macrovascular disease  NONE  Other associated conditions     Depression/HTN/ETOH      Diabetes medication history:NONE  Drug class Currently in use Discontinued Never used   Biguanide Metformin 500mg BID     DDP-4 inhibitor       Sulfonylurea Glipizide  5mg daily     Thiazolidinedione      GLP-1 RA      SGLT-2 inhibitors      Basal insulin      Bolus insulin      Fixed Dose  Combinations        Subjective   Mr. Eugenie Purcell is currently participating in his group therapy at time of visit. Will re-visit him later this afternoon or tomorrow for initial diabetes education.     Patient reports the following home diabetes self-care practices:  Deferred for now  Eating pattern  []  Physical activity pattern-limited to none, spinal injury in 11/2020  Monitoring pattern    Taking medications pattern  [x] Consistent administration  [x] Affordable    Social determinants of health impacting diabetes self-management practices   Struggling with anxiety and/or depression and Concerned that you need to know more about how to stay healthy with diabetes     Objective   Physical exam  General Alert, oriented and in no acute distress/ill-appearing. Conversant and cooperative. Vital Signs   Visit Vitals  BP (!) 138/97   Pulse (!) 113   Temp 98.6 °F (37 °C)   Resp 16   Ht 6' 2\" (1.88 m)   Wt 99.8 kg (220 lb)   SpO2 97%   BMI 28.25 kg/m²     Skin  Warm and dry. Heart   Regular rate and rhythm.  No murmurs, rubs or gallops  Lungs  Clear to auscultation without rales or rhonchi  Extremities No foot wounds    Diabetic foot exam:  Deferred for today        Laboratory  BMP: No results found for: NA, K, CL, CO2, AGAP, GLU, BUN, CREA, GFRAA, GFRNA ; reviewed lipase, triglyceride levels, and GFR- >60    Factors affecting BG management  Factor Dose Comments   Nutrition:  Carb-controlled meals     60 grams/meal        Blood glucose pattern        Assessment and Plan   Nursing Diagnosis Risk for unstable blood glucose pattern   Nursing Intervention Domain 5252 Decision-making Support   Nursing Interventions Examined current inpatient diabetes control   Explored factors facilitating and impeding inpatient management  Identified self-management practices impeding diabetes control  Explored corrective strategies with patient and responsible inpatient provider   Informed patient of rational for insulin strategy while hospitalized  Instructed patient in  Diabetes Education Checklist (will cover this either later today or tomorrow)    Pathophysiology  [x] Diabetes basics  [x] Differences between type 1 and type 2    Diagnostic Criteria  [x] Interpretation of Labs  [x] Hemoglobin A1c  [x] Blood glucose goals  Notes: Goal A1C 7%, patients A1C ____  Goal glucose under 200    Blood Glucose Monitoring  [x] Using a glucometer  [x] When to check BG  [x] Record keeping  Notes: Patient simulated obtaining a blood glucose with a home glucometer glucometer. Discussed they will need to obtain a meter, lancets and strips. Patient instructed to obtain a glucose reading before meals and bedtime and if the \"don't feel right\"  Patient to create a log of blood sugar readings and present to their PCP for long term management. Insulin  [x] Long-acting insulin  [x] Rapid-acting insulin  [x] Using insulin pens / vials  [x] Injection sites & site rotation  [x] Proper storage  [x] Insulin expiration guidelines  [x] Sharps disposal  Notes: Discussed the difference between long acting and short acting insulin  Used an insulin pen demo and simulated an insulin injection      Hypoglycemia  [x] Signs & symptoms  [x] Rule of 15 and other treatment  Notes: If patient feels dizzy, light headed or \"woozy\" patient to obtain a blood glucose reading. If blood glucose is under 70, patient instructed to drink 4-6 oz of milk, juice or regular soda then recheck 15 minutes later. If glucose under 70, repeat with another 4-6 oz regular juice/soda/milk. Once glucose over 70, eat a 15 gram snack like 1/2 peanut butter sandwich or meal.      Hyperglycemia  [x] Signs & symptoms      Physical Activity & Exercise  [x] Review of current routine  [x] Impact on BG levels  [x] BG targets for physical activity  [x] When to avoid physical activity  Notes: Patient instructed to increase activity every week once stabilized at home. Activity can consist of walking, chores around his house, garden, cut grass.       Nutrition Basics  [x] Starter tips for meal planning  [x] Meal & snack schedule  [x] Reading food labels  [x] Balanced plate method  [x] How different foods impact BG levels  [x] Carbohydrate food sources  [x] Carbohydrate counting        [x] Low carb meal & snack options  Notes:       Reducing Risks  [x] Long-term complications of diabetes  [x] Health Maintenance  [x] Healthy coping        [] Need for behavioral health counseling  Notes:       Diabetes Technology  [] CGM  [] Insulin pumps  Notes:      Evaluation   Mr. Aly Ferguson with new onset Type 2 diabetes, did not achieve diabetes control prior to admission (PTA), as evidenced by admission BG of 180 and A1c of 8.1%. He voluntarily  is on [address] and being treated for depression symptoms and ETOH abuse. Based on assessment of diabetes self-care practices, interventions that warrant action while hospitalized include:   [x] Healthy Eating   [x] Problem Solving  [] Being Active   [x] Healthy Coping  [x] Monitoring   [x] Reducing Risks  [x] Taking Medications  The patient would also benefit from diabetes self-management education and support [facility] after discharge. During this hospitalization, the patient has achieved inpatient blood glucose target of 100-180mg/dl  With correctional and daily Metformin therapy    BG trends since admission 130-200s. Started on Metformin and correctional insulin once transferred from [facility]. No hypoglycemia events noted. Receiving 1000mg Metformin and minimal correctional insulin. May continue correctional and Metformin while hospitalized. AM BG today >200, which indicates he may need low dose basal insulin as well. Recommendations   1. IF AM BG consistently trend >200mg/dl consider adding low dose basal insulin at 0.1u/kg dosing=10 units daily    2. Continue Metformin and Correctional insulin as ordered    Discharge Planning   1. A1C 8.1%, recommend optimization of Metformin therapy as indicated below:  Metformin  mg: Start with 500 mg Daily; monitor for GI side effects. Side effects should resolve after 5-7 days. If no GI side effects- advance dose by 1 tablet every 5-7 days to a total dose of 1000 mg BID. 2.  Scripts for Relion Glucometer/strips/ and lancets    3. Follow up with PCP for diabetes management follow up    4. On Discharge, please place an order for \"diabetes education\".   This will trigger a referral for the Program for Diabetes [facility] which includes outpatient education with a certified diabetes educator. Billing Code(s)   I personally reviewed medical record, including notes, data and current medications, and examined the patient at bedside before making care recommendations.        [x] 59559 IP subsequent hospital care - 25 minutes    [employer], CNS  Diabetes Clinical Nurse Specialist  [facility]  Access via [address] [place] [address]

## 2021-01-12 NOTE — PROGRESS NOTES
Physical Therapy Screening:  Services are not indicated at this time. [facility] based on results obtained during the nursing admission assessment. The patients chart was reviewed and the patient is not appropriate for a skilled therapy evaluation at this time. Please consult physical therapy if any therapy needs arise. Thank you.     Any Hilliard, PT

## 2021-01-12 NOTE — DISCHARGE INSTRUCTIONS
DISCHARGE SUMMARY    Luis Ramirez  : 1965  MRN: [id number]    The patient Facundo Tan exhibits the ability to control behavior in a less restrictive environment. Patient's level of functioning is improving. No assaultive/destructive behavior has been observed for the past 24 hours. No suicidal/homicidal threat or behavior has been observed for the past 24 hours. There is no evidence of serious medication side effects. Patient has not been in physical or protective restraints for at least the past 24 hours. If weapons involved, how are they secured? No weapons per his brother, Sharon Anguiano    Is patient aware of and in agreement with discharge plan? yes    Arrangements for medication:  Prescriptions sent to pt pharmacy    Copy of discharge instructions to provider?:  Clint Skaggs for transportation home:  [place] all follow up appointments as scheduled, continue to take prescribed medications per physician instructions. CRISIS Information:  911 or [address] [place] [address] DVCH:[phone]     Mental Health Emergency WARM LINE      [phone])      M-F: 9am to 9pm      Sat & Sun: 5pm - 9pm  National suicide prevention lines:                             3-170-XHQLNGP ([phone])       4-328-343-TALK ([phone])    Crisis Text Line:  Text HOME to [address] from Nurse    PATIENT INSTRUCTIONS:      What to do at Home:  Recommended activity: Activity as tolerated,         *  Please give a list of your current medications to your Primary Care Provider. *  Please update this list whenever your medications are discontinued, doses are      changed, or new medications (including over-the-counter products) are added. *  Please carry medication information at all times in case of emergency situations.     These are general instructions for a healthy lifestyle:    No smoking/ No tobacco products/ Avoid exposure to second hand smoke  Surgeon General's Warning:  Quitting smoking now greatly reduces serious risk to your health. Obesity, smoking, and sedentary lifestyle greatly increases your risk for illness    A healthy diet, regular physical exercise & weight monitoring are important for maintaining a healthy lifestyle    You may be retaining fluid if you have a history of heart failure or if you experience any of the following symptoms:  Weight gain of 3 pounds or more overnight or 5 pounds in a week, increased swelling in our hands or feet or shortness of breath while lying flat in bed. Please call your doctor as soon as you notice any of these symptoms; do not wait until your next office visit. The discharge information has been reviewed with the patient. The patient verbalized understanding. Discharge medications reviewed with the patient and appropriate educational materials and side effects teaching were provided.   ___________________________________________________________________________________________________________________________________

## 2021-01-12 NOTE — PROGRESS NOTES
Problem: Diabetes Self-Management  Goal: *Monitoring blood glucose, interpreting and using results  Description: Identify recommended blood glucose targets  and personal targets. Outcome: Progressing Towards Goal     Problem: Depressed Mood (Adult/Pediatric)  Goal: *STG: Participates in treatment plan  Outcome: Progressing Towards Goal  Note: Out on unit engaged and social. Mood and affect anxious. Denies SI, daily goal is to talk with tx team about d/c plans.  Staff focus is on offering support and redirection  Goal: *STG: Attends activities and groups  Outcome: Progressing Towards Goal  Goal: *STG: Demonstrates reduction in symptoms and increase in insight into coping skills/future focused  Outcome: Progressing Towards Goal  Goal: Interventions  Outcome: Progressing Towards Goal     Problem: Alcohol Withdrawal  Goal: *STG: Seeks staff when symptoms of withdrawal increase  Outcome: Progressing Towards Goal  Goal: *STG: Will identify negative impact of chemical dependency including the use of tobacco, alcohol, and other substances  Outcome: Progressing Towards Goal  Goal: *STG: Agrees to participate in outpatient after care program to support ongoing mental health  Outcome: Progressing Towards Goal  Goal: Interventions  Outcome: Progressing Towards Goal

## 2021-01-12 NOTE — BH NOTES
PRN Medication Documentation    Specific patient behavior that led to need for PRN medication: increasing anxiety  Staff interventions attempted prior to PRN being given: distraction, group  PRN medication given: 100 mg Atarax PO  Patient response/effectiveness of PRN medication: 1016:  Patient participated in group, no complaints of anxiety

## 2021-01-17 ENCOUNTER — HOSPITAL ENCOUNTER (EMERGENCY)
Age: 56
Discharge: HOME | End: 2021-01-17
Attending: EMERGENCY MEDICINE
Payer: MEDICAID

## 2021-01-17 ENCOUNTER — APPOINTMENT (OUTPATIENT)
Dept: CT IMAGING | Age: 56
End: 2021-01-17
Attending: EMERGENCY MEDICINE
Payer: MEDICAID

## 2021-01-17 VITALS
DIASTOLIC BLOOD PRESSURE: 79 MMHG | RESPIRATION RATE: 16 BRPM | TEMPERATURE: 98.5 F | WEIGHT: 220 LBS | HEIGHT: 74 IN | OXYGEN SATURATION: 99 % | HEART RATE: 87 BPM | SYSTOLIC BLOOD PRESSURE: 119 MMHG | BODY MASS INDEX: 28.23 KG/M2

## 2021-01-17 DIAGNOSIS — K29.00 ACUTE GASTRITIS WITHOUT HEMORRHAGE, UNSPECIFIED GASTRITIS TYPE: Primary | ICD-10-CM

## 2021-01-17 LAB
ALBUMIN SERPL-MCNC: 3.6 G/DL (ref 3.5–5)
ALBUMIN/GLOB SERPL: 1 {RATIO} (ref 1.1–2.2)
ALP SERPL-CCNC: 58 U/L (ref 45–117)
ALT SERPL-CCNC: 41 U/L (ref 12–78)
AMPHET UR QL SCN: NEGATIVE
ANION GAP SERPL CALC-SCNC: 5 MMOL/L (ref 5–15)
APPEARANCE UR: CLEAR
APTT PPP: 23.2 SEC (ref 22.1–31)
AST SERPL-CCNC: 26 U/L (ref 15–37)
BACTERIA URNS QL MICRO: ABNORMAL /HPF
BARBITURATES UR QL SCN: POSITIVE
BASOPHILS # BLD: 0.1 K/UL (ref 0–0.1)
BASOPHILS NFR BLD: 1 % (ref 0–1)
BENZODIAZ UR QL: NEGATIVE
BILIRUB SERPL-MCNC: 1.2 MG/DL (ref 0.2–1)
BILIRUB UR QL CFM: POSITIVE
BUN SERPL-MCNC: 9 MG/DL (ref 6–20)
BUN/CREAT SERPL: 7 (ref 12–20)
CALCIUM SERPL-MCNC: 9.8 MG/DL (ref 8.5–10.1)
CANNABINOIDS UR QL SCN: POSITIVE
CHLORIDE SERPL-SCNC: 99 MMOL/L (ref 97–108)
CO2 SERPL-SCNC: 30 MMOL/L (ref 21–32)
COCAINE UR QL SCN: NEGATIVE
COLOR UR: ABNORMAL
CREAT SERPL-MCNC: 1.24 MG/DL (ref 0.7–1.3)
DIFFERENTIAL METHOD BLD: ABNORMAL
DRUG SCRN COMMENT,DRGCM: ABNORMAL
EOSINOPHIL # BLD: 0.1 K/UL (ref 0–0.4)
EOSINOPHIL NFR BLD: 1 % (ref 0–7)
EPITH CASTS URNS QL MICRO: ABNORMAL /LPF
ERYTHROCYTE [DISTWIDTH] IN BLOOD BY AUTOMATED COUNT: 12 % (ref 11.5–14.5)
ETHANOL SERPL-MCNC: <10 MG/DL
GLOBULIN SER CALC-MCNC: 3.6 G/DL (ref 2–4)
GLUCOSE SERPL-MCNC: 163 MG/DL (ref 65–100)
GLUCOSE UR STRIP.AUTO-MCNC: 100 MG/DL
HCT VFR BLD AUTO: 37.1 % (ref 36.6–50.3)
HGB BLD-MCNC: 13.7 G/DL (ref 12.1–17)
HGB UR QL STRIP: NEGATIVE
HYALINE CASTS URNS QL MICRO: ABNORMAL /LPF (ref 0–5)
IMM GRANULOCYTES # BLD AUTO: 0.1 K/UL (ref 0–0.04)
IMM GRANULOCYTES NFR BLD AUTO: 1 % (ref 0–0.5)
INR PPP: 1 (ref 0.9–1.1)
KETONES UR QL STRIP.AUTO: ABNORMAL MG/DL
LEUKOCYTE ESTERASE UR QL STRIP.AUTO: ABNORMAL
LIPASE SERPL-CCNC: 243 U/L (ref 73–393)
LYMPHOCYTES # BLD: 1.8 K/UL (ref 0.8–3.5)
LYMPHOCYTES NFR BLD: 27 % (ref 12–49)
MCH RBC QN AUTO: 35.5 PG (ref 26–34)
MCHC RBC AUTO-ENTMCNC: 36.9 G/DL (ref 30–36.5)
MCV RBC AUTO: 96.1 FL (ref 80–99)
METHADONE UR QL: NEGATIVE
MONOCYTES # BLD: 1.5 K/UL (ref 0–1)
MONOCYTES NFR BLD: 22 % (ref 5–13)
MUCOUS THREADS URNS QL MICRO: ABNORMAL /LPF
NEUTS SEG # BLD: 3 K/UL (ref 1.8–8)
NEUTS SEG NFR BLD: 48 % (ref 32–75)
NITRITE UR QL STRIP.AUTO: POSITIVE
NRBC # BLD: 0 K/UL (ref 0–0.01)
NRBC BLD-RTO: 0 PER 100 WBC
OPIATES UR QL: NEGATIVE
PCP UR QL: NEGATIVE
PH UR STRIP: 8 [PH] (ref 5–8)
PLATELET # BLD AUTO: 354 K/UL (ref 150–400)
PMV BLD AUTO: 9.1 FL (ref 8.9–12.9)
POTASSIUM SERPL-SCNC: 3.7 MMOL/L (ref 3.5–5.1)
PROT SERPL-MCNC: 7.2 G/DL (ref 6.4–8.2)
PROT UR STRIP-MCNC: 100 MG/DL
PROTHROMBIN TIME: 10.3 SEC (ref 9–11.1)
RBC # BLD AUTO: 3.86 M/UL (ref 4.1–5.7)
RBC #/AREA URNS HPF: ABNORMAL /HPF (ref 0–5)
RBC MORPH BLD: ABNORMAL
SODIUM SERPL-SCNC: 134 MMOL/L (ref 136–145)
SP GR UR REFRACTOMETRY: 1.03 (ref 1–1.03)
THERAPEUTIC RANGE,PTTT: NORMAL SECS (ref 58–77)
UR CULT HOLD, URHOLD: NORMAL
UROBILINOGEN UR QL STRIP.AUTO: 1 EU/DL (ref 0.2–1)
WBC # BLD AUTO: 6.6 K/UL (ref 4.1–11.1)
WBC URNS QL MICRO: ABNORMAL /HPF (ref 0–4)

## 2021-01-17 PROCEDURE — 81001 URINALYSIS AUTO W/SCOPE: CPT

## 2021-01-17 PROCEDURE — 82077 ASSAY SPEC XCP UR&BREATH IA: CPT

## 2021-01-17 PROCEDURE — C9113 INJ PANTOPRAZOLE SODIUM, VIA: HCPCS | Performed by: EMERGENCY MEDICINE

## 2021-01-17 PROCEDURE — 85025 COMPLETE CBC W/AUTO DIFF WBC: CPT

## 2021-01-17 PROCEDURE — 83690 ASSAY OF LIPASE: CPT

## 2021-01-17 PROCEDURE — 80053 COMPREHEN METABOLIC PANEL: CPT

## 2021-01-17 PROCEDURE — 80307 DRUG TEST PRSMV CHEM ANLYZR: CPT

## 2021-01-17 PROCEDURE — 74011000636 HC RX REV CODE- 636: Performed by: EMERGENCY MEDICINE

## 2021-01-17 PROCEDURE — 74011000250 HC RX REV CODE- 250: Performed by: EMERGENCY MEDICINE

## 2021-01-17 PROCEDURE — 36415 COLL VENOUS BLD VENIPUNCTURE: CPT

## 2021-01-17 PROCEDURE — 96374 THER/PROPH/DIAG INJ IV PUSH: CPT

## 2021-01-17 PROCEDURE — 99285 EMERGENCY DEPT VISIT HI MDM: CPT

## 2021-01-17 PROCEDURE — 93005 ELECTROCARDIOGRAM TRACING: CPT

## 2021-01-17 PROCEDURE — 96361 HYDRATE IV INFUSION ADD-ON: CPT

## 2021-01-17 PROCEDURE — 74011250637 HC RX REV CODE- 250/637: Performed by: EMERGENCY MEDICINE

## 2021-01-17 PROCEDURE — 85610 PROTHROMBIN TIME: CPT

## 2021-01-17 PROCEDURE — 96375 TX/PRO/DX INJ NEW DRUG ADDON: CPT

## 2021-01-17 PROCEDURE — 74011250636 HC RX REV CODE- 250/636: Performed by: EMERGENCY MEDICINE

## 2021-01-17 PROCEDURE — 74177 CT ABD & PELVIS W/CONTRAST: CPT

## 2021-01-17 PROCEDURE — 85730 THROMBOPLASTIN TIME PARTIAL: CPT

## 2021-01-17 RX ORDER — ONDANSETRON 2 MG/ML
4 INJECTION INTRAMUSCULAR; INTRAVENOUS
Status: COMPLETED | OUTPATIENT
Start: 2021-01-17 | End: 2021-01-17

## 2021-01-17 RX ORDER — ONDANSETRON 4 MG/1
4 TABLET, ORALLY DISINTEGRATING ORAL
Qty: 20 TAB | Refills: 0 | Status: SHIPPED | OUTPATIENT
Start: 2021-01-17 | End: 2022-01-18

## 2021-01-17 RX ORDER — LORAZEPAM 0.5 MG/1
0.5 TABLET ORAL
Status: COMPLETED | OUTPATIENT
Start: 2021-01-17 | End: 2021-01-17

## 2021-01-17 RX ORDER — OMEPRAZOLE 40 MG/1
40 CAPSULE, DELAYED RELEASE ORAL DAILY
Qty: 30 CAP | Refills: 0 | Status: SHIPPED | OUTPATIENT
Start: 2021-01-17 | End: 2021-02-16

## 2021-01-17 RX ORDER — PANTOPRAZOLE SODIUM 40 MG/10ML
80 INJECTION, POWDER, LYOPHILIZED, FOR SOLUTION INTRAVENOUS
Status: COMPLETED | OUTPATIENT
Start: 2021-01-17 | End: 2021-01-17

## 2021-01-17 RX ADMIN — LIDOCAINE HYDROCHLORIDE 40 ML: 20 SOLUTION ORAL; TOPICAL at 10:55

## 2021-01-17 RX ADMIN — PANTOPRAZOLE SODIUM 80 MG: 40 INJECTION, POWDER, FOR SOLUTION INTRAVENOUS at 10:54

## 2021-01-17 RX ADMIN — LORAZEPAM 0.5 MG: 0.5 TABLET ORAL at 12:20

## 2021-01-17 RX ADMIN — SODIUM CHLORIDE 1000 ML: 9 INJECTION, SOLUTION INTRAVENOUS at 10:54

## 2021-01-17 RX ADMIN — ONDANSETRON 4 MG: 2 INJECTION INTRAMUSCULAR; INTRAVENOUS at 10:55

## 2021-01-17 RX ADMIN — IOPAMIDOL 100 ML: 755 INJECTION, SOLUTION INTRAVENOUS at 11:41

## 2021-01-17 NOTE — ED PROVIDER NOTES
The history is provided by the patient. Epigastric Pain   This is a new problem. The current episode started 2 days ago. The problem occurs constantly. The problem has not changed since onset. The pain is associated with vomiting and ETOH use. The pain is located in the epigastric region. The quality of the pain is cramping. The pain is moderate. Associated symptoms include nausea and vomiting. Pertinent negatives include no anorexia, no fever, no belching, no diarrhea, no flatus, no hematochezia, no melena, no constipation, no dysuria, no frequency, no hematuria, no headaches, no arthralgias, no myalgias, no trauma, no chest pain, no testicular pain and no back pain. Nothing worsens the pain. The pain is relieved by nothing. Past workup includes CT scan, esophagogastroduodenoscopy. His past medical history is significant for diverticulitis. The patient's surgical history includes colectomy. Past Medical History:   Diagnosis Date    Alcohol abuse     Diverticulitis     Diverticulitis     Hypertension        Past Surgical History:   Procedure Laterality Date    COLONOSCOPY N/A 9/6/2017    COLONOSCOPY performed by Nancy Levy MD at [address] [place] [address] HX COLECTOMY           No family history on file. Social History     Socioeconomic History    Marital status: SINGLE     Spouse name: Not on file    Number of children: Not on file    Years of education: Not on file    Highest education level: Not on file   Occupational History    Not on file   Social Needs    Financial resource strain: Not on file    Food insecurity     Worry: Not on file     Inability: Not on file    Transportation needs     Medical: Not on file     Non-medical: Not on file   Tobacco Use    Smoking status: Unknown If Ever Smoked    Smokeless tobacco: Current User    Tobacco comment: Pt uses Chewing tobacco   Substance and Sexual Activity    Alcohol use: Yes     Comment: heavy liquor use daily    Drug use:  Yes Types: Marijuana     Comment: occasional    Sexual activity: Not on file   Lifestyle    Physical activity     Days per week: Not on file     Minutes per session: Not on file    Stress: Not on file   Relationships    Social connections     Talks on phone: Not on file     Gets together: Not on file     Attends Jainism service: Not on file     Active member of club or organization: Not on file     Attends meetings of clubs or organizations: Not on file     Relationship status: Not on file    Intimate partner violence     Fear of current or ex partner: Not on file     Emotionally abused: Not on file     Physically abused: Not on file     Forced sexual activity: Not on file   Other Topics Concern     Service Not Asked    Blood Transfusions Not Asked    Caffeine Concern Not Asked    Occupational Exposure Not Asked   [place] Hazards Not Asked    Sleep Concern Not Asked    Stress Concern Not Asked    Weight Concern Not Asked    Special Diet Not Asked    Back Care Not Asked    Exercise Not Asked    Bike Helmet Not Asked   [address] Not Asked    Self-Exams Not Asked   Social History Narrative    Not on file         ALLERGIES: Patient has no known allergies. Review of Systems   Constitutional: Negative for activity change, chills and fever. HENT: Negative for nosebleeds, sore throat, trouble swallowing and voice change. Eyes: Negative for visual disturbance. Respiratory: Positive for shortness of breath. Cardiovascular: Negative for chest pain and palpitations. Gastrointestinal: Positive for abdominal pain, nausea and vomiting. Negative for anorexia, constipation, diarrhea, flatus, hematochezia and melena. Genitourinary: Negative for difficulty urinating, dysuria, frequency, hematuria, testicular pain and urgency. Musculoskeletal: Negative for arthralgias, back pain, myalgias, neck pain and neck stiffness. Skin: Negative for color change.    Allergic/Immunologic: Negative for immunocompromised state. Neurological: Negative for dizziness, seizures, syncope, weakness, light-headedness, numbness and headaches. Psychiatric/Behavioral: Negative for behavioral problems, confusion, hallucinations, self-injury and suicidal ideas. Vitals:    01/17/21 1017   BP: (!) 111/59   Pulse: (!) 111   Resp: 20   SpO2: 98%   Weight: 99.8 kg (220 lb)   Height: 6' 2\" (1.88 m)            Physical Exam  Vitals signs and nursing note reviewed. Constitutional:       General: He is not in acute distress. Appearance: He is well-developed. He is not diaphoretic. HENT:      Head: Normocephalic and atraumatic. Eyes:      Pupils: Pupils are equal, round, and reactive to light. Neck:      Musculoskeletal: Normal range of motion and neck supple. Cardiovascular:      Rate and Rhythm: Regular rhythm. Tachycardia present. Heart sounds: Normal heart sounds. No murmur. No friction rub. No gallop. Pulmonary:      Effort: Pulmonary effort is normal. No respiratory distress. Breath sounds: Normal breath sounds. No wheezing. Abdominal:      General: Bowel sounds are normal. There is no distension. Palpations: Abdomen is soft. Tenderness: There is generalized abdominal tenderness. There is no guarding or rebound. Musculoskeletal: Normal range of motion. Skin:     General: Skin is warm. Findings: No rash. Neurological:      Mental Status: He is alert and oriented to person, place, and time. Psychiatric:         Behavior: Behavior normal.         Thought Content: Thought content normal.         Judgment: Judgment normal.          MDM     This is a 67-year-old male with past medical history, review of systems, physical exam as above, presenting with complaints of epigastric abdominal pain. Patient states pain began 3 to 4 days ago, after being discharged from inpatient psych following admission for alcohol withdrawal and suicidal ideation.   Patient states he is scheduled to begin partial inpatient psychiatric treatment tomorrow. He states since being discharged home he has not resumed alcohol use, he endorses nausea and vomiting without blood, denies melena. He endorses a history of partial colectomy for diverticulitis, endorses remote history of endoscopy, denies fevers or chills, chest pain, states shortness of breath is chronic secondary to COPD. He states bowel movements have been scant. Physical exam is remarkable for well-appearing obese middle-aged male, in no acute distress noted to be tachycardic, afebrile, normotensive, with epigastric tenderness to palpation, mild scattered wheezes, rapid regular heart rate without murmurs gallops or rubs. Differential includes alcoholic gastritis, pancreatitis, small bowel obstruction. Plan to provide GI cocktail, IV PPI and Zofran, fluid bolus. Will obtain CMP, CBC, UA, UDS, blood alcohol, lipase level. We will reassess, likely consider CT imaging, and make a disposition. Procedures    12:26 PM  Patient states discomfort improved significantly, states previously diagnosed with GERD, not currently taking PPI. Patient states previous history of similar pain with negative cardiac catheterization. Lab work and imaging without acute process, likely gastritis due to chronic alcohol use, steroid use secondary to chronic musculoskeletal pain. Discussed with patient at bedside and discharged home with PPI and antiemetic as needed, recommend Maalox for breakthrough pain, will refer to primary care and GI, patient is scheduled to start partial inpatient therapy program tomorrow for psychiatric disease and alcohol abuse, return precautions given.

## 2021-01-17 NOTE — ED TRIAGE NOTES
Pt states he was here last week for ETOH detox and then was transferred to [facility] for rehab. Pt here today with epigastric pain, N/V of bile, and SOB/[place].

## 2021-01-17 NOTE — ED NOTES
The patient left the Emergency Department ambulatory, alert and oriented and in no acute distress. The patient was encouraged to call or return to the ED for worsening issues or problems and was encouraged to schedule a follow up appointment for continuing care. The patient verbalized understanding of discharge instructions and prescriptions, all questions were answered. The patient has no further concerns at this time.

## 2021-01-17 NOTE — ED NOTES
Bedside shift change report given to Ct Tao RN  (oncoming nurse) by Lisa Motta RN (offgoing nurse).  Report included the following information SBAR, Kardex, Intake/Output, MAR, Recent Results and Cardiac Rhythm NSR

## 2021-01-18 LAB
ATRIAL RATE: 93 BPM
CALCULATED P AXIS, ECG09: 71 DEGREES
CALCULATED R AXIS, ECG10: 33 DEGREES
CALCULATED T AXIS, ECG11: 44 DEGREES
DIAGNOSIS, 93000: NORMAL
P-R INTERVAL, ECG05: 168 MS
Q-T INTERVAL, ECG07: 348 MS
QRS DURATION, ECG06: 86 MS
QTC CALCULATION (BEZET), ECG08: 432 MS
VENTRICULAR RATE, ECG03: 93 BPM

## 2021-01-18 NOTE — PROGRESS NOTES
Reached out to patient at [phone] for follow up. Voicemail came on and no message left due to confidentiality.

## 2021-01-25 ENCOUNTER — TRANSCRIBE ORDER (OUTPATIENT)
Dept: GENERAL RADIOLOGY | Age: 56
End: 2021-01-25

## 2021-01-25 DIAGNOSIS — R93.89 ABNORMAL CHEST X-RAY: Primary | ICD-10-CM

## 2021-02-08 ENCOUNTER — OFFICE VISIT (OUTPATIENT)
Dept: FAMILY MEDICINE CLINIC | Age: 56
End: 2021-02-08
Payer: MEDICAID

## 2021-02-08 VITALS
HEART RATE: 77 BPM | SYSTOLIC BLOOD PRESSURE: 97 MMHG | BODY MASS INDEX: 30.29 KG/M2 | HEIGHT: 74 IN | DIASTOLIC BLOOD PRESSURE: 70 MMHG | WEIGHT: 236 LBS | RESPIRATION RATE: 20 BRPM | OXYGEN SATURATION: 96 % | TEMPERATURE: 98.6 F

## 2021-02-08 DIAGNOSIS — F41.9 ANXIETY: ICD-10-CM

## 2021-02-08 DIAGNOSIS — E11.9 TYPE 2 DIABETES MELLITUS WITHOUT COMPLICATION, WITHOUT LONG-TERM CURRENT USE OF INSULIN (HCC): Primary | ICD-10-CM

## 2021-02-08 DIAGNOSIS — I10 ESSENTIAL HYPERTENSION: ICD-10-CM

## 2021-02-08 DIAGNOSIS — E83.119 HEMOCHROMATOSIS, UNSPECIFIED HEMOCHROMATOSIS TYPE: ICD-10-CM

## 2021-02-08 PROCEDURE — 99204 OFFICE O/P NEW MOD 45 MIN: CPT | Performed by: FAMILY MEDICINE

## 2021-02-08 PROCEDURE — 3052F HG A1C>EQUAL 8.0%<EQUAL 9.0%: CPT | Performed by: FAMILY MEDICINE

## 2021-02-08 RX ORDER — METOPROLOL TARTRATE 50 MG/1
50 TABLET ORAL 2 TIMES DAILY
Qty: 60 TAB | Refills: 5 | Status: SHIPPED | OUTPATIENT
Start: 2021-02-08 | End: 2022-02-18 | Stop reason: SDUPTHER

## 2021-02-08 RX ORDER — METFORMIN HYDROCHLORIDE 1000 MG/1
1000 TABLET ORAL 2 TIMES DAILY WITH MEALS
Qty: 60 TAB | Refills: 5 | Status: SHIPPED | OUTPATIENT
Start: 2021-02-08 | End: 2022-02-18 | Stop reason: SDUPTHER

## 2021-02-08 RX ORDER — LISINOPRIL 20 MG/1
20 TABLET ORAL DAILY
Qty: 30 TAB | Refills: 5 | Status: SHIPPED | OUTPATIENT
Start: 2021-02-08 | End: 2022-02-18 | Stop reason: SDUPTHER

## 2021-02-08 RX ORDER — QUETIAPINE FUMARATE 25 MG/1
TABLET, FILM COATED ORAL
COMMUNITY
Start: 2021-01-21 | End: 2021-02-08 | Stop reason: SDUPTHER

## 2021-02-08 RX ORDER — QUETIAPINE FUMARATE 25 MG/1
TABLET, FILM COATED ORAL
Qty: 60 TAB | Refills: 5 | Status: SHIPPED | OUTPATIENT
Start: 2021-02-08 | End: 2022-01-18

## 2021-02-08 NOTE — PATIENT INSTRUCTIONS
Counting Carbohydrates: Care Instructions  Your Care Instructions     You don't have to eat special foods when you have diabetes. You just have to be careful to eat healthy foods. Carbohydrates (carbs) raise blood sugar higher and quicker than any other nutrient. Carbs are found in desserts, breads and cereals, and fruit. They're also in starchy vegetables. These include potatoes, corn, and grains such as rice and pasta. Carbs are also in milk and yogurt. The more carbs you eat at one time, the higher your blood sugar will rise. Spreading carbs all through the day helps keep your blood sugar levels within your target range. Counting carbs is one of the best ways to keep your blood sugar under control. If you use insulin, counting carbs helps you match the right amount of insulin to the number of grams of carbs in a meal. Then you can change your diet and insulin dose as needed. Testing your blood sugar several times a day can help you learn how carbs affect your blood sugar. A registered dietitian or certified diabetes educator can help you plan meals and snacks. Follow-up care is a key part of your treatment and safety. Be sure to make and go to all appointments, and call your doctor if you are having problems. It's also a good idea to know your test results and keep a list of the medicines you take. How can you care for yourself at home? Know your daily amount of carbohydrates  Your daily amount depends on several things, such as your weight, how active you are, which diabetes medicines you take, and what your goals are for your blood sugar levels. A registered dietitian or certified diabetes educator can help you plan how many carbs to include in each meal and snack. For most adults, a guideline for the daily amount of carbs is:  · 45 to 60 grams at each meal. That's about the same as 3 to 4 carbohydrate servings. · 15 to 20 grams at each snack.  That's about the same as 1 carbohydrate serving. Count carbs  Counting carbs lets you know how much rapid-acting insulin to take before you eat. If you use an insulin pump, you get a constant rate of insulin during the day. So the pump must be programmed at meals. This gives you extra insulin to cover the rise in blood sugar after meals. If you take insulin:  · Learn your own insulin-to-carb ratio. You and your diabetes health professional will figure out the ratio. You can do this by testing your blood sugar after meals. For example, you may need a certain amount of insulin for every 15 grams of carbs. · Add up the carb grams in a meal. Then you can figure out how many units of insulin to take based on your insulin-to-carb ratio. · Exercise lowers blood sugar. You can use less insulin than you would if you were not doing exercise. Keep in mind that timing matters. If you exercise within 1 hour after a meal, your body may need less insulin for that meal than it would if you exercised 3 hours after the meal. Test your blood sugar to find out how exercise affects your need for insulin. If you do or don't take insulin:  · Look at labels on packaged foods. This can tell you how many carbs are in a serving. You can also use guides from the American Diabetes Association. · Be aware of portions, or serving sizes. If a package has two servings and you eat the whole package, you need to double the number of grams of carbohydrate listed for one serving. · Protein, fat, and fiber do not raise blood sugar as much as carbs do. If you eat a lot of these nutrients in a meal, your blood sugar will rise more slowly than it would otherwise. Eat from all food groups  · Eat at least three meals a day. · Plan meals to include food from all the food groups. The food groups include grains, fruits, dairy, proteins, and vegetables. · Talk to your dietitian or diabetes educator about ways to add limited amounts of sweets into your meal plan.   · If you drink alcohol, talk to your doctor. It may not be recommended when you are taking certain diabetes medicines. Where can you learn more? Go to http://www.gray.com/  Enter G703 in the search box to learn more about \"Counting Carbohydrates: Care Instructions. \"  Current as of: December 20, 2019               Content Version: 12.6  © 5676-8415 [employer], [employer]. Care instructions adapted under license by [facility] (which disclaims liability or warranty for this information). If you have questions about a medical condition or this instruction, always ask your healthcare professional. [address] any warranty or liability for your use of this information.

## 2021-02-08 NOTE — PROGRESS NOTES
Subjective:      Milagro Cobb is a 63 y.o. male with h/o hypertension, diabetes mellitus, COPD, depression, anxiety, alcohol abuse (in recovery, last alcoholic drink about 8-2.8 months ago) here today to establish care - \"I haven't had a PCP in a really, really long time\". Diabetes mellitus: diagnosed approximately 12-15 years ago. · Medication compliance: compliant all of the time,   · Diabetic diet compliance: has been watching his sugar intake, does consume a lot of carbohydrates  · Home glucose monitoring: is performed regularly,   · Further diabetic ROS: no unusual visual symptoms, no hypoglycemia, no medication side effects noted, has dysesthesias in the feet. Lab Results   Component Value Date/Time    Hemoglobin A1c 8.1 (H) 01/08/2021 03:28 AM       Hypertension:   · Home BP monitoring: does not check at home   · Reports compliance with medications      COPD: Pulmologist is Dr. Gwen Wen whom he has appointment with 2/9/2021. Reports previous treatment with [facility]. Has completed inpatient treatment program for alcohol use. He has information to contact psychologists. Reports h/o hemochromatosis for which she was scheduled for therapeutic phlebotomy prior to moving to [place] 2 years ago. Has not had follow up since that time. Current Outpatient Medications   Medication Sig Dispense Refill    QUEtiapine (SEROquel) 25 mg tablet 25 - 50 mg by mouth daily prn for anxiety      omeprazole (PRILOSEC) 40 mg capsule Take 1 Cap by mouth daily for 30 days. 30 Cap 0    ondansetron (Zofran ODT) 4 mg disintegrating tablet Take 1 Tab by mouth every eight (8) hours as needed for Nausea. 20 Tab 0    lisinopriL (PRINIVIL, ZESTRIL) 20 mg tablet Take 1 Tab by mouth daily. Indications: high blood pressure 30 Tab 0    metoprolol tartrate (LOPRESSOR) 50 mg tablet Take 1 Tab by mouth two (2) times a day. Indications: high blood pressure (Patient taking differently: Take 50 mg by mouth two (2) times a day. Has been taking this only once - didn't realize he was prescribed to take is twice daily  Indications: high blood pressure) 60 Tab 0    metFORMIN (GLUCOPHAGE) 500 mg tablet Take 1 Tab by mouth two (2) times daily (with meals). 60 Tab 0    therapeutic multivitamin (THERAGRAN) tablet Take 1 Tab by mouth daily. 30 Tab 0    hydrOXYzine HCL (ATARAX) 50 mg tablet Take 2 Tabs by mouth every six (6) hours as needed for Anxiety. Indications: anxious (Patient taking differently: Take 100 mg by mouth every six (6) hours as needed for Anxiety. OUT OF MEDICATION  Indications: anxious) 30 Tab 0       No Known Allergies      Past medical history - reviewed. Past Medical History:   Diagnosis Date    Alcohol abuse     [facility]     Anxiety     COPD (chronic obstructive pulmonary disease) ([facility])     Dr. Samantha Rivera is pulmonologist    Depression     Diabetes ([facility]     Type II    Diverticulitis     Family history of angina     GERD (gastroesophageal reflux disease)     Hypertension        Social history - reviewed. Social History     Tobacco Use    Smoking status: Unknown If Ever Smoked    Smokeless tobacco: Current User    Tobacco comment: Pt uses Chewing tobacco   Substance Use Topics    Alcohol use: Yes     Comment: heavy liquor use daily        Family history - reviewed. History reviewed. No pertinent family history. Review of Systems  Pertinent items are noted in HPI.      Objective:     Visit Vitals  BP 97/70 (BP 1 Location: Left upper arm, BP Patient Position: Supine, BP Cuff Size: Large adult)   Pulse 77   Temp 98.6 °F (37 °C) (Oral)   Resp 20   Ht 6' 2\" (1.88 m)   Wt 236 lb (107 kg)   SpO2 96%   BMI 30.30 kg/m²      General appearance - alert, well appearing, and in no distress  Eyes - pupils equal and reactive, extraocular eye movements intact, sclera anicteric  Neck - supple, no significant adenopathy, carotids upstroke normal bilaterally, no bruits, thyroid exam: thyroid is normal in size without nodules or tenderness  Chest - clear to auscultation, no wheezes, rales or rhonchi, symmetric air entry, no tachypnea, retractions or cyanosis  Heart - normal rate, regular rhythm, normal S1, S2, no murmurs, rubs, clicks or gallops  Neurologic - alert, oriented, normal speech, no focal findings or movement disorder noted  Mental Status - alert, oriented to person, place, and time, normal mood, behavior, speech, dress, motor activity, and thought processes    Assessment/Plan:   Severo Galas is a 63 y.o. male seen for:     1. Type 2 diabetes mellitus without complication, without long-term current use of insulin ([facility]): increase metformin to 1000 mg twice daily. Check labs. - CBC WITH AUTOMATED DIFF; Future  - METABOLIC PANEL, COMPREHENSIVE; Future  - metFORMIN (GLUCOPHAGE) 1,000 mg tablet; Take 1 Tab by mouth two (2) times daily (with meals). Dispense: 60 Tab; Refill: 5    2. Essential hypertension: on the lower end today. Will continue with current therapy at this time. - CBC WITH AUTOMATED DIFF; Future  - METABOLIC PANEL, COMPREHENSIVE; Future  - lisinopriL (PRINIVIL, ZESTRIL) 20 mg tablet; Take 1 Tab by mouth daily. Indications: high blood pressure  Dispense: 30 Tab; Refill: 5  - metoprolol tartrate (LOPRESSOR) 50 mg tablet; Take 1 Tab by mouth two (2) times a day. Indications: high blood pressure  Dispense: 60 Tab; Refill: 5    3. Anxiety: continue with current therapy. He is looking to start outpatient therapy. - QUEtiapine (SEROquel) 25 mg tablet; Take 1 to 2 tablets daily as needed for anxiety. Dispense: 60 Tab; Refill: 5    4. Hemochromatosis, unspecified hemochromatosis type: per history, will check labs as below. Refer to Hematology. Will request previous medical records. - IRON PROFILE; Future  - FERRITIN; Future  - CBC WITH AUTOMATED DIFF; Future  - METABOLIC PANEL, COMPREHENSIVE;  Future  - REFERRAL TO HEMATOLOGY    I have discussed the diagnosis with the patient and the intended plan as seen in the above orders. The patient has received an after-visit summary and questions were answered concerning future plans. I have discussed medication side effects and warnings with the patient as well. Patient verbalizes understanding of plan of care and denies further questions or concerns at this time. Informed patient to return to the office if symptoms worsen or if new symptoms arise. Follow-up and Dispositions    · Return in about 3 months (around 5/8/2021) for diabetes follow up or sooner as needed.

## 2021-02-08 NOTE — PROGRESS NOTES
Identified pt with two pt identifiers(name and ). Chief Complaint   Patient presents with    New Patient    Diabetes    COPD    Alcohol Problem     just out of in patient care the beginning of  - was in outpatient therepy until recently    Abnormal Lab Results     was advised during a workup in [place] approx one year ago that he has toxic levels of iron in his blood and low WBC count    Depression    Anxiety     was taking vibrid daily until he ran out of the medication        Health Maintenance Due   Topic    Pneumococcal 0-64 years (1 of 1 - PPSV23)    Foot Exam Q1     MICROALBUMIN Q1     Eye Exam Retinal or Dilated     COVID-19 Vaccine (1 of 2)    DTaP/Tdap/Td series (1 - Tdap)    Shingrix Vaccine Age 50> (1 of 2)    Flu Vaccine (1)       Wt Readings from Last 3 Encounters:   21 236 lb (107 kg)   21 220 lb (99.8 kg)   21 220 lb (99.8 kg)     Temp Readings from Last 3 Encounters:   21 98.6 °F (37 °C) (Oral)   21 98.5 °F (36.9 °C)   01/10/21 99.1 °F (37.3 °C)     BP Readings from Last 3 Encounters:   21 97/70   21 119/79   01/10/21 (!) 142/93     Pulse Readings from Last 3 Encounters:   21 77   21 87   01/10/21 (!) 112         Learning Assessment:  :     Learning Assessment 2021   PRIMARY LEARNER Patient   HIGHEST LEVEL OF EDUCATION - PRIMARY LEARNER  SOME COLLEGE   BARRIERS PRIMARY LEARNER NONE   CO-LEARNER CAREGIVER No   PRIMARY LANGUAGE ENGLISH   LEARNER PREFERENCE PRIMARY DEMONSTRATION     LISTENING   ANSWERED BY patient   RELATIONSHIP SELF       Depression Screening:  :     3 most recent PHQ Screens 2021   Little interest or pleasure in doing things Not at all   Feeling down, depressed, irritable, or hopeless Not at all   Total Score PHQ 2 0       Fall Risk Assessment:  :     No flowsheet data found. Abuse Screening:  :     Abuse Screening Questionnaire 2021   Do you ever feel afraid of your partner?  N   Are you in a relationship with someone who physically or mentally threatens you? N   Is it safe for you to go home? Y           Coordination of Care Questionnaire:  :     1) Have you been to an emergency room, urgent care clinic since your last visit? no   Hospitalized since your last visit? no             2) Have you seen or consulted any other health care providers outside of [address] [place] [address] since your last visit? no  (Include any pap smears or colon screenings in this section.)    3) Do you have an Advance Directive on file? no  Are you interested in receiving information about Advance Directives? no    Patient is accompanied by self. Reviewed record in preparation for visit and have obtained necessary documentation. Medication reconciliation up to date and corrected with patient at this time.

## 2021-02-09 LAB
ALBUMIN SERPL-MCNC: 3.8 G/DL (ref 3.5–5)
ALBUMIN/GLOB SERPL: 1.2 {RATIO} (ref 1.1–2.2)
ALP SERPL-CCNC: 67 U/L (ref 45–117)
ALT SERPL-CCNC: 28 U/L (ref 12–78)
ANION GAP SERPL CALC-SCNC: 5 MMOL/L (ref 5–15)
AST SERPL-CCNC: 16 U/L (ref 15–37)
BASOPHILS # BLD: 0.1 K/UL (ref 0–0.1)
BASOPHILS NFR BLD: 1 % (ref 0–1)
BILIRUB SERPL-MCNC: 1.1 MG/DL (ref 0.2–1)
BUN SERPL-MCNC: 8 MG/DL (ref 6–20)
BUN/CREAT SERPL: 7 (ref 12–20)
CALCIUM SERPL-MCNC: 9.4 MG/DL (ref 8.5–10.1)
CHLORIDE SERPL-SCNC: 103 MMOL/L (ref 97–108)
CO2 SERPL-SCNC: 27 MMOL/L (ref 21–32)
CREAT SERPL-MCNC: 1.12 MG/DL (ref 0.7–1.3)
DIFFERENTIAL METHOD BLD: ABNORMAL
EOSINOPHIL # BLD: 0.1 K/UL (ref 0–0.4)
EOSINOPHIL NFR BLD: 1 % (ref 0–7)
ERYTHROCYTE [DISTWIDTH] IN BLOOD BY AUTOMATED COUNT: 13.2 % (ref 11.5–14.5)
FERRITIN SERPL-MCNC: 714 NG/ML (ref 26–388)
GLOBULIN SER CALC-MCNC: 3.1 G/DL (ref 2–4)
GLUCOSE SERPL-MCNC: 249 MG/DL (ref 65–100)
HCT VFR BLD AUTO: 39 % (ref 36.6–50.3)
HGB BLD-MCNC: 13.7 G/DL (ref 12.1–17)
IMM GRANULOCYTES # BLD AUTO: 0 K/UL (ref 0–0.04)
IMM GRANULOCYTES NFR BLD AUTO: 0 % (ref 0–0.5)
IRON SATN MFR SERPL: 43 % (ref 20–50)
IRON SERPL-MCNC: 122 UG/DL (ref 35–150)
LYMPHOCYTES # BLD: 1.5 K/UL (ref 0.8–3.5)
LYMPHOCYTES NFR BLD: 21 % (ref 12–49)
MCH RBC QN AUTO: 34.8 PG (ref 26–34)
MCHC RBC AUTO-ENTMCNC: 35.1 G/DL (ref 30–36.5)
MCV RBC AUTO: 99 FL (ref 80–99)
MONOCYTES # BLD: 0.7 K/UL (ref 0–1)
MONOCYTES NFR BLD: 9 % (ref 5–13)
NEUTS SEG # BLD: 4.9 K/UL (ref 1.8–8)
NEUTS SEG NFR BLD: 68 % (ref 32–75)
NRBC # BLD: 0 K/UL (ref 0–0.01)
NRBC BLD-RTO: 0 PER 100 WBC
PLATELET # BLD AUTO: 207 K/UL (ref 150–400)
PMV BLD AUTO: 11.1 FL (ref 8.9–12.9)
POTASSIUM SERPL-SCNC: 4.7 MMOL/L (ref 3.5–5.1)
PROT SERPL-MCNC: 6.9 G/DL (ref 6.4–8.2)
RBC # BLD AUTO: 3.94 M/UL (ref 4.1–5.7)
SODIUM SERPL-SCNC: 135 MMOL/L (ref 136–145)
TIBC SERPL-MCNC: 284 UG/DL (ref 250–450)
WBC # BLD AUTO: 7.2 K/UL (ref 4.1–11.1)

## 2021-02-17 ENCOUNTER — APPOINTMENT (OUTPATIENT)
Dept: FAMILY MEDICINE CLINIC | Age: 56
End: 2021-02-17

## 2021-02-17 ENCOUNTER — VIRTUAL VISIT (OUTPATIENT)
Dept: FAMILY MEDICINE CLINIC | Age: 56
End: 2021-02-17
Payer: MEDICAID

## 2021-02-17 DIAGNOSIS — E11.9 TYPE 2 DIABETES MELLITUS WITHOUT COMPLICATION, WITHOUT LONG-TERM CURRENT USE OF INSULIN (HCC): Primary | ICD-10-CM

## 2021-02-17 PROCEDURE — 99441 PR PHYS/QHP TELEPHONE EVALUATION 5-10 MIN: CPT | Performed by: NURSE PRACTITIONER

## 2021-02-17 NOTE — PROGRESS NOTES
HISTORY OF PRESENT ILLNESS  Franck Bo is a 63 y.o. male. HPI  Pt presents with \"needing labs\"  Visit was conducted via telephone  Pt was located at home, provider was located at [facility]  Visit lasted 5 minutes  Franck Bo, who was evaluated through a synchronous (real-time) audio only encounter, and/or his healthcare decision maker, is aware that it is a billable service, with coverage as determined by his insurance carrier. He provided verbal consent to proceed: Yes, and patient identification was verified. It was conducted pursuant to the emergency declaration under the [facility], [address] [place] [address] authority and the [employer] Act. A caregiver was present when appropriate. Ability to conduct physical exam was limited. I was in the office. The patient was at home. Pt states that he is having spinal surgery on 3/2. This is going to be performed by Dr Rosalie Allen at [facility].  He needs a HgbA1C, prior to surgery. He is having pre op eval at [facility] before surgery  Review of Systems   Constitutional: Negative for fever. Physical Exam  Neurological:      Mental Status: He is alert and oriented to person, place, and time. ASSESSMENT and PLAN    ICD-10-CM ICD-9-CM    1. Type 2 diabetes mellitus without complication, without long-term current use of insulin ([facility])  E11.9 250.00 HEMOGLOBIN A1C WITH EAG      HEMOGLOBIN A1C WITH EAG     Will notify when labs return, and inform him of any change in plan of care at that time      Pt informed to return to office with worsening of symptoms, or PRN with any questions or concerns. Pt verbalizes understanding of plan of care and denies further questions or concerns at this time.

## 2021-02-18 LAB
EST. AVERAGE GLUCOSE BLD GHB EST-MCNC: 143 MG/DL
HBA1C MFR BLD: 6.6 % (ref 4–5.6)

## 2021-02-18 NOTE — PROGRESS NOTES
Please call patient and let him know that his HgbA1C returned and is good! I believe he wanted this faxed to his ortho surgeon, Dr Flores Morning? Thanks!

## 2021-02-19 ENCOUNTER — TELEPHONE (OUTPATIENT)
Dept: FAMILY MEDICINE CLINIC | Age: 56
End: 2021-02-19

## 2021-02-19 NOTE — TELEPHONE ENCOUNTER
----- Message from [employer] sent at 2/19/2021 10:49 AM EST -----  Regarding: /Telephone  Contact: [phone]  General Message/Vendor Calls    Caller's first and last name:pt      Reason for call:oncology medical records to be sent to Dr. Marian Hamm required yes/no and why:yes to confirm fax was sent      Best contact number(s):[phone]      Details to clarify the request:pt is due to see oncology with Dr. Paolo Cia, they should be able to see records in [facility], but requested office notes to be sent to [address] [place] [address]

## 2021-02-19 NOTE — TELEPHONE ENCOUNTER
Last two office visits and labs from the past two office visits here were faxed to Dr. Johana Cunha office for review. Pt has been advised.

## 2021-03-01 ENCOUNTER — TELEPHONE (OUTPATIENT)
Dept: FAMILY MEDICINE CLINIC | Age: 56
End: 2021-03-01

## 2021-03-01 NOTE — TELEPHONE ENCOUNTER
Patient needs the generic Prilosec for his acid reflux. Patient having spine surgery tomorrow and needs the medication. Please call him at [phone].

## 2021-03-29 ENCOUNTER — VIRTUAL VISIT (OUTPATIENT)
Dept: ONCOLOGY | Age: 56
End: 2021-03-29

## 2021-03-29 DIAGNOSIS — Z53.8 APPOINTMENT CANCELED BY HOSPITAL: Primary | ICD-10-CM

## 2021-04-16 ENCOUNTER — TELEPHONE (OUTPATIENT)
Dept: FAMILY MEDICINE CLINIC | Age: 56
End: 2021-04-16

## 2021-04-16 DIAGNOSIS — K21.9 GASTROESOPHAGEAL REFLUX DISEASE, UNSPECIFIED WHETHER ESOPHAGITIS PRESENT: Primary | ICD-10-CM

## 2021-04-16 RX ORDER — OMEPRAZOLE 40 MG/1
40 CAPSULE, DELAYED RELEASE ORAL
Qty: 30 CAP | Refills: 2 | Status: SHIPPED | OUTPATIENT
Start: 2021-04-16 | End: 2022-02-18 | Stop reason: SDUPTHER

## 2021-04-16 NOTE — TELEPHONE ENCOUNTER
Orders Placed This Encounter    omeprazole (PRILOSEC) 40 mg capsule     Sig: Take 1 Cap by mouth Daily (before breakfast). Dispense:  30 Cap     Refill:  2     Recommend GI evaluation if continued symptoms.

## 2021-04-16 NOTE — TELEPHONE ENCOUNTER
Called, spoke to patient. Two pt identifiers confirmed. Patient informed per Dr. Aliya Galicia she will send in 12 wks worth of omeprazole to [address] [place] [address]. Pt advised if this still bothers him after 12 wks Dr. Aliya Galicia would recommend him seeing GI. Patient verbalized understanding of information discussed w/ no further questions at this time.

## 2021-04-16 NOTE — TELEPHONE ENCOUNTER
Called, spoke to patient. Two pt identifiers confirmed. Pt asking for refills on omeprazole. Pt advised Dr. Yudi Reza will be notified and will call back. Patient verbalized understanding of information discussed w/ no further questions at this time.

## 2021-04-16 NOTE — TELEPHONE ENCOUNTER
----- Message from Heber Escalera sent at 4/16/2021 11:31 AM EDT -----  Regarding: Dr. Abigail Morrow Message/Vendor Calls    Caller's first and last name: N/A      Reason for call: Speak to nurse about medications.       Callback required yes/no and why: yes      Best contact number(s): [phone]      Details to clarify the request: N/A      Heber Escalera

## 2021-05-12 NOTE — PROGRESS NOTES
[id number] [facility] at [facility]  [phone]    Hematology / Oncology Consult    Reason for Visit:   Quan Chavez is a 63 y.o. male who is seen in consultation at the request of Dr. Sveta Garnica for evaluation of high iron levels, low WBC count. History of Present Illness:   Quan Chavez is a 63 y.o. male who with COPD, DM, HTN, GERD, Anxiety and h/o Alcohol abuse is referred for evaluation of high iron levels and low WBC count. Chart review shows that patient was already seen by a Hematologist, Dr. Neelam Darnell with [facility] as an inpatient consult in Jan 2021. 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 Patient reports he quit heavy EtOH use 8/2020. Still drinks beer occasionally when fishing, but not much use now. Attending [place] [address] meetings periodically. Since then his leukopenia has resolved. Pt states that he has made significant lifestyle changes. He moved away from [facility] and has cut down on EtOH drastically from prior daily use liquor/beer, etc to now occasional beer when fishing. No recent infections, fevers, chills, surgeries. No known family h/o hemochromatosis.        Past Medical History:   Diagnosis Date    Alcohol abuse     [facility]     Anxiety     COPD (chronic obstructive pulmonary disease) (HCC)     Dr. Yin Francisco is pulmonologist    Depression     Diabetes ([facility]     Type II    Diverticulitis     Family history of angina     GERD (gastroesophageal reflux disease)     Hemochromatosis     Hypertension       Past Surgical History:   Procedure Laterality Date    COLONOSCOPY N/A 9/6/2017    COLON[facility] Su[facility]nott [facility] HX CERVICAL FUSION  03/02/2021    ACDF C4/7    HX TOTAL COLECTOMY        Social History     Tobacco Use    Smoking status: Unknown If Ever Smoked    Smokeless tobacco: Current User    Tobacco comment: Pt uses Chewing tobacco   Substance Use Topics    Alcohol use: Yes     Comment: heavy liquor use daily      No family history on file. Current Outpatient Medications   Medication Sig    omeprazole (PRILOSEC) 40 mg capsule Take 1 Cap by mouth Daily (before breakfast).  metFORMIN (GLUCOPHAGE) 1,000 mg tablet Take 1 Tab by mouth two (2) times daily (with meals).  QUEtiapine (SEROquel) 25 mg tablet Take 1 to 2 tablets daily as needed for anxiety.  lisinopriL (PRINIVIL, ZESTRIL) 20 mg tablet Take 1 Tab by mouth daily. Indications: high blood pressure    metoprolol tartrate (LOPRESSOR) 50 mg tablet Take 1 Tab by mouth two (2) times a day. Indications: high blood pressure    ondansetron (Zofran ODT) 4 mg disintegrating tablet Take 1 Tab by mouth every eight (8) hours as needed for Nausea.  hydrOXYzine HCL (ATARAX) 50 mg tablet Take 2 Tabs by mouth every six (6) hours as needed for Anxiety. Indications: anxious (Patient taking differently: Take 100 mg by mouth every six (6) hours as needed for Anxiety. OUT OF MEDICATION  Indications: anxious)    therapeutic multivitamin (THERAGRAN) tablet Take 1 Tab by mouth daily. No current facility-administered medications for this visit. No Known Allergies     Review of Systems: A complete review of systems was obtained, negative except as described above. Physical Exam:   Blood pressure (!) 143/91, pulse 79, temperature 98.2 °F (36.8 °C), resp. rate 16, height 6' 2\" (1.88 m), weight 224 lb 9.6 oz (101.9 kg), SpO2 98 %.     ECOG PS: 0  General: Well developed, no acute distress  Eyes: PERRLA, EOMI, anicteric sclerae  HENT: Atraumatic, OP clear, TMs intact without erythema  Neck: Supple  Lymphatic: No cervical, supraclavicular, axillary or inguinal adenopathy  Respiratory: CTAB, normal respiratory effort  CV: Normal rate, regular rhythm, no murmurs, no peripheral edema  GI: Soft, nontender, nondistended, no masses, no hepatomegaly, no splenomegaly  MS: Normal gait and station. Digits without clubbing or cyanosis. Skin: No rashes, ecchymoses, or petechiae. Normal temperature, turgor, and texture. Neuro/Psych: Alert, oriented. 5/5 strength in all 4 extremities. Appropriate affect, normal judgment/insight. Results:     Lab Results   Component Value Date/Time    WBC 7.2 2021 03:27 PM    HGB 13.7 2021 03:27 PM    HCT 39.0 2021 03:27 PM    PLATELET 589 4795 03:27 PM    MCV 99.0 2021 03:27 PM    ABS. NEUTROPHILS 4.9 2021 03:27 PM     Lab Results   Component Value Date/Time    Sodium 135 (L) 2021 03:27 PM    Potassium 4.7 2021 03:27 PM    Chloride 103 2021 03:27 PM    CO2 27 2021 03:27 PM    Glucose 249 (H) 2021 03:27 PM    BUN 8 2021 03:27 PM    Creatinine 1.12 2021 03:27 PM    GFR est AA >60 2021 03:27 PM    GFR est non-AA >60 2021 03:27 PM    Calcium 9.4 2021 03:27 PM    Glucose (POC) 147 (H) 2021 11:41 AM     Lab Results   Component Value Date/Time    Bilirubin, total 1.1 (H) 2021 03:27 PM    ALT (SGPT) 28 2021 03:27 PM    Alk. phosphatase 67 2021 03:27 PM    Protein, total 6.9 2021 03:27 PM    Albumin 3.8 2021 03:27 PM    Globulin 3.1 2021 03:27 PM     Lab Results   Component Value Date/Time    Iron 122 2021 03:27 PM    TIBC 284 2021 03:27 PM    Iron % saturation 43 2021 03:27 PM    Ferritin 714 (H) 2021 03:27 PM       Lab Results   Component Value Date/Time    Vitamin B12 305 2017 10:56 PM    Folate 6.8 2017 10:56 PM     No results found for: TSH, TSH2, TSH3, TSHP, TSHEXT  Lab Results   Component Value Date/Time    Hepatitis A, IgM NONREACTIVE 2017 10:56 PM    Hepatitis B surface Ag <0.10 2017 10:56 PM    Hepatitis B core, IgM NONREACTIVE 2017 10:56 PM         Imagin2021 CT abd/pelvis:  IMPRESSION: No bowel obstruction, ileus or perforation. No intra-abdominal  abscess. Diffuse hepatic steatosis. 9/4/2017 Abdomen ultrasound:  IMPRESSION: Echogenic liver compatible with hepatic steatosis. No gallstones. Top normal extrahepatic biliary duct. No hydronephrosis. Assessment & Plan:   Jennifer Gutierres is a 63 y.o. male with hepatic steatosis is referred for elevated ferritin. 1. Elevated ferritin:  Ferritin 714 in Feb 2021, but iron profile is actually normal. Ferritin is nonspecific and can be elevated with inflammation, liver disease, etc. Will check for HFE gene mutation to determine whether patient has hereditary hemochromatosis. However, most likely the prior elevated iron studies in 2017 and current ferritin elevation are likely related to alcohol-induced liver disease. If HFE analysis is negative, I do not see a reason to follow the ferritin level. -- HFE analysis - call pt with results    2. H/o leukopenia:  Was already seen by hematologist in Jan 2021 during inpatient consultation. Was deemed related to alcohol induced liver disease. Has since resolved and may be related to cutting down significantly on alcohol. -- No further workup needed. 3. Hepatic steatosis / History of alcohol abuse:   Related to long standing and prior alcohol abuse. Has cut down significantly, but not completely abstinent. -- Refer to Hepatology for evaluation per his request.     4. C-spine arthritis/stenosis:  S/p fusion surgery 3/2/21. I appreciate the opportunity to participate in Mr. Uyen choi.     Signed By: Tejas Allen MD     May 13, 2021

## 2021-05-13 ENCOUNTER — OFFICE VISIT (OUTPATIENT)
Dept: ONCOLOGY | Age: 56
End: 2021-05-13
Payer: MEDICAID

## 2021-05-13 VITALS
SYSTOLIC BLOOD PRESSURE: 143 MMHG | HEART RATE: 79 BPM | WEIGHT: 224.6 LBS | TEMPERATURE: 98.2 F | DIASTOLIC BLOOD PRESSURE: 91 MMHG | HEIGHT: 74 IN | OXYGEN SATURATION: 98 % | RESPIRATION RATE: 16 BRPM | BODY MASS INDEX: 28.83 KG/M2

## 2021-05-13 DIAGNOSIS — R79.89 ELEVATED FERRITIN: Primary | ICD-10-CM

## 2021-05-13 DIAGNOSIS — Z98.1 HISTORY OF FUSION OF CERVICAL SPINE: ICD-10-CM

## 2021-05-13 DIAGNOSIS — D72.818 OTHER DECREASED WHITE BLOOD CELL (WBC) COUNT: ICD-10-CM

## 2021-05-13 DIAGNOSIS — K76.0 HEPATIC STEATOSIS: ICD-10-CM

## 2021-05-13 DIAGNOSIS — F10.11 HISTORY OF ALCOHOL ABUSE: ICD-10-CM

## 2021-05-13 PROCEDURE — 99244 OFF/OP CNSLTJ NEW/EST MOD 40: CPT | Performed by: INTERNAL MEDICINE

## 2021-05-13 NOTE — PROGRESS NOTES
Chief Complaint   Patient presents with    New Patient     Prabhjot Beaulieu is a pleasant 63year old male who presents as a new patient for high iron levels/ low WBC count.  He denies pain

## 2022-01-17 ENCOUNTER — APPOINTMENT (OUTPATIENT)
Dept: GENERAL RADIOLOGY | Age: 57
End: 2022-01-17
Attending: NURSE PRACTITIONER
Payer: MEDICAID

## 2022-01-17 ENCOUNTER — APPOINTMENT (OUTPATIENT)
Dept: CT IMAGING | Age: 57
End: 2022-01-17
Attending: STUDENT IN AN ORGANIZED HEALTH CARE EDUCATION/TRAINING PROGRAM
Payer: MEDICAID

## 2022-01-17 ENCOUNTER — HOSPITAL ENCOUNTER (INPATIENT)
Age: 57
LOS: 3 days | Discharge: HOME | End: 2022-01-20
Attending: EMERGENCY MEDICINE | Admitting: FAMILY MEDICINE
Payer: MEDICAID

## 2022-01-17 DIAGNOSIS — F10.932 ALCOHOL WITHDRAWAL SYNDROME WITH PERCEPTUAL DISTURBANCE (HCC): ICD-10-CM

## 2022-01-17 DIAGNOSIS — U07.1 COVID-19: ICD-10-CM

## 2022-01-17 DIAGNOSIS — E83.42 HYPOMAGNESEMIA: ICD-10-CM

## 2022-01-17 DIAGNOSIS — E87.6 HYPOKALEMIA: ICD-10-CM

## 2022-01-17 DIAGNOSIS — D69.6 THROMBOCYTOPENIA (HCC): ICD-10-CM

## 2022-01-17 DIAGNOSIS — R63.4 WEIGHT LOSS: ICD-10-CM

## 2022-01-17 DIAGNOSIS — J44.1 COPD EXACERBATION (HCC): ICD-10-CM

## 2022-01-17 DIAGNOSIS — I10 PRIMARY HYPERTENSION: ICD-10-CM

## 2022-01-17 DIAGNOSIS — K29.20 ALCOHOLIC GASTRITIS, PRESENCE OF BLEEDING UNSPECIFIED, UNSPECIFIED CHRONICITY: ICD-10-CM

## 2022-01-17 DIAGNOSIS — F10.939 ALCOHOL WITHDRAWAL SYNDROME WITH COMPLICATION (HCC): Primary | ICD-10-CM

## 2022-01-17 DIAGNOSIS — F10.10 ALCOHOL ABUSE: Chronic | ICD-10-CM

## 2022-01-17 DIAGNOSIS — F12.10 MILD TETRAHYDROCANNABINOL (THC) ABUSE: Chronic | ICD-10-CM

## 2022-01-17 DIAGNOSIS — F33.41 RECURRENT MAJOR DEPRESSIVE DISORDER, IN PARTIAL REMISSION (HCC): ICD-10-CM

## 2022-01-17 DIAGNOSIS — E87.20 LACTIC ACIDOSIS: ICD-10-CM

## 2022-01-17 DIAGNOSIS — E11.69 TYPE 2 DIABETES MELLITUS WITH OTHER SPECIFIED COMPLICATION, WITHOUT LONG-TERM CURRENT USE OF INSULIN (HCC): ICD-10-CM

## 2022-01-17 PROBLEM — E11.9 DM (DIABETES MELLITUS) (HCC): Status: ACTIVE | Noted: 2022-01-17

## 2022-01-17 LAB
ABO + RH BLD: NORMAL
ALBUMIN SERPL-MCNC: 3.3 G/DL (ref 3.5–5)
ALBUMIN SERPL-MCNC: 3.9 G/DL (ref 3.5–5)
ALBUMIN/GLOB SERPL: 1.1 {RATIO} (ref 1.1–2.2)
ALBUMIN/GLOB SERPL: 1.1 {RATIO} (ref 1.1–2.2)
ALP SERPL-CCNC: 101 U/L (ref 45–117)
ALP SERPL-CCNC: 81 U/L (ref 45–117)
ALT SERPL-CCNC: 38 U/L (ref 12–78)
ALT SERPL-CCNC: 48 U/L (ref 12–78)
AMMONIA PLAS-SCNC: 36 UMOL/L
AMPHET UR QL SCN: NEGATIVE
ANION GAP SERPL CALC-SCNC: 11 MMOL/L (ref 5–15)
ANION GAP SERPL CALC-SCNC: 20 MMOL/L (ref 5–15)
APAP SERPL-MCNC: <2 UG/ML (ref 10–30)
APPEARANCE UR: CLEAR
AST SERPL-CCNC: 48 U/L (ref 15–37)
AST SERPL-CCNC: 62 U/L (ref 15–37)
B PERT DNA SPEC QL NAA+PROBE: NOT DETECTED
BACTERIA URNS QL MICRO: NEGATIVE /HPF
BARBITURATES UR QL SCN: NEGATIVE
BASOPHILS # BLD: 0 K/UL (ref 0–0.1)
BASOPHILS # BLD: 0 K/UL (ref 0–0.1)
BASOPHILS NFR BLD: 0 % (ref 0–1)
BASOPHILS NFR BLD: 0 % (ref 0–1)
BENZODIAZ UR QL: NEGATIVE
BILIRUB SERPL-MCNC: 2.6 MG/DL (ref 0.2–1)
BILIRUB SERPL-MCNC: 2.7 MG/DL (ref 0.2–1)
BILIRUB UR QL CFM: NEGATIVE
BLOOD GROUP ANTIBODIES SERPL: NORMAL
BORDETELLA PARAPERTUSSIS PCR, BORPAR: NOT DETECTED
BUN SERPL-MCNC: 10 MG/DL (ref 6–20)
BUN SERPL-MCNC: 12 MG/DL (ref 6–20)
BUN/CREAT SERPL: 11 (ref 12–20)
BUN/CREAT SERPL: 13 (ref 12–20)
C PNEUM DNA SPEC QL NAA+PROBE: NOT DETECTED
CALCIUM SERPL-MCNC: 8.5 MG/DL (ref 8.5–10.1)
CALCIUM SERPL-MCNC: 9 MG/DL (ref 8.5–10.1)
CANNABINOIDS UR QL SCN: POSITIVE
CHLORIDE SERPL-SCNC: 92 MMOL/L (ref 97–108)
CHLORIDE SERPL-SCNC: 97 MMOL/L (ref 97–108)
CO2 SERPL-SCNC: 21 MMOL/L (ref 21–32)
CO2 SERPL-SCNC: 25 MMOL/L (ref 21–32)
COCAINE UR QL SCN: NEGATIVE
COLOR UR: ABNORMAL
COMMENT, HOLDF: NORMAL
COVID-19 RAPID TEST, COVR: DETECTED
CREAT SERPL-MCNC: 0.79 MG/DL (ref 0.7–1.3)
CREAT SERPL-MCNC: 1.07 MG/DL (ref 0.7–1.3)
DIFFERENTIAL METHOD BLD: ABNORMAL
DIFFERENTIAL METHOD BLD: ABNORMAL
DRUG SCRN COMMENT,DRGCM: ABNORMAL
EOSINOPHIL # BLD: 0 K/UL (ref 0–0.4)
EOSINOPHIL # BLD: 0.1 K/UL (ref 0–0.4)
EOSINOPHIL NFR BLD: 0 % (ref 0–7)
EOSINOPHIL NFR BLD: 1 % (ref 0–7)
EPITH CASTS URNS QL MICRO: ABNORMAL /LPF
ERYTHROCYTE [DISTWIDTH] IN BLOOD BY AUTOMATED COUNT: 13.8 % (ref 11.5–14.5)
ERYTHROCYTE [DISTWIDTH] IN BLOOD BY AUTOMATED COUNT: 14.1 % (ref 11.5–14.5)
EST. AVERAGE GLUCOSE BLD GHB EST-MCNC: 117 MG/DL
ETHANOL SERPL-MCNC: 102 MG/DL
FLUAV H1 2009 PAND RNA SPEC QL NAA+PROBE: NOT DETECTED
FLUAV H1 RNA SPEC QL NAA+PROBE: NOT DETECTED
FLUAV H3 RNA SPEC QL NAA+PROBE: NOT DETECTED
FLUAV SUBTYP SPEC NAA+PROBE: NOT DETECTED
FLUBV RNA SPEC QL NAA+PROBE: NOT DETECTED
FOLATE SERPL-MCNC: 16.4 NG/ML (ref 5–21)
GLOBULIN SER CALC-MCNC: 3.1 G/DL (ref 2–4)
GLOBULIN SER CALC-MCNC: 3.5 G/DL (ref 2–4)
GLUCOSE SERPL-MCNC: 126 MG/DL (ref 65–100)
GLUCOSE SERPL-MCNC: 171 MG/DL (ref 65–100)
GLUCOSE UR STRIP.AUTO-MCNC: NEGATIVE MG/DL
HADV DNA SPEC QL NAA+PROBE: NOT DETECTED
HBA1C MFR BLD: 5.7 % (ref 4–5.6)
HCOV 229E RNA SPEC QL NAA+PROBE: NOT DETECTED
HCOV HKU1 RNA SPEC QL NAA+PROBE: NOT DETECTED
HCOV NL63 RNA SPEC QL NAA+PROBE: NOT DETECTED
HCOV OC43 RNA SPEC QL NAA+PROBE: NOT DETECTED
HCT VFR BLD AUTO: 39.1 % (ref 36.6–50.3)
HCT VFR BLD AUTO: 40.6 % (ref 36.6–50.3)
HCT VFR BLD AUTO: 44.1 % (ref 36.6–50.3)
HGB BLD-MCNC: 14.5 G/DL (ref 12.1–17)
HGB BLD-MCNC: 15.1 G/DL (ref 12.1–17)
HGB BLD-MCNC: 16.4 G/DL (ref 12.1–17)
HGB UR QL STRIP: NEGATIVE
HMPV RNA SPEC QL NAA+PROBE: NOT DETECTED
HPIV1 RNA SPEC QL NAA+PROBE: NOT DETECTED
HPIV2 RNA SPEC QL NAA+PROBE: NOT DETECTED
HPIV3 RNA SPEC QL NAA+PROBE: NOT DETECTED
HPIV4 RNA SPEC QL NAA+PROBE: NOT DETECTED
HYALINE CASTS URNS QL MICRO: ABNORMAL /LPF (ref 0–5)
IMM GRANULOCYTES # BLD AUTO: 0.1 K/UL (ref 0–0.04)
IMM GRANULOCYTES # BLD AUTO: 0.1 K/UL (ref 0–0.04)
IMM GRANULOCYTES NFR BLD AUTO: 1 % (ref 0–0.5)
IMM GRANULOCYTES NFR BLD AUTO: 1 % (ref 0–0.5)
INR PPP: 1.1 (ref 0.9–1.1)
KETONES UR QL STRIP.AUTO: 80 MG/DL
LACTATE SERPL-SCNC: 1.7 MMOL/L (ref 0.4–2)
LACTATE SERPL-SCNC: 7.8 MMOL/L (ref 0.4–2)
LEUKOCYTE ESTERASE UR QL STRIP.AUTO: NEGATIVE
LIPASE SERPL-CCNC: 121 U/L (ref 73–393)
LIPASE SERPL-CCNC: 74 U/L (ref 73–393)
LYMPHOCYTES # BLD: 0.4 K/UL (ref 0.8–3.5)
LYMPHOCYTES # BLD: 0.8 K/UL (ref 0.8–3.5)
LYMPHOCYTES NFR BLD: 11 % (ref 12–49)
LYMPHOCYTES NFR BLD: 6 % (ref 12–49)
M PNEUMO DNA SPEC QL NAA+PROBE: NOT DETECTED
MAGNESIUM SERPL-MCNC: 1.4 MG/DL (ref 1.6–2.4)
MCH RBC QN AUTO: 34.5 PG (ref 26–34)
MCH RBC QN AUTO: 35 PG (ref 26–34)
MCHC RBC AUTO-ENTMCNC: 37.1 G/DL (ref 30–36.5)
MCHC RBC AUTO-ENTMCNC: 37.2 G/DL (ref 30–36.5)
MCV RBC AUTO: 93.1 FL (ref 80–99)
MCV RBC AUTO: 94 FL (ref 80–99)
METHADONE UR QL: NEGATIVE
MONOCYTES # BLD: 0.4 K/UL (ref 0–1)
MONOCYTES # BLD: 0.6 K/UL (ref 0–1)
MONOCYTES NFR BLD: 6 % (ref 5–13)
MONOCYTES NFR BLD: 8 % (ref 5–13)
NEUTS SEG # BLD: 6 K/UL (ref 1.8–8)
NEUTS SEG # BLD: 6.1 K/UL (ref 1.8–8)
NEUTS SEG NFR BLD: 79 % (ref 32–75)
NEUTS SEG NFR BLD: 87 % (ref 32–75)
NITRITE UR QL STRIP.AUTO: NEGATIVE
NRBC # BLD: 0 K/UL (ref 0–0.01)
NRBC # BLD: 0 K/UL (ref 0–0.01)
NRBC BLD-RTO: 0 PER 100 WBC
NRBC BLD-RTO: 0 PER 100 WBC
OPIATES UR QL: NEGATIVE
PCP UR QL: NEGATIVE
PH UR STRIP: 5.5 [PH] (ref 5–8)
PLATELET # BLD AUTO: 108 K/UL (ref 150–400)
PLATELET # BLD AUTO: 127 K/UL (ref 150–400)
PMV BLD AUTO: 9 FL (ref 8.9–12.9)
PMV BLD AUTO: 9.6 FL (ref 8.9–12.9)
POTASSIUM SERPL-SCNC: 3.7 MMOL/L (ref 3.5–5.1)
POTASSIUM SERPL-SCNC: 4.1 MMOL/L (ref 3.5–5.1)
PROCALCITONIN SERPL-MCNC: <0.05 NG/ML
PROT SERPL-MCNC: 6.4 G/DL (ref 6.4–8.2)
PROT SERPL-MCNC: 7.4 G/DL (ref 6.4–8.2)
PROT UR STRIP-MCNC: 100 MG/DL
PROTHROMBIN TIME: 11.7 SEC (ref 9–11.1)
RBC # BLD AUTO: 4.2 M/UL (ref 4.1–5.7)
RBC # BLD AUTO: 4.69 M/UL (ref 4.1–5.7)
RBC #/AREA URNS HPF: ABNORMAL /HPF (ref 0–5)
RBC MORPH BLD: ABNORMAL
RSV RNA SPEC QL NAA+PROBE: NOT DETECTED
RV+EV RNA SPEC QL NAA+PROBE: NOT DETECTED
SALICYLATES SERPL-MCNC: <1.7 MG/DL (ref 2.8–20)
SAMPLES BEING HELD,HOLD: NORMAL
SARS-COV-2 PCR, COVPCR: DETECTED
SODIUM SERPL-SCNC: 133 MMOL/L (ref 136–145)
SODIUM SERPL-SCNC: 133 MMOL/L (ref 136–145)
SOURCE, COVRS: ABNORMAL
SPECIMEN EXP DATE BLD: NORMAL
TROPONIN-HIGH SENSITIVITY: 5 NG/L (ref 0–76)
UR CULT HOLD, URHOLD: NORMAL
UROBILINOGEN UR QL STRIP.AUTO: 1 EU/DL (ref 0.2–1)
WBC # BLD AUTO: 6.9 K/UL (ref 4.1–11.1)
WBC # BLD AUTO: 7.6 K/UL (ref 4.1–11.1)
WBC URNS QL MICRO: ABNORMAL /HPF (ref 0–4)

## 2022-01-17 PROCEDURE — 85610 PROTHROMBIN TIME: CPT

## 2022-01-17 PROCEDURE — 74178 CT ABD&PLV WO CNTR FLWD CNTR: CPT

## 2022-01-17 PROCEDURE — 74011250637 HC RX REV CODE- 250/637: Performed by: FAMILY MEDICINE

## 2022-01-17 PROCEDURE — 96374 THER/PROPH/DIAG INJ IV PUSH: CPT

## 2022-01-17 PROCEDURE — 65660000000 HC RM CCU STEPDOWN

## 2022-01-17 PROCEDURE — 86900 BLOOD TYPING SEROLOGIC ABO: CPT

## 2022-01-17 PROCEDURE — 0202U NFCT DS 22 TRGT SARS-COV-2: CPT

## 2022-01-17 PROCEDURE — 74011250636 HC RX REV CODE- 250/636: Performed by: STUDENT IN AN ORGANIZED HEALTH CARE EDUCATION/TRAINING PROGRAM

## 2022-01-17 PROCEDURE — 94640 AIRWAY INHALATION TREATMENT: CPT

## 2022-01-17 PROCEDURE — 74011250637 HC RX REV CODE- 250/637: Performed by: NURSE PRACTITIONER

## 2022-01-17 PROCEDURE — 74011000250 HC RX REV CODE- 250: Performed by: NURSE PRACTITIONER

## 2022-01-17 PROCEDURE — 80307 DRUG TEST PRSMV CHEM ANLYZR: CPT

## 2022-01-17 PROCEDURE — 87635 SARS-COV-2 COVID-19 AMP PRB: CPT

## 2022-01-17 PROCEDURE — 80143 DRUG ASSAY ACETAMINOPHEN: CPT

## 2022-01-17 PROCEDURE — 81001 URINALYSIS AUTO W/SCOPE: CPT

## 2022-01-17 PROCEDURE — 82077 ASSAY SPEC XCP UR&BREATH IA: CPT

## 2022-01-17 PROCEDURE — 74011250637 HC RX REV CODE- 250/637: Performed by: STUDENT IN AN ORGANIZED HEALTH CARE EDUCATION/TRAINING PROGRAM

## 2022-01-17 PROCEDURE — 83605 ASSAY OF LACTIC ACID: CPT

## 2022-01-17 PROCEDURE — 96375 TX/PRO/DX INJ NEW DRUG ADDON: CPT

## 2022-01-17 PROCEDURE — 80179 DRUG ASSAY SALICYLATE: CPT

## 2022-01-17 PROCEDURE — 71045 X-RAY EXAM CHEST 1 VIEW: CPT

## 2022-01-17 PROCEDURE — 74011000636 HC RX REV CODE- 636

## 2022-01-17 PROCEDURE — 74011000250 HC RX REV CODE- 250: Performed by: FAMILY MEDICINE

## 2022-01-17 PROCEDURE — 94664 DEMO&/EVAL PT USE INHALER: CPT

## 2022-01-17 PROCEDURE — 36415 COLL VENOUS BLD VENIPUNCTURE: CPT

## 2022-01-17 PROCEDURE — C9113 INJ PANTOPRAZOLE SODIUM, VIA: HCPCS | Performed by: FAMILY MEDICINE

## 2022-01-17 PROCEDURE — 99282 EMERGENCY DEPT VISIT SF MDM: CPT

## 2022-01-17 PROCEDURE — 74011250636 HC RX REV CODE- 250/636: Performed by: FAMILY MEDICINE

## 2022-01-17 PROCEDURE — 85025 COMPLETE CBC W/AUTO DIFF WBC: CPT

## 2022-01-17 PROCEDURE — 82140 ASSAY OF AMMONIA: CPT

## 2022-01-17 PROCEDURE — 83735 ASSAY OF MAGNESIUM: CPT

## 2022-01-17 PROCEDURE — 80053 COMPREHEN METABOLIC PANEL: CPT

## 2022-01-17 PROCEDURE — 82746 ASSAY OF FOLIC ACID SERUM: CPT

## 2022-01-17 PROCEDURE — 99222 1ST HOSP IP/OBS MODERATE 55: CPT | Performed by: FAMILY MEDICINE

## 2022-01-17 PROCEDURE — 84484 ASSAY OF TROPONIN QUANT: CPT

## 2022-01-17 PROCEDURE — 83690 ASSAY OF LIPASE: CPT

## 2022-01-17 PROCEDURE — 94761 N-INVAS EAR/PLS OXIMETRY MLT: CPT

## 2022-01-17 PROCEDURE — 85018 HEMOGLOBIN: CPT

## 2022-01-17 PROCEDURE — 99284 EMERGENCY DEPT VISIT MOD MDM: CPT

## 2022-01-17 PROCEDURE — 74011250636 HC RX REV CODE- 250/636: Performed by: NURSE PRACTITIONER

## 2022-01-17 PROCEDURE — 84145 PROCALCITONIN (PCT): CPT

## 2022-01-17 PROCEDURE — 83036 HEMOGLOBIN GLYCOSYLATED A1C: CPT

## 2022-01-17 RX ORDER — LORAZEPAM 2 MG/ML
2 INJECTION INTRAMUSCULAR
Status: DISCONTINUED | OUTPATIENT
Start: 2022-01-17 | End: 2022-01-20 | Stop reason: HOSPADM

## 2022-01-17 RX ORDER — INSULIN LISPRO 100 [IU]/ML
INJECTION, SOLUTION INTRAVENOUS; SUBCUTANEOUS
Status: DISCONTINUED | OUTPATIENT
Start: 2022-01-17 | End: 2022-01-20 | Stop reason: HOSPADM

## 2022-01-17 RX ORDER — SODIUM CHLORIDE, SODIUM LACTATE, POTASSIUM CHLORIDE, CALCIUM CHLORIDE 600; 310; 30; 20 MG/100ML; MG/100ML; MG/100ML; MG/100ML
125 INJECTION, SOLUTION INTRAVENOUS CONTINUOUS
Status: DISCONTINUED | OUTPATIENT
Start: 2022-01-17 | End: 2022-01-18

## 2022-01-17 RX ORDER — BUDESONIDE 0.5 MG/2ML
500 INHALANT ORAL 2 TIMES DAILY
Status: DISCONTINUED | OUTPATIENT
Start: 2022-01-17 | End: 2022-01-17

## 2022-01-17 RX ORDER — ZINC GLUCONATE 50 MG
1 TABLET ORAL DAILY
Status: DISCONTINUED | OUTPATIENT
Start: 2022-01-18 | End: 2022-01-18

## 2022-01-17 RX ORDER — ONDANSETRON 2 MG/ML
8 INJECTION INTRAMUSCULAR; INTRAVENOUS
Status: COMPLETED | OUTPATIENT
Start: 2022-01-17 | End: 2022-01-17

## 2022-01-17 RX ORDER — LORAZEPAM 2 MG/ML
2 INJECTION INTRAMUSCULAR ONCE
Status: COMPLETED | OUTPATIENT
Start: 2022-01-17 | End: 2022-01-17

## 2022-01-17 RX ORDER — IPRATROPIUM BROMIDE AND ALBUTEROL SULFATE 2.5; .5 MG/3ML; MG/3ML
3 SOLUTION RESPIRATORY (INHALATION) EVERY 4 HOURS
Status: DISCONTINUED | OUTPATIENT
Start: 2022-01-17 | End: 2022-01-17

## 2022-01-17 RX ORDER — IPRATROPIUM BROMIDE AND ALBUTEROL SULFATE 2.5; .5 MG/3ML; MG/3ML
3 SOLUTION RESPIRATORY (INHALATION)
Status: DISCONTINUED | OUTPATIENT
Start: 2022-01-17 | End: 2022-01-18

## 2022-01-17 RX ORDER — PHENOBARBITAL SODIUM 65 MG/ML
65 INJECTION INTRAMUSCULAR 3 TIMES DAILY
Status: DISCONTINUED | OUTPATIENT
Start: 2022-01-17 | End: 2022-01-19

## 2022-01-17 RX ORDER — GUAIFENESIN 600 MG/1
600 TABLET, EXTENDED RELEASE ORAL EVERY 12 HOURS
Status: DISCONTINUED | OUTPATIENT
Start: 2022-01-17 | End: 2022-01-20 | Stop reason: HOSPADM

## 2022-01-17 RX ORDER — IBUPROFEN 200 MG
1 TABLET ORAL DAILY
Status: DISCONTINUED | OUTPATIENT
Start: 2022-01-18 | End: 2022-01-20 | Stop reason: SDUPTHER

## 2022-01-17 RX ORDER — LORAZEPAM 2 MG/ML
4 INJECTION INTRAMUSCULAR
Status: DISCONTINUED | OUTPATIENT
Start: 2022-01-17 | End: 2022-01-20 | Stop reason: HOSPADM

## 2022-01-17 RX ORDER — MAGNESIUM SULFATE 100 %
4 CRYSTALS MISCELLANEOUS AS NEEDED
Status: DISCONTINUED | OUTPATIENT
Start: 2022-01-17 | End: 2022-01-20 | Stop reason: HOSPADM

## 2022-01-17 RX ORDER — LISINOPRIL 20 MG/1
20 TABLET ORAL DAILY
Status: DISCONTINUED | OUTPATIENT
Start: 2022-01-18 | End: 2022-01-20 | Stop reason: HOSPADM

## 2022-01-17 RX ORDER — METOPROLOL TARTRATE 50 MG/1
50 TABLET ORAL 2 TIMES DAILY
Status: DISCONTINUED | OUTPATIENT
Start: 2022-01-17 | End: 2022-01-20 | Stop reason: HOSPADM

## 2022-01-17 RX ORDER — PROCHLORPERAZINE EDISYLATE 5 MG/ML
10 INJECTION INTRAMUSCULAR; INTRAVENOUS
Status: DISCONTINUED | OUTPATIENT
Start: 2022-01-17 | End: 2022-01-20 | Stop reason: HOSPADM

## 2022-01-17 RX ORDER — DIAZEPAM 10 MG/2ML
7.5 INJECTION INTRAMUSCULAR
Status: DISCONTINUED | OUTPATIENT
Start: 2022-01-17 | End: 2022-01-17

## 2022-01-17 RX ORDER — ATORVASTATIN CALCIUM 20 MG/1
20 TABLET, FILM COATED ORAL DAILY
Status: DISCONTINUED | OUTPATIENT
Start: 2022-01-18 | End: 2022-01-20 | Stop reason: HOSPADM

## 2022-01-17 RX ORDER — ASCORBIC ACID 500 MG
500 TABLET ORAL 2 TIMES DAILY
Status: DISCONTINUED | OUTPATIENT
Start: 2022-01-17 | End: 2022-01-20 | Stop reason: HOSPADM

## 2022-01-17 RX ORDER — PHENOBARBITAL SODIUM 65 MG/ML
130 INJECTION INTRAMUSCULAR ONCE
Status: COMPLETED | OUTPATIENT
Start: 2022-01-17 | End: 2022-01-17

## 2022-01-17 RX ORDER — ARFORMOTEROL TARTRATE 15 UG/2ML
15 SOLUTION RESPIRATORY (INHALATION)
Status: DISCONTINUED | OUTPATIENT
Start: 2022-01-17 | End: 2022-01-17

## 2022-01-17 RX ORDER — MAGNESIUM SULFATE HEPTAHYDRATE 40 MG/ML
2 INJECTION, SOLUTION INTRAVENOUS ONCE
Status: COMPLETED | OUTPATIENT
Start: 2022-01-17 | End: 2022-01-17

## 2022-01-17 RX ORDER — DEXTROSE 50 % IN WATER (D50W) INTRAVENOUS SYRINGE
25-50 AS NEEDED
Status: DISCONTINUED | OUTPATIENT
Start: 2022-01-17 | End: 2022-01-20 | Stop reason: HOSPADM

## 2022-01-17 RX ORDER — LABETALOL HCL 20 MG/4 ML
10 SYRINGE (ML) INTRAVENOUS
Status: DISCONTINUED | OUTPATIENT
Start: 2022-01-17 | End: 2022-01-20 | Stop reason: HOSPADM

## 2022-01-17 RX ORDER — BUDESONIDE AND FORMOTEROL FUMARATE DIHYDRATE 80; 4.5 UG/1; UG/1
2 AEROSOL RESPIRATORY (INHALATION)
Status: DISCONTINUED | OUTPATIENT
Start: 2022-01-17 | End: 2022-01-20 | Stop reason: HOSPADM

## 2022-01-17 RX ADMIN — LORAZEPAM 2 MG: 2 INJECTION INTRAMUSCULAR; INTRAVENOUS at 14:44

## 2022-01-17 RX ADMIN — IPRATROPIUM BROMIDE AND ALBUTEROL 1 PUFF: 20; 100 SPRAY, METERED RESPIRATORY (INHALATION) at 23:14

## 2022-01-17 RX ADMIN — METOPROLOL TARTRATE 50 MG: 50 TABLET, FILM COATED ORAL at 18:35

## 2022-01-17 RX ADMIN — FOLIC ACID: 5 INJECTION, SOLUTION INTRAMUSCULAR; INTRAVENOUS; SUBCUTANEOUS at 18:23

## 2022-01-17 RX ADMIN — MAGNESIUM SULFATE HEPTAHYDRATE 2 G: 40 INJECTION, SOLUTION INTRAVENOUS at 18:22

## 2022-01-17 RX ADMIN — SODIUM CHLORIDE, PRESERVATIVE FREE 20 MG: 5 INJECTION INTRAVENOUS at 18:28

## 2022-01-17 RX ADMIN — ONDANSETRON 8 MG: 2 INJECTION INTRAMUSCULAR; INTRAVENOUS at 14:44

## 2022-01-17 RX ADMIN — METHYLPREDNISOLONE SODIUM SUCCINATE 125 MG: 125 INJECTION, POWDER, FOR SOLUTION INTRAMUSCULAR; INTRAVENOUS at 18:31

## 2022-01-17 RX ADMIN — SODIUM CHLORIDE, POTASSIUM CHLORIDE, SODIUM LACTATE AND CALCIUM CHLORIDE 1000 ML: 600; 310; 30; 20 INJECTION, SOLUTION INTRAVENOUS at 14:08

## 2022-01-17 RX ADMIN — BUDESONIDE AND FORMOTEROL FUMARATE DIHYDRATE 2 PUFF: 80; 4.5 AEROSOL RESPIRATORY (INHALATION) at 23:10

## 2022-01-17 RX ADMIN — PHENOBARBITAL SODIUM 130 MG: 65 INJECTION INTRAMUSCULAR at 18:17

## 2022-01-17 RX ADMIN — Medication 500 MG: at 22:28

## 2022-01-17 RX ADMIN — LORAZEPAM 2 MG: 2 INJECTION INTRAMUSCULAR; INTRAVENOUS at 20:11

## 2022-01-17 RX ADMIN — SODIUM CHLORIDE, PRESERVATIVE FREE 40 MG: 5 INJECTION INTRAVENOUS at 18:21

## 2022-01-17 RX ADMIN — GUAIFENESIN 600 MG: 600 TABLET, EXTENDED RELEASE ORAL at 22:28

## 2022-01-17 RX ADMIN — LIDOCAINE HYDROCHLORIDE 40 ML: 20 SOLUTION OROPHARYNGEAL at 18:21

## 2022-01-17 RX ADMIN — LORAZEPAM 2 MG: 2 INJECTION INTRAMUSCULAR; INTRAVENOUS at 22:28

## 2022-01-17 RX ADMIN — PHENOBARBITAL SODIUM 65 MG: 65 INJECTION INTRAMUSCULAR at 22:27

## 2022-01-17 RX ADMIN — IOPAMIDOL 100 ML: 755 INJECTION, SOLUTION INTRAVENOUS at 18:51

## 2022-01-17 NOTE — ED PROVIDER NOTES
79-year-old male with past medical history of alcohol abuse, anxiety, COPD, depression, diverticulitis, GERD, hemochromatosis, and hypertension presents to the ER today for evaluation of alcohol withdrawal.    Patient states that he has been a chronic daily alcohol user for quite some time, was able to quit several months back, and \"I've been on a alba ever since the first no fall we had earlier this year\". He states that he last drank alcohol yesterday morning, and since then has developed persistent nausea, vomiting, fatigue, tremors, \"and just a really bad feeling that something bad is going to happen\". He does feel like he is starting to have some visual hallucinations feels like he is going to have a seizure (has had withdrawal seizures in the past). He does endorse marijuana use, but no other illicit drug use. Initial CIWA score 15           Past Medical History:   Diagnosis Date    Alcohol abuse     [facility] [address] [facility]     Anxiety     COPD [facility]chronic obstructive pulmonary disease) ([facility]     Dr. Jaquan Gottlieb is pulmonologist    Depression     Diabetes ([facility] [address]     Type II    Diverticulitis     Family history of angina     GERD (gastroesophageal reflux disease)     Hemochromatosis     Hypertension        Past Surgical History:   Procedure Laterality Date    COLONOSCOPY N/A 9/6/2017    COLONOSCOPY performed by Marty Grace MD at [address] [place] [address]  03/02/2021    ACDF C4/7    HX TOTAL COLECTOMY           No family history on file.     Social History     Socioeconomic History    Marital status: SINGLE     Spouse name: Not on file    Number of children: Not on file    Years of education: Not on file    Highest education level: Not on file   Occupational History    Not on file   Tobacco Use    Smoking status: Unknown If Ever Smoked    Smokeless tobacco: Current User    Tobacco comment: Pt uses Chewing tobacco   Substance and Sexual Activity    Alcohol use: Yes     Comment: heavy liquor use daily    Drug use: Yes     Types: Marijuana     Comment: occasional    Sexual activity: Not on file   Other Topics Concern     Service Not Asked    Blood Transfusions Not Asked    Caffeine Concern Not Asked    Occupational Exposure Not Asked    Hobby Hazards Not Asked    Sleep Concern Not Asked    Stress Concern Not Asked    Weight Concern Not Asked    Special Diet Not Asked    Back Care Not Asked    Exercise Not Asked    Bike Helmet Not Asked   [address] Not Asked    Self-Exams Not Asked   Social History Narrative    Not on file     Social Determinants of Health     Financial Resource Strain:     Difficulty of Paying Living Expenses: Not on file   Food Insecurity:     Worried About Running Out of Food in the Last Year: Not on file    [place] of Food in the Last Year: Not on file   Transportation Needs:     Lack of Transportation (Medical): Not on file    Lack of Transportation (Non-Medical): Not on file   Physical Activity:     Days of Exercise per Week: Not on file    Minutes of Exercise per Session: Not on file   Stress:     Feeling of Stress : Not on file   Social Connections:     Frequency of Communication with Friends and Family: Not on file    Frequency of Social Gatherings with Friends and Family: Not on file    Attends [employer] Services: Not on file    Active Member of [address] [place] [address] [employer] or Organizations: Not on file    Attends Club or Organization Meetings: Not on file    Marital Status: Not on file   Intimate Partner Violence:     Fear of Current or Ex-Partner: Not on file    Emotionally Abused: Not on file    Physically Abused: Not on file    Sexually Abused: Not on file   Housing Stability:     Unable to Pay for Housing in the Last Year: Not on file    Number of [place] in the Last Year: Not on file    Unstable Housing in the Last Year: Not on file         ALLERGIES: Patient has no known allergies.     Review of Systems   Constitutional: Positive for chills. Negative for fever. HENT: Negative for sore throat. Eyes: Negative for visual disturbance. Respiratory: Positive for cough and shortness of breath. Cardiovascular: Negative for chest pain and leg swelling. Gastrointestinal: Positive for nausea and vomiting. Genitourinary: Negative for dysuria. Musculoskeletal: Negative for myalgias. Skin: Negative for rash. Neurological: Positive for tremors and headaches. Psychiatric/Behavioral: Positive for dysphoric mood and sleep disturbance. Negative for suicidal ideas. The patient is nervous/anxious. Vitals:    01/17/22 1401   BP: (!) 160/89   Pulse: 84   Resp: 16   Temp: 96.9 °F (36.1 °C)   SpO2: 96%   Weight: 104.3 kg (230 lb)   Height: 6' 2\" (1.88 m)            Physical Exam  Vitals and nursing note reviewed. Constitutional:       General: He is in acute distress. Appearance: Normal appearance. He is not ill-appearing, toxic-appearing or diaphoretic. HENT:      Head: Normocephalic and atraumatic. Nose: Nose normal.      Mouth/Throat:      Mouth: Mucous membranes are dry. Pharynx: Oropharynx is clear. Eyes:      Extraocular Movements: Extraocular movements intact. Cardiovascular:      Rate and Rhythm: Normal rate and regular rhythm. Pulses: Normal pulses. Heart sounds: Normal heart sounds. No murmur heard. No friction rub. Pulmonary:      Effort: Pulmonary effort is normal.      Breath sounds: Normal breath sounds. Abdominal:      General: Abdomen is flat. Bowel sounds are normal. There is no distension. Palpations: Abdomen is soft. Tenderness: There is generalized abdominal tenderness. There is no guarding or rebound. Musculoskeletal:         General: Normal range of motion. Cervical back: Normal range of motion and neck supple. Skin:     General: Skin is warm and dry. Neurological:      Mental Status: He is alert and oriented to person, place, and time.  Mental status is at baseline. Motor: Tremor present. Psychiatric:         Mood and Affect: Mood is anxious. Behavior: Behavior is cooperative. [facility]      VITAL SIGNS:  Patient Vitals for the past 4 hrs:   Temp Pulse Resp BP SpO2   01/17/22 1401 96.9 °F (36.1 °C) 84 16 (!) 160/89 96 %         LABS:  Recent Results (from the past 6 hour(s))   CBC WITH AUTOMATED DIFF    Collection Time: 01/17/22  2:39 PM   Result Value Ref Range    WBC 7.6 4.1 - 11.1 K/uL    RBC 4.69 4.10 - 5.70 M/uL    HGB 16.4 12.1 - 17.0 g/dL    HCT 44.1 36.6 - 50.3 %    MCV 94.0 80.0 - 99.0 FL    MCH 35.0 (H) 26.0 - 34.0 PG    MCHC 37.2 (H) 30.0 - 36.5 g/dL    RDW 13.8 11.5 - 14.5 %    PLATELET 070 (L) 499 - 400 K/uL    MPV 9.0 8.9 - 12.9 FL    NRBC 0.0 0  WBC    ABSOLUTE NRBC 0.00 0.00 - 0.01 K/uL    NEUTROPHILS 79 (H) 32 - 75 %    LYMPHOCYTES 11 (L) 12 - 49 %    MONOCYTES 8 5 - 13 %    EOSINOPHILS 1 0 - 7 %    BASOPHILS 0 0 - 1 %    IMMATURE GRANULOCYTES 1 (H) 0.0 - 0.5 %    ABS. NEUTROPHILS 6.1 1.8 - 8.0 K/UL    ABS. LYMPHOCYTES 0.8 0.8 - 3.5 K/UL    ABS. MONOCYTES 0.6 0.0 - 1.0 K/UL    ABS. EOSINOPHILS 0.1 0.0 - 0.4 K/UL    ABS. BASOPHILS 0.0 0.0 - 0.1 K/UL    ABS. IMM. GRANS. 0.1 (H) 0.00 - 0.04 K/UL    DF AUTOMATED     METABOLIC PANEL, COMPREHENSIVE    Collection Time: 01/17/22  2:39 PM   Result Value Ref Range    Sodium 133 (L) 136 - 145 mmol/L    Potassium 3.7 3.5 - 5.1 mmol/L    Chloride 92 (L) 97 - 108 mmol/L    CO2 21 21 - 32 mmol/L    Anion gap 20 (H) 5 - 15 mmol/L    Glucose 171 (H) 65 - 100 mg/dL    BUN 12 6 - 20 MG/DL    Creatinine 1.07 0.70 - 1.30 MG/DL    BUN/Creatinine ratio 11 (L) 12 - 20      GFR est AA >60 >60 ml/min/1.73m2    GFR est non-AA >60 >60 ml/min/1.73m2    Calcium 9.0 8.5 - 10.1 MG/DL    Bilirubin, total 2.7 (H) 0.2 - 1.0 MG/DL    ALT (SGPT) 48 12 - 78 U/L    AST (SGOT) 62 (H) 15 - 37 U/L    Alk.  phosphatase 101 45 - 117 U/L    Protein, total 7.4 6.4 - 8.2 g/dL    Albumin 3.9 3.5 - 5.0 g/dL    Globulin 3.5 2.0 - 4.0 g/dL    A-G Ratio 1.1 1.1 - 2.2     ETHYL ALCOHOL    Collection Time: 01/17/22  2:39 PM   Result Value Ref Range    ALCOHOL(ETHYL),SERUM 102 (H) <10 MG/DL   MAGNESIUM    Collection Time: 01/17/22  2:39 PM   Result Value Ref Range    Magnesium 1.4 (L) 1.6 - 2.4 mg/dL   SALICYLATE    Collection Time: 01/17/22  2:39 PM   Result Value Ref Range    Salicylate level <4.4 (L) 2.8 - 20.0 MG/DL   ACETAMINOPHEN    Collection Time: 01/17/22  2:39 PM   Result Value Ref Range    Acetaminophen level <2 (L) 10 - 30 ug/mL   TROPONIN-HIGH SENSITIVITY    Collection Time: 01/17/22  2:39 PM   Result Value Ref Range    Troponin-High Sensitivity 5 0 - 76 ng/L   SAMPLES BEING HELD    Collection Time: 01/17/22  2:39 PM   Result Value Ref Range    SAMPLES BEING HELD 1SST     COMMENT        Add-on orders for these samples will be processed based on acceptable specimen integrity and analyte stability, which may vary by analyte. LIPASE    Collection Time: 01/17/22  2:39 PM   Result Value Ref Range    Lipase 121 73 - 393 U/L   PROTHROMBIN TIME + INR    Collection Time: 01/17/22  2:49 PM   Result Value Ref Range    INR 1.1 0.9 - 1.1      Prothrombin time 11.7 (H) 9.0 - 11.1 sec   LACTIC ACID    Collection Time: 01/17/22  2:49 PM   Result Value Ref Range    Lactic acid 7.8 (HH) 0.4 - 2.0 MMOL/L        IMAGING:  XR CHEST PORT   Final Result   1. No evidence of an acute cardiopulmonary process.             Medications During Visit:  Medications   famotidine (PF) (PEPCID) 20 mg in 0.9% sodium chloride 10 mL injection (has no administration in time range)   mylanta/viscous lidocaine (GI COCKTAIL) (has no administration in time range)   sodium chloride 0.9 % bolus infusion 1,000 mL (has no administration in time range)   magnesium sulfate 2 g/50 ml IVPB (premix or compounded) (has no administration in time range)   methylPREDNISolone (PF) (SOLU-MEDROL) injection 60 mg (has no administration in time range)   ipratropium-albuterol (COMBIVENT RESPIMAT) 20 mcg-100 mcg inhalation spray (has no administration in time range)   lactated ringers bolus infusion 1,000 mL (1,000 mL IntraVENous New Bag 1/17/22 1408)   ondansetron (ZOFRAN) injection 8 mg (8 mg IntraVENous Given 1/17/22 1444)   LORazepam (ATIVAN) injection 2 mg (2 mg IntraVENous Given 1/17/22 1444)         DECISION MAKING:  Jacques Guillen is a 61 y.o. male who comes in as above. Work-up remarkable for marked lactic acidosis and hypomagnesemia in the setting of acute alcohol withdrawal.  Patient resting and appears less tremulous, anxious, and ill after IV Ativan. Patient is a very high risk for decompensation if discharged home and will be admitted to intermediate care for further management. Perfect Serve Consult for Admission  3:54 PM    ED Room Number: F57/M11  Patient Name and age:  Jacques Guillen 61 y.o.  male  Working Diagnosis:   1. Alcohol withdrawal syndrome with complication ([facility] [address]    2. Lactic acidosis    3. Alcoholic gastritis, presence of bleeding unspecified, unspecified chronicity    4. Hypomagnesemia    5. COPD exacerbation ([address]        COVID-19 Suspicion:  no  Sepsis present:  no  Reassessment needed: no  Code Status:  Full Code  Readmission: no  Isolation Requirements:  no  Recommended Level of Care:  step down  Department:[facility] ED - [phone]  Other: Alcohol withdrawal, COPD exacerbation. IMPRESSION:  1. Alcohol withdrawal syndrome with complication ([address]    2. Lactic acidosis    3. Alcoholic gastritis, presence of bleeding unspecified, unspecified chronicity    4. Hypomagnesemia    5.  COPD exacerbation (HCC)        DISPOSITION:  Admitted    CRITICAL CARE NOTE :    3:53 PM      IMPENDING DETERIORATION -CNS, Respiratory, and Metabolic    ASSOCIATED RISK FACTORS - Hypotension, Dysrhythmia, Metabolic changes and CNS Decompensation    MANAGEMENT- Supervision of Care    INTERPRETATION -  Xrays and Blood Pressure    INTERVENTIONS - hemodynamic mngmt and Metobolic interventions    TREATMENT RESPONSE -Improved    PERFORMED BY - Self        NOTES   :      I have spent 45 minutes of critical care time involved in lab review, consultations with specialist, family decision- making, bedside attention and documentation. During this entire length of time I was immediately available to the patient .     Jamey Richard, NP

## 2022-01-17 NOTE — ED TRIAGE NOTES
Patient reports he went to a detox facility a few month ago and went through detox-    Patient reports a week ago he started on a alcohol alba and reports his last drink was yesterday morning-    Patient reports he has a history of ETOH seizures-

## 2022-01-17 NOTE — PROGRESS NOTES
1810: Per family practice pt does not need NS bolus. Start goody bag. Hold maintenance fluid until goody bag completed. Per FP pt is ok to have sips of water to give metoprolol.  Ok for GI cocktail

## 2022-01-17 NOTE — H&P
Admission H&P     Name: Hilaria Reddy MRN: [id number]    Sex: Male   YOB: 1965  Age: 61 y.o. PCP: Yolande Tolbert NP      Source of Information: patient, medical records    Chief complaint: Alcohol withdrawal    History of Present Illness  Hilaria Reddy is a 61 y.o. male with PMHx of multi-substance abuse including alcohol, opioids, THC, and h/o crack-cocaine use; PUD 2/2 long-term NSAID use, MDD w/ h/o suicidal ideation, thrombocytopenia, T2DM, HTN, and COPD who presents to the ED complaining of a month-long history of shortness of breath, wheezing, and coughing; with a week long history of n/v and inability to tolerate oral intake. The patient has been on a drinking binge for the past 1.5-2-week period, consuming approximately 1/2 gallon of liquor per 12-hr period. Two days ago, he consumed an unknown amount of alcohol that caused him to black out; he claims he doesn't remember yesterday at all. Additionally, he has had a single episode of blood-streaked vomit and had been having black stools for the week prior to this past week. He claims he's hardly had any bowel movements as he hasn't had any food for 5 days. Of note, the patient has been hospitalized multiple times in the past for alcohol withdrawal, and has had withdrawal seizures. His last drink was early yesterday morning. COVID Questions:   Experiencing any of the following symptoms: fever, chills. Has had SOB, wheezing, cough, sinus congestion, nausea, vomiting, and diarrhea. Any Sick contacts with fever, cough, diarrhea, SOB, URI symptoms. NO  Traveled out of state or out of country. NO  Lives alone. Has been staying at home. Patient IS COVID vaccinated x2; no booster. In the ED:  Vitals: Temp 96.9   /89   HR 84   RR 16   SatO2  96% on RA  Labs: Na 133. Cr 171. . AST 62. Lactate 7.8. EtOH 102. Trop, salicylate, acetaminophen negative. Lipase 121 and Ca 9.0.    Imaging: CXR, which showed no acute cardiopulmonary process  Treatment: 1L LRs, 1L NS, Solu-med, Ativan, Pepcid    EKG: Not yet obtained. Patient Vitals for the past 12 hrs:   Temp Pulse Resp BP SpO2   01/17/22 1830 98.9 °F (37.2 °C) 77 16 (!) 151/88 100 %   01/17/22 1401 96.9 °F (36.1 °C) 84 16 (!) 160/89 96 %       Review of Systems  Review of Systems   Constitutional: Positive for fatigue. Negative for chills, diaphoresis and fever. HENT: Positive for congestion. Negative for nosebleeds, rhinorrhea, sore throat and trouble swallowing. Respiratory: Positive for cough, choking, shortness of breath and wheezing. Negative for apnea and chest tightness. Cardiovascular: Negative for chest pain, palpitations and leg swelling. Gastrointestinal: Positive for abdominal pain, blood in stool, diarrhea, nausea and vomiting. Negative for abdominal distention and constipation. Genitourinary: Negative for difficulty urinating, dysuria and hematuria. Musculoskeletal: Negative for arthralgias and joint swelling. Skin: Negative for color change and pallor. Neurological: Positive for tremors and headaches. Negative for seizures, speech difficulty and weakness. Psychiatric/Behavioral: Positive for decreased concentration and hallucinations. Negative for agitation, dysphoric mood, self-injury and suicidal ideas. The patient is nervous/anxious. Home Medications  Prior to Admission medications    Medication Sig Start Date End Date Taking? Authorizing Provider   omeprazole (PRILOSEC) 40 mg capsule Take 1 Cap by mouth Daily (before breakfast). 4/16/21   Marlon Rivera MD   metFORMIN (GLUCOPHAGE) 1,000 mg tablet Take 1 Tab by mouth two (2) times daily (with meals). 2/8/21   Marlon Rivera MD   QUEtiapine (SEROquel) 25 mg tablet Take 1 to 2 tablets daily as needed for anxiety. 2/8/21   Marlon Rivera MD   lisinopriL (PRINIVIL, ZESTRIL) 20 mg tablet Take 1 Tab by mouth daily.  Indications: high blood pressure 2/8/21   Marlon Rivera MD   metoprolol tartrate (LOPRESSOR) 50 mg tablet Take 1 Tab by mouth two (2) times a day. Indications: high blood pressure 2/8/21   Beatriz FORD MD   ondansetron (Zofran ODT) 4 mg disintegrating tablet Take 1 Tab by mouth every eight (8) hours as needed for Nausea. 1/17/21   Juliocesar Lopez MD   hydrOXYzine HCL (ATARAX) 50 mg tablet Take 2 Tabs by mouth every six (6) hours as needed for Anxiety. Indications: anxious  Patient taking differently: Take 100 mg by mouth every six (6) hours as needed for Anxiety. OUT OF MEDICATION  Indications: anxious 1/12/21   Ashley PONCE NP   therapeutic multivitamin [facility]) tablet Take 1 Tab by mouth daily. 1/11/21   Manuela Newman MD       Allergies  No Known Allergies    Past Medical History  Past Medical History:   Diagnosis Date    Alcohol abuse     [facility]ism ([facility]     Anxiety     COPD (chronic obstructive pulmonary disease) ([facility])     Dr. Corliss Runner is pulmonologist    Depression     Diabetes ([facility]     Type II    Diverticulitis     Family history of angina     GERD (gastroesophageal reflux disease)     Hemochromatosis     Hypertension        Previous Hospitalization(s)  Past Surgical History:   Procedure Laterality Date    COLONOSCOPY N/A 9/6/2017    COLONOSCOPY performed by Cassidy Quigley MD at [facility] HX CERVICAL FUSION  03/02/2021    ACDF C4/7    HX TOTAL COLECTOMY         Family History  No family history on file. Social History  Alcohol history: Yes, 1-2 L on weekdays, 1-2 L on weekends of liquor  Smoking history: Non-tobacco smoker; long-term daily cannabis use via rolled cigarrettes  Illicit drug history: Distant history of crack-cocaine use. Living arrangement: patient lives alone.   Ambulates: Independently     Vital Signs  Visit Vitals  BP (!) 151/88   Pulse 77   Temp 98.9 °F (37.2 °C)   Resp 16   Ht 6' 2\" (1.88 m)   Wt 230 lb (104.3 kg)   SpO2 100%   BMI 29.53 kg/m²       Physical Exam  General: No acute distress, visibly anxious. Alert. Cooperative. Head: Normocephalic. Atraumatic. Eyes:              Conjunctiva pink. Sclera white. PERRL. Ears:              Hearing grossly intact. Nose:             Septum midline. Mucosa pink. No drainage. Throat: Mucosa pink. Moist mucous membranes. No tonsillar exudates or erythema. Palate movement equal bilaterally. Neck: Supple. Normal ROM. No stiffness. Respiratory: Diffuse, course breath sounds in all four lung fields; audible wheezing w/out crackles. Cardiovascular: RRR. Normal S1,S2. No m/r/g. Pulses 2+ throughout. GI: Normal bowel sounds. RUQ and epigastrium tender to palpation. No rebound tenderness or guarding. Nondistended. Extremities: No LE edema. Distal pulses intact. Musculoskeletal: Full ROM in all extremities. Skin: Warm, dry. No rashes. Neuro: CN II-XII grossly intact. Strength 5/5 in all extremities. Laboratory Data  Recent Results (from the past 8 hour(s))   CBC WITH AUTOMATED DIFF    Collection Time: 01/17/22  2:39 PM   Result Value Ref Range    WBC 7.6 4.1 - 11.1 K/uL    RBC 4.69 4.10 - 5.70 M/uL    HGB 16.4 12.1 - 17.0 g/dL    HCT 44.1 36.6 - 50.3 %    MCV 94.0 80.0 - 99.0 FL    MCH 35.0 (H) 26.0 - 34.0 PG    MCHC 37.2 (H) 30.0 - 36.5 g/dL    RDW 13.8 11.5 - 14.5 %    PLATELET 902 (L) 357 - 400 K/uL    MPV 9.0 8.9 - 12.9 FL    NRBC 0.0 0  WBC    ABSOLUTE NRBC 0.00 0.00 - 0.01 K/uL    NEUTROPHILS 79 (H) 32 - 75 %    LYMPHOCYTES 11 (L) 12 - 49 %    MONOCYTES 8 5 - 13 %    EOSINOPHILS 1 0 - 7 %    BASOPHILS 0 0 - 1 %    IMMATURE GRANULOCYTES 1 (H) 0.0 - 0.5 %    ABS. NEUTROPHILS 6.1 1.8 - 8.0 K/UL    ABS. LYMPHOCYTES 0.8 0.8 - 3.5 K/UL    ABS. MONOCYTES 0.6 0.0 - 1.0 K/UL    ABS. EOSINOPHILS 0.1 0.0 - 0.4 K/UL    ABS. BASOPHILS 0.0 0.0 - 0.1 K/UL    ABS. IMM.  GRANS. 0.1 (H) 0.00 - 0.04 K/UL    DF AUTOMATED     METABOLIC PANEL, COMPREHENSIVE    Collection Time: 01/17/22  2:39 PM   Result Value Ref Range    Sodium 133 (L) 136 - 145 mmol/L Potassium 3.7 3.5 - 5.1 mmol/L    Chloride 92 (L) 97 - 108 mmol/L    CO2 21 21 - 32 mmol/L    Anion gap 20 (H) 5 - 15 mmol/L    Glucose 171 (H) 65 - 100 mg/dL    BUN 12 6 - 20 MG/DL    Creatinine 1.07 0.70 - 1.30 MG/DL    BUN/Creatinine ratio 11 (L) 12 - 20      GFR est AA >60 >60 ml/min/1.73m2    GFR est non-AA >60 >60 ml/min/1.73m2    Calcium 9.0 8.5 - 10.1 MG/DL    Bilirubin, total 2.7 (H) 0.2 - 1.0 MG/DL    ALT (SGPT) 48 12 - 78 U/L    AST (SGOT) 62 (H) 15 - 37 U/L    Alk. phosphatase 101 45 - 117 U/L    Protein, total 7.4 6.4 - 8.2 g/dL    Albumin 3.9 3.5 - 5.0 g/dL    Globulin 3.5 2.0 - 4.0 g/dL    A-G Ratio 1.1 1.1 - 2.2     ETHYL ALCOHOL    Collection Time: 01/17/22  2:39 PM   Result Value Ref Range    ALCOHOL(ETHYL),SERUM 102 (H) <10 MG/DL   MAGNESIUM    Collection Time: 01/17/22  2:39 PM   Result Value Ref Range    Magnesium 1.4 (L) 1.6 - 2.4 mg/dL   SALICYLATE    Collection Time: 01/17/22  2:39 PM   Result Value Ref Range    Salicylate level <7.4 (L) 2.8 - 20.0 MG/DL   ACETAMINOPHEN    Collection Time: 01/17/22  2:39 PM   Result Value Ref Range    Acetaminophen level <2 (L) 10 - 30 ug/mL   TROPONIN-HIGH SENSITIVITY    Collection Time: 01/17/22  2:39 PM   Result Value Ref Range    Troponin-High Sensitivity 5 0 - 76 ng/L   SAMPLES BEING HELD    Collection Time: 01/17/22  2:39 PM   Result Value Ref Range    SAMPLES BEING HELD 1SST     COMMENT        Add-on orders for these samples will be processed based on acceptable specimen integrity and analyte stability, which may vary by analyte.    LIPASE    Collection Time: 01/17/22  2:39 PM   Result Value Ref Range    Lipase 121 73 - 393 U/L   PROTHROMBIN TIME + INR    Collection Time: 01/17/22  2:49 PM   Result Value Ref Range    INR 1.1 0.9 - 1.1      Prothrombin time 11.7 (H) 9.0 - 11.1 sec   LACTIC ACID    Collection Time: 01/17/22  2:49 PM   Result Value Ref Range    Lactic acid 7.8 (HH) 0.4 - 2.0 MMOL/L       Imaging  CXR Results  (Last 48 hours) 01/17/22 1428  XR CHEST PORT Final result    Impression:  1. No evidence of an acute cardiopulmonary process. Narrative:  EXAM:  XR CHEST PORT       INDICATION: Wheezing, cough       COMPARISON: Chest x-ray 1/6/2021. TECHNIQUE: Single frontal view of the chest.       FINDINGS: No lobar consolidation, pleural effusion, or pneumothorax. The   cardiomediastinal silhouette is not enlarged. No acute or aggressive osseous   lesion. CT Results  (Last 48 hours)    None              Assessment and Plan     Victor Hugo Finch is a 61 y.o. male with a PMHx of multi-substance abuse including alcohol, opioids, THC, and h/o crack-cocaine use; PUD 2/2 long-term NSAID use, MDD w/ h/o suicidal ideation, thrombocytopenia, T2DM, HTN, and COPD who is admitted for acute EtOH withdrawal w/ intractable n/v, SOB, and suspect hemetemesis. Acute EtOH Withdrawal: Pt last drink 1.5 days ago. BL 1/2 gallon of liquor daily. H/o withdrawal seizures requiring hospitalization. Pt POA CIWA of 15. CIWA of 10 on exam. Ativan given. Blood EtOH level 102. - Admit to ICU  - Vitals per unit routine  - Continuous cardiac monitoring  - Goodie bag, 1L at 150 ml/hr 1 time daily  - LRs for mIVF at 125 ml/hr  - CIWA protocol with Ativan 2mg (8-11) and 4mg (>12)  - Phenobarb load 130 mg followed by 65 TID  - Seizure precautions in place    - UDS    Hematemesis and Melena in s/o Peristent N/V: POA Hgb 16.4. Pt reports some blood-streaking in vomit and black stool. Likely PUD given h/o long-term NSAID use. Concern for Meaghan-Banegas syndrome vs esophageal varices given drinking history. Will also rule-out diverticulitis.  Lipase 121.    - Consult to GI, NPO in case procedure  - Compazine prn (no Zofran given QTc)  - Protonix BID  - GI cocktail now  - CT A/P to assess for pancreatitis, bleeding PUD, Meaghan-Banegas tear, diverticulitis   - FOBT and GOBT  - H/H q8 hrs; transfuse if <7.   - Type and screen, consent for blood     COPD Exacerbation: CXR w/ n/a/p; O2 sats have remained over 90% on room air. Lungs diffusely course bilaterally w/ wheezing. 2/2 chronic cannabis smoking. On home Advair and prn albuterol. Will work up for potential viral and bacterial involvement. - Solu-medrol 125 now then 60 TID  - Sub home Advair w/ Brovana/Pulmicort inhaler BID  - Duo-nebs q4hrs, scheduled   - Mucinex BID  - Pro-calcitonin ordered  - RVP ordered  - Doxycycline 100 mg bid for atypical coverage    Lactic Acidosis: POA lactate 7.8. Likely multifactorial, 2/2 alcoholic ketoacidosis vs volume depletion 2/2 persistent N/V and lack of PO intake.    - mIVF, LR's at 125 ml/hr    Hypomagnesemia: POA Mg 1.4.  - IV magnesium sulfate, replete as needed     Thrombocytopenia: this is a chronic problem, likely d/t liver disease 2/2 chronic alcohol abuse vs possible history of hemochromatosis (per chart review). - Chronic problem, monitor on daily CBC  - Transfuse if <50 and bleeding; <10 if not    Hypertension: BP on admission 160/89.  - Cont home medications of Lisinopril 20mg daily and metoprolol 50mg daily   - Will continue to monitor at this time and readjust as BP's trend    DMII: A1c 6.6 (2/2021). POA glucose 171.  - Holding home medications of Metformin 1000 mg daily.  - Insulin Sliding Scale normal sensitivity with AC&HS glucose checks  - Consider NPH if glucose uncontrolled with steroids  - Hypoglycemia protocols ordered  - Goal glucose concentration >70, <140 pre-meal and <180 with all random glucoses    Prolonged QTc: QTc 493. - Avoid QT-prolonging agents including Zofran and quinolones    MDD w/ Anxiety and h/o Suicidal Ideation: Pt on no home meds. Prior h/o of Hydroxyzine for anxiety. No SI/HI. - Recommend f/u outpt for counseling and possible SSRI therapy    Chronic Alcohol Abuse Disorder: 40-year history of regular alcohol abuse.  Has done well with AA in the past.  - F/u outpt; refer to local AA    Nicotine Dependence: long term, daily tobacco-snuff use. - Nicotine patch 21mg/24hr as needed      FEN/GI - NPO except meds. LR's at 150 mL/hr. Activity - Out of bed with assistance  DVT prophylaxis - SCDs  GI prophylaxis - Protonix  Fall prophylaxis - Fall precautions ordered. Disposition - Admit to ICU. Plan to d/c to Home. Consulting PT, OT and CM  Code Status - Full. Discussed with patient / caregivers.   Next of Kin Name and Contact - ernie Szymanskier - [phone]     Patient Mal Aver will be discussed with Dr. Joey Uribe.    5:23 PM, 01/17/22  Melissa Ramirez MD  Family Medicine Resident       For Billing    Chief Complaint   Patient presents with    Alcohol intoxication       Hospital Problems  Date Reviewed: 2/8/2021          Codes Class Noted POA    DM (diabetes mellitus) ([facility] ICD-10-CM: E11.9  ICD-9-CM: 250.00  1/17/2022 Unknown        HTN (hypertension) ICD-10-CM: I10  ICD-9-CM: 401.9  1/17/2022 Unknown        Major depression ICD-10-CM: F32.9  ICD-9-CM: 296.20  1/10/2021 Yes        * (Principal) Alcohol withdrawal ([facility] ICD-10-CM: V06.195  ICD-9-CM: 291.81  9/4/2017 Yes        Alcohol abuse (Chronic) ICD-10-CM: F10.10  ICD-9-CM: 305.00  Unknown Yes        Drug abuse ([facility] ICD-10-CM: F19.10  ICD-9-CM: 305.90  9/2/2017 Unknown        Lactic acidosis ICD-10-CM: I72.3  ICD-9-CM: 276.2  9/2/2017 Unknown

## 2022-01-18 LAB
ALBUMIN SERPL-MCNC: 3.4 G/DL (ref 3.5–5)
ALBUMIN/GLOB SERPL: 1.2 {RATIO} (ref 1.1–2.2)
ALP SERPL-CCNC: 83 U/L (ref 45–117)
ALT SERPL-CCNC: 39 U/L (ref 12–78)
ANION GAP SERPL CALC-SCNC: 12 MMOL/L (ref 5–15)
AST SERPL-CCNC: 45 U/L (ref 15–37)
BASOPHILS # BLD: 0 K/UL (ref 0–0.1)
BASOPHILS NFR BLD: 0 % (ref 0–1)
BILIRUB SERPL-MCNC: 3.9 MG/DL (ref 0.2–1)
BUN SERPL-MCNC: 10 MG/DL (ref 6–20)
BUN/CREAT SERPL: 12 (ref 12–20)
CALCIUM SERPL-MCNC: 8.4 MG/DL (ref 8.5–10.1)
CHLORIDE SERPL-SCNC: 101 MMOL/L (ref 97–108)
CO2 SERPL-SCNC: 24 MMOL/L (ref 21–32)
CREAT SERPL-MCNC: 0.82 MG/DL (ref 0.7–1.3)
CRP SERPL-MCNC: <0.29 MG/DL (ref 0–0.6)
D DIMER PPP FEU-MCNC: 2.54 MG/L FEU (ref 0–0.65)
DIFFERENTIAL METHOD BLD: ABNORMAL
EOSINOPHIL # BLD: 0 K/UL (ref 0–0.4)
EOSINOPHIL NFR BLD: 0 % (ref 0–7)
ERYTHROCYTE [DISTWIDTH] IN BLOOD BY AUTOMATED COUNT: 13.9 % (ref 11.5–14.5)
FERRITIN SERPL-MCNC: 467 NG/ML (ref 26–388)
GLOBULIN SER CALC-MCNC: 2.8 G/DL (ref 2–4)
GLUCOSE BLD STRIP.AUTO-MCNC: 157 MG/DL (ref 65–117)
GLUCOSE BLD STRIP.AUTO-MCNC: 174 MG/DL (ref 65–117)
GLUCOSE BLD STRIP.AUTO-MCNC: 204 MG/DL (ref 65–117)
GLUCOSE BLD STRIP.AUTO-MCNC: 216 MG/DL (ref 65–117)
GLUCOSE SERPL-MCNC: 176 MG/DL (ref 65–100)
HCT VFR BLD AUTO: 40.7 % (ref 36.6–50.3)
HCT VFR BLD AUTO: 41.1 % (ref 36.6–50.3)
HGB BLD-MCNC: 15 G/DL (ref 12.1–17)
HGB BLD-MCNC: 15.3 G/DL (ref 12.1–17)
IMM GRANULOCYTES # BLD AUTO: 0 K/UL (ref 0–0.04)
IMM GRANULOCYTES NFR BLD AUTO: 1 % (ref 0–0.5)
LDH SERPL L TO P-CCNC: 301 U/L (ref 85–241)
LYMPHOCYTES # BLD: 0.4 K/UL (ref 0.8–3.5)
LYMPHOCYTES NFR BLD: 8 % (ref 12–49)
MAGNESIUM SERPL-MCNC: 1.9 MG/DL (ref 1.6–2.4)
MCH RBC QN AUTO: 34.3 PG (ref 26–34)
MCHC RBC AUTO-ENTMCNC: 36.9 G/DL (ref 30–36.5)
MCV RBC AUTO: 93.1 FL (ref 80–99)
MONOCYTES # BLD: 0.1 K/UL (ref 0–1)
MONOCYTES NFR BLD: 2 % (ref 5–13)
NEUTS SEG # BLD: 3.9 K/UL (ref 1.8–8)
NEUTS SEG NFR BLD: 89 % (ref 32–75)
NRBC # BLD: 0 K/UL (ref 0–0.01)
NRBC BLD-RTO: 0 PER 100 WBC
PHOSPHATE SERPL-MCNC: 2.6 MG/DL (ref 2.6–4.7)
PLATELET # BLD AUTO: 104 K/UL (ref 150–400)
PMV BLD AUTO: 9.8 FL (ref 8.9–12.9)
POTASSIUM SERPL-SCNC: 4.4 MMOL/L (ref 3.5–5.1)
PROT SERPL-MCNC: 6.2 G/DL (ref 6.4–8.2)
RBC # BLD AUTO: 4.37 M/UL (ref 4.1–5.7)
RBC MORPH BLD: ABNORMAL
SERVICE CMNT-IMP: ABNORMAL
SODIUM SERPL-SCNC: 137 MMOL/L (ref 136–145)
WBC # BLD AUTO: 4.4 K/UL (ref 4.1–11.1)

## 2022-01-18 PROCEDURE — 74011250636 HC RX REV CODE- 250/636: Performed by: STUDENT IN AN ORGANIZED HEALTH CARE EDUCATION/TRAINING PROGRAM

## 2022-01-18 PROCEDURE — 85025 COMPLETE CBC W/AUTO DIFF WBC: CPT

## 2022-01-18 PROCEDURE — 85018 HEMOGLOBIN: CPT

## 2022-01-18 PROCEDURE — 74011250636 HC RX REV CODE- 250/636: Performed by: FAMILY MEDICINE

## 2022-01-18 PROCEDURE — 74011250637 HC RX REV CODE- 250/637: Performed by: STUDENT IN AN ORGANIZED HEALTH CARE EDUCATION/TRAINING PROGRAM

## 2022-01-18 PROCEDURE — 85379 FIBRIN DEGRADATION QUANT: CPT

## 2022-01-18 PROCEDURE — 86140 C-REACTIVE PROTEIN: CPT

## 2022-01-18 PROCEDURE — 80053 COMPREHEN METABOLIC PANEL: CPT

## 2022-01-18 PROCEDURE — 94664 DEMO&/EVAL PT USE INHALER: CPT

## 2022-01-18 PROCEDURE — 74011250637 HC RX REV CODE- 250/637: Performed by: FAMILY MEDICINE

## 2022-01-18 PROCEDURE — 94760 N-INVAS EAR/PLS OXIMETRY 1: CPT

## 2022-01-18 PROCEDURE — 94640 AIRWAY INHALATION TREATMENT: CPT

## 2022-01-18 PROCEDURE — 74011000250 HC RX REV CODE- 250: Performed by: FAMILY MEDICINE

## 2022-01-18 PROCEDURE — 84100 ASSAY OF PHOSPHORUS: CPT

## 2022-01-18 PROCEDURE — 82728 ASSAY OF FERRITIN: CPT

## 2022-01-18 PROCEDURE — 99232 SBSQ HOSP IP/OBS MODERATE 35: CPT | Performed by: FAMILY MEDICINE

## 2022-01-18 PROCEDURE — 82962 GLUCOSE BLOOD TEST: CPT

## 2022-01-18 PROCEDURE — 83615 LACTATE (LD) (LDH) ENZYME: CPT

## 2022-01-18 PROCEDURE — 36415 COLL VENOUS BLD VENIPUNCTURE: CPT

## 2022-01-18 PROCEDURE — C9113 INJ PANTOPRAZOLE SODIUM, VIA: HCPCS | Performed by: FAMILY MEDICINE

## 2022-01-18 PROCEDURE — 74011636637 HC RX REV CODE- 636/637: Performed by: STUDENT IN AN ORGANIZED HEALTH CARE EDUCATION/TRAINING PROGRAM

## 2022-01-18 PROCEDURE — 83735 ASSAY OF MAGNESIUM: CPT

## 2022-01-18 PROCEDURE — 65660000000 HC RM CCU STEPDOWN

## 2022-01-18 RX ORDER — ZINC GLUCONATE 50 MG
1 TABLET ORAL
Status: DISCONTINUED | OUTPATIENT
Start: 2022-01-19 | End: 2022-01-20 | Stop reason: HOSPADM

## 2022-01-18 RX ORDER — IPRATROPIUM BROMIDE AND ALBUTEROL SULFATE 2.5; .5 MG/3ML; MG/3ML
3 SOLUTION RESPIRATORY (INHALATION)
Status: DISCONTINUED | OUTPATIENT
Start: 2022-01-18 | End: 2022-01-20 | Stop reason: HOSPADM

## 2022-01-18 RX ORDER — DOXYCYCLINE HYCLATE 100 MG
100 TABLET ORAL EVERY 12 HOURS
Status: DISCONTINUED | OUTPATIENT
Start: 2022-01-18 | End: 2022-01-20 | Stop reason: HOSPADM

## 2022-01-18 RX ORDER — PHENOBARBITAL 32.4 MG/1
64.8 TABLET ORAL 2 TIMES DAILY
Status: DISCONTINUED | OUTPATIENT
Start: 2022-01-19 | End: 2022-01-19

## 2022-01-18 RX ORDER — PHENOBARBITAL 32.4 MG/1
32.4 TABLET ORAL DAILY
Status: DISCONTINUED | OUTPATIENT
Start: 2022-01-21 | End: 2022-01-19

## 2022-01-18 RX ORDER — PHENOBARBITAL 32.4 MG/1
32.4 TABLET ORAL 2 TIMES DAILY
Status: DISCONTINUED | OUTPATIENT
Start: 2022-01-20 | End: 2022-01-19

## 2022-01-18 RX ADMIN — PHENOBARBITAL SODIUM 65 MG: 65 INJECTION INTRAMUSCULAR at 16:07

## 2022-01-18 RX ADMIN — IPRATROPIUM BROMIDE AND ALBUTEROL 1 PUFF: 20; 100 SPRAY, METERED RESPIRATORY (INHALATION) at 21:36

## 2022-01-18 RX ADMIN — FOLIC ACID: 5 INJECTION, SOLUTION INTRAMUSCULAR; INTRAVENOUS; SUBCUTANEOUS at 16:58

## 2022-01-18 RX ADMIN — Medication 2 UNITS: at 07:30

## 2022-01-18 RX ADMIN — LORAZEPAM 2 MG: 2 INJECTION INTRAMUSCULAR; INTRAVENOUS at 12:40

## 2022-01-18 RX ADMIN — LISINOPRIL 20 MG: 20 TABLET ORAL at 08:40

## 2022-01-18 RX ADMIN — METOPROLOL TARTRATE 50 MG: 50 TABLET, FILM COATED ORAL at 08:40

## 2022-01-18 RX ADMIN — Medication 500 MG: at 08:40

## 2022-01-18 RX ADMIN — METHYLPREDNISOLONE SODIUM SUCCINATE 60 MG: 125 INJECTION, POWDER, FOR SOLUTION INTRAMUSCULAR; INTRAVENOUS at 21:41

## 2022-01-18 RX ADMIN — LORAZEPAM 4 MG: 2 INJECTION INTRAMUSCULAR; INTRAVENOUS at 16:58

## 2022-01-18 RX ADMIN — SODIUM CHLORIDE, PRESERVATIVE FREE 40 MG: 5 INJECTION INTRAVENOUS at 17:03

## 2022-01-18 RX ADMIN — Medication 3 UNITS: at 11:01

## 2022-01-18 RX ADMIN — BUDESONIDE AND FORMOTEROL FUMARATE DIHYDRATE 2 PUFF: 80; 4.5 AEROSOL RESPIRATORY (INHALATION) at 07:47

## 2022-01-18 RX ADMIN — Medication 2 UNITS: at 21:41

## 2022-01-18 RX ADMIN — SODIUM CHLORIDE, PRESERVATIVE FREE 40 MG: 5 INJECTION INTRAVENOUS at 05:21

## 2022-01-18 RX ADMIN — LORAZEPAM 4 MG: 2 INJECTION INTRAMUSCULAR; INTRAVENOUS at 20:01

## 2022-01-18 RX ADMIN — GUAIFENESIN 600 MG: 600 TABLET, EXTENDED RELEASE ORAL at 08:40

## 2022-01-18 RX ADMIN — LORAZEPAM 2 MG: 2 INJECTION INTRAMUSCULAR; INTRAVENOUS at 10:52

## 2022-01-18 RX ADMIN — METHYLPREDNISOLONE SODIUM SUCCINATE 60 MG: 125 INJECTION, POWDER, FOR SOLUTION INTRAMUSCULAR; INTRAVENOUS at 08:39

## 2022-01-18 RX ADMIN — ATORVASTATIN CALCIUM 20 MG: 20 TABLET, FILM COATED ORAL at 08:40

## 2022-01-18 RX ADMIN — Medication 500 MG: at 17:02

## 2022-01-18 RX ADMIN — PROCHLORPERAZINE EDISYLATE 10 MG: 5 INJECTION INTRAMUSCULAR; INTRAVENOUS at 01:29

## 2022-01-18 RX ADMIN — METHYLPREDNISOLONE SODIUM SUCCINATE 60 MG: 125 INJECTION, POWDER, FOR SOLUTION INTRAMUSCULAR; INTRAVENOUS at 02:12

## 2022-01-18 RX ADMIN — IPRATROPIUM BROMIDE AND ALBUTEROL 1 PUFF: 20; 100 SPRAY, METERED RESPIRATORY (INHALATION) at 07:46

## 2022-01-18 RX ADMIN — Medication 2 UNITS: at 16:06

## 2022-01-18 RX ADMIN — GUAIFENESIN 600 MG: 600 TABLET, EXTENDED RELEASE ORAL at 21:41

## 2022-01-18 RX ADMIN — SODIUM CHLORIDE, POTASSIUM CHLORIDE, SODIUM LACTATE AND CALCIUM CHLORIDE 125 ML/HR: 600; 310; 30; 20 INJECTION, SOLUTION INTRAVENOUS at 02:11

## 2022-01-18 RX ADMIN — PHENOBARBITAL SODIUM 65 MG: 65 INJECTION INTRAMUSCULAR at 08:39

## 2022-01-18 RX ADMIN — BUDESONIDE AND FORMOTEROL FUMARATE DIHYDRATE 2 PUFF: 80; 4.5 AEROSOL RESPIRATORY (INHALATION) at 21:36

## 2022-01-18 RX ADMIN — PHENOBARBITAL SODIUM 65 MG: 65 INJECTION INTRAMUSCULAR at 21:41

## 2022-01-18 RX ADMIN — DOXYCYCLINE HYCLATE 100 MG: 100 TABLET, COATED ORAL at 10:52

## 2022-01-18 RX ADMIN — DOXYCYCLINE HYCLATE 100 MG: 100 TABLET, COATED ORAL at 21:41

## 2022-01-18 RX ADMIN — LORAZEPAM 2 MG: 2 INJECTION INTRAMUSCULAR; INTRAVENOUS at 01:29

## 2022-01-18 RX ADMIN — Medication 50 MG: at 08:40

## 2022-01-18 RX ADMIN — IPRATROPIUM BROMIDE AND ALBUTEROL 1 PUFF: 20; 100 SPRAY, METERED RESPIRATORY (INHALATION) at 11:22

## 2022-01-18 NOTE — DISCHARGE SUMMARY
[address] [facility] [address] [place] [address]   Office [phone]  Fax [phone]       Discharge / Transfer / Off-Service Note     Name: Jazmin Quiros MRN: [id number]  Sex: Male   YOB: 1965  Age: 61 y.o. PCP: Lucrecia Salcido NP     Date of admission: 1/17/2022  Date of discharge/transfer: 1/20/2022    Attending physician at admission: Dr. Bella Redding    Attending physician at discharge/transfer: Bella Redding MD     Resident physician at discharge/transfer: Kevin López MD     Consultants during hospitalization  IP CONSULT TO [place] [id number]     Admission diagnoses   Alcohol withdrawal ([facility] [F10.239]    Recommended follow-up after discharge  1. PCP: Lucrecia Salcido NP    Things to follow up on with PCP:  - Resources for addressing ongoing alcohol dependency, including social support systems, counseling, and pharmacologic intervention if necessary   - Would consider Cymbalta and/or bupropion as adjuncts to counseling to address depression, anxiety, and tobacco and alcohol dependencies   - Medical management of depression and anxiety, as well as referral for cognitive-behavioral-therapy to address underlying psychological factors   - Optimizing medical management of chronic conditions including COPD, diabetes mellitus, and hypertension    History of Present Illness  Per admitting provider, Kevin López DO, \"Carlos Dubon is a 61 y.o. male with PMHx of multi-substance abuse including alcohol, opioids, THC, and h/o crack-cocaine use; PUD 2/2 long-term NSAID use, MDD w/ h/o suicidal ideation, thrombocytopenia, T2DM, HTN, and COPD who presents to the ED complaining of a month-long history of shortness of breath, wheezing, and coughing; with a week long history of n/v and inability to tolerate oral intake. The patient has been on a drinking binge for the past 1.5-2-week period, consuming approximately 1/2 gallon of liquor per 12-hr period.  Two days ago, he consumed an unknown amount of alcohol that caused him to black out; he claims he doesn't remember yesterday at all. Additionally, he has had a single episode of blood-streaked vomit and had been having black stools for the week prior to this past week. He claims he's hardly had any bowel movements as he hasn't had any food for 5 days. Of note, the patient has been hospitalized multiple times in the past for alcohol withdrawal, and has had withdrawal seizures. His last drink was early yesterday morning. \"    [facility] a 61 y. o. male with a PMHx of multi-substance abuse including alcohol, opioids, THC, and h/o crack-cocaine use; PUD 2/2 long-term NSAID use, MDD w/ h/o suicidal ideation, thrombocytopenia, T2DM, HTN, and COPD who was admitted for acute EtOH withdrawal w/ intractable n/v, SOB, and suspect hemetemesis.         Acute EtOH Withdrawal in s/o Chronic Alcohol Misuse Disorder: Pt w/ BL alcohol consumption 1/2 gallon of liquor daily. H/o withdrawal seizures requiring hospitalization. Pt POA CIWA of 15. UDS+ for THC. Pt w/ 40-year history of regular alcohol abuse. Has done well with AA in the past.  - Finish PO Phenobarb taper; 32.4mg as single dose   - F/u outpt; refer to local AA as pt has a positive experience history w/ the program     Hematemesis and Melena in s/o Peristent N/V: POA Hgb 16.4. Likely PUD given h/o long-term NSAID use. Lipase 121. A/p CT negative for acute process, pancreatitis, or diverticulitis. Hgb remains stable.    - Start daily omeprazole, 40 mg daily  - Avoid NSAID use for pain control  - F/u CBC w/ PCP  - F/u w/ GI outpt     COPD Exacerbation: CXR w/ n/a/p; O2 sats remained over 90% on room air. Likely 2/2 chronic cannabis smoking. On home Advair and prn albuterol. RVP positive for COVID only. Pro-fadumo negative.   - PO prednisone taper 60 x2 days, 40 x2 days, 20 x2 days  - Replacing home Advair with Symbicort inhaler BID d/t formulary preference   - Mucinex BID  - Doxycycline 100mg bid for 5-day course in total  - F/u w/ PCP on options for alternative, non-combustible forms of cannabis consumption, or, ideally, cessation of consumption altogether      Lactic Acidosis (resolved): POA lactate 7.8. Likely multifactorial, 2/2 alcoholic ketoacidosis vs volume depletion 2/2 persistent N/V and lack of PO intake.       Hypomagnesemia: POA Mg 1.4. Repleted prior to discharge.  - Repeat Mg level outpt     Thrombocytopenia: this is a chronic problem, likely d/t liver disease 2/2 chronic alcohol abuse vs possible history of hemochromatosis (per chart review).    - F/u w/ PCP, who may consider a heme/onc referral      Hypertension: BP on admission 160/89.  - Cont home medications of Lisinopril 20mg daily and metoprolol 50mg BID   - F/u w/ PCP for necessary adjustments that may be warranted      DMII: A1c 6.6 (2/2021). POA glucose 171.  - Continue Metformin 1000 mg daily.  - F/u w/ PCP for repeat A1c testing and ongoing blood glucose management     Prolonged QTc: QTc 493.    - Avoid QT-prolonging agents as able     MDD w/ Anxiety and h/o Suicidal Ideation: Pt on no home meds. Prior h/o of hydroxyzine use for anxiety. No SI/HI. - Recommend f/u outpt for counseling and adjunct medical therapy (Cymbalta and/or bupropion for anxiety/depression/addiction)   - Recommend seeking further help for polysubstance use disorder, which is likely contributing to ongoing mental health disorders     Nicotine Dependence: long term, daily tobacco-snuff use. - F/u outpt (consider bupropion vs Varenicline vs patches)      Physical exam at discharge:   General: No acute distress. Alert and cooperative. Head: Normocephalic and atraumatic  Eyes: PERRL, conjuntiva white, and EOM intact. Ears: Hearing grossly intact  Neck: Supple. Normal ROM and no stiffness. Respiratory: Faint, right-sided wheezes. No rales or rhonchi. Cardiovascular: RRR. Pulses strong and regular. No murmurs, rubs, or gallops.   GI: Bowel sounds normal. No tenderness or guarding. Extremities: No lower extremity edema. Distal pulses intact  Musculoskeletal: Full ROM in all extremities. Skin: Warm and dry. No rashes or color changes  Neuro: Oriented x3. CN II-XII grossly intact. Condition at discharge: [facility]     01/20/22  0524 01/19/22  0325 01/18/22  1601 01/18/22  0214 01/18/22 0214   WBC 8.2 8.0  --   --  4.4   HGB 16.0 13.6 15.3   < > 15.0   HCT 44.1 36.9 41.1   < > 40.7   * 97*  --   --  104*    < > = values in this interval not displayed. Recent Labs     01/20/22  0502 01/19/22  1216 01/19/22  0325 01/18/22 0214 01/18/22 0214   * 136 132*   < > 137   K HEMOLYZED,RECOLLECT REQUESTED 3.7 3.5   < > 4.4    103 101   < > 101   CO2 25 25 25   < > 24   BUN 12 11 12   < > 10   CREA 0.82 0.84 0.82   < > 0.82   * 159* 204*   < > 176*   CA 8.6 8.3* 8.2*   < > 8.4*   MG HEMOLYZED,RECOLLECT REQUESTED  --  1.9  --  1.9   PHOS 3.7  --  1.6*  --  2.6    < > = values in this interval not displayed. Recent Labs     01/20/22  0502 01/19/22  0325 01/18/22 0214   ALT 35 30 39   AP 60 62 83   TBILI 1.4* 1.8* 3.9*   TP 6.2* 5.8* 6.2*   ALB 3.2* 3.1* 3.4*   GLOB 3.0 2.7 2.8     Recent Labs     01/20/22  1051 01/20/22  0820 01/20/22  0502 01/19/22  1659 01/19/22  1208 01/19/22  0833 01/19/22  0325 01/18/22  2121   FERR  --   --  696*  --   --   --  559*  --    GLUCPOC 175* 165*  --  221* 148* 180*  --    < >    < > = values in this interval not displayed. Microbiology  Results     Procedure Component Value Units Date/Time    URINE CULTURE HOLD SAMPLE [[id number]] Collected: 01/17/22 2203    Order Status: Completed Specimen: Urine from Serum Updated: 01/17/22 2248     Urine culture hold       Urine on hold in Microbiology dept for 2 days. If unpreserved urine is submitted, it cannot be used for addtional testing after 24 hours, recollection will be required.           COVID-19 RAPID TEST [[id number]] (Abnormal) Collected: 01/17/22 2020    Order Status: Completed Specimen: Nasopharyngeal Updated: 01/17/22 2047     Specimen source Nasopharyngeal        COVID-19 rapid test Detected        Comment: Rapid Abbott ID Now       The specimen is POSITIVE for SARS-CoV-2, the novel coronavirus associated with COVID-19. This test has been authorized by the FDA under an Emergency Use Authorization (EUA) for use by authorized laboratories.         Fact sheet for Healthcare Providers: Misoca.nz  Fact sheet for Patients: Misoca.nz       Methodology: Isothermal Nucleic Acid Amplification  CALLED TO AND READ BACK BY   Irma Garcia RN @ [facility]/ [facility]         RESPIRATORY VIRUS PANEL W/COVID-19, PCR [[id number]]  (Abnormal) Collected: 01/17/22 2020    Order Status: Completed Specimen: Nasopharyngeal Updated: 01/17/22 2324     Adenovirus Not detected        Coronavirus 229E Not detected        Coronavirus HKU1 Not detected        Coronavirus CVNL63 Not detected        Coronavirus OC43 Not detected        SARS-CoV-2, PCR Detected        Metapneumovirus Not detected        Rhinovirus and Enterovirus Not detected        Influenza A Not detected        Influenza A, subtype H1 Not detected        Influenza A, subtype H3 Not detected        INFLUENZA A H1N1 PCR Not detected        Influenza B Not detected        Parainfluenza 1 Not detected        Parainfluenza 2 Not detected        Parainfluenza 3 Not detected        Parainfluenza virus 4 Not detected        RSV by PCR Not detected        B. parapertussis, PCR Not detected        Bordetella pertussis - PCR Not detected        Chlamydophila pneumoniae DNA, QL, PCR Not detected        Mycoplasma pneumoniae DNA, QL, PCR Not detected             Procedures / Diagnostic Studies  Echo Results  (Last 48 hours)    None          Imaging  CT ABD PELV W WO CONT    Result Date: 1/17/2022  No evidence for acute pancreatitis or other acute finding. Details can be found in report. XR CHEST PORT    Result Date: 1/17/2022  1. No evidence of an acute cardiopulmonary process.        Chronic diagnoses   Problem List as of 1/20/2022 Date Reviewed: 2/8/2021          Codes Class Noted - Resolved    DM (diabetes mellitus) ([place] [address].) ICD-10-CM: E11.9  ICD-9-CM: 250.00  1/17/2022 - Present        HTN (hypertension) ICD-10-CM: I10  ICD-9-CM: 401.9  1/17/2022 - Present        Major depression ICD-10-CM: F32.9  ICD-9-CM: 296.20  1/10/2021 - Present        Hypokalemia ICD-10-CM: E87.6  ICD-9-CM: 276.8  1/6/2021 - Present        Thrombocytopenia ([place] [address].) ICD-10-CM: D69.6  ICD-9-CM: 287.5  1/6/2021 - Present        Suspected COVID-19 virus infection ICD-10-CM: Z20.822  ICD-9-CM: V01.79  1/6/2021 - Present        Suicidal ideations ICD-10-CM: R45.851  ICD-9-CM: V62.84  1/6/2021 - Present        Headache ICD-10-CM: R51.9  ICD-9-CM: 784.0  1/6/2021 - Present        Elevated bilirubin ICD-10-CM: R17  ICD-9-CM: 277.4  1/6/2021 - Present        * (Principal) Alcohol withdrawal ([place] [address].) ICD-10-CM: Y34.330  ICD-9-CM: 291.81  9/4/2017 - Present        Alcohol abuse (Chronic) ICD-10-CM: F10.10  ICD-9-CM: 305.00  Unknown - Present        Opioid abuse ([place] [address].) (Chronic) ICD-10-CM: F11.10  ICD-9-CM: 305.50  9/2/2017 - Present        Mild tetrahydrocannabinol (THC) abuse (Chronic) ICD-10-CM: F12.10  ICD-9-CM: 305.20  9/2/2017 - Present        Drug abuse ([place] [address].) ICD-10-CM: F19.10  ICD-9-CM: 305.90  9/2/2017 - Present        Lactic acidosis ICD-10-CM: E87.2  ICD-9-CM: 276.2  9/2/2017 - Present        NSAID long-term use (Chronic) ICD-10-CM: Z79.1  ICD-9-CM: V58.64  9/2/2017 - Present        Weight loss ICD-10-CM: R63.4  ICD-9-CM: 783.21  9/2/2017 - Present        RESOLVED: Dehydration ICD-10-CM: E86.0  ICD-9-CM: 276.51  9/2/2017 - 9/4/2017        RESOLVED: Nausea & vomiting ICD-10-CM: R11.2  ICD-9-CM: 787.01  9/2/2017 - 9/4/2017        RESOLVED: Syncope ICD-10-CM: R55  ICD-9-CM: 780.2  9/2/2017 - 9/7/2017        RESOLVED: Elevated lipase ICD-10-CM: R74.8  ICD-9-CM: 790.5  9/2/2017 - 9/4/2017        RESOLVED: Hematemesis ICD-10-CM: K92.0  ICD-9-CM: 578.0  9/2/2017 - 9/7/2017              Discharge Medication List as of 1/20/2022 12:47 PM     START these medications, which are NEW:       budesonide-formoteroL (Symbicort) 160-4.5 mcg/actuation HFAA, Take 1 Puff by inhalation two (2) times a day., Disp: 10.2 g, Rfl: 1      doxycycline (VIBRA-TABS) 100 mg tablet, Take 1 Tablet by mouth every twelve (12) hours. , Disp: 5 Tablet, Rfl: 0      guaiFENesin ER (MUCINEX) 600 mg ER tablet, Take 1 Tablet by mouth every twelve (12) hours for 14 days. , Disp: 28 Tablet, Rfl: 0      albuterol (PROVENTIL HFA, VENTOLIN HFA, PROAIR HFA) 90 mcg/actuation inhaler, Take 2 Puffs by inhalation every six (6) hours as needed for Wheezing, Shortness of Breath or Respiratory Distress. , Disp: 18 g, Rfl: 1      PHENobarbitaL (LUMINAL) 32.4 mg tablet, Take 1 Tablet by mouth once for 1 dose. Max Daily Amount: 32.4 mg., Disp: 1 Tablet, Rfl: 0      predniSONE (DELTASONE) 20 mg tablet, Take 60 mg (3 pills) by mouth, every 8 hours, for the first and second days. Take 40 mg (2 pills) by mouth, every 12 hours, for the third and fourth days. Take 20 mg (1 pill) by mouth, every 24 hours, for the fifth and sixth days. , Disp: 12 Tablet, Rfl: 0     CONTINUE these medications which have NOT CHANGED    Details   omeprazole (PRILOSEC) 40 mg capsule Take 1 Cap by mouth Daily (before breakfast). , Normal, Disp-30 Cap, R-2      metFORMIN (GLUCOPHAGE) 1,000 mg tablet Take 1 Tab by mouth two (2) times daily (with meals). , Normal, Disp-60 Tab, R-5      lisinopriL (PRINIVIL, ZESTRIL) 20 mg tablet Take 1 Tab by mouth daily. Indications: high blood pressure, Normal, Disp-30 Tab, R-5      metoprolol tartrate (LOPRESSOR) 50 mg tablet Take 1 Tab by mouth two (2) times a day.  Indications: high blood pressure, Normal, Disp-60 Tab, R-5      therapeutic multivitamin [facility]) tablet Take 1 Tab by mouth daily. , No Print, Disp-30 Tab, R-0         STOP taking these medications       QUEtiapine (SEROquel) 25 mg tablet Comments:   Reason for Stopping:         ondansetron (Zofran ODT) 4 mg disintegrating tablet Comments:   Reason for Stopping:         hydrOXYzine HCL (ATARAX) 50 mg tablet Comments:   Reason for Stopping:             Diet:  Regular diet. Activity:  As tolerated    Disposition: Discharge to home w/ close outpt follow-up and AA attendance.       Discharge instructions to patient/family  Please seek medical attention for any new or worsening symptoms particularly fever, chest pain, shortness of breath, abdominal pain, nausea, vomiting    Follow up plans/appointments  Follow-up Information     Follow up With Specialties Details Why Contact Info    Cynthia Junior NP Nurse Practitioner On 1/21/2022 [address] [place] [address] Virtual Visit at 9:30am [address] [place] [address]      Juan Harris MD Gastroenterology Schedule an appointment as soon as possible for a visit For GI bleeding [facility]             Aracelis Will MD  Family Medicine Resident       For Billing    Chief Complaint   Patient presents with    Alcohol intoxication       Hospital Problems  Date Reviewed: 2/8/2021          Codes Class Noted POA    DM (diabetes mellitus) ([facility] ICD-10-CM: E11.9  ICD-9-CM: 250.00  1/17/2022 Unknown        HTN (hypertension) ICD-10-CM: I10  ICD-9-CM: 401.9  1/17/2022 Unknown        Major depression ICD-10-CM: F32.9  ICD-9-CM: 296.20  1/10/2021 Yes        * (Principal) Alcohol withdrawal ([facility] ICD-10-CM: I98.902  ICD-9-CM: 291.81  9/4/2017 Yes        Alcohol abuse (Chronic) ICD-10-CM: F10.10  ICD-9-CM: 305.00  Unknown Yes        Drug abuse ([facility] ICD-10-CM: F19.10  ICD-9-CM: 305.90  9/2/2017 Unknown        Lactic acidosis ICD-10-CM: J44.5  ICD-9-CM: 276.2  9/2/2017 Unknown

## 2022-01-18 NOTE — PROGRESS NOTES
1/18/2022  2:30 PM  Case management note    Unable to reach patient by room phone or work phone  Will continue to follow  [address]

## 2022-01-18 NOTE — ED NOTES
Verbal/Bedside report was given to Vasu Valdivia ([facility]) who will assume care of patient at this time. SBAR report consisted of ED summary, MAR, recent results, and additional pertinent information. Receiving nurse given opportunity to ask questions and seek clarifications. Receiving nurse aware of pending lab results and orders that are to be completed.

## 2022-01-18 NOTE — PROGRESS NOTES
GI note    Consult received, chart reviewed. Although there was comment of some streaks of blood in vomit, the vital signs and hemoglobin trend do not support a hypothesis of clinically significant GI blood loss. He has found to be positive on rapid assay for SARS-CoV-2. Performance of upper GI endoscopy in the presence of this viral infection would require placement of an endotracheal tube and mechanical ventilation for the procedure. I have concerns that intubation and ventilation might confer some risk in light of his presenting pulmonary complaints and covid infection. As a result, my analysis of the risk/benefit in regards endoscopy favors not performing that procedure at this time. I have no plans for immediate endoscopic exam.    Would administer PPI, serial H/H, avoid anticoagulant/antiplatelet/NSAID therapy if possible. OK from my perspective for him to feed. Full consult to follow.     Kelvin Quigley MD

## 2022-01-18 NOTE — PROGRESS NOTES
Progress Note     1/18/2022  PCP: Donnell Sotelo NP     Assessment/Plan:     Breanna Garcia is a 61 y.o. male with a PMHx of multi-substance abuse including alcohol, opioids, THC, and h/o crack-cocaine use; PUD 2/2 long-term NSAID use, MDD w/ h/o suicidal ideation, thrombocytopenia, T2DM, HTN, and COPD who is admitted for acute EtOH withdrawal w/ intractable n/v, SOB, and suspect hemetemesis. Overnight Events: No acute events. Pt's CIWAs max of 26 and required 10mg total overnight of Ativan. He was very agitated overnight threatening to leave AMA but was redirected to stay via night team.     Acute EtOH Withdrawal: Pt last drink 1.5 days prior to admission. BL 1/2 gallon of liquor daily. H/o withdrawal seizures requiring hospitalization. CIWA max of 26 overnight w/ 10 mg total Ativan given. UDS+ for EtOH and THC.  - Continuous cardiac monitoring  - Thiamine 100mg daily and multivitamin daily   - CIWA protocol with Ativan 2mg (8-11) and 4mg (>12)  - PO Phenobarb: 64.8mg this AM and 32.4 this PM  - Seizure precautions in place       Hematemesis and Melena in s/o Peristent N/V: POA Hgb 16.4. Pt reports some blood-streaking in vomit and black stool. Likely PUD given h/o long-term NSAID use. CT A/P negative for acute process, pancreatitis, or diverticulitis. Patient is HDS.  - GI consulted, rec'd: PPI, trend H/H, avoid NSAIDs and anticoagulation  - No EGD indicated  - Compazine prn (no Zofran given QTc)  - Protonix BID     COPD Exacerbation in s/o COVID infection: CXR w/o consolidation. No new O2 requirement. On home Advair and prn albuterol. RVP positive for COVID only on 1/17.   - Continue Solu-medrol taper w/ PO prednisone on discharge  - Brovana/Pulmicort inhaler BID  - Duo-nebs q4hrs, scheduled   - Mucinex BID  - Doxycycline 100mg bid  - Vitamin C, Zinc, and Liptior for COVID infection      Lactic Acidosis (resolved): POA lactate 7.8.  Likely multifactorial, 2/2 alcoholic ketoacidosis vs volume depletion 2/2 persistent N/V and lack of PO intake.       Hypomagnesemia (resolved): Replete prn.     Thrombocytopenia: this is a chronic problem, likely d/t liver disease 2/2 chronic alcohol abuse vs possible history of hemochromatosis (per chart review). - Chronic problem, monitor on daily CBC   - Transfuse if <50 and bleeding; <10 if not     Hypertension: Stable. - Cont home medications of Lisinopril 20mg daily and metoprolol 50mg BID   - IV Labetalol prn   - Will continue to monitor at this time and readjust as BP's trend     DMII: A1c 6.6 (2/2021). - Holding home Metformin 1000 mg daily.  - Insulin Sliding Scale normal sensitivity with AC&HS glucose checks  - Consider NPH if glucose uncontrolled with steroids  - Hypoglycemia protocols ordered  - Goal glucose concentration >70, <140 pre-meal and <180 with all random glucoses     Prolonged QTc: QTc 493. - Avoid QT-prolonging agents including Zofran and quinolones     MDD w/ Anxiety and h/o Suicidal Ideation: Pt on no home meds. Prior h/o of Hydroxyzine for anxiety. No SI/HI. - Recommend f/u outpt for counseling and possible SSRI therapy     Chronic Alcohol Abuse Disorder: 40-year history of regular alcohol abuse. Has done well with AA in the past.  - F/u outpt; refer to local AA     Nicotine Dependence: long term, daily tobacco-snuff use. - Nicotine patch 21mg/24hr as needed        FEN/GI - GI bland. Activity - Out of bed with assistance  DVT prophylaxis - SCDs  GI prophylaxis - Protonix  Fall prophylaxis - Fall precautions ordered. Disposition - Admit to ICU. Plan to d/c to Home. Consulting PT, OT and CM  Code Status - Full. Discussed with patient / caregivers. Alana Church discussed with Dr. Joseph Carbone. Subjective:   Pt was seen and examined at bedside. He was resting comfortably.  Reports that he was hallucinating earlier about his grandpa's farm with snow falling, then he realized he was here at the hospital. Patient still wanting to leave though calm in discussion.     Objective:   Physical examination  Patient Vitals for the past 24 hrs:   Temp Pulse Resp BP SpO2   01/18/22 2017 -- -- -- -- 96 %   01/18/22 1900 -- 66 20 130/75 95 %   01/18/22 1800 -- 70 19 138/84 --   01/18/22 1702 -- -- -- 96/82 --   01/18/22 1700 -- 75 18 96/82 100 %   01/18/22 1600 97.6 °F (36.4 °C) 60 24 (!) 143/124 97 %   01/18/22 1530 -- 60 13 138/81 --   01/18/22 1500 -- 68 14 133/61 --   01/18/22 1430 -- 60 18 126/89 --   01/18/22 1400 -- 66 16 137/76 --   01/18/22 1330 -- 63 15 128/77 97 %   01/18/22 1230 -- 65 18 (!) 149/82 94 %   01/18/22 1200 -- 64 14 139/81 96 %   01/18/22 1130 -- 63 15 123/69 94 %   01/18/22 1122 -- -- -- -- 98 %   01/18/22 1100 -- 69 15 (!) 142/75 96 %   01/18/22 1030 -- 61 18 134/73 95 %   01/18/22 1000 -- (!) 56 18 (!) 141/79 98 %   01/18/22 0930 -- 67 16 (!) 159/83 98 %   01/18/22 0900 -- 66 14 (!) 161/118 --   01/18/22 0800 98.4 °F (36.9 °C) 61 16 (!) 134/92 --   01/18/22 0740 -- -- -- -- 95 %   01/18/22 0700 -- (!) 46 18 139/85 95 %   01/18/22 0639 -- 68 19 -- --   01/18/22 0638 -- 75 19 -- --   01/18/22 0637 -- 97 21 -- --   01/18/22 0636 -- 77 18 -- --   01/18/22 0630 -- (!) 46 18 (!) 161/85 96 %   01/18/22 0600 -- (!) 44 18 (!) 167/80 --   01/18/22 0530 -- (!) 46 18 (!) 164/84 94 %   01/18/22 0500 -- (!) 41 13 (!) 167/79 --   01/18/22 0430 -- (!) 43 17 (!) 148/82 94 %   01/18/22 0400 -- (!) 46 18 (!) 145/78 95 %   01/18/22 0330 -- (!) 46 18 (!) 155/93 96 %   01/18/22 0300 -- (!) 49 19 (!) 133/90 92 %   01/18/22 0230 -- (!) 50 19 135/74 --   01/18/22 0200 98.5 °F (36.9 °C) (!) 58 22 (!) 141/72 --   01/18/22 0130 -- 60 16 (!) 145/78 95 %   01/18/22 0100 -- (!) 57 17 133/74 100 %   01/18/22 0033 -- 61 19 -- 94 %   01/18/22 0030 -- (!) 59 18 (!) 139/92 97 %   01/18/22 0004 -- 62 11 (!) 155/82 --   01/17/22 2334 -- 61 22 (!) 142/79 93 %   01/17/22 2311 -- -- -- -- 98 %   01/17/22 2304 -- 64 14 (!) 154/88 96 %   01/17/22 2223 -- 64 15 (!) 142/82 98 %   01/17/22 2208 -- 64 17 (!) 160/91 96 %   01/17/22 2123 -- 64 21 (!) 154/94 --             Date 01/17/22 1900 - 01/18/22 0659 01/18/22 0700 - 01/19/22 0659   Shift 7170-8075 24 Hour Total 6555-1164 6765-4201 24 Hour Total   INTAKE   P.O.   600  600     P. O.   600  600   I. V.(mL/kg/hr)   142.5(0.1)  142.5     Volume (0.9% sodium chloride 6,064 mL with folic acid 1 mg, thiamine 100 mg, mvi (adult no. 4 with vit K) 10 mL infusion)   142.5  142.5   Shift Total(mL/kg)   742. 5(7.1)  742. 5(7.1)   OUTPUT   Urine(mL/kg/hr) 500 500 800(0.6)  800     Urine Voided 500 500 800  800   Stool          Stool Occurrence(s)   1 x  1 x   Shift Total(mL/kg) 500(4.8) 500(4.8) 800(7.7)  800(7.7)   NET -500 -500 -57.5  -57.5   Weight (kg) 104.3 104.3 104.3 104.3 104.3     General:   Alert, cooperative, no acute distress, mildly anxious-appearing   Head:   Atraumatic   Eyes:   Conjunctivae clear   ENT:  Oral mucosa normal   Chest wall:    No tenderness or deformities    Lungs:   CTAB anteriorly   Heart:   Normal rate, regular rhythm, no murmur, rubs or gallops   Abdomen:    Soft, non-tender   No masses or organomegaly    Skin:  Warm and dry    No rashes or lesions   Neurologic:  Alert and oriented x4   No focal deficits     Data Review:     CBC:  Recent Labs     01/18/22  1601 01/18/22  0214 01/17/22  2229 01/17/22 1948 01/17/22  1948 01/17/22  1439 01/17/22  1439   WBC  --  4.4  --   --  6.9  --  7.6   HGB 15.3 15.0 15.1   < > 14.5   < > 16.4   HCT 41.1 40.7 40.6   < > 39.1   < > 44.1   PLT  --  104*  --   --  108*  --  127*    < > = values in this interval not displayed.      Metabolic Panel:  Recent Labs     01/18/22  0214 01/17/22  1948 01/17/22  1449 01/17/22  1439    133*  --  133*   K 4.4 4.1  --  3.7    97  --  92*   CO2 24 25  --  21   BUN 10 10  --  12   CREA 0.82 0.79  --  1.07   * 126*  --  171*   CA 8.4* 8.5  --  9.0   MG 1.9  --   --  1.4*   PHOS 2.6  --   --   --    ALB 3.4* 3.3*  --  3.9 TBILI 3.9* 2.6*  --  2.7*   ALT 39 38  --  48   INR  --   --  1.1  --      Micro:  No results found for: CULT  Imaging:  CT ABD PELV W WO CONT    Result Date: 1/17/2022  EXAM: CT ABD PELV W WO CONT INDICATION: epigastric pain, r/o pancreatitis COMPARISON: Ultrasound 9/4/2017. CT 9/2/2017. Kae Mueller CONTRAST: 100 mL of Isovue-370. TECHNIQUE:  Multislice helical CT was performed from the diaphragm to the iliac crest prior to intravenous contrast administration and from the diaphragm to the symphysis pubis during uneventful rapid bolus intravenous contrast administration, during the hepatic arterial, portal venous and delayed venous phases. Oral contrast was not administered. Contiguous 5 mm axial images were reconstructed and lung and soft tissue windows were generated. Coronal and sagittal reformations were generated. CT dose reduction was achieved through use of a standardized protocol tailored for this examination and automatic exposure control for dose modulation. The lack of oral contrast material diminishes the capacity of CT to evaluate the bowel and adjacent structures. FINDINGS: LOWER THORAX: Several bilateral subpleural nodules measuring up to 4 mm in size appear unchanged in number and size. LIVER: Diffuse severe hepatic steatosis. No hepatic mass or intrahepatic bile duct dilation demonstrated. BILIARY TREE: Gallbladder is within normal limits. CBD is not dilated. SPLEEN: within normal limits. PANCREAS: No mass or ductal dilatation. No CT imaging evidence of pancreatitis. ADRENALS: Unremarkable. KIDNEYS: No mass, calculus, or hydronephrosis. STOMACH: Unremarkable. SMALL BOWEL: No dilatation or wall thickening. COLON: Anastomotic suture ring at proximal sigmoid region. Transverse and descending colonic diverticulosis. No mural thickening or dilation demonstrated. APPENDIX: Normal. PERITONEUM: No ascites or pneumoperitoneum. RETROPERITONEUM: No lymphadenopathy or aortic aneurysm.  REPRODUCTIVE ORGANS: Those shown appear unremarkable. URINARY BLADDER: No mass or calculus. BONES: No destructive bone lesion. ABDOMINAL WALL: No mass or hernia. ADDITIONAL COMMENTS: N/A     No evidence for acute pancreatitis or other acute finding. Details can be found in report. XR CHEST PORT    Result Date: 1/17/2022  EXAM:  XR CHEST PORT INDICATION: Wheezing, cough COMPARISON: Chest x-ray 1/6/2021. TECHNIQUE: Single frontal view of the chest. FINDINGS: No lobar consolidation, pleural effusion, or pneumothorax. The cardiomediastinal silhouette is not enlarged. No acute or aggressive osseous lesion. 1.  No evidence of an acute cardiopulmonary process. Medications reviewed  Current Facility-Administered Medications   Medication Dose Route Frequency    methylPREDNISolone (PF) (Solu-MEDROL) injection 60 mg  60 mg IntraVENous Q12H    doxycycline (VIBRA-TABS) tablet 100 mg  100 mg Oral Q12H    [START ON 1/19/2022] zinc gluconate tablet 50 mg  1 Tablet Oral DAILY WITH LUNCH    ipratropium-albuterol (COMBIVENT RESPIMAT) 20 mcg-100 mcg inhalation spray  1 Puff Inhalation Q4H PRN    Or    albuterol-ipratropium (DUO-NEB) 2.5 MG-0.5 MG/3 ML  3 mL Nebulization Q4H PRN    [START ON 1/19/2022] PHENobarbitaL (LUMINAL) tablet 64.8 mg  64.8 mg Oral BID    [START ON 1/20/2022] PHENobarbitaL (LUMINAL) tablet 32.4 mg  32.4 mg Oral BID    [START ON 1/21/2022] PHENobarbitaL (LUMINAL) tablet 32.4 mg  32.4 mg Oral DAILY    pantoprazole (PROTONIX) 40 mg in 0.9% sodium chloride 10 mL injection  40 mg IntraVENous Q12H    LORazepam (ATIVAN) injection 2 mg  2 mg IntraVENous Q1H PRN    LORazepam (ATIVAN) injection 4 mg  4 mg IntraVENous Q1H PRN    0.9% sodium chloride 6,372 mL with folic acid 1 mg, thiamine 100 mg, mvi (adult no. 4 with vit K) 10 mL infusion   IntraVENous Q24H    PHENobarbital (LUMINAL) injection 65 mg  65 mg IntraVENous TID    lisinopriL (PRINIVIL, ZESTRIL) tablet 20 mg  20 mg Oral DAILY    metoprolol tartrate (LOPRESSOR) tablet 50 mg  50 mg Oral BID    glucose chewable tablet 16 g  4 Tablet Oral PRN    dextrose (D50W) injection syrg 12.5-25 g  25-50 mL IntraVENous PRN    glucagon (GLUCAGEN) injection 1 mg  1 mg IntraMUSCular PRN    insulin lispro (HUMALOG) injection   SubCUTAneous AC&HS    nicotine (NICODERM CQ) 21 mg/24 hr patch 1 Patch  1 Patch TransDERmal DAILY    prochlorperazine (COMPAZINE) injection 10 mg  10 mg IntraVENous Q6H PRN    labetaloL (NORMODYNE;TRANDATE) 20 mg/4 mL (5 mg/mL) injection 10 mg  10 mg IntraVENous Q6H PRN    guaiFENesin ER (MUCINEX) tablet 600 mg  600 mg Oral Q12H    ascorbic acid (vitamin C) (VITAMIN C) tablet 500 mg  500 mg Oral BID    atorvastatin (LIPITOR) tablet 20 mg  20 mg Oral DAILY    budesonide-formoterol (SYMBICORT) 80-4.5 mcg inhaler  2 Puff Inhalation BID RT    Or    arformoterol 15 mcg/budesonide 0.5 mg neb solution   Nebulization BID RT     Current Outpatient Medications   Medication Sig    omeprazole (PRILOSEC) 40 mg capsule Take 1 Cap by mouth Daily (before breakfast).  metFORMIN (GLUCOPHAGE) 1,000 mg tablet Take 1 Tab by mouth two (2) times daily (with meals).  lisinopriL (PRINIVIL, ZESTRIL) 20 mg tablet Take 1 Tab by mouth daily. Indications: high blood pressure    metoprolol tartrate (LOPRESSOR) 50 mg tablet Take 1 Tab by mouth two (2) times a day. Indications: high blood pressure    therapeutic multivitamin (THERAGRAN) tablet Take 1 Tab by mouth daily.          Signed:   Suzanna Rahman MD   Resident, Family Medicine

## 2022-01-18 NOTE — PROGRESS NOTES
0115 - Verbal shift change report given to Lala Steinberg (oncoming nurse) by [address] [place] [address] (offgoing nurse). Report included the following information SBAR, ED Summary, Intake/Output, MAR, Recent Results and Dual Neuro Assessment     0700 - Verbal shift change report given to Sorin Cowan (oncoming nurse) by Lala Steinberg (offgoing nurse). Report included the following information SBAR, ED Summary, Procedure Summary, Intake/Output, MAR, Recent Results, Cardiac Rhythm Sinus Rhythm, Sinus Laquetta Johnson and Dual Neuro Assessment. Vin Poe

## 2022-01-18 NOTE — CONSULTS
Reggie Kenney, NP-C  [phone] cell     Gastroenterology Consultation Note      Admit Date: 1/17/2022  Consult Date: 1/18/2022   I greatly appreciate your asking me to see Maryellen Breen, thank you very much for the opportunity to participate in his care. Narrative Assessment and Plan   62 y/o male with history of polysubstance abuse and recent history of binge alcohol use of 1/2 gallon of liquor a day for the past couple of weeks. Presents with complaint of nausea, vomiting, blood tinged emesis, and hiccups. Also complains of melena for the last week. Also having AECOPD and was Covid+. Hgb stable without anemia. Platelets a little low. INR 1.1. CT showed diffuse severe hepatic steatosis, normal biliary ducts, no acute findings. TB 3.9, AP 83, AST 45, and ALT 39. Reports daily NSAID use with 400 mg of ibuprofen daily. Last EGD and colonoscopy in 2017 by Dr. Joey Guerra. EGD with LA-B esophagitis. Colonoscopy with evidence of previous sigmoid colectomy, diverticulosis, and 2 TA. Takes omeprazole 40 mg daily. Has used AA in the past with reported long period of sobriety. · No plans for endoscopic evaluation at this time. Shyann Chimes for diet as tolerated  · Follow H&H, transfuse prn  · Continue PPI BID  · If discharged can follow up with GI as outpatient if needed, recommended he reconnect with AA and complete cessation of alcohol. · No NSAIDs    Destiney Bill. Joey Guerra MD  [phone] office  [phone] voicemail   I have personally reviewed the history, interviewed the patient, and independently examined the patient. I have reviewed the chart and agree with the documentation recorded by the Advanced Practice Provider, including the assessment, treatment plan, and disposition; with the addition of:    No further overt bleeding, not anemic, hemodynamics normal.  Will defer endoscopic evaluation given the testing suggestive of viral infection. Following.     Maeve Limon, MD        Subjective:     Chief Complaint: Nausea, vomiting, blood streaked emesis, NSAID use, melena, ETOH use    History of Present Illness: 60 y/o male with history of polysubstance abuse and recent history of binge alcohol use of 1/2 gallon of liquor a day for the past couple of weeks. Presents with complaint of nausea, vomiting, blood tinged emesis, and hiccups. Also complains of melena for the last week. No abdominal pain, diarrhea, or constipation. Daily NSAID use. Also having AECOPD and was Covid+. Hgb stable without anemia. Platelets a little low. INR 1.1. CT showed diffuse severe hepatic steatosis, normal biliary ducts, no acute findings. TB 3.9, AP 83, AST 45, and ALT 39. Other pertinent labs: WBC 4.4, hgb 15, hct 40.7, MCV 93.1, platelets 903, sodium 137, potassium 4.4, BUN 10, creatinine 0.82, folate 16.4, ammonia 36, . Last EGD and colonoscopy in 2017 by Dr. Yadiel Mathews. EGD with LA-B esophagitis. Colonoscopy with evidence of previous sigmoid colectomy, diverticulosis, and 2 TA. Takes omeprazole 40 mg daily. Has used AA in the past with reported long period of sobriety.      PCP:  Sarina Leal NP    Past Medical History:   Diagnosis Date    Alcohol abuse     Alcoholism ([facility]     Anxiety     COPD (chronic obstructive pulmonary disease) (HCC)     Dr. Bea May is pulmonologist    Depression     Diabetes ([facility]     Type II    Diverticulitis     Family history of angina     GERD (gastroesophageal reflux disease)     Hemochromatosis     Hypertension         Past Surgical History:   Procedure Laterality Date    COLONOSCOPY N/A 9/6/2017    COLONOSCOPY performed by Jojo Fay MD at [address] [facility] HX CERVICAL FUSION  03/02/2021    ACDF C4/7    HX TOTAL COLECTOMY         Social History     Tobacco Use    Smoking status: Unknown If Ever Smoked    Smokeless tobacco: Current User    Tobacco comment: Pt uses Chewing tobacco   Substance Use Topics    Alcohol use: Yes     Comment: heavy liquor use daily        No family history on file. No Known Allergies         Home Medications:  Prior to Admission Medications   Prescriptions Last Dose Informant Patient Reported? Taking? QUEtiapine (SEROquel) 25 mg tablet   No No   Sig: Take 1 to 2 tablets daily as needed for anxiety. hydrOXYzine HCL (ATARAX) 50 mg tablet   No No   Sig: Take 2 Tabs by mouth every six (6) hours as needed for Anxiety. Indications: anxious   Patient taking differently: Take 100 mg by mouth every six (6) hours as needed for Anxiety. OUT OF MEDICATION  Indications: anxious   lisinopriL (PRINIVIL, ZESTRIL) 20 mg tablet   No No   Sig: Take 1 Tab by mouth daily. Indications: high blood pressure   metFORMIN (GLUCOPHAGE) 1,000 mg tablet   No No   Sig: Take 1 Tab by mouth two (2) times daily (with meals). metoprolol tartrate (LOPRESSOR) 50 mg tablet   No No   Sig: Take 1 Tab by mouth two (2) times a day. Indications: high blood pressure   omeprazole (PRILOSEC) 40 mg capsule   No No   Sig: Take 1 Cap by mouth Daily (before breakfast). ondansetron (Zofran ODT) 4 mg disintegrating tablet   No No   Sig: Take 1 Tab by mouth every eight (8) hours as needed for Nausea. therapeutic multivitamin (THERAGRAN) tablet   No No   Sig: Take 1 Tab by mouth daily. Facility-Administered Medications: None       Hospital Medications:  Current Facility-Administered Medications   Medication Dose Route Frequency    methylPREDNISolone (PF) (Solu-MEDROL) injection 60 mg  60 mg IntraVENous Q12H    pantoprazole (PROTONIX) 40 mg in 0.9% sodium chloride 10 mL injection  40 mg IntraVENous Q12H    LORazepam (ATIVAN) injection 2 mg  2 mg IntraVENous Q1H PRN    LORazepam (ATIVAN) injection 4 mg  4 mg IntraVENous Q1H PRN    0.9% sodium chloride 7,304 mL with folic acid 1 mg, thiamine 100 mg, mvi (adult no. 4 with vit K) 10 mL infusion   IntraVENous Q24H    PHENobarbital (LUMINAL) injection 65 mg  65 mg IntraVENous TID    lisinopriL (PRINIVIL, ZESTRIL) tablet 20 mg  20 mg Oral DAILY    metoprolol tartrate (LOPRESSOR) tablet 50 mg  50 mg Oral BID    glucose chewable tablet 16 g  4 Tablet Oral PRN    dextrose (D50W) injection syrg 12.5-25 g  25-50 mL IntraVENous PRN    glucagon (GLUCAGEN) injection 1 mg  1 mg IntraMUSCular PRN    insulin lispro (HUMALOG) injection   SubCUTAneous AC&HS    nicotine (NICODERM CQ) 21 mg/24 hr patch 1 Patch  1 Patch TransDERmal DAILY    prochlorperazine (COMPAZINE) injection 10 mg  10 mg IntraVENous Q6H PRN    labetaloL (NORMODYNE;TRANDATE) 20 mg/4 mL (5 mg/mL) injection 10 mg  10 mg IntraVENous Q6H PRN    guaiFENesin ER (MUCINEX) tablet 600 mg  600 mg Oral Q12H    ascorbic acid (vitamin C) (VITAMIN C) tablet 500 mg  500 mg Oral BID    zinc gluconate tablet 50 mg  1 Tablet Oral DAILY    atorvastatin (LIPITOR) tablet 20 mg  20 mg Oral DAILY    budesonide-formoterol (SYMBICORT) 80-4.5 mcg inhaler  2 Puff Inhalation BID RT    Or    arformoterol 15 mcg/budesonide 0.5 mg neb solution   Nebulization BID RT    albuterol-ipratropium (DUO-NEB) 2.5 MG-0.5 MG/3 ML  3 mL Nebulization Q4H RT    Or    ipratropium-albuterol (COMBIVENT RESPIMAT) 20 mcg-100 mcg inhalation spray  1 Puff Inhalation Q4H RT     Current Outpatient Medications   Medication Sig    omeprazole (PRILOSEC) 40 mg capsule Take 1 Cap by mouth Daily (before breakfast).  metFORMIN (GLUCOPHAGE) 1,000 mg tablet Take 1 Tab by mouth two (2) times daily (with meals).  QUEtiapine (SEROquel) 25 mg tablet Take 1 to 2 tablets daily as needed for anxiety.  lisinopriL (PRINIVIL, ZESTRIL) 20 mg tablet Take 1 Tab by mouth daily. Indications: high blood pressure    metoprolol tartrate (LOPRESSOR) 50 mg tablet Take 1 Tab by mouth two (2) times a day. Indications: high blood pressure    ondansetron (Zofran ODT) 4 mg disintegrating tablet Take 1 Tab by mouth every eight (8) hours as needed for Nausea.     hydrOXYzine HCL (ATARAX) 50 mg tablet Take 2 Tabs by mouth every six (6) hours as needed for Anxiety. Indications: anxious (Patient taking differently: Take 100 mg by mouth every six (6) hours as needed for Anxiety. OUT OF MEDICATION  Indications: anxious)    therapeutic multivitamin (THERAGRAN) tablet Take 1 Tab by mouth daily. Review of Systems: Per HPI, otherwise negative. Objective:     Physical Exam:  Visit Vitals  BP (!) 161/85   Pulse 68   Temp 98.5 °F (36.9 °C)   Resp 19   Ht 6' 2\" (1.88 m)   Wt 104.3 kg (230 lb)   SpO2 95%   BMI 29.53 kg/m²     SpO2 Readings from Last 6 Encounters:   01/18/22 95%   05/13/21 98%   02/08/21 96%   01/17/21 99%   01/10/21 94%   09/07/17 98%            Intake/Output Summary (Last 24 hours) at 1/18/2022 2185  Last data filed at 1/18/2022 0600  Gross per 24 hour   Intake --   Output 500 ml   Net -500 ml      General: no distress, comfortable  Skin:  No rash or palpable dermatologic mass lesions  HEENT: Pupils equal, sclera anicteric, oropharynx with no gross lesions  Cardiovascular: No abnormal audible heart sounds, well perfused, no edema  Respiratory:  No abnormal audible breath sounds, normal respiratory effort, no throacic deformity  GI:   Abdomen nondistended, nontender, no mass, no free fluid, no rebound or guarding. Musculoskeletal:  No skeletal deformity nor acute arthritis noted.   Neurological:  Motor and sensory function intact in upper extremeties  Psychiatric:  Normal affect, memory intact, appears to have insight into current illness    Laboratory:    Recent Results (from the past 24 hour(s))   CBC WITH AUTOMATED DIFF    Collection Time: 01/17/22  2:39 PM   Result Value Ref Range    WBC 7.6 4.1 - 11.1 K/uL    RBC 4.69 4.10 - 5.70 M/uL    HGB 16.4 12.1 - 17.0 g/dL    HCT 44.1 36.6 - 50.3 %    MCV 94.0 80.0 - 99.0 FL    MCH 35.0 (H) 26.0 - 34.0 PG    MCHC 37.2 (H) 30.0 - 36.5 g/dL    RDW 13.8 11.5 - 14.5 %    PLATELET 570 (L) 765 - 400 K/uL    MPV 9.0 8.9 - 12.9 FL    NRBC 0.0 0  WBC    ABSOLUTE NRBC 0.00 0.00 - 0.01 K/uL    NEUTROPHILS 79 (H) 32 - 75 %    LYMPHOCYTES 11 (L) 12 - 49 %    MONOCYTES 8 5 - 13 %    EOSINOPHILS 1 0 - 7 %    BASOPHILS 0 0 - 1 %    IMMATURE GRANULOCYTES 1 (H) 0.0 - 0.5 %    ABS. NEUTROPHILS 6.1 1.8 - 8.0 K/UL    ABS. LYMPHOCYTES 0.8 0.8 - 3.5 K/UL    ABS. MONOCYTES 0.6 0.0 - 1.0 K/UL    ABS. EOSINOPHILS 0.1 0.0 - 0.4 K/UL    ABS. BASOPHILS 0.0 0.0 - 0.1 K/UL    ABS. IMM. GRANS. 0.1 (H) 0.00 - 0.04 K/UL    DF AUTOMATED     METABOLIC PANEL, COMPREHENSIVE    Collection Time: 01/17/22  2:39 PM   Result Value Ref Range    Sodium 133 (L) 136 - 145 mmol/L    Potassium 3.7 3.5 - 5.1 mmol/L    Chloride 92 (L) 97 - 108 mmol/L    CO2 21 21 - 32 mmol/L    Anion gap 20 (H) 5 - 15 mmol/L    Glucose 171 (H) 65 - 100 mg/dL    BUN 12 6 - 20 MG/DL    Creatinine 1.07 0.70 - 1.30 MG/DL    BUN/Creatinine ratio 11 (L) 12 - 20      GFR est AA >60 >60 ml/min/1.73m2    GFR est non-AA >60 >60 ml/min/1.73m2    Calcium 9.0 8.5 - 10.1 MG/DL    Bilirubin, total 2.7 (H) 0.2 - 1.0 MG/DL    ALT (SGPT) 48 12 - 78 U/L    AST (SGOT) 62 (H) 15 - 37 U/L    Alk.  phosphatase 101 45 - 117 U/L    Protein, total 7.4 6.4 - 8.2 g/dL    Albumin 3.9 3.5 - 5.0 g/dL    Globulin 3.5 2.0 - 4.0 g/dL    A-G Ratio 1.1 1.1 - 2.2     ETHYL ALCOHOL    Collection Time: 01/17/22  2:39 PM   Result Value Ref Range    ALCOHOL(ETHYL),SERUM 102 (H) <10 MG/DL   MAGNESIUM    Collection Time: 01/17/22  2:39 PM   Result Value Ref Range    Magnesium 1.4 (L) 1.6 - 2.4 mg/dL   SALICYLATE    Collection Time: 01/17/22  2:39 PM   Result Value Ref Range    Salicylate level <9.3 (L) 2.8 - 20.0 MG/DL   ACETAMINOPHEN    Collection Time: 01/17/22  2:39 PM   Result Value Ref Range    Acetaminophen level <2 (L) 10 - 30 ug/mL   TROPONIN-HIGH SENSITIVITY    Collection Time: 01/17/22  2:39 PM   Result Value Ref Range    Troponin-High Sensitivity 5 0 - 76 ng/L   SAMPLES BEING HELD    Collection Time: 01/17/22  2:39 PM   Result Value Ref Range    SAMPLES BEING HELD 1SST COMMENT        Add-on orders for these samples will be processed based on acceptable specimen integrity and analyte stability, which may vary by analyte. LIPASE    Collection Time: 01/17/22  2:39 PM   Result Value Ref Range    Lipase 121 73 - 393 U/L   PROTHROMBIN TIME + INR    Collection Time: 01/17/22  2:49 PM   Result Value Ref Range    INR 1.1 0.9 - 1.1      Prothrombin time 11.7 (H) 9.0 - 11.1 sec   LACTIC ACID    Collection Time: 01/17/22  2:49 PM   Result Value Ref Range    Lactic acid 7.8 (HH) 0.4 - 2.0 MMOL/L   LIPASE    Collection Time: 01/17/22  7:48 PM   Result Value Ref Range    Lipase 74 73 - 393 U/L   TYPE & SCREEN    Collection Time: 01/17/22  7:48 PM   Result Value Ref Range    Crossmatch Expiration 01/20/2022,2359     ABO/Rh(D) A POSITIVE     Antibody screen NEG    CBC WITH AUTOMATED DIFF    Collection Time: 01/17/22  7:48 PM   Result Value Ref Range    WBC 6.9 4.1 - 11.1 K/uL    RBC 4.20 4. 10 - 5.70 M/uL    HGB 14.5 12.1 - 17.0 g/dL    HCT 39.1 36.6 - 50.3 %    MCV 93.1 80.0 - 99.0 FL    MCH 34.5 (H) 26.0 - 34.0 PG    MCHC 37.1 (H) 30.0 - 36.5 g/dL    RDW 14.1 11.5 - 14.5 %    PLATELET 228 (L) 891 - 400 K/uL    MPV 9.6 8.9 - 12.9 FL    NRBC 0.0 0  WBC    ABSOLUTE NRBC 0.00 0.00 - 0.01 K/uL    NEUTROPHILS 87 (H) 32 - 75 %    LYMPHOCYTES 6 (L) 12 - 49 %    MONOCYTES 6 5 - 13 %    EOSINOPHILS 0 0 - 7 %    BASOPHILS 0 0 - 1 %    IMMATURE GRANULOCYTES 1 (H) 0.0 - 0.5 %    ABS. NEUTROPHILS 6.0 1.8 - 8.0 K/UL    ABS. LYMPHOCYTES 0.4 (L) 0.8 - 3.5 K/UL    ABS. MONOCYTES 0.4 0.0 - 1.0 K/UL    ABS. EOSINOPHILS 0.0 0.0 - 0.4 K/UL    ABS. BASOPHILS 0.0 0.0 - 0.1 K/UL    ABS. IMM.  GRANS. 0.1 (H) 0.00 - 0.04 K/UL    DF SMEAR SCANNED      RBC COMMENTS NORMOCYTIC, NORMOCHROMIC     METABOLIC PANEL, COMPREHENSIVE    Collection Time: 01/17/22  7:48 PM   Result Value Ref Range    Sodium 133 (L) 136 - 145 mmol/L    Potassium 4.1 3.5 - 5.1 mmol/L    Chloride 97 97 - 108 mmol/L    CO2 25 21 - 32 mmol/L    Anion gap 11 5 - 15 mmol/L    Glucose 126 (H) 65 - 100 mg/dL    BUN 10 6 - 20 MG/DL    Creatinine 0.79 0.70 - 1.30 MG/DL    BUN/Creatinine ratio 13 12 - 20      GFR est AA >60 >60 ml/min/1.73m2    GFR est non-AA >60 >60 ml/min/1.73m2    Calcium 8.5 8.5 - 10.1 MG/DL    Bilirubin, total 2.6 (H) 0.2 - 1.0 MG/DL    ALT (SGPT) 38 12 - 78 U/L    AST (SGOT) 48 (H) 15 - 37 U/L    Alk.  phosphatase 81 45 - 117 U/L    Protein, total 6.4 6.4 - 8.2 g/dL    Albumin 3.3 (L) 3.5 - 5.0 g/dL    Globulin 3.1 2.0 - 4.0 g/dL    A-G Ratio 1.1 1.1 - 2.2     AMMONIA    Collection Time: 01/17/22  7:48 PM   Result Value Ref Range    Ammonia 36 (H) <32 UMOL/L   FOLATE    Collection Time: 01/17/22  7:48 PM   Result Value Ref Range    Folate 16.4 5.0 - 21.0 ng/mL   PROCALCITONIN    Collection Time: 01/17/22  7:48 PM   Result Value Ref Range    Procalcitonin <0.05 ng/mL   HEMOGLOBIN A1C WITH EAG    Collection Time: 01/17/22  7:48 PM   Result Value Ref Range    Hemoglobin A1c 5.7 (H) 4.0 - 5.6 %    Est. average glucose 117 mg/dL   LACTIC ACID    Collection Time: 01/17/22  7:56 PM   Result Value Ref Range    Lactic acid 1.7 0.4 - 2.0 MMOL/L   COVID-19 RAPID TEST    Collection Time: 01/17/22  8:20 PM   Result Value Ref Range    Specimen source Nasopharyngeal      COVID-19 rapid test Detected (A) NOTD     RESPIRATORY VIRUS PANEL W/COVID-19, PCR    Collection Time: 01/17/22  8:20 PM    Specimen: Nasopharyngeal   Result Value Ref Range    Adenovirus Not detected NOTD      Coronavirus 229E Not detected NOTD      Coronavirus HKU1 Not detected NOTD      Coronavirus CVNL63 Not detected NOTD      Coronavirus OC43 Not detected NOTD      SARS-CoV-2, PCR Detected (A) NOTD      Metapneumovirus Not detected NOTD      Rhinovirus and Enterovirus Not detected NOTD      Influenza A Not detected NOTD      Influenza A, subtype H1 Not detected NOTD      Influenza A, subtype H3 Not detected NOTD      INFLUENZA A H1N1 PCR Not detected NOTD      Influenza B Not detected NOTD Parainfluenza 1 Not detected NOTD      Parainfluenza 2 Not detected NOTD      Parainfluenza 3 Not detected NOTD      Parainfluenza virus 4 Not detected NOTD      RSV by PCR Not detected NOTD      B. parapertussis, PCR Not detected NOTD      Bordetella pertussis - PCR Not detected NOTD      Chlamydophila pneumoniae DNA, QL, PCR Not detected NOTD      Mycoplasma pneumoniae DNA, QL, PCR Not detected NOTD     URINALYSIS W/MICROSCOPIC    Collection Time: 01/17/22 10:03 PM   Result Value Ref Range    Color DARK YELLOW      Appearance CLEAR CLEAR      pH (UA) 5.5 5.0 - 8.0      Protein 100 (A) NEG mg/dL    Glucose Negative NEG mg/dL    Ketone 80 (A) NEG mg/dL    Blood Negative NEG      Urobilinogen 1.0 0.2 - 1.0 EU/dL    Nitrites Negative NEG      Leukocyte Esterase Negative NEG      WBC 0-4 0 - 4 /hpf    RBC 0-5 0 - 5 /hpf    Epithelial cells FEW FEW /lpf    Bacteria Negative NEG /hpf    Hyaline cast 0-2 0 - 5 /lpf   URINE CULTURE HOLD SAMPLE    Collection Time: 01/17/22 10:03 PM    Specimen: Serum; Urine   Result Value Ref Range    Urine culture hold        Urine on hold in Microbiology dept for 2 days. If unpreserved urine is submitted, it cannot be used for addtional testing after 24 hours, recollection will be required.    DRUG SCREEN, URINE    Collection Time: 01/17/22 10:03 PM   Result Value Ref Range    AMPHETAMINES Negative NEG      BARBITURATES Negative NEG      BENZODIAZEPINES Negative NEG      COCAINE Negative NEG      METHADONE Negative NEG      OPIATES Negative NEG      PCP(PHENCYCLIDINE) Negative NEG      THC (TH-CANNABINOL) Positive (A) NEG      Drug screen comment (NOTE)    BILIRUBIN, CONFIRM    Collection Time: 01/17/22 10:03 PM   Result Value Ref Range    Bilirubin UA, confirm Negative     HGB & HCT    Collection Time: 01/17/22 10:29 PM   Result Value Ref Range    HGB 15.1 12.1 - 17.0 g/dL    HCT 40.6 36.6 - 50.3 %   GLUCOSE, POC    Collection Time: 01/18/22 12:40 AM   Result Value Ref Range Glucose (POC) 157 (H) 65 - 117 mg/dL    Performed by Ladonna Clemons    CBC WITH AUTOMATED DIFF    Collection Time: 01/18/22  2:14 AM   Result Value Ref Range    WBC 4.4 4.1 - 11.1 K/uL    RBC 4.37 4.10 - 5.70 M/uL    HGB 15.0 12.1 - 17.0 g/dL    HCT 40.7 36.6 - 50.3 %    MCV 93.1 80.0 - 99.0 FL    MCH 34.3 (H) 26.0 - 34.0 PG    MCHC 36.9 (H) 30.0 - 36.5 g/dL    RDW 13.9 11.5 - 14.5 %    PLATELET 772 (L) 851 - 400 K/uL    MPV 9.8 8.9 - 12.9 FL    NRBC 0.0 0  WBC    ABSOLUTE NRBC 0.00 0.00 - 0.01 K/uL    NEUTROPHILS 89 (H) 32 - 75 %    LYMPHOCYTES 8 (L) 12 - 49 %    MONOCYTES 2 (L) 5 - 13 %    EOSINOPHILS 0 0 - 7 %    BASOPHILS 0 0 - 1 %    IMMATURE GRANULOCYTES 1 (H) 0.0 - 0.5 %    ABS. NEUTROPHILS 3.9 1.8 - 8.0 K/UL    ABS. LYMPHOCYTES 0.4 (L) 0.8 - 3.5 K/UL    ABS. MONOCYTES 0.1 0.0 - 1.0 K/UL    ABS. EOSINOPHILS 0.0 0.0 - 0.4 K/UL    ABS. BASOPHILS 0.0 0.0 - 0.1 K/UL    ABS. IMM. GRANS. 0.0 0.00 - 0.04 K/UL    DF SMEAR SCANNED      RBC COMMENTS NORMOCYTIC, NORMOCHROMIC     METABOLIC PANEL, COMPREHENSIVE    Collection Time: 01/18/22  2:14 AM   Result Value Ref Range    Sodium 137 136 - 145 mmol/L    Potassium 4.4 3.5 - 5.1 mmol/L    Chloride 101 97 - 108 mmol/L    CO2 24 21 - 32 mmol/L    Anion gap 12 5 - 15 mmol/L    Glucose 176 (H) 65 - 100 mg/dL    BUN 10 6 - 20 MG/DL    Creatinine 0.82 0.70 - 1.30 MG/DL    BUN/Creatinine ratio 12 12 - 20      GFR est AA >60 >60 ml/min/1.73m2    GFR est non-AA >60 >60 ml/min/1.73m2    Calcium 8.4 (L) 8.5 - 10.1 MG/DL    Bilirubin, total 3.9 (H) 0.2 - 1.0 MG/DL    ALT (SGPT) 39 12 - 78 U/L    AST (SGOT) 45 (H) 15 - 37 U/L    Alk.  phosphatase 83 45 - 117 U/L    Protein, total 6.2 (L) 6.4 - 8.2 g/dL    Albumin 3.4 (L) 3.5 - 5.0 g/dL    Globulin 2.8 2.0 - 4.0 g/dL    A-G Ratio 1.2 1.1 - 2.2     MAGNESIUM    Collection Time: 01/18/22  2:14 AM   Result Value Ref Range    Magnesium 1.9 1.6 - 2.4 mg/dL   PHOSPHORUS    Collection Time: 01/18/22  2:14 AM   Result Value Ref Range    Phosphorus 2.6 2.6 - 4.7 MG/DL   D DIMER    Collection Time: 01/18/22  2:14 AM   Result Value Ref Range    D-dimer 2.54 (H) 0.00 - 0.65 mg/L FEU   C REACTIVE PROTEIN, QT    Collection Time: 01/18/22  2:20 AM   Result Value Ref Range    C-Reactive protein <0.29 0.00 - 0.60 mg/dL   LD    Collection Time: 01/18/22  2:20 AM   Result Value Ref Range     (H) 85 - 241 U/L         Assessment/Plan:     Principal Problem:    Alcohol withdrawal ([facility] (9/4/2017)    Active Problems:    Alcohol abuse ()      Drug abuse ([facility] (9/2/2017)      Lactic acidosis (9/2/2017)      Major depression (1/10/2021)      DM (diabetes mellitus) ([facility] (1/17/2022)      HTN (hypertension) (1/17/2022)         See above narrative for full detail.     Agusto Delgadillo NP

## 2022-01-18 NOTE — PROGRESS NOTES
1/18/2022  2:41 PM  Case management note    Reason for Admission:  ETOH withdrawal    Patient came to hospital for ETOH withdrawal. He recently was in a rehab but lapsed. He drinks close to a gallon a day. Patient is independent and drives. Patient has symptoms of vomiting and melana. Patient is COVID +  [employer] [facility] [place]                     RUR Score:    9%                 Plan for utilizing home health:      Patient is independent and will most likely not qualify for home health services    PCP: First and Last name:  Alvin Lynn NP     Name of Practice:    Are you a current patient: Yes/No:    Approximate date of last visit: 1 yr   Can you participate in a virtual visit with your PCP:                     Current Advanced Directive/Advance Care Plan: Full Code NO AD      Healthcare Decision Maker:   Bernie Navarrete brother                              Transition of Care Plan:             1. Home with family assistance  2. PCP follow up  3. AA  4. AD planning  5. CM to follow for discharge needs    Care Management Interventions  PCP Verified by CM:  Yes Elif Geeta NP)  Support Systems: Other Family Member(s)  Confirm Follow Up Transport: Family  The Plan for Transition of Care is Related to the Following Treatment Goals : ETOH  Discharge Location  Discharge Placement: Home with outpatient services  [address]

## 2022-01-18 NOTE — PROGRESS NOTES
Progress Note     1/18/2022  PCP: Perez Ford NP     Assessment/Plan:     Mikala Reed is a 61 y.o. male with a PMHx of multi-substance abuse including alcohol, opioids, THC, and h/o crack-cocaine use; PUD 2/2 long-term NSAID use, MDD w/ h/o suicidal ideation, thrombocytopenia, T2DM, HTN, and COPD who is admitted for acute EtOH withdrawal w/ intractable n/v, SOB, and suspect hemetemesis. Overnight Events: No acute events. Pt's CIWAs remained <8. 8 mg Ativan given over last 24 hrs. A/P CT did not support any acute GI bleeding.       Acute EtOH Withdrawal: Pt last drink 1.5 days prior to admission. BL 1/2 gallon of liquor daily. H/o withdrawal seizures requiring hospitalization. Pt POA CIWA of 15. CIWA of 6-7 overnight w/ 8 mg Ativan given. UDS+ for EtOH and THC.  - Continuous cardiac monitoring  - Goodie bag, 1L at 150 ml/hr 1 time daily  - LRs for mIVF at 125 ml/hr  - CIWA protocol with Ativan 2mg (8-11) and 4mg (>12)  - Phenobarb 65 TID; will begin taper today given clinical improvement  - Seizure precautions in place       Hematemesis and Melena in s/o Peristent N/V: POA Hgb 16.4. Pt reports some blood-streaking in vomit and black stool. Likely PUD given h/o long-term NSAID use. Concern for Meaghan-Banegas syndrome vs esophageal varices given drinking history. Lipase 121. A/p CT negative for acute process, pancreatitis, or diverticulitis. Hgb stable. - GI consulted, rec'd: PPI, trend H/H, avoid NSAIDs and anticoagulation  - No EGD currently indicated  - Compazine prn (no Zofran given QTc)  - Protonix BID     COPD Exacerbation: CXR w/ n/a/p; O2 sats have remained over 90% on room air. Lungs diffusely course bilaterally w/ wheezing. 2/2 chronic cannabis smoking. On home Advair and prn albuterol. RVP positive for COVID only. Pro-fadumo negative.   - Solu-medrol 60 TID tapered to BID  - Brovana/Pulmicort inhaler BID  - Duo-nebs q4hrs, scheduled   - Mucinex BID  - Doxycycline 100mg bid      Lactic Acidosis (resolved): POA lactate 7.8. Likely multifactorial, 2/2 alcoholic ketoacidosis vs volume depletion 2/2 persistent N/V and lack of PO intake.       Hypomagnesemia: POA Mg 1.4.  - IV magnesium sulfate, replete as needed      Thrombocytopenia: this is a chronic problem, likely d/t liver disease 2/2 chronic alcohol abuse vs possible history of hemochromatosis (per chart review). - Chronic problem, monitor on daily CBC  - Transfuse if <50 and bleeding; <10 if not     Hypertension: BP on admission 160/89.  - Cont home medications of Lisinopril 20mg daily and metoprolol 50mg BID   - Will continue to monitor at this time and readjust as BP's trend     DMII: A1c 6.6 (2/2021). POA glucose 171.  - Holding home medications of Metformin 1000 mg daily.  - Insulin Sliding Scale normal sensitivity with AC&HS glucose checks  - Consider NPH if glucose uncontrolled with steroids  - Hypoglycemia protocols ordered  - Goal glucose concentration >70, <140 pre-meal and <180 with all random glucoses     Prolonged QTc: QTc 493. - Avoid QT-prolonging agents including Zofran and quinolones     MDD w/ Anxiety and h/o Suicidal Ideation: Pt on no home meds. Prior h/o of Hydroxyzine for anxiety. No SI/HI. - Recommend f/u outpt for counseling and possible SSRI therapy     Chronic Alcohol Abuse Disorder: 40-year history of regular alcohol abuse. Has done well with AA in the past.  - F/u outpt; refer to local AA     Nicotine Dependence: long term, daily tobacco-snuff use. - Nicotine patch 21mg/24hr as needed        FEN/GI - GI bland. No IVF. Activity - Out of bed with assistance  DVT prophylaxis - SCDs  GI prophylaxis - Protonix  Fall prophylaxis - Fall precautions ordered. Disposition - Admit to ICU. Plan to d/c to Home. Consulting PT, OT and CM  Code Status - Full. Discussed with patient / caregivers. Breanna Garcia discussed with Dr. Juan C Bennett. Subjective:   Pt was seen and examined at bedside.  Afebrile and hemodynamically stable. Concerns overnight include: None. Denies chest pain, SOB, nausea, vomiting, abdominal pain, dizziness. Claims he is feeling much better. Still has some anxiety.     Objective:   Physical examination  Patient Vitals for the past 24 hrs:   Temp Pulse Resp BP SpO2   22 1122 -- -- -- -- 98 %   22 1000 -- (!) 56 18 (!) 141/79 98 %   22 0930 -- 67 16 (!) 159/83 98 %   22 0900 -- 66 14 (!) 161/118 --   22 0800 98.4 °F (36.9 °C) 61 16 (!) 134/92 --   22 0740 -- -- -- -- 95 %   22 0700 -- (!) 46 18 139/85 95 %   22 0639 -- 68 19 -- --   22 0638 -- 75 19 -- --   22 0637 -- 97 21 -- --   22 0636 -- 77 18 -- --   22 0630 -- (!) 46 18 (!) 161/85 96 %   22 0600 -- (!) 44 18 (!) 167/80 --   22 0530 -- (!) 46 18 (!) 164/84 94 %   22 0500 -- (!) 41 13 (!) 167/79 --   22 0430 -- (!) 43 17 (!) 148/82 94 %   22 0400 -- (!) 46 18 (!) 145/78 95 %   22 0330 -- (!) 46 18 (!) 155/93 96 %   22 0300 -- (!) 49 19 (!) 133/90 92 %   22 0230 -- (!) 50 19 135/74 --   22 0200 98.5 °F (36.9 °C) (!) 58 22 (!) 141/72 --   22 0130 -- 60 16 (!) 145/78 95 %   22 0100 -- (!) 57 17 133/74 100 %   22 0033 -- 61 19 -- 94 %   22 0030 -- (!) 59 18 (!) 139/92 97 %   22 0004 -- 62 11 (!) 155/82 --   22 2334 -- 61 22 (!) 142/79 93 %   22 2311 -- -- -- -- 98 %   22 2304 -- 64 14 (!) 154/88 96 %   223 -- 64 15 (!) 142/82 98 %   228 -- 64 17 (!) 160/91 96 %   22 -- 64 21 (!) 154/94 --   22 98.5 °F (36.9 °C) 63 17 (!) 156/99 95 %   22 1831 -- 81 13 (!) 151/88 98 %   22 1830 98.9 °F (37.2 °C) 77 16 (!) 151/88 100 %   22 1821 -- 69 17 (!) 140/95 97 %   22 1401 96.9 °F (36.1 °C) 84 16 (!) 160/89 96 %      Temp (24hrs), Av.2 °F (36.8 °C), Min:96.9 °F (36.1 °C), Max:98.9 °F (37.2 °C)         O2 Device: None (Room air)    Date 01/17/22 0700 - 01/18/22 0659 01/18/22 0700 - 01/19/22 0659   Shift [id number] 1900-0659 24 Hour Total 6149-5115 6434-9058 24 Hour Total   INTAKE   Shift Total(mL/kg)         OUTPUT   Urine(mL/kg/hr)  500(0.4) 500(0.2) 500  500     Urine Voided  500 500 500  500   Shift Total(mL/kg)  500(4.8) 500(4.8) 500(4.8)  500(4.8)   NET  -500 -500 -500  -500   Weight (kg) 104.3 104.3 104.3 104.3 104.3 104.3     General:   Alert, cooperative, no acute distress, mildly anxious-appearing   Head:   Atraumatic   Eyes:   Conjunctivae clear   ENT:  Oral mucosa normal   Neck:  Supple, trachea midline, no adenopathy   No JVD   Back:    No CVA tenderness    Chest wall:    No tenderness or deformities    Lungs:   Some wheezing in RLL; otherwise CTAB    Heart:   Normal rate, regular rhythm, no murmur, rubs or gallops   Abdomen:    Soft, non-tender   No masses or organomegaly    Extremities:  No edema or DVT signs   Pulses:  Symmetric all extremities   Skin:  Warm and dry    No rashes or lesions   Neurologic:  Alert and oriented x4   No focal deficits   Urinary catheter:  None     Data Review:     CBC:  Recent Labs     01/18/22 0214 01/17/22 2229 01/17/22 1948 01/17/22  1439 01/17/22  1439   WBC 4.4  --  6.9  --  7.6   HGB 15.0 15.1 14.5   < > 16.4   HCT 40.7 40.6 39.1   < > 44.1   *  --  108*  --  127*    < > = values in this interval not displayed.      Metabolic Panel:  Recent Labs     01/18/22 0214 01/17/22 1948 01/17/22  1449 01/17/22  1439    133*  --  133*   K 4.4 4.1  --  3.7    97  --  92*   CO2 24 25  --  21   BUN 10 10  --  12   CREA 0.82 0.79  --  1.07   * 126*  --  171*   CA 8.4* 8.5  --  9.0   MG 1.9  --   --  1.4*   PHOS 2.6  --   --   --    ALB 3.4* 3.3*  --  3.9   TBILI 3.9* 2.6*  --  2.7*   ALT 39 38  --  48   INR  --   --  1.1  --      Micro:  No results found for: CULT  Imaging:  CT ABD PELV W WO CONT    Result Date: 1/17/2022  EXAM: CT ABD PELV W WO CONT INDICATION: epigastric pain, r/o pancreatitis COMPARISON: Ultrasound 9/4/2017. CT 9/2/2017. Kala Stands CONTRAST: 100 mL of Isovue-370. TECHNIQUE:  Multislice helical CT was performed from the diaphragm to the iliac crest prior to intravenous contrast administration and from the diaphragm to the symphysis pubis during uneventful rapid bolus intravenous contrast administration, during the hepatic arterial, portal venous and delayed venous phases. Oral contrast was not administered. Contiguous 5 mm axial images were reconstructed and lung and soft tissue windows were generated. Coronal and sagittal reformations were generated. CT dose reduction was achieved through use of a standardized protocol tailored for this examination and automatic exposure control for dose modulation. The lack of oral contrast material diminishes the capacity of CT to evaluate the bowel and adjacent structures. FINDINGS: LOWER THORAX: Several bilateral subpleural nodules measuring up to 4 mm in size appear unchanged in number and size. LIVER: Diffuse severe hepatic steatosis. No hepatic mass or intrahepatic bile duct dilation demonstrated. BILIARY TREE: Gallbladder is within normal limits. CBD is not dilated. SPLEEN: within normal limits. PANCREAS: No mass or ductal dilatation. No CT imaging evidence of pancreatitis. ADRENALS: Unremarkable. KIDNEYS: No mass, calculus, or hydronephrosis. STOMACH: Unremarkable. SMALL BOWEL: No dilatation or wall thickening. COLON: Anastomotic suture ring at proximal sigmoid region. Transverse and descending colonic diverticulosis. No mural thickening or dilation demonstrated. APPENDIX: Normal. PERITONEUM: No ascites or pneumoperitoneum. RETROPERITONEUM: No lymphadenopathy or aortic aneurysm. REPRODUCTIVE ORGANS: Those shown appear unremarkable. URINARY BLADDER: No mass or calculus. BONES: No destructive bone lesion. ABDOMINAL WALL: No mass or hernia.  ADDITIONAL COMMENTS: N/A     No evidence for acute pancreatitis or other acute finding. Details can be found in report. XR CHEST PORT    Result Date: 1/17/2022  EXAM:  XR CHEST PORT INDICATION: Wheezing, cough COMPARISON: Chest x-ray 1/6/2021. TECHNIQUE: Single frontal view of the chest. FINDINGS: No lobar consolidation, pleural effusion, or pneumothorax. The cardiomediastinal silhouette is not enlarged. No acute or aggressive osseous lesion. 1.  No evidence of an acute cardiopulmonary process. Medications reviewed  Current Facility-Administered Medications   Medication Dose Route Frequency    methylPREDNISolone (PF) (Solu-MEDROL) injection 60 mg  60 mg IntraVENous Q12H    doxycycline (VIBRA-TABS) tablet 100 mg  100 mg Oral Q12H    [START ON 1/19/2022] zinc gluconate tablet 50 mg  1 Tablet Oral DAILY WITH LUNCH    pantoprazole (PROTONIX) 40 mg in 0.9% sodium chloride 10 mL injection  40 mg IntraVENous Q12H    LORazepam (ATIVAN) injection 2 mg  2 mg IntraVENous Q1H PRN    LORazepam (ATIVAN) injection 4 mg  4 mg IntraVENous Q1H PRN    0.9% sodium chloride 4,032 mL with folic acid 1 mg, thiamine 100 mg, mvi (adult no. 4 with vit K) 10 mL infusion   IntraVENous Q24H    PHENobarbital (LUMINAL) injection 65 mg  65 mg IntraVENous TID    lisinopriL (PRINIVIL, ZESTRIL) tablet 20 mg  20 mg Oral DAILY    metoprolol tartrate (LOPRESSOR) tablet 50 mg  50 mg Oral BID    glucose chewable tablet 16 g  4 Tablet Oral PRN    dextrose (D50W) injection syrg 12.5-25 g  25-50 mL IntraVENous PRN    glucagon (GLUCAGEN) injection 1 mg  1 mg IntraMUSCular PRN    insulin lispro (HUMALOG) injection   SubCUTAneous AC&HS    nicotine (NICODERM CQ) 21 mg/24 hr patch 1 Patch  1 Patch TransDERmal DAILY    prochlorperazine (COMPAZINE) injection 10 mg  10 mg IntraVENous Q6H PRN    labetaloL (NORMODYNE;TRANDATE) 20 mg/4 mL (5 mg/mL) injection 10 mg  10 mg IntraVENous Q6H PRN    guaiFENesin ER (MUCINEX) tablet 600 mg  600 mg Oral Q12H    ascorbic acid (vitamin C) (VITAMIN C) tablet 500 mg  500 mg Oral BID    atorvastatin (LIPITOR) tablet 20 mg  20 mg Oral DAILY    budesonide-formoterol (SYMBICORT) 80-4.5 mcg inhaler  2 Puff Inhalation BID RT    Or    arformoterol 15 mcg/budesonide 0.5 mg neb solution   Nebulization BID RT    albuterol-ipratropium (DUO-NEB) 2.5 MG-0.5 MG/3 ML  3 mL Nebulization Q4H RT    Or    ipratropium-albuterol (COMBIVENT RESPIMAT) 20 mcg-100 mcg inhalation spray  1 Puff Inhalation Q4H RT     Current Outpatient Medications   Medication Sig    omeprazole (PRILOSEC) 40 mg capsule Take 1 Cap by mouth Daily (before breakfast).  metFORMIN (GLUCOPHAGE) 1,000 mg tablet Take 1 Tab by mouth two (2) times daily (with meals).  QUEtiapine (SEROquel) 25 mg tablet Take 1 to 2 tablets daily as needed for anxiety.  lisinopriL (PRINIVIL, ZESTRIL) 20 mg tablet Take 1 Tab by mouth daily. Indications: high blood pressure    metoprolol tartrate (LOPRESSOR) 50 mg tablet Take 1 Tab by mouth two (2) times a day. Indications: high blood pressure    ondansetron (Zofran ODT) 4 mg disintegrating tablet Take 1 Tab by mouth every eight (8) hours as needed for Nausea.  hydrOXYzine HCL (ATARAX) 50 mg tablet Take 2 Tabs by mouth every six (6) hours as needed for Anxiety. Indications: anxious (Patient taking differently: Take 100 mg by mouth every six (6) hours as needed for Anxiety. OUT OF MEDICATION  Indications: anxious)    therapeutic multivitamin (THERAGRAN) tablet Take 1 Tab by mouth daily. Signed:   Aracelis Will MD   Resident, Family Medicine      Attending note: Attending note to follow. ..

## 2022-01-18 NOTE — PROGRESS NOTES
0730 - Verbal shift change report given to Will (oncoming nurse) by  Gisel Holt nurse). Report included the following information SBAR, Intake/Output, MAR and Cardiac Rhythm Sinus Yesenia Goldsmith. 0900 - Assessment complete, NP at bedside, Family practice rounded, meds given, pt requesting diet, ordered by GI    1100 - medicated per CIWA protocol    1200 - Assessment complete, contacted Family practice r/t breathing treatments    1300 - Medicated per CIWA, up with asst to BSC, void & BM, back to stretcher    1600 - Assessment complete, bloodwork sent, medicated (see MAR)    1745 - attempted to call report to  but RN unable to accept pt r/t CIWA scores    1900 - Verbal shift change report given to Bryan Vega (oncoming nurse) by Will (offgoing nurse). Report included the following information SBAR, Kardex, Intake/Output, MAR, Recent Results and Cardiac Rhythm Sinus.

## 2022-01-19 LAB
ALBUMIN SERPL-MCNC: 3.1 G/DL (ref 3.5–5)
ALBUMIN/GLOB SERPL: 1.1 {RATIO} (ref 1.1–2.2)
ALP SERPL-CCNC: 62 U/L (ref 45–117)
ALT SERPL-CCNC: 30 U/L (ref 12–78)
ANION GAP SERPL CALC-SCNC: 6 MMOL/L (ref 5–15)
ANION GAP SERPL CALC-SCNC: 8 MMOL/L (ref 5–15)
AST SERPL-CCNC: 27 U/L (ref 15–37)
BASOPHILS # BLD: 0 K/UL (ref 0–0.1)
BASOPHILS NFR BLD: 0 % (ref 0–1)
BILIRUB SERPL-MCNC: 1.8 MG/DL (ref 0.2–1)
BUN SERPL-MCNC: 11 MG/DL (ref 6–20)
BUN SERPL-MCNC: 12 MG/DL (ref 6–20)
BUN/CREAT SERPL: 13 (ref 12–20)
BUN/CREAT SERPL: 15 (ref 12–20)
CALCIUM SERPL-MCNC: 8.2 MG/DL (ref 8.5–10.1)
CALCIUM SERPL-MCNC: 8.3 MG/DL (ref 8.5–10.1)
CHLORIDE SERPL-SCNC: 101 MMOL/L (ref 97–108)
CHLORIDE SERPL-SCNC: 103 MMOL/L (ref 97–108)
CO2 SERPL-SCNC: 25 MMOL/L (ref 21–32)
CO2 SERPL-SCNC: 25 MMOL/L (ref 21–32)
CREAT SERPL-MCNC: 0.82 MG/DL (ref 0.7–1.3)
CREAT SERPL-MCNC: 0.84 MG/DL (ref 0.7–1.3)
CRP SERPL-MCNC: <0.29 MG/DL (ref 0–0.6)
D DIMER PPP FEU-MCNC: 1.24 MG/L FEU (ref 0–0.65)
DIFFERENTIAL METHOD BLD: ABNORMAL
EOSINOPHIL # BLD: 0 K/UL (ref 0–0.4)
EOSINOPHIL NFR BLD: 0 % (ref 0–7)
ERYTHROCYTE [DISTWIDTH] IN BLOOD BY AUTOMATED COUNT: 14 % (ref 11.5–14.5)
FERRITIN SERPL-MCNC: 559 NG/ML (ref 26–388)
GLOBULIN SER CALC-MCNC: 2.7 G/DL (ref 2–4)
GLUCOSE BLD STRIP.AUTO-MCNC: 148 MG/DL (ref 65–117)
GLUCOSE BLD STRIP.AUTO-MCNC: 180 MG/DL (ref 65–117)
GLUCOSE BLD STRIP.AUTO-MCNC: 221 MG/DL (ref 65–117)
GLUCOSE SERPL-MCNC: 159 MG/DL (ref 65–100)
GLUCOSE SERPL-MCNC: 204 MG/DL (ref 65–100)
HCT VFR BLD AUTO: 36.9 % (ref 36.6–50.3)
HGB BLD-MCNC: 13.6 G/DL (ref 12.1–17)
IMM GRANULOCYTES # BLD AUTO: 0 K/UL (ref 0–0.04)
IMM GRANULOCYTES NFR BLD AUTO: 0 % (ref 0–0.5)
LDH SERPL L TO P-CCNC: 229 U/L (ref 85–241)
LYMPHOCYTES # BLD: 0.3 K/UL (ref 0.8–3.5)
LYMPHOCYTES NFR BLD: 4 % (ref 12–49)
MAGNESIUM SERPL-MCNC: 1.9 MG/DL (ref 1.6–2.4)
MCH RBC QN AUTO: 34 PG (ref 26–34)
MCHC RBC AUTO-ENTMCNC: 36.9 G/DL (ref 30–36.5)
MCV RBC AUTO: 92.3 FL (ref 80–99)
MONOCYTES # BLD: 0.3 K/UL (ref 0–1)
MONOCYTES NFR BLD: 4 % (ref 5–13)
NEUTS SEG # BLD: 7.4 K/UL (ref 1.8–8)
NEUTS SEG NFR BLD: 92 % (ref 32–75)
NRBC # BLD: 0 K/UL (ref 0–0.01)
NRBC BLD-RTO: 0 PER 100 WBC
PHOSPHATE SERPL-MCNC: 1.6 MG/DL (ref 2.6–4.7)
PLATELET # BLD AUTO: 97 K/UL (ref 150–400)
PMV BLD AUTO: 10 FL (ref 8.9–12.9)
POTASSIUM SERPL-SCNC: 3.5 MMOL/L (ref 3.5–5.1)
POTASSIUM SERPL-SCNC: 3.7 MMOL/L (ref 3.5–5.1)
PROT SERPL-MCNC: 5.8 G/DL (ref 6.4–8.2)
RBC # BLD AUTO: 4 M/UL (ref 4.1–5.7)
RBC MORPH BLD: ABNORMAL
SERVICE CMNT-IMP: ABNORMAL
SODIUM SERPL-SCNC: 132 MMOL/L (ref 136–145)
SODIUM SERPL-SCNC: 136 MMOL/L (ref 136–145)
WBC # BLD AUTO: 8 K/UL (ref 4.1–11.1)

## 2022-01-19 PROCEDURE — 65610000006 HC RM INTENSIVE CARE

## 2022-01-19 PROCEDURE — 82962 GLUCOSE BLOOD TEST: CPT

## 2022-01-19 PROCEDURE — 74011000250 HC RX REV CODE- 250: Performed by: FAMILY MEDICINE

## 2022-01-19 PROCEDURE — 84100 ASSAY OF PHOSPHORUS: CPT

## 2022-01-19 PROCEDURE — 94640 AIRWAY INHALATION TREATMENT: CPT

## 2022-01-19 PROCEDURE — 83615 LACTATE (LD) (LDH) ENZYME: CPT

## 2022-01-19 PROCEDURE — 80053 COMPREHEN METABOLIC PANEL: CPT

## 2022-01-19 PROCEDURE — 86140 C-REACTIVE PROTEIN: CPT

## 2022-01-19 PROCEDURE — 74011250637 HC RX REV CODE- 250/637: Performed by: FAMILY MEDICINE

## 2022-01-19 PROCEDURE — 83735 ASSAY OF MAGNESIUM: CPT

## 2022-01-19 PROCEDURE — 74011250637 HC RX REV CODE- 250/637: Performed by: STUDENT IN AN ORGANIZED HEALTH CARE EDUCATION/TRAINING PROGRAM

## 2022-01-19 PROCEDURE — 94664 DEMO&/EVAL PT USE INHALER: CPT

## 2022-01-19 PROCEDURE — 82728 ASSAY OF FERRITIN: CPT

## 2022-01-19 PROCEDURE — 36415 COLL VENOUS BLD VENIPUNCTURE: CPT

## 2022-01-19 PROCEDURE — 85379 FIBRIN DEGRADATION QUANT: CPT

## 2022-01-19 PROCEDURE — 99232 SBSQ HOSP IP/OBS MODERATE 35: CPT | Performed by: FAMILY MEDICINE

## 2022-01-19 PROCEDURE — 74011636637 HC RX REV CODE- 636/637: Performed by: STUDENT IN AN ORGANIZED HEALTH CARE EDUCATION/TRAINING PROGRAM

## 2022-01-19 PROCEDURE — 74011250636 HC RX REV CODE- 250/636: Performed by: FAMILY MEDICINE

## 2022-01-19 PROCEDURE — 85025 COMPLETE CBC W/AUTO DIFF WBC: CPT

## 2022-01-19 PROCEDURE — 74011250636 HC RX REV CODE- 250/636: Performed by: STUDENT IN AN ORGANIZED HEALTH CARE EDUCATION/TRAINING PROGRAM

## 2022-01-19 PROCEDURE — C9113 INJ PANTOPRAZOLE SODIUM, VIA: HCPCS | Performed by: FAMILY MEDICINE

## 2022-01-19 RX ORDER — THERA TABS 400 MCG
1 TAB ORAL DAILY
Status: DISCONTINUED | OUTPATIENT
Start: 2022-01-19 | End: 2022-01-20 | Stop reason: HOSPADM

## 2022-01-19 RX ORDER — PHENOBARBITAL 32.4 MG/1
64.8 TABLET ORAL ONCE
Status: COMPLETED | OUTPATIENT
Start: 2022-01-19 | End: 2022-01-19

## 2022-01-19 RX ORDER — LORAZEPAM 2 MG/ML
4 INJECTION INTRAMUSCULAR
Status: ACTIVE | OUTPATIENT
Start: 2022-01-19 | End: 2022-01-19

## 2022-01-19 RX ORDER — ACETAMINOPHEN 500 MG
1000 TABLET ORAL
Status: DISCONTINUED | OUTPATIENT
Start: 2022-01-19 | End: 2022-01-20 | Stop reason: HOSPADM

## 2022-01-19 RX ORDER — PHENOBARBITAL 32.4 MG/1
32.4 TABLET ORAL DAILY
Status: COMPLETED | OUTPATIENT
Start: 2022-01-19 | End: 2022-01-19

## 2022-01-19 RX ORDER — LANOLIN ALCOHOL/MO/W.PET/CERES
10 CREAM (GRAM) TOPICAL
Status: DISCONTINUED | OUTPATIENT
Start: 2022-01-19 | End: 2022-01-20 | Stop reason: HOSPADM

## 2022-01-19 RX ORDER — ASPIRIN 325 MG/1
100 TABLET, FILM COATED ORAL DAILY
Status: DISCONTINUED | OUTPATIENT
Start: 2022-01-19 | End: 2022-01-20 | Stop reason: HOSPADM

## 2022-01-19 RX ADMIN — DOXYCYCLINE HYCLATE 100 MG: 100 TABLET, COATED ORAL at 20:50

## 2022-01-19 RX ADMIN — Medication 50 MG: at 12:09

## 2022-01-19 RX ADMIN — LORAZEPAM 2 MG: 2 INJECTION INTRAMUSCULAR; INTRAVENOUS at 03:23

## 2022-01-19 RX ADMIN — Medication 2 UNITS: at 12:16

## 2022-01-19 RX ADMIN — LISINOPRIL 20 MG: 20 TABLET ORAL at 09:00

## 2022-01-19 RX ADMIN — PHENOBARBITAL 32.4 MG: 32.4 TABLET ORAL at 14:25

## 2022-01-19 RX ADMIN — ATORVASTATIN CALCIUM 20 MG: 20 TABLET, FILM COATED ORAL at 08:48

## 2022-01-19 RX ADMIN — LORAZEPAM 4 MG: 2 INJECTION INTRAMUSCULAR; INTRAVENOUS at 00:41

## 2022-01-19 RX ADMIN — SODIUM CHLORIDE, PRESERVATIVE FREE 40 MG: 5 INJECTION INTRAVENOUS at 05:50

## 2022-01-19 RX ADMIN — METHYLPREDNISOLONE SODIUM SUCCINATE 60 MG: 125 INJECTION, POWDER, FOR SOLUTION INTRAMUSCULAR; INTRAVENOUS at 08:48

## 2022-01-19 RX ADMIN — DOXYCYCLINE HYCLATE 100 MG: 100 TABLET, COATED ORAL at 08:48

## 2022-01-19 RX ADMIN — Medication 500 MG: at 17:09

## 2022-01-19 RX ADMIN — PHENOBARBITAL 64.8 MG: 32.4 TABLET ORAL at 08:49

## 2022-01-19 RX ADMIN — ACETAMINOPHEN 1000 MG: 500 TABLET ORAL at 23:45

## 2022-01-19 RX ADMIN — THERA TABS 1 TABLET: TAB at 08:49

## 2022-01-19 RX ADMIN — Medication 10.5 MG: at 23:46

## 2022-01-19 RX ADMIN — GUAIFENESIN 600 MG: 600 TABLET, EXTENDED RELEASE ORAL at 20:50

## 2022-01-19 RX ADMIN — Medication 2 UNITS: at 08:47

## 2022-01-19 RX ADMIN — METOPROLOL TARTRATE 50 MG: 50 TABLET, FILM COATED ORAL at 17:09

## 2022-01-19 RX ADMIN — LORAZEPAM 4 MG: 2 INJECTION INTRAMUSCULAR; INTRAVENOUS at 23:55

## 2022-01-19 RX ADMIN — LORAZEPAM 2 MG: 2 INJECTION INTRAMUSCULAR; INTRAVENOUS at 11:29

## 2022-01-19 RX ADMIN — METHYLPREDNISOLONE SODIUM SUCCINATE 60 MG: 125 INJECTION, POWDER, FOR SOLUTION INTRAMUSCULAR; INTRAVENOUS at 20:49

## 2022-01-19 RX ADMIN — IPRATROPIUM BROMIDE AND ALBUTEROL 1 PUFF: 20; 100 SPRAY, METERED RESPIRATORY (INHALATION) at 09:08

## 2022-01-19 RX ADMIN — Medication 500 MG: at 08:48

## 2022-01-19 RX ADMIN — LORAZEPAM 2 MG: 2 INJECTION INTRAMUSCULAR; INTRAVENOUS at 08:57

## 2022-01-19 RX ADMIN — GUAIFENESIN 600 MG: 600 TABLET, EXTENDED RELEASE ORAL at 08:49

## 2022-01-19 RX ADMIN — LORAZEPAM 4 MG: 2 INJECTION INTRAMUSCULAR; INTRAVENOUS at 20:49

## 2022-01-19 RX ADMIN — Medication 3 UNITS: at 17:09

## 2022-01-19 RX ADMIN — Medication 100 MG: at 08:49

## 2022-01-19 RX ADMIN — BUDESONIDE AND FORMOTEROL FUMARATE DIHYDRATE 2 PUFF: 80; 4.5 AEROSOL RESPIRATORY (INHALATION) at 09:07

## 2022-01-19 RX ADMIN — SODIUM CHLORIDE, PRESERVATIVE FREE 40 MG: 5 INJECTION INTRAVENOUS at 17:09

## 2022-01-19 RX ADMIN — LORAZEPAM 4 MG: 2 INJECTION INTRAMUSCULAR; INTRAVENOUS at 05:50

## 2022-01-19 NOTE — ED NOTES
Pt was found standing in the hallway both IV's have been ripped out and he is in his personal clothing. Staff explained to patient that he cannot be in the hallway and that he must return to his room. Pt states that he needs to go, that he has to get home to get his vehicle so he can make the drive to work which is 3-4 hours. States he will leave AMA. Alert and oriented x4. CIWA of 7-8 at this time. Family practice contacted and physician down to speak with Mr. Rossana Lewis. Agrees to stay until \"no later than 2 pm\". He agrees to let the IV be reinserted and medications to be given. Pt appears more calm after speaking with the physician.

## 2022-01-19 NOTE — PROGRESS NOTES
Jovany Lopez. Matty Leung MD  [phone] office  [phone] voicemail   I have reviewed the chart and agree with the documentation recorded by the Advanced Practice Provider, including the assessment, treatment plan, and disposition; with the addition of:    No clinical or laboratory evidence of ongoing blood loss. Ethanol withdrawal being managed. So long as a PO plan for his ethanol withdrawal generated, and the primary team is satisfied of safety of outpatient management of same, I have no objections to discharge. I recommend he avoid NSAIDs, avoid ethanol, and take PO PPI (omeprazole 40mg adequate) daily. I will arrange follow up in GI clinic. Signing off. Rafita Levin MD                                        Progress Note  Date:2022       Room:[place] [address]  Patient Devang Bañuelos     YOB: 1965     Age:56 y.o. Subjective    Subjective   Review of Systems   Unable to perform ROS: Other (Sedation)   Constitutional: Negative for fever. Objective         Vitals Last 24 Hours:  TEMPERATURE:  Temp  Av.6 °F (36.4 °C)  Min: 97.6 °F (36.4 °C)  Max: 97.6 °F (36.4 °C)  RESPIRATIONS RANGE: Resp  Av.1  Min: 13  Max: 24  PULSE OXIMETRY RANGE: SpO2  Av.4 %  Min: 94 %  Max: 100 %  PULSE RANGE: Pulse  Av.9  Min: 44  Max: 75  BLOOD PRESSURE RANGE: Systolic (04CTU), UEA:729 , Min:96 , TCS:348   ; Diastolic (19GLZ), WK, Min:61, Max:124    I/O (24Hr): Intake/Output Summary (Last 24 hours) at 2022 1006  Last data filed at 2022 1755  Gross per 24 hour   Intake 742.5 ml   Output 300 ml   Net 442.5 ml     Objective:  General Appearance: In no acute distress. Vital signs: (most recent): Blood pressure 130/68, pulse (!) 56, temperature 97.6 °F (36.4 °C), resp. rate 20, height 6' 2\" (1.88 m), weight 104.3 kg (230 lb), SpO2 98 %. No fever. HEENT: Normal HEENT exam.    Lungs:  Normal effort and normal respiratory rate.   Breath sounds clear to auscultation. Heart: Normal rate. Regular rhythm. S1 normal and S2 normal.  No murmur. Abdomen: Abdomen is soft and non-distended. Bowel sounds are normal.     Extremities: There is no dependent edema. Pulses: Distal pulses are intact. Neurological: (Unable to awaken). Skin:  Warm and dry. Labs/Imaging/Diagnostics    Labs:  CBC:  Recent Labs     01/19/22  0325 01/18/22  1601 01/18/22 0214 01/17/22 2229 01/17/22 1948   WBC 8.0  --  4.4  --  6.9   RBC 4.00*  --  4.37  --  4.20   HGB 13.6 15.3 15.0   < > 14.5   HCT 36.9 41.1 40.7   < > 39.1   MCV 92.3  --  93.1  --  93.1   RDW 14.0  --  13.9  --  14.1   PLT 97*  --  104*  --  108*    < > = values in this interval not displayed. CHEMISTRIES:  Recent Labs     01/19/22 0325 01/18/22 0214 01/17/22 1948 01/17/22  1439 01/17/22  1439   * 137 133*   < > 133*   K 3.5 4.4 4.1   < > 3.7    101 97   < > 92*   CO2 25 24 25   < > 21   BUN 12 10 10   < > 12   CA 8.2* 8.4* 8.5   < > 9.0   PHOS 1.6* 2.6  --   --   --    MG 1.9 1.9  --   --  1.4*    < > = values in this interval not displayed. PT/INR:  Recent Labs     01/17/22  1449   INR 1.1     APTT:No results for input(s): APTT in the last 72 hours. LIVER PROFILE:  Recent Labs     01/19/22 0325 01/18/22 0214 01/17/22 1948   AST 27 45* 48*   ALT 30 39 38     Lab Results   Component Value Date/Time    ALT (SGPT) 30 01/19/2022 03:25 AM    AST (SGOT) 27 01/19/2022 03:25 AM    Alk. phosphatase 62 01/19/2022 03:25 AM    Bilirubin, total 1.8 (H) 01/19/2022 03:25 AM       Imaging Last 24 Hours:  No results found.   Assessment//Plan   Principal Problem:    Alcohol withdrawal ([facility] (9/4/2017)    Active Problems:    Alcohol abuse ()      Drug abuse ([facility] (9/2/2017)      Lactic acidosis (9/2/2017)      Major depression (1/10/2021)      DM (diabetes mellitus) ([facility] (1/17/2022)      HTN (hypertension) (1/17/2022)      Assessment:  (56 y/o male with history of polysubstance abuse and recent history of binge alcohol use of 1/2 gallon of liquor a day for the past couple of weeks. Presents with complaint of nausea, vomiting, blood tinged emesis, and hiccups. Also complains of melena for the last week. Also having AECOPD and was Covid+. Hgb stable without anemia. Platelets a little low. INR 1.1. CT showed diffuse severe hepatic steatosis, normal biliary ducts, no acute findings. TB 3.9, AP 83, AST 45, and ALT 39. Reports daily NSAID use with 400 mg of ibuprofen daily. Last EGD and colonoscopy in 2017 by Dr. Vishal Hewitt. EGD with LA-B esophagitis. Colonoscopy with evidence of previous sigmoid colectomy, diverticulosis, and 2 TA. Takes omeprazole 40 mg daily. Has used AA in the past with reported long period of sobriety. 1/19/2022: Sleeping after Ativan dose, unable to awaken. Tolerating regular diet. Hgb stable. TB down to 1.8.        ). Plan:   (· No plans for endoscopic evaluation at this time. · Follow H&H, transfuse prn  · Continue PPI BID  · No NSAIDs    GI will sign off, please call if needed further. ).        Electronically signed by Yung Rojas NP on 1/19/2022 at 10:06 AM

## 2022-01-19 NOTE — PROGRESS NOTES
3pm call made to MD per pt request to have physician speak with pt at bedside about discharge. Was told they would come in a little bit, instead a phone call was made to pt's room, pt did not want to speak with them via phone. 5pm another call made to MD requesting a bedside discussion regarding discharge. Was again told they would come shortly.

## 2022-01-19 NOTE — ED NOTES
Pt standing in the hallway, nursing staff directed pt back to room. Found that pt has ripped the IV tubing. Pt states he had to urinate and he could not find his call bell. Once again pt was oriented to his environment and where the call bell is located.

## 2022-01-19 NOTE — ED NOTES
Pt was found walking out of his room, covered in urine and blood. Pt states where am I? I was walking in the field with my Uncle and was talking to my cousin. When explained that he is in the hospital pt asked how long has he been here. Pt cleaned, reoriented, linens changed, and new IV placed. 4mg of ativan given IV after IV was established. CIWA score of 26. Pt is also hallucinating bugs on the walls. Pt is agitated and keeps stating \"what kind of place is this? What kind of program is this for you to hypnotize someone\".

## 2022-01-20 VITALS
RESPIRATION RATE: 12 BRPM | DIASTOLIC BLOOD PRESSURE: 63 MMHG | HEIGHT: 74 IN | BODY MASS INDEX: 29.52 KG/M2 | TEMPERATURE: 97.5 F | SYSTOLIC BLOOD PRESSURE: 130 MMHG | OXYGEN SATURATION: 100 % | WEIGHT: 230 LBS | HEART RATE: 74 BPM

## 2022-01-20 LAB
ALBUMIN SERPL-MCNC: 3.2 G/DL (ref 3.5–5)
ALBUMIN/GLOB SERPL: 1.1 {RATIO} (ref 1.1–2.2)
ALP SERPL-CCNC: 60 U/L (ref 45–117)
ALT SERPL-CCNC: 35 U/L (ref 12–78)
ANION GAP SERPL CALC-SCNC: 6 MMOL/L (ref 5–15)
AST SERPL-CCNC: ABNORMAL U/L (ref 15–37)
BASOPHILS # BLD: 0 K/UL (ref 0–0.1)
BASOPHILS NFR BLD: 0 % (ref 0–1)
BILIRUB SERPL-MCNC: 1.4 MG/DL (ref 0.2–1)
BUN SERPL-MCNC: 12 MG/DL (ref 6–20)
BUN/CREAT SERPL: 15 (ref 12–20)
CALCIUM SERPL-MCNC: 8.6 MG/DL (ref 8.5–10.1)
CHLORIDE SERPL-SCNC: 104 MMOL/L (ref 97–108)
CO2 SERPL-SCNC: 25 MMOL/L (ref 21–32)
COMMENT, HOLDF: NORMAL
CREAT SERPL-MCNC: 0.82 MG/DL (ref 0.7–1.3)
CRP SERPL-MCNC: <0.29 MG/DL (ref 0–0.6)
DIFFERENTIAL METHOD BLD: ABNORMAL
EOSINOPHIL # BLD: 0 K/UL (ref 0–0.4)
EOSINOPHIL NFR BLD: 0 % (ref 0–7)
ERYTHROCYTE [DISTWIDTH] IN BLOOD BY AUTOMATED COUNT: 14.6 % (ref 11.5–14.5)
FERRITIN SERPL-MCNC: 696 NG/ML (ref 26–388)
GLOBULIN SER CALC-MCNC: 3 G/DL (ref 2–4)
GLUCOSE BLD STRIP.AUTO-MCNC: 165 MG/DL (ref 65–117)
GLUCOSE BLD STRIP.AUTO-MCNC: 175 MG/DL (ref 65–117)
GLUCOSE SERPL-MCNC: 183 MG/DL (ref 65–100)
HCT VFR BLD AUTO: 44.1 % (ref 36.6–50.3)
HGB BLD-MCNC: 16 G/DL (ref 12.1–17)
IMM GRANULOCYTES # BLD AUTO: 0.1 K/UL (ref 0–0.04)
IMM GRANULOCYTES NFR BLD AUTO: 1 % (ref 0–0.5)
LDH SERPL L TO P-CCNC: NORMAL U/L (ref 85–241)
LYMPHOCYTES # BLD: 0.5 K/UL (ref 0.8–3.5)
LYMPHOCYTES NFR BLD: 6 % (ref 12–49)
MAGNESIUM SERPL-MCNC: NORMAL MG/DL (ref 1.6–2.4)
MCH RBC QN AUTO: 34.1 PG (ref 26–34)
MCHC RBC AUTO-ENTMCNC: 36.3 G/DL (ref 30–36.5)
MCV RBC AUTO: 94 FL (ref 80–99)
MONOCYTES # BLD: 0.3 K/UL (ref 0–1)
MONOCYTES NFR BLD: 4 % (ref 5–13)
NEUTS SEG # BLD: 7.3 K/UL (ref 1.8–8)
NEUTS SEG NFR BLD: 89 % (ref 32–75)
NRBC # BLD: 0 K/UL (ref 0–0.01)
NRBC BLD-RTO: 0 PER 100 WBC
PHOSPHATE SERPL-MCNC: 3.7 MG/DL (ref 2.6–4.7)
PLATELET # BLD AUTO: 111 K/UL (ref 150–400)
PMV BLD AUTO: 10.2 FL (ref 8.9–12.9)
POTASSIUM SERPL-SCNC: ABNORMAL MMOL/L (ref 3.5–5.1)
PROT SERPL-MCNC: 6.2 G/DL (ref 6.4–8.2)
RBC # BLD AUTO: 4.69 M/UL (ref 4.1–5.7)
RBC MORPH BLD: ABNORMAL
SAMPLES BEING HELD,HOLD: NORMAL
SERVICE CMNT-IMP: ABNORMAL
SERVICE CMNT-IMP: ABNORMAL
SODIUM SERPL-SCNC: 135 MMOL/L (ref 136–145)
WBC # BLD AUTO: 8.2 K/UL (ref 4.1–11.1)

## 2022-01-20 PROCEDURE — 83615 LACTATE (LD) (LDH) ENZYME: CPT

## 2022-01-20 PROCEDURE — 94640 AIRWAY INHALATION TREATMENT: CPT

## 2022-01-20 PROCEDURE — 74011250637 HC RX REV CODE- 250/637: Performed by: STUDENT IN AN ORGANIZED HEALTH CARE EDUCATION/TRAINING PROGRAM

## 2022-01-20 PROCEDURE — 80053 COMPREHEN METABOLIC PANEL: CPT

## 2022-01-20 PROCEDURE — 74011250637 HC RX REV CODE- 250/637: Performed by: FAMILY MEDICINE

## 2022-01-20 PROCEDURE — 85025 COMPLETE CBC W/AUTO DIFF WBC: CPT

## 2022-01-20 PROCEDURE — 82962 GLUCOSE BLOOD TEST: CPT

## 2022-01-20 PROCEDURE — 83735 ASSAY OF MAGNESIUM: CPT

## 2022-01-20 PROCEDURE — 99238 HOSP IP/OBS DSCHRG MGMT 30/<: CPT | Performed by: FAMILY MEDICINE

## 2022-01-20 PROCEDURE — C9113 INJ PANTOPRAZOLE SODIUM, VIA: HCPCS | Performed by: FAMILY MEDICINE

## 2022-01-20 PROCEDURE — 86140 C-REACTIVE PROTEIN: CPT

## 2022-01-20 PROCEDURE — 74011250636 HC RX REV CODE- 250/636: Performed by: FAMILY MEDICINE

## 2022-01-20 PROCEDURE — 36415 COLL VENOUS BLD VENIPUNCTURE: CPT

## 2022-01-20 PROCEDURE — 74011636637 HC RX REV CODE- 636/637: Performed by: STUDENT IN AN ORGANIZED HEALTH CARE EDUCATION/TRAINING PROGRAM

## 2022-01-20 PROCEDURE — 74011000250 HC RX REV CODE- 250: Performed by: FAMILY MEDICINE

## 2022-01-20 PROCEDURE — 82728 ASSAY OF FERRITIN: CPT

## 2022-01-20 PROCEDURE — 84100 ASSAY OF PHOSPHORUS: CPT

## 2022-01-20 RX ORDER — GUAIFENESIN 600 MG/1
600 TABLET, EXTENDED RELEASE ORAL EVERY 12 HOURS
Qty: 28 TABLET | Refills: 0 | Status: SHIPPED | OUTPATIENT
Start: 2022-01-20 | End: 2022-02-03

## 2022-01-20 RX ORDER — BUDESONIDE AND FORMOTEROL FUMARATE DIHYDRATE 160; 4.5 UG/1; UG/1
1 AEROSOL RESPIRATORY (INHALATION) 2 TIMES DAILY
Qty: 10.2 G | Refills: 1 | Status: SHIPPED | OUTPATIENT
Start: 2022-01-20 | End: 2022-07-22 | Stop reason: ALTCHOICE

## 2022-01-20 RX ORDER — DOXYCYCLINE HYCLATE 100 MG
100 TABLET ORAL EVERY 12 HOURS
Qty: 5 TABLET | Refills: 0 | Status: SHIPPED | OUTPATIENT
Start: 2022-01-20 | End: 2022-02-18 | Stop reason: ALTCHOICE

## 2022-01-20 RX ORDER — PHENOBARBITAL 32.4 MG/1
32.4 TABLET ORAL 2 TIMES DAILY
Status: COMPLETED | OUTPATIENT
Start: 2022-01-20 | End: 2022-01-20

## 2022-01-20 RX ORDER — IBUPROFEN 200 MG
1 TABLET ORAL DAILY
Status: DISCONTINUED | OUTPATIENT
Start: 2022-01-20 | End: 2022-01-20 | Stop reason: HOSPADM

## 2022-01-20 RX ORDER — ALBUTEROL SULFATE 90 UG/1
2 AEROSOL, METERED RESPIRATORY (INHALATION)
Qty: 18 G | Refills: 1 | Status: SHIPPED | OUTPATIENT
Start: 2022-01-20

## 2022-01-20 RX ORDER — IBUPROFEN 200 MG
1 TABLET ORAL DAILY
Status: DISCONTINUED | OUTPATIENT
Start: 2022-01-20 | End: 2022-01-20

## 2022-01-20 RX ORDER — PHENOBARBITAL 32.4 MG/1
32.4 TABLET ORAL ONCE
Qty: 1 TABLET | Refills: 0 | Status: SHIPPED | OUTPATIENT
Start: 2022-01-20 | End: 2022-01-20

## 2022-01-20 RX ORDER — PREDNISONE 20 MG/1
20 TABLET ORAL 2 TIMES DAILY
Qty: 12 TABLET | Refills: 0 | Status: SHIPPED | OUTPATIENT
Start: 2022-01-20 | End: 2022-02-18 | Stop reason: ALTCHOICE

## 2022-01-20 RX ADMIN — Medication 100 MG: at 09:12

## 2022-01-20 RX ADMIN — SODIUM CHLORIDE, PRESERVATIVE FREE 40 MG: 5 INJECTION INTRAVENOUS at 06:04

## 2022-01-20 RX ADMIN — BUDESONIDE AND FORMOTEROL FUMARATE DIHYDRATE 2 PUFF: 80; 4.5 AEROSOL RESPIRATORY (INHALATION) at 08:18

## 2022-01-20 RX ADMIN — THERA TABS 1 TABLET: TAB at 09:12

## 2022-01-20 RX ADMIN — Medication 2 UNITS: at 08:22

## 2022-01-20 RX ADMIN — ATORVASTATIN CALCIUM 20 MG: 20 TABLET, FILM COATED ORAL at 09:12

## 2022-01-20 RX ADMIN — LISINOPRIL 20 MG: 20 TABLET ORAL at 09:11

## 2022-01-20 RX ADMIN — Medication 2 UNITS: at 12:02

## 2022-01-20 RX ADMIN — GUAIFENESIN 600 MG: 600 TABLET, EXTENDED RELEASE ORAL at 09:12

## 2022-01-20 RX ADMIN — DOXYCYCLINE HYCLATE 100 MG: 100 TABLET, COATED ORAL at 09:12

## 2022-01-20 RX ADMIN — Medication 500 MG: at 09:12

## 2022-01-20 RX ADMIN — PHENOBARBITAL 32.4 MG: 32.4 TABLET ORAL at 09:13

## 2022-01-20 RX ADMIN — Medication 50 MG: at 12:02

## 2022-01-20 NOTE — PROGRESS NOTES
Spiritual Care Assessment/Progress Note  [address]      NAME: Luigi Burnett      MRN: [id number]  AGE: 61 y.o. SEX: male  Jew Affiliation: Anglican   Language: English     1/20/2022     Total Time (in minutes): 7     Spiritual Assessment begun in [facility] through conversation with:         []Patient        [] Family    [] Friend(s)        Reason for Consult: Initial/Spiritual assessment, critical care     Spiritual beliefs: (Please include comment if needed)     [] Identifies with a justa tradition:         [] Supported by a justa community:            [] Claims no spiritual orientation:           [] Seeking spiritual identity:                [] Adheres to an individual form of spirituality:           [x] Not able to assess:                           Identified resources for coping:      [] Prayer                               [] Music                  [] Guided Imagery     [] Family/friends                 [] Pet visits     [] Devotional reading                         [x] Unknown     [] Other:                                              Interventions offered during this visit: (See comments for more details)    Patient Interventions: Other (comment) (consulted w/ staff)           Plan of Care:     [] Support spiritual and/or cultural needs    [] Support AMD and/or advance care planning process      [] Support grieving process   [] Coordinate Rites and/or Rituals    [] Coordination with community clergy   [] No spiritual needs identified at this time   [] Detailed Plan of Care below (See Comments)  [] Make referral to Music Therapy  [] Make referral to Pet Therapy     [] Make referral to Addiction services  [] Make referral to [facility]  [] Make referral to Spiritual Care Partner  [] No future visits requested        [x] Contact Spiritual Care for further referrals     Comments:   Spiritual care assessment attempted with patient, [facility]. [facility].  Pt is under contact restrictions.  consulted with nurse and saw that medical team was in working with Mr Jesus Burks as he sat on edge of bed. He is able to speak by phone. After team left, made phone call into room but no answer. Advised nurse to contact [facility] for any further referrals.     Chaplain Candance Lab, M.Div.  Domenica Benavides ([id number])

## 2022-01-20 NOTE — PROGRESS NOTES
As pt cannot get transportation from family or friends and because pt is covid +,I am contacting medicaid to set up transport for pt to go home by ambulance transportation as cabs and ubers are not transporting covid patients. I contacted medicaid @ [phone] to set up transport for pt. Mediciad transport has three hours to get here but the medicaid states they will try very hard to get here before then. Confirmation number for transport is [id number]. I prepared a packet for transport.     Venkata Schumacher

## 2022-01-20 NOTE — PROGRESS NOTES
Case Management follow-up:    RUR 8%(low readmission risk score)    Problems:  Polysubstance abuse(ETOH,opiods,crack cocaine)  Acute ETOH withdrawal  Recent binging on alcohol approaching one half gallon of liquor daily  Covid +  N/V/blood tinged emesis  COPD exacerbation      Prior medical history:  MDD  History of completing 6 months in rehab for polysubstances   COPD    Plan:    Discharge home today per night shift charge nurse report  Follow-up with GI in the outpatient setting if needed  No plans for endoscopic interventions @ this time. Pt does not meet criteria for home health services. Pt is on room air so does not need home oxygen. I am requesting for CM assistant to please pt a telehealth appointment with Basilio Reddy. Brother is Mikey Dorsey @ [phone]. I am placing resources on substance abuse programs and crisis hotline numbers in pt.'s chart on the left inside flap to be given to pt when discharged.     Tone Baptiste  Perfect Serve  [phone]

## 2022-01-20 NOTE — PROGRESS NOTES
[facility] Virtual Visit at 9:30am with PCP Eveline Najera NP on 01/21/2022

## 2022-01-20 NOTE — PROGRESS NOTES
[address] [place] [address] with [address] [place] [address]       Resident Progress Note in Brief    S: Patient seen and examined at bedside. Pt requested to speak with doctors, with multiple members of primary team by to see him, however he was sleeping at times when they went to see him. Primary team also attempted to call patient in his room, however pt had hung up the phone and refused to speak with Dr. Mulu Shanks (PGY-1) over the phone. Pt seen by myself, Dr. Jami Chand (PGY-2), and Dr. Samuel Borden (PGY-3) around 1800. Pt dressed in street clothes, states he would like to be discharged/willing to leave AMA. He endorsed hallucinations - pointing behind Dr. Samuel Borden at the wall, stating \"that line is squiggly, but I know it's not\" and noting that earlier in the day he had seen \"things moving around the floor, but I know they're not there. \" Dr. Jami Chand explained to patient that he was unsafe to be discharged and would likely need TDO if he were to attempt to leave as he would be unsafe for discharge at this time and had continued to endorse hallucinations. He ultimately agreed to stay overnight, citing that he would need to leave in the morning to make it to [place] to meet up with friends he has made through [employer] as well as his sponsor.        O:  Visit Vitals  /75 (BP 1 Location: Left upper arm, BP Patient Position: At rest)   Pulse 95   Temp 98.2 °F (36.8 °C)   Resp 26   Ht 6' 2\" (1.88 m)   Wt 230 lb (104.3 kg)   SpO2 98%   BMI 29.53 kg/m²     Physical Examination - exam limited by patient agitation:  General appearance - agitated, but alert and oriented to conversation  Chest - symmetric chest rise  Neurological - alert, agitated, oriented, tangential in speech, endorsing visual hallucinations    A/P:     Josef Orantes is a 61 y.o. male with a PMHx of multi-substance abuse including alcohol, opioids, THC, and h/o crack-cocaine use; PUD 2/2 long-term NSAID use, MDD w/ h/o suicidal ideation, thrombocytopenia, T2DM, HTN, and COPD who is admitted for alcohol withdrawal.    Acute EtOH Withdrawal: Pt last drink 1.5 days prior to admission. BL 1/2 gallon of liquor daily. H/o withdrawal seizures requiring hospitalization. CIWA max of 26 w/ 20 mg total Ativan given in past 24 hrs. - Thiamine 100mg daily and multivitamin daily   - CIWA protocol with Ativan 2mg (8-11) and 4mg (>12)  - Phenobarbital d/c'ed this PM  - Seizure precautions in place      Please see the primary team's daily progress note for the patient's full plan.     Cristela Payne MD

## 2022-01-20 NOTE — PROGRESS NOTES
Problem: Alcohol Withdrawal  Goal: *STG: Participates in treatment plan  Outcome: Progressing Towards Goal  Goal: *STG: Remains safe in hospital  Outcome: Progressing Towards Goal  Goal: *STG: Complies with medication therapy  Outcome: Progressing Towards Goal

## 2022-01-20 NOTE — PROGRESS NOTES
Neuro: A&Ox4, follows command, moves all extremities. Pt denies hallucinations. Cardiac: SB to NSR, HR 40-60s. Normotensive  Respiratory: On room air. O2 >95%  GI: regular diet, tolerating diet. NO bm today  : adequate unmeasured urine output  Progress Notes:   Afebrile   Will continue to monitor    1200: pt refuses to be on monitor. Pt is discharged home and per pt, \"monitors on him makes him feel agitated and uncomfortable. \" Pt educated on the importance of staying on monitor until his transport arrive to take him home, pt verbalizes understanding of teaching but would like to stay off monitors and wires. Leads, pulse ox, and BP cuff removed as requested by patient. MD aware    Pt discharged per order. Discharge AVS completed and signed by patient. Medicaid discharged arranged by case management. Per case management, medicaid have 3 hours to pick patient up. At 1530, medicaid had not shown up. Medicaid called, was told transport was cancelled. Transport reordered, was told next transport time was 1900. Informed patient about delayed transport. Pt verbalizes 1900 is too late for him and he is going to get his own ride. Pt was able to contact his brother to pick him up. At 1640, pt transported by brother. IV removed prior to discharge.  Pt discharged with belongings   Problem: Patient Education: Go to Patient Education Activity  Goal: Patient/Family Education  Outcome: Progressing Towards Goal     Problem: Alcohol Withdrawal  Goal: *STG: Participates in treatment plan  Outcome: Progressing Towards Goal  Goal: *STG: Remains safe in hospital  Outcome: Progressing Towards Goal  Goal: *STG: Complies with medication therapy  Outcome: Progressing Towards Goal     Problem: Airway Clearance - Ineffective  Goal: Achieve or maintain patent airway  Outcome: Progressing Towards Goal

## 2022-01-20 NOTE — PROGRESS NOTES
2300 Bedside and Verbal shift change report given to Nadine  (oncoming nurse) byNitza RN ED Nurse. Report included the following information SBAR, Intake/Output, MAR, Cardiac Rhythm SR/SB and Alarm Parameters    Patient required PRN Ativan once during shift. Patient came up to ICU with tobacco and patient agreed to dispose of tobacco. Order placed for Nicotine patch. See MAR see Flowsheet. Bedside and Verbal shift change report given to [facility] (oncoming nurse) by Andie Valente (offgoing nurse). Report included the following information SBAR, Intake/Output, MAR, Med Rec Status and Cardiac Rhythm SR/SB. Candy Sharp

## 2022-01-20 NOTE — PROGRESS NOTES
Progress Note     1/20/2022  PCP: Fred Pimentel NP     Assessment/Plan:     Sylvester Jacome is a 61 y.o. male with a PMHx of multi-substance abuse including alcohol, opioids, THC, and h/o crack-cocaine use; PUD 2/2 long-term NSAID use, MDD w/ h/o suicidal ideation, thrombocytopenia, T2DM, HTN, and COPD who is admitted for acute EtOH withdrawal w/ intractable n/v, SOB, and suspect hemetemesis. Overnight Events: No acute events. Pt's overnight CIWAs max of 9 and required 8mg total overnight of Ativan.      Acute EtOH Withdrawal: Pt last drink 1.5 days prior to admission. BL 1/2 gallon of liquor daily. H/o withdrawal seizures requiring hospitalization. CIWA max of 9 overnight w/ 8 mg total Ativan given. UDS+ for EtOH and THC.  - Continuous cardiac monitoring  - Thiamine 100mg daily and multivitamin daily   - CIWA protocol with Ativan 2mg (8-11) and 4mg (>12)  - PO Phenobarb: 32.4 mg this am and pm, plan to d/c with 1 dose for tomorrow  - Seizure precautions in place       Hematemesis and Melena in s/o Peristent N/V: POA Hgb 16.4. Pt reports some blood-streaking in vomit and black stool. Likely PUD given h/o long-term NSAID use. CT A/P negative for acute process, pancreatitis, or diverticulitis. Patient is HDS.  - GI consulted, rec'd: PPI, trend H/H, avoid NSAIDs and anticoagulation  - No EGD indicated  - Compazine prn (no Zofran given QTc)  - Protonix BID     COPD Exacerbation in s/o COVID infection: CXR w/o consolidation. No new O2 requirement. On home Advair and prn albuterol. RVP positive for COVID only on 1/17.   - Will give final dose of Solu-Medrol today  - Discharge with PO prednisone taper, 60x2, 40x2, 20x2  - Brovana/Pulmicort inhaler BID  - Duo-nebs q4hrs, scheduled   - Mucinex BID  - Doxycycline 100mg bid for total of 5 day course  - Vitamin C, Zinc, and Liptior for COVID infection      Lactic Acidosis (resolved): POA lactate 7.8.  Likely multifactorial, 2/2 alcoholic ketoacidosis vs volume depletion 2/2 persistent N/V and lack of PO intake.       Hypomagnesemia (resolved): Replete prn.     Thrombocytopenia: this is a chronic problem, likely d/t liver disease 2/2 chronic alcohol abuse vs possible history of hemochromatosis (per chart review). - Chronic problem, monitor on daily CBC   - Transfuse if <50 and bleeding; <10 if not     Hypertension: Stable. - Cont home medications of Lisinopril 20mg daily and metoprolol 50mg BID      DMII: A1c 6.6 (2/2021). - Holding home Metformin 1000 mg daily.  - Insulin Sliding Scale normal sensitivity with AC&HS glucose checks  - Consider NPH if glucose uncontrolled with steroids  - Hypoglycemia protocols ordered  - Goal glucose concentration >70, <140 pre-meal and <180 with all random glucoses     Prolonged QTc: QTc 493. - Avoid QT-prolonging agents including Zofran and quinolones     MDD w/ Anxiety and h/o Suicidal Ideation: Pt on no home meds. Prior h/o of Hydroxyzine for anxiety. No SI/HI. - Recommend f/u outpt for counseling and possible SSRI/SNRI therapy     Chronic Alcohol Abuse Disorder: 40-year history of regular alcohol abuse. Has done well with AA in the past.  - F/u outpt; refer to local AA     Nicotine Dependence: long term, daily tobacco-snuff use. - Nicotine patch 21mg/24hr as needed        FEN/GI - GI bland. Activity - Out of bed with assistance  DVT prophylaxis - SCDs  GI prophylaxis - Protonix  Fall prophylaxis - Fall precautions ordered. Disposition - ICU. D/C to Home. Consulting PT, OT and CM  Code Status - Full. Discussed with patient / caregivers. Hilaria Reddy discussed with Dr. Montse Smiley. Subjective:   Pt was seen and examined at bedside. He reports still having some difficulty breathing, but hasn't been in any distress. He denies any hallucinations or worsening withdrawal symptoms.     Objective:   Physical examination  Patient Vitals for the past 24 hrs:   Temp Pulse Resp BP SpO2   01/20/22 0645 -- (!) 49 15 -- 93 %   01/20/22 0630 -- 72 18 -- 98 %   01/20/22 0615 -- 79 16 -- 98 %   01/20/22 0600 -- 77 18 -- 97 %   01/20/22 0545 -- 72 15 -- 96 %   01/20/22 0530 -- 74 15 -- 100 %   01/20/22 0515 -- 65 15 -- 98 %   01/20/22 0500 -- 76 21 -- 100 %   01/20/22 0445 -- (!) 55 17 -- 92 %   01/20/22 0430 -- (!) 59 15 -- 97 %   01/20/22 0415 -- (!) 51 17 132/85 94 %   01/20/22 0400 98 °F (36.7 °C) (!) 55 16 131/82 96 %   01/20/22 0345 -- (!) 48 17 130/86 96 %   01/20/22 0330 -- (!) 50 17 126/82 96 %   01/20/22 0315 -- (!) 49 17 129/82 95 %   01/20/22 0300 -- (!) 52 18 137/82 96 %   01/20/22 0245 -- (!) 47 18 137/84 96 %   01/20/22 0230 -- (!) 57 18 (!) 155/96 97 %   01/20/22 0215 -- (!) 48 14 134/83 --   01/20/22 0200 -- 60 17 127/74 --   01/20/22 0145 -- (!) 54 17 137/82 96 %   01/20/22 0130 -- (!) 58 18 -- 100 %   01/20/22 0115 -- (!) 55 18 -- 96 %   01/20/22 0100 -- (!) 48 15 (!) 144/80 95 %   01/20/22 0045 -- (!) 51 13 -- 95 %   01/20/22 0030 -- (!) 58 20 -- 91 %   01/20/22 0015 -- 61 17 -- 96 %   01/20/22 0000 -- (!) 55 13 -- 93 %   01/19/22 2345 -- 74 16 -- 94 %   01/19/22 2330 -- 83 19 (!) 150/89 94 %   01/19/22 2315 -- 78 13 (!) 170/81 96 %   01/19/22 2305 97.2 °F (36.2 °C) 80 20 (!) 155/89 97 %   01/19/22 2303 -- 65 -- -- --   01/19/22 2300 -- -- -- (!) 155/89 (!) 80 %   01/19/22 2123 -- 95 26 -- --   01/19/22 2108 -- 66 24 -- --   01/19/22 2053 98.2 °F (36.8 °C) 65 17 107/75 98 %   01/19/22 2038 -- (!) 58 20 -- 96 %   01/19/22 2023 -- 71 20 -- --   01/19/22 2008 -- 77 16 -- 98 %   01/19/22 1738 -- 73 17 -- --   01/19/22 1723 -- 93 21 -- --   01/19/22 1708 -- 87 16 -- --   01/19/22 1700 -- 79 15 -- --   01/19/22 1653 -- (!) 105 17 -- --   01/19/22 1638 -- (!) 102 16 -- --   01/19/22 1623 -- (!) 106 20 -- --   01/19/22 1608 -- 97 17 -- --   01/19/22 1600 -- (!) 101 -- (!) 148/94 --   01/19/22 1523 -- 83 -- -- --   01/19/22 1508 -- 87 -- -- --   01/19/22 1453 -- (!) 105 -- -- --   01/19/22 1438 -- 100 -- -- --   01/19/22 1423 -- 86 -- -- --   01/19/22 1408 -- 82 -- -- --   01/19/22 1353 -- (!) 102 -- -- --   01/19/22 1338 -- 89 -- -- --   01/19/22 1323 -- 71 -- -- --   01/19/22 1308 -- 67 -- -- --   01/19/22 1300 -- 68 -- -- --   01/19/22 1253 -- 87 -- -- --   01/19/22 1238 -- 74 -- -- --   01/19/22 1223 -- 63 -- -- --   01/19/22 1208 -- (!) 53 -- -- --   01/19/22 1200 -- (!) 54 -- -- --   01/19/22 1153 -- 66 -- -- --   01/19/22 1100 -- (!) 49 -- -- --   01/19/22 0908 -- -- -- -- 98 %   01/19/22 0900 -- 66 -- -- --             Date 01/19/22 0700 - 01/20/22 0659 01/20/22 0700 - 01/21/22 0659   Shift 5483-7860 1096-4787 24 Hour Total 5421-4019 5438-7119 24 Hour Total   INTAKE   P.O.  150 150        P. O.  150 150      Shift Total(mL/kg)  150(1.4) 150(1.4)      OUTPUT   Urine(mL/kg/hr)  1200(1) 1200(0.5)        Urine Voided  1200 1200      Shift Total(mL/kg)  1200(11.5) 1200(11.5)      NET  -1050 -1050      Weight (kg) 104.3 104.3 104.3 104.3 104.3 104.3     General:   Alert, cooperative, no acute distress   Head:   Atraumatic   Eyes:   Conjunctivae clear   ENT:  Oral mucosa normal   Chest wall:    No tenderness or deformities    Lungs:   CTAB anteriorly   Heart:   Normal rate, regular rhythm, no murmur, rubs or gallops   Abdomen:    Soft, non-tender   No masses or organomegaly    Skin:  Warm and dry    No rashes or lesions   Neurologic:  Alert and oriented x4   No focal deficits     Data Review:     CBC:  Recent Labs     01/20/22  0524 01/19/22  0325 01/18/22  1601 01/18/22  0214 01/18/22  0214   WBC 8.2 8.0  --   --  4.4   HGB 16.0 13.6 15.3   < > 15.0   HCT 44.1 36.9 41.1   < > 40.7   * 97*  --   --  104*    < > = values in this interval not displayed.      Metabolic Panel:  Recent Labs     01/20/22  0502 01/19/22  1216 01/19/22  0325 01/18/22  0214 01/18/22  0214 01/17/22  1948 01/17/22  1449   * 136 132*   < > 137   < >  --    K HEMOLYZED,RECOLLECT REQUESTED 3.7 3.5   < > 4.4   < >  --     103 101   < > 101   < > --    CO2 25 25 25   < > 24   < >  --    BUN 12 11 12   < > 10   < >  --    CREA 0.82 0.84 0.82   < > 0.82   < >  --    * 159* 204*   < > 176*   < >  --    CA 8.6 8.3* 8.2*   < > 8.4*   < >  --    MG HEMOLYZED,RECOLLECT REQUESTED  --  1.9  --  1.9  --   --    PHOS 3.7  --  1.6*  --  2.6  --   --    ALB 3.2*  --  3.1*  --  3.4*   < >  --    TBILI 1.4*  --  1.8*  --  3.9*   < >  --    ALT 35  --  30  --  39   < >  --    INR  --   --   --   --   --   --  1.1    < > = values in this interval not displayed. Micro:  No results found for: CULT  Imaging:  CT ABD PELV W WO CONT    Result Date: 1/17/2022  EXAM: CT ABD PELV W WO CONT INDICATION: epigastric pain, r/o pancreatitis COMPARISON: Ultrasound 9/4/2017. CT 9/2/2017. Karin Bloodgood CONTRAST: 100 mL of Isovue-370. TECHNIQUE:  Multislice helical CT was performed from the diaphragm to the iliac crest prior to intravenous contrast administration and from the diaphragm to the symphysis pubis during uneventful rapid bolus intravenous contrast administration, during the hepatic arterial, portal venous and delayed venous phases. Oral contrast was not administered. Contiguous 5 mm axial images were reconstructed and lung and soft tissue windows were generated. Coronal and sagittal reformations were generated. CT dose reduction was achieved through use of a standardized protocol tailored for this examination and automatic exposure control for dose modulation. The lack of oral contrast material diminishes the capacity of CT to evaluate the bowel and adjacent structures. FINDINGS: LOWER THORAX: Several bilateral subpleural nodules measuring up to 4 mm in size appear unchanged in number and size. LIVER: Diffuse severe hepatic steatosis. No hepatic mass or intrahepatic bile duct dilation demonstrated. BILIARY TREE: Gallbladder is within normal limits. CBD is not dilated. SPLEEN: within normal limits. PANCREAS: No mass or ductal dilatation. No CT imaging evidence of pancreatitis. ADRENALS: Unremarkable. KIDNEYS: No mass, calculus, or hydronephrosis. STOMACH: Unremarkable. SMALL BOWEL: No dilatation or wall thickening. COLON: Anastomotic suture ring at proximal sigmoid region. Transverse and descending colonic diverticulosis. No mural thickening or dilation demonstrated. APPENDIX: Normal. PERITONEUM: No ascites or pneumoperitoneum. RETROPERITONEUM: No lymphadenopathy or aortic aneurysm. REPRODUCTIVE ORGANS: Those shown appear unremarkable. URINARY BLADDER: No mass or calculus. BONES: No destructive bone lesion. ABDOMINAL WALL: No mass or hernia. ADDITIONAL COMMENTS: N/A     No evidence for acute pancreatitis or other acute finding. Details can be found in report. XR CHEST PORT    Result Date: 1/17/2022  EXAM:  XR CHEST PORT INDICATION: Wheezing, cough COMPARISON: Chest x-ray 1/6/2021. TECHNIQUE: Single frontal view of the chest. FINDINGS: No lobar consolidation, pleural effusion, or pneumothorax. The cardiomediastinal silhouette is not enlarged. No acute or aggressive osseous lesion. 1.  No evidence of an acute cardiopulmonary process.     Medications reviewed  Current Facility-Administered Medications   Medication Dose Route Frequency    PHENobarbitaL (LUMINAL) tablet 32.4 mg  32.4 mg Oral BID    nicotine (NICODERM CQ) 21 mg/24 hr patch 1 Patch  1 Patch TransDERmal DAILY    [START ON 1/21/2022] methylPREDNISolone (PF) (Solu-MEDROL) injection 60 mg  60 mg IntraVENous DAILY    thiamine mononitrate (B-1) tablet 100 mg  100 mg Oral DAILY    therapeutic multivitamin (THERAGRAN) tablet 1 Tablet  1 Tablet Oral DAILY    acetaminophen (TYLENOL) tablet 1,000 mg  1,000 mg Oral Q8H PRN    melatonin tablet 10.5 mg  10.5 mg Oral QHS PRN    doxycycline (VIBRA-TABS) tablet 100 mg  100 mg Oral Q12H    zinc gluconate tablet 50 mg  1 Tablet Oral DAILY WITH LUNCH    ipratropium-albuterol (COMBIVENT RESPIMAT) 20 mcg-100 mcg inhalation spray  1 Puff Inhalation Q4H PRN    Or    albuterol-ipratropium (DUO-NEB) 2.5 MG-0.5 MG/3 ML  3 mL Nebulization Q4H PRN    pantoprazole (PROTONIX) 40 mg in 0.9% sodium chloride 10 mL injection  40 mg IntraVENous Q12H    LORazepam (ATIVAN) injection 2 mg  2 mg IntraVENous Q1H PRN    LORazepam (ATIVAN) injection 4 mg  4 mg IntraVENous Q1H PRN    lisinopriL (PRINIVIL, ZESTRIL) tablet 20 mg  20 mg Oral DAILY    metoprolol tartrate (LOPRESSOR) tablet 50 mg  50 mg Oral BID    glucose chewable tablet 16 g  4 Tablet Oral PRN    dextrose (D50W) injection syrg 12.5-25 g  25-50 mL IntraVENous PRN    glucagon (GLUCAGEN) injection 1 mg  1 mg IntraMUSCular PRN    insulin lispro (HUMALOG) injection   SubCUTAneous AC&HS    prochlorperazine (COMPAZINE) injection 10 mg  10 mg IntraVENous Q6H PRN    labetaloL (NORMODYNE;TRANDATE) 20 mg/4 mL (5 mg/mL) injection 10 mg  10 mg IntraVENous Q6H PRN    guaiFENesin ER (MUCINEX) tablet 600 mg  600 mg Oral Q12H    ascorbic acid (vitamin C) (VITAMIN C) tablet 500 mg  500 mg Oral BID    atorvastatin (LIPITOR) tablet 20 mg  20 mg Oral DAILY    budesonide-formoterol (SYMBICORT) 80-4.5 mcg inhaler  2 Puff Inhalation BID RT    Or    arformoterol 15 mcg/budesonide 0.5 mg neb solution   Nebulization BID RT         Signed:   Deng Galeano MD   Resident, Family Medicine

## 2022-01-20 NOTE — DISCHARGE INSTRUCTIONS
HOME DISCHARGE INSTRUCTIONS    [facility] / [id number] : 1965    Admission date: 2022 Discharge date: 2022 7:16 PM     Please bring this form with you to show your care provider at your follow-up appointment. Primary care provider:  Jessi Blank NP    Discharging provider:  Diogo Tracey MD  - Family Medicine Resident  Donis Spencer MD - Family Medicine Attending      You have been admitted to the hospital with the following diagnoses:    ACUTE DIAGNOSES:  [facility] [F10.239]    . . . . . . . . . . . . . . . . . . . . . . . . . . . . . . . . . . . . . . . . . . . . . . . . . . . . . . . . . . . . . . . . . . . . . . . .   You are well enough to be discharged from the hospital. However, because you were inpatient in a hospital, you are at greater risk of having been exposed to the coronavirus. PLEASE stay inside and self-quarantine for 5 days to prevent further spread of the coronavirus. . . . . . . . . . . . . . . . . . . . . . . . . . . . . . . . . . . . . . . . . . . . . . . . . . . . . . . . . . . . . . . . . . . . . . . . . MEDICATION CHANGES  START  - DOXYCYCLINE 100mg, take 1 pill tonight; then take 1 pill by mouth 2 times daily for 2 more days  - PREDNISONE 20mg, Take 60 mg (3 pills) by mouth, every 8 hours, for the first and second days. Take 40 mg (2 pills) by mouth, every 12 hours, for the third and fourth days.  Take 20 mg (1 pill) by mouth, every 24 hours, for the fifth and sixth days.  - PHENOBARBITAL 32.4mg, take 1 pill by mouth tomorrow  - OMEPRAZOLE 40mg, take 1 pill by mouth 1 time daily  - SYMBICORT 160-4.5mcg; take 1 puff by inhalation two times daily  - ALBUTEROL 90mcg; take 2 puffs by mouth every six hours as needed for wheezing or shortness of breath  - MUCINEX 600mg; take 1 pill by mouth two times daily for 7-10 days      STOP  - You have not been taking Hydroxyzine (Atarax) and Quetiapine (Seroquel); discuss restarting with your PCP, who may wish to switch these medications to a new regimen  - ANY NSAIDS (Ibuprofen, Naproxen-sodium, or aspirin)     CHANGES  - NONE      No other changes were made to your medications, please take all your other home medicines as previously prescribed. . . . . . . . . . . . . . . . . . . . . . . . . . . . . . . . . . . . . . . . . . . . . . . . . . . . . . . . . . . . . . . . . . . . . . . . Ray Olmos FOLLOW-UP CARE RECOMMENDATIONS:    Appointments  Follow-up Information     Follow up With Specialties Details Why Contact Angel Sotelo NP Nurse Practitioner On 1/21/2022 [address] Virtual Visit at 9:30am [address] [place] [address] Drive [address] [place] [address]      Vicente Powell MD Gastroenterology Schedule an appointment as soon as possible for a visit For GI bleeding [address] [place] [address]  [phone]               Follow-up tests needed:   - Repeat CBC to assess Hgb level  - BMP to assess electrolytes  - Magnesium, Calcium, Phosophorus levels   - PFTs to assess severity of/progression of COPD  - Please follow up with GI to assess your need for endoscopy and colonoscopy outpatient    Pending test results: At the time of your discharge the following test results are still pending: None. Please make sure you review these results with your outpatient follow-up provider(s). DIET/what to eat:  Diabetic Diet    ACTIVITY:  Activity as tolerated    Wound care: N/A    Equipment needed: N/A    Specific symptoms to watch for: chest pain, shortness of breath, fever, chills, nausea, vomiting, diarrhea, change in mentation, falling, weakness, bleeding. What to do if new or unexpected symptoms occur? If you experience any of the above symptoms (or should other concerns or questions arise after discharge) please call your primary care physician. Return to the emergency room if you cannot get hold of your doctor.     · It is very important that you keep your follow-up appointment(s). · Please bring discharge papers, medication list (and/or medication bottles) to your follow-up appointments for review by your outpatient provider(s). · Please check the list of medications and be sure it includes every medication (even non-prescription medications) that your provider wants you to take. · It is important that you take the medication exactly as they are prescribed. · Keep your medication in the bottles provided by the pharmacist and keep a list of the medication names, dosages, and times to be taken in your wallet. · Do not take other medications without consulting your doctor. · If you have any questions about your medications or other instructions, please talk to your nurse or care provider before you leave the hospital.     Information obtained by:     I understand that if any problems occur once I am at home I am to contact my physician. These instructions were explained to me and I had the opportunity to ask questions. I understand and acknowledge receipt of the instructions indicated above. Physician's or R.N.'s Signature                                                                  Date/Time                                                                                                                                              Patient or Representative Signature                                                          Date/Time        Patient Education        9 Ways to Cut Back on Drinking  Maybe you've found yourself drinking more alcohol than you'd prefer. If you want to cut back, here are some ideas to try. Think before you drink. Do you really want a drink, or is it just a habit? If you're used to having a drink at a certain time, try doing something else then. Look for substitutes.   Find some no-alcohol drinks that you enjoy, like flavored seltzer water, tea with honey, or tonic with a slice of lime. Or try alcohol-free beer or \"virgin\" cocktails (without the alcohol). Drink more water. Use water to quench your thirst. Drink a glass of water before you have any alcohol. Have another glass along with every drink or between drinks. Shrink your drink. For example, have a bottle of beer instead of a pint. Use a smaller glass for wine. Choose drinks with lower alcohol content (ABV%). Or use less liquor and more mixer in cocktails. Slow down. It's easy to drink quickly and without thinking about it. Pay attention, and make each drink last longer. Do the math. Total up how much you spend on alcohol each month. How much is that a year? If you cut back, what could you do with the money you save? Take a break. Choose a day or two each week when you won't drink at all. Notice how you feel on those days, physically and emotionally. How did you sleep? Do you feel better? Over time, add more break days. Count calories. Would you like to lose some weight? That can be a good motivator for cutting back. Figure out how many calories are in each drink. How many does that add up to in a day? In a week? In a month? Practice saying no. Be ready when someone offers you a drink. Try: Juan Batres, I've had enough. \" Or \"Thanks, but I'm cutting back. \" Or \"No, thanks. I feel better when I drink less. \"   Current as of: February 11, 2021               Content Version: 13.0  © 7815-0040 [employer]. Care instructions adapted under license by [employer] (which disclaims liability or warranty for this information). If you have questions about a medical condition or this instruction, always ask your healthcare professional. [place] [address] any warranty or liability for your use of this information.        Patient Education        Learning About COPD Triggers  What are COPD triggers? When you have COPD, certain things can make your symptoms worse. These things are called triggers. Avoiding triggers can help you to keep your symptoms under control and reduce the impact of COPD on your life. Common COPD triggers include:  · Tobacco smoke or air pollution. · Illnesses like colds, flu, or pneumonia. · Air pollution; cold, dry air; and high altitudes. · Fumes from cleaning supplies or other chemicals. · Wood or kerosene home heaters. · Things you are allergic to, such as pollen, mold, dust mites, or pet hair. · Other health problems, such as heart failure or an abnormal heartbeat (arrhythmia). Not all people have the same triggers. What may cause symptoms in one person may not be a problem for another person. How do triggers affect COPD? Triggers can make it harder for your lungs to work as they should and can lead to sudden difficulty breathing and other symptoms. When you are around a trigger, a COPD flare-up is more likely. If your symptoms are severe, you may need emergency treatment or have to go to the hospital for treatment. If you know what your triggers are and can avoid them, you can reduce how often you have flare-ups and how much COPD affects your life. It's also important to be active and to take your daily medicines as prescribed. This helps prevent flare-ups too. How can you manage a flare-up? Do not panic if you start to have a COPD flare-up. If you have a COPD action plan, follow the plan. In general:  · Use your quick-relief inhaler as directed by your doctor. If your symptoms do not get better after you use your medicine, have someone take you to the emergency room. Call an ambulance if needed. · Use a spacer with your metered-dose inhaler (MDI). If you have a nebulizer for inhaled medicine, use it. A spacer or nebulizer may help get more medicine to your lungs.   · If your doctor has given you other inhaled medicines or steroid pills, take them as directed. · If your doctor has given you a prescription for an antibiotic, fill it if you need to. · Call your doctor if you have to use your antibiotic or steroid pills. How can you avoid COPD triggers? The first thing is to know your triggers. When you are having symptoms, note the things around you that might be causing them. Then look for patterns in what may be triggering your symptoms. When you have your list of possible triggers, work with your doctor to find ways to avoid them. Here are some ways to avoid a few common triggers. · Do not smoke or allow others to smoke around you. · If there is a lot of pollution, pollen, or dust outside, stay at home and keep your windows closed. Use an air conditioner or air filter in your home. Check your local weather report or newspaper for air quality and pollen reports. · Get a flu vaccine every year. Talk to your doctor about getting a pneumococcal shot. Wash your hands often to prevent infections. Where can you learn more? Go to http://www.gray.com/  Enter W244 in the search box to learn more about \"Learning About COPD Triggers. \"  Current as of: July 6, 2021               Content Version: 13.0  © 1890-5556 [employer]. Care instructions adapted under license by [employer] (which disclaims liability or warranty for this information). If you have questions about a medical condition or this instruction, always ask your healthcare professional. [place] [address] any warranty or liability for your use of this information. Patient Education        Depression and Chronic Disease: Care Instructions  Your Care Instructions     A chronic disease is one that you have for a long time. Some chronic diseases can be controlled, but they usually cannot be cured. Depression is common in people with chronic diseases, but it often goes unnoticed.   Many people have concerns about seeking treatment for a mental health problem. You may think it's a sign of weakness, or you don't want people to know about it. It's important to overcome these reasons for not seeking treatment. Treating depression or anxiety is good for your health. Follow-up care is a key part of your treatment and safety. Be sure to make and go to all appointments, and call your doctor if you are having problems. It's also a good idea to know your test results and keep a list of the medicines you take. How can you care for yourself at home? Watch for symptoms of depression  The symptoms of depression are often subtle at first. You may think they are caused by your disease rather than depression. Or you may think it is normal to be depressed when you have a chronic disease. If you are depressed you may:  · Feel sad or hopeless. · Feel guilty or worthless. · Not enjoy the things you used to enjoy. · Feel hopeless, as though life is not worth living. · Have trouble thinking or remembering. · Have low energy, and you may not eat or sleep well. · Pull away from others. · Think often about death or killing yourself. (Keep the numbers for these national suicide hotlines: 5-902-040-TALK [1-620.426.1758] and 2-448-PZLZUYM [1-479.705.4174]. )  Get treatment  By treating your depression, you can feel more hopeful and have more energy. If you feel better, you may take better care of yourself, so your health may improve. · Talk to your doctor if you have any changes in mood during treatment for your disease. · Ask your doctor for help. Counseling, antidepressant medicine, or a combination of the two can help most people with depression. Often a combination works best. Counseling can also help you cope with having a chronic disease. When should you call for help? Call 911 anytime you think you may need emergency care.  For example, call if:    · You feel like hurting yourself or someone else.     · Someone you know has depression and is about to attempt or is attempting suicide. Call your doctor now or seek immediate medical care if:    · You hear voices.     · Someone you know has depression and:  ? Starts to give away his or her possessions. ? Uses illegal drugs or drinks alcohol heavily. ? Talks or writes about death, including writing suicide notes or talking about guns, knives, or pills. ? Starts to spend a lot of time alone. ? Acts very aggressively or suddenly appears calm. Watch closely for changes in your health, and be sure to contact your doctor if:    · You do not get better as expected. Where can you learn more? Go to http://www.gray.com/  Enter A548 in the search box to learn more about \"Depression and Chronic Disease: Care Instructions. \"  Current as of: June 16, 2021               Content Version: 13.0  © 1212-0641 [employer]. Care instructions adapted under license by [employer] (which disclaims liability or warranty for this information). If you have questions about a medical condition or this instruction, always ask your healthcare professional. [place] [address] any warranty or liability for your use of this information. Patient Education        Gastritis: Care Instructions  Your Care Instructions     Gastritis is a sore and upset stomach. It happens when something irritates the stomach lining. Many things can cause it. These include an infection such as the flu or something you ate or drank. Medicines or a sore on the lining of the stomach (ulcer) also can cause it. Your belly may bloat and ache. You may belch, vomit, and feel sick to your stomach. You should be able to relieve the problem by taking medicine. And it may help to change your diet. If gastritis lasts, your doctor may prescribe medicine. Follow-up care is a key part of your treatment and safety.  Be sure to make and go to all appointments, and call your doctor if you are having problems. It's also a good idea to know your test results and keep a list of the medicines you take. How can you care for yourself at home? · If your doctor prescribed antibiotics, take them as directed. Do not stop taking them just because you feel better. You need to take the full course of antibiotics. · Be safe with medicines. If your doctor prescribed medicine to decrease stomach acid, take it as directed. Call your doctor if you think you are having a problem with your medicine. · Do not take any other medicine, including over-the-counter pain relievers, without talking to your doctor first.  · If your doctor recommends over-the-counter medicine to reduce stomach acid, such as Pepcid AC (famotidine), Prilosec (omeprazole), or Tagamet HB (cimetidine) follow the directions on the label. · Drink plenty of fluids to prevent dehydration. Choose water and other clear liquids. If you have kidney, heart, or liver disease and have to limit fluids, talk with your doctor before you increase the amount of fluids you drink. · Limit how much alcohol you drink. · Limit or avoid caffeine, which is found in coffee, tea, cola drinks, and chocolate. When should you call for help? Call 911 anytime you think you may need emergency care. For example, call if:    · You vomit blood or what looks like coffee grounds.     · You pass maroon or very bloody stools. Call your doctor now or seek immediate medical care if:    · You start breathing fast and have not produced urine in the last 8 hours.     · You cannot keep fluids down. Watch closely for changes in your health, and be sure to contact your doctor if:    · You do not get better as expected. Where can you learn more? Go to http://www.KonaWare.com/  Enter Z536 in the search box to learn more about \"Gastritis: Care Instructions. \"  Current as of: February 10, 2021               Content Version: 13.0  © 5196-8797 [employer]. Care instructions adapted under license by [employer] (which disclaims liability or warranty for this information). If you have questions about a medical condition or this instruction, always ask your healthcare professional. [address] any warranty or liability for your use of this information.

## 2022-01-21 ENCOUNTER — PATIENT OUTREACH (OUTPATIENT)
Dept: CASE MANAGEMENT | Age: 57
End: 2022-01-21

## 2022-01-21 NOTE — PROGRESS NOTES
Transitions of Care Call  Call within 2 business days of discharge: Yes     Patient: Cristina Queen Patient : 1965 MRN: [id number]    Last Discharge [address]       Complaint Diagnosis Description Type Department Provider    22 Alcohol intoxication Alcohol withdrawal syndrome with complication ([facility] . .. ED to Hosp-Admission (Discharged) (ADMIT) Sammy Marie MD; Damian Page,... Was this an external facility discharge? Yes, [facility] - Discharge Facility    Challenges to be reviewed by the provider   Additional needs identified to be addressed with provider yes  [facility] - Acute ETOH withdrawal,COPD exac,Covid 19   Needs MANUEL with pcp and f/u with GI, Dr.Christopher Rubio Figures, asap re GI bleeding. Encounter was routed to provider for escalation. Method of communication with provider: chart routing. Discussed COVID-19 related testing which was available at this time. Test results were positive. Patient informed of results, if available? yes. Current Symptoms:fatigue and no new symptoms. Reports he is feeling a lot better. No Covid sxs except for some sniffles and fatigue. Picking up new meds this am by drive thru at pharmacy. Lives alone, but has support systems he can call, friends and family, if needed. Plans to contact his insurance CM, Cheryl Brown, today. Reviewed substance abuse programs/info provided by hospital CM. Reviewed New or Changed Meds: yes    Do you have what you need at home?  Durable Medical Equipment ordered at discharge: None   Home Health/Outpatient orders at discharge: none   Pulse oximeter? no     Patient education provided: Reviewed appropriate site of care based on symptoms and resources available to patient including: PCP, Specialist, Urgent Care Clinics and When to call 911. Follow up appointment scheduled within 7 days of discharge: yes. If no appointment scheduled, scheduling offered: yes.   No future appointments. Interventions: Scheduled appointment with PCP-was scheduled for VV with pcp this am, was not aware. He will call and reschedule. Given info on [facility] as resource., Scheduled appointment with Specialist-Patient to call GI and request f/u asap., Obtained and reviewed discharge summary and/or continuity of care documents and Education of patient/family/caregiver/guardian to support self-management-reviewed resources given to him by hospital CM re substance abuse programs and AA meetings. CTN reviewed discharge instructions, medical action plan and red flags with patient who verbalized understanding. Provided contact information for future needs. Plan for follow-up call in 7-10 days based on severity of symptoms and risk factors. Plan for next call: symptom management-assess current symptoms, self management-following discharge instructions, follow up appointment-saw pcp for MANUEL visit, medication management-taking meds as ordered. and [facility], reviewed substance abuse resources and made sure he f/u with Aurora Gar, his CM with his insurance re IOP.      Cinthya Huggins RN

## 2022-01-24 ENCOUNTER — TELEPHONE (OUTPATIENT)
Dept: FAMILY MEDICINE CLINIC | Age: 57
End: 2022-01-24

## 2022-01-24 NOTE — TELEPHONE ENCOUNTER
----- Message from Esa Blankenship sent at 1/24/2022 11:52 AM EST -----  Subject: Message to Provider    QUESTIONS  Information for Provider? Patient would like to know if the provider needs   to speak to him prior to apt.  ---------------------------------------------------------------------------  --------------  CALL BACK INFO  What is the best way for the office to contact you? OK to leave message on   voicemail  Preferred Call Back Phone Number? [phone]  ---------------------------------------------------------------------------  --------------  SCRIPT ANSWERS  Relationship to Patient?  Self

## 2022-02-01 ENCOUNTER — PATIENT OUTREACH (OUTPATIENT)
Dept: CASE MANAGEMENT | Age: 57
End: 2022-02-01

## 2022-02-01 NOTE — PROGRESS NOTES
Follow Up Call    Challenges to be reviewed by the provider   Additional needs identified to be addressed with provider: yes  Reports he is doing better, staying as Ean Orantes and clean as much as he can. \"           Encounter was routed to provider for escalation. Method of communication with provider: chart routing. Contacted the patient by telephone to follow up after hospital visit. Patient was out having lunch. Said he was drinking water and tea. Came[place]ua Cantrellch he has been as sober and clean \"as much as I can be. \"  Encouraged him to take care of his body, attend AA meetings, substance abuse programs and f/u with pcp and GI. Status: improved  Interventions to address identified needs: Scheduled appointment with PCP-appt with pcp in a few days, Scheduled appointment with [address] with [facility], in March and Assistance in accessing community resources-encouraged to attend AA meetings and other substance abuse programs as well as finding a sponsor with AA that he can call if having a hard time. [facility] follow up appointment(s):   Future Appointments   Date Time Provider Farrah Vail   2/10/2022  9:00 AM Teressa Butler NP [facility] BS [facility]     Non-BS follow up appointment(s): Dr.Lyons, March   Follow up appointment completed? Pending. .     Provided contact information for future needs. Plan for follow-up call in 7-10 days based on severity of symptoms and risk factors. Plan for next call: symptom management-assess current symptoms, self management-following discharge instructions and follow up appointment-saw pcp for MANUEL visit.      Jose Chand RN

## 2022-02-09 ENCOUNTER — PATIENT OUTREACH (OUTPATIENT)
Dept: CASE MANAGEMENT | Age: 57
End: 2022-02-09

## 2022-02-09 NOTE — PROGRESS NOTES
Spoke to patient. Says he is not doing well. Sees his pcp tomorrow, YENI Bailey. Trying not to drink, but gets anxious and depressed. Worried about having withdrawal and DTs. Says he spent $18,000 of his own money on a treatment center in November for 32 days. And still went back to drinking. Thinks he needs Mental Health treatment to help with the anxiety and depression. Admits to not being motivated to make changes. Lives [place] in [facility] [place] [facility] [place] [address], isolated. Worried he will drink tonight before going to his appt tomorrow morning. But says even if he drinks, will still go. Girlfriend lives in [place], they talk frequently. Suggested he call her this evening for support. Said that [facility] had reached out to him re mental health issues. Suggested he call them back to see what they can suggest.  Also urged to discuss with pcp in the morning. Maybe she can start him on a medication that will help with the depression/anxiety until he can get in to see a psychiatrist.  Suggested he ask pcp to be brutally honest with him about what will happen to his body if he does not stop drinking. That that information may help motivate him to make changes. Says has been more honest with himself and others since going to treatment in November. Advised this is important because clinicians need to know in order to treat him appropriately. Urged to go to [address] this evening for support. Says they always welcome him back and sometimes needs more \"tough love\" than they give. Mother  after [id number] years of sobriety, he gave her her 20 year chip when he was sober. Says if she were here now, she would be so upset with him. Agreed with him that she would not want him to be going through this. Patient agreed to call [facility] tomorrow after sees pcp. Advised will be waiting to hear from him. Wished him well tonight.

## 2022-02-10 ENCOUNTER — APPOINTMENT (OUTPATIENT)
Dept: GENERAL RADIOLOGY | Age: 57
End: 2022-02-10
Attending: EMERGENCY MEDICINE
Payer: MEDICAID

## 2022-02-10 ENCOUNTER — HOSPITAL ENCOUNTER (INPATIENT)
Age: 57
LOS: 5 days | Discharge: HOME | End: 2022-02-15
Attending: EMERGENCY MEDICINE | Admitting: FAMILY MEDICINE
Payer: MEDICAID

## 2022-02-10 ENCOUNTER — OFFICE VISIT (OUTPATIENT)
Dept: FAMILY MEDICINE CLINIC | Age: 57
End: 2022-02-10
Payer: MEDICAID

## 2022-02-10 VITALS
SYSTOLIC BLOOD PRESSURE: 162 MMHG | RESPIRATION RATE: 16 BRPM | WEIGHT: 218 LBS | OXYGEN SATURATION: 95 % | HEART RATE: 118 BPM | DIASTOLIC BLOOD PRESSURE: 102 MMHG | BODY MASS INDEX: 27.98 KG/M2 | HEIGHT: 74 IN | TEMPERATURE: 97.4 F

## 2022-02-10 DIAGNOSIS — F10.930 ALCOHOL WITHDRAWAL SYNDROME WITHOUT COMPLICATION (HCC): ICD-10-CM

## 2022-02-10 DIAGNOSIS — D69.6 THROMBOCYTOPENIA (HCC): ICD-10-CM

## 2022-02-10 DIAGNOSIS — I10 PRIMARY HYPERTENSION: ICD-10-CM

## 2022-02-10 DIAGNOSIS — F19.10 DRUG ABUSE (HCC): ICD-10-CM

## 2022-02-10 DIAGNOSIS — E83.42 HYPOMAGNESEMIA: ICD-10-CM

## 2022-02-10 DIAGNOSIS — F10.10 ALCOHOL ABUSE: Chronic | ICD-10-CM

## 2022-02-10 DIAGNOSIS — E87.20 LACTIC ACIDOSIS: ICD-10-CM

## 2022-02-10 DIAGNOSIS — F10.939 ALCOHOL WITHDRAWAL SYNDROME WITH COMPLICATION (HCC): ICD-10-CM

## 2022-02-10 DIAGNOSIS — R17 ELEVATED BILIRUBIN: ICD-10-CM

## 2022-02-10 DIAGNOSIS — F33.41 RECURRENT MAJOR DEPRESSIVE DISORDER, IN PARTIAL REMISSION (HCC): ICD-10-CM

## 2022-02-10 DIAGNOSIS — K92.1 MELENA: ICD-10-CM

## 2022-02-10 DIAGNOSIS — E11.69 TYPE 2 DIABETES MELLITUS WITH OTHER SPECIFIED COMPLICATION, WITHOUT LONG-TERM CURRENT USE OF INSULIN (HCC): ICD-10-CM

## 2022-02-10 DIAGNOSIS — Z09 HOSPITAL DISCHARGE FOLLOW-UP: Primary | ICD-10-CM

## 2022-02-10 DIAGNOSIS — F10.930 ALCOHOL WITHDRAWAL SYNDROME WITHOUT COMPLICATION (HCC): Primary | ICD-10-CM

## 2022-02-10 LAB
ALBUMIN SERPL-MCNC: 3.9 G/DL (ref 3.5–5)
ALBUMIN/GLOB SERPL: 1.2 {RATIO} (ref 1.1–2.2)
ALP SERPL-CCNC: 63 U/L (ref 45–117)
ALT SERPL-CCNC: 43 U/L (ref 12–78)
AMPHET UR QL SCN: NEGATIVE
ANION GAP SERPL CALC-SCNC: 15 MMOL/L (ref 5–15)
APPEARANCE UR: CLEAR
AST SERPL-CCNC: 44 U/L (ref 15–37)
BACTERIA URNS QL MICRO: NEGATIVE /HPF
BARBITURATES UR QL SCN: NEGATIVE
BASOPHILS # BLD: 0 K/UL (ref 0–0.1)
BASOPHILS NFR BLD: 1 % (ref 0–1)
BENZODIAZ UR QL: NEGATIVE
BILIRUB SERPL-MCNC: 1.7 MG/DL (ref 0.2–1)
BILIRUB UR QL CFM: NEGATIVE
BUN SERPL-MCNC: 8 MG/DL (ref 6–20)
BUN/CREAT SERPL: 10 (ref 12–20)
CALCIUM SERPL-MCNC: 8.8 MG/DL (ref 8.5–10.1)
CANNABINOIDS UR QL SCN: POSITIVE
CHLORIDE SERPL-SCNC: 98 MMOL/L (ref 97–108)
CO2 SERPL-SCNC: 24 MMOL/L (ref 21–32)
COCAINE UR QL SCN: NEGATIVE
COLOR UR: ABNORMAL
COMMENT, HOLDF: NORMAL
CREAT SERPL-MCNC: 0.8 MG/DL (ref 0.7–1.3)
DIFFERENTIAL METHOD BLD: ABNORMAL
DRUG SCRN COMMENT,DRGCM: ABNORMAL
EOSINOPHIL # BLD: 0 K/UL (ref 0–0.4)
EOSINOPHIL NFR BLD: 1 % (ref 0–7)
EPITH CASTS URNS QL MICRO: ABNORMAL /LPF
ERYTHROCYTE [DISTWIDTH] IN BLOOD BY AUTOMATED COUNT: 14.4 % (ref 11.5–14.5)
ETHANOL SERPL-MCNC: 184 MG/DL
GLOBULIN SER CALC-MCNC: 3.3 G/DL (ref 2–4)
GLUCOSE BLD STRIP.AUTO-MCNC: 145 MG/DL (ref 65–117)
GLUCOSE BLD STRIP.AUTO-MCNC: 86 MG/DL (ref 65–117)
GLUCOSE SERPL-MCNC: 107 MG/DL (ref 65–100)
GLUCOSE UR STRIP.AUTO-MCNC: NEGATIVE MG/DL
HCT VFR BLD AUTO: 43 % (ref 36.6–50.3)
HGB BLD-MCNC: 15.9 G/DL (ref 12.1–17)
HGB UR QL STRIP: ABNORMAL
HYALINE CASTS URNS QL MICRO: ABNORMAL /LPF (ref 0–5)
IMM GRANULOCYTES # BLD AUTO: 0 K/UL (ref 0–0.04)
IMM GRANULOCYTES NFR BLD AUTO: 0 % (ref 0–0.5)
KETONES UR QL STRIP.AUTO: 80 MG/DL
LACTATE SERPL-SCNC: 1.9 MMOL/L (ref 0.4–2)
LACTATE SERPL-SCNC: 2.3 MMOL/L (ref 0.4–2)
LACTATE SERPL-SCNC: 3.8 MMOL/L (ref 0.4–2)
LEUKOCYTE ESTERASE UR QL STRIP.AUTO: ABNORMAL
LIPASE SERPL-CCNC: 163 U/L (ref 73–393)
LYMPHOCYTES # BLD: 0.6 K/UL (ref 0.8–3.5)
LYMPHOCYTES NFR BLD: 15 % (ref 12–49)
MAGNESIUM SERPL-MCNC: 2.1 MG/DL (ref 1.6–2.4)
MCH RBC QN AUTO: 34.8 PG (ref 26–34)
MCHC RBC AUTO-ENTMCNC: 37 G/DL (ref 30–36.5)
MCV RBC AUTO: 94.1 FL (ref 80–99)
METHADONE UR QL: NEGATIVE
MONOCYTES # BLD: 0.4 K/UL (ref 0–1)
MONOCYTES NFR BLD: 11 % (ref 5–13)
NEUTS SEG # BLD: 2.8 K/UL (ref 1.8–8)
NEUTS SEG NFR BLD: 72 % (ref 32–75)
NITRITE UR QL STRIP.AUTO: NEGATIVE
NRBC # BLD: 0 K/UL (ref 0–0.01)
NRBC BLD-RTO: 0 PER 100 WBC
OPIATES UR QL: NEGATIVE
PCP UR QL: NEGATIVE
PH UR STRIP: 5.5 [PH] (ref 5–8)
PLATELET # BLD AUTO: 105 K/UL (ref 150–400)
PMV BLD AUTO: 9 FL (ref 8.9–12.9)
POTASSIUM SERPL-SCNC: 3.4 MMOL/L (ref 3.5–5.1)
PROT SERPL-MCNC: 7.2 G/DL (ref 6.4–8.2)
PROT UR STRIP-MCNC: 300 MG/DL
RBC # BLD AUTO: 4.57 M/UL (ref 4.1–5.7)
RBC #/AREA URNS HPF: ABNORMAL /HPF (ref 0–5)
RBC MORPH BLD: ABNORMAL
SAMPLES BEING HELD,HOLD: NORMAL
SERVICE CMNT-IMP: ABNORMAL
SERVICE CMNT-IMP: NORMAL
SODIUM SERPL-SCNC: 137 MMOL/L (ref 136–145)
SP GR UR REFRACTOMETRY: 1.03 (ref 1–1.03)
TROPONIN-HIGH SENSITIVITY: 7 NG/L (ref 0–76)
TROPONIN-HIGH SENSITIVITY: 7 NG/L (ref 0–76)
UROBILINOGEN UR QL STRIP.AUTO: 1 EU/DL (ref 0.2–1)
WBC # BLD AUTO: 3.8 K/UL (ref 4.1–11.1)
WBC URNS QL MICRO: ABNORMAL /HPF (ref 0–4)

## 2022-02-10 PROCEDURE — 99285 EMERGENCY DEPT VISIT HI MDM: CPT

## 2022-02-10 PROCEDURE — 94664 DEMO&/EVAL PT USE INHALER: CPT

## 2022-02-10 PROCEDURE — 93005 ELECTROCARDIOGRAM TRACING: CPT

## 2022-02-10 PROCEDURE — 96374 THER/PROPH/DIAG INJ IV PUSH: CPT

## 2022-02-10 PROCEDURE — 94640 AIRWAY INHALATION TREATMENT: CPT

## 2022-02-10 PROCEDURE — 83690 ASSAY OF LIPASE: CPT

## 2022-02-10 PROCEDURE — 82077 ASSAY SPEC XCP UR&BREATH IA: CPT

## 2022-02-10 PROCEDURE — 74011250637 HC RX REV CODE- 250/637

## 2022-02-10 PROCEDURE — 65660000000 HC RM CCU STEPDOWN

## 2022-02-10 PROCEDURE — 81001 URINALYSIS AUTO W/SCOPE: CPT

## 2022-02-10 PROCEDURE — 71045 X-RAY EXAM CHEST 1 VIEW: CPT

## 2022-02-10 PROCEDURE — 74011000250 HC RX REV CODE- 250: Performed by: FAMILY MEDICINE

## 2022-02-10 PROCEDURE — 80053 COMPREHEN METABOLIC PANEL: CPT

## 2022-02-10 PROCEDURE — 99222 1ST HOSP IP/OBS MODERATE 55: CPT | Performed by: FAMILY MEDICINE

## 2022-02-10 PROCEDURE — 84484 ASSAY OF TROPONIN QUANT: CPT

## 2022-02-10 PROCEDURE — 83735 ASSAY OF MAGNESIUM: CPT

## 2022-02-10 PROCEDURE — 74011250637 HC RX REV CODE- 250/637: Performed by: EMERGENCY MEDICINE

## 2022-02-10 PROCEDURE — 36415 COLL VENOUS BLD VENIPUNCTURE: CPT

## 2022-02-10 PROCEDURE — 94761 N-INVAS EAR/PLS OXIMETRY MLT: CPT

## 2022-02-10 PROCEDURE — 96375 TX/PRO/DX INJ NEW DRUG ADDON: CPT

## 2022-02-10 PROCEDURE — 74011000250 HC RX REV CODE- 250: Performed by: STUDENT IN AN ORGANIZED HEALTH CARE EDUCATION/TRAINING PROGRAM

## 2022-02-10 PROCEDURE — 96361 HYDRATE IV INFUSION ADD-ON: CPT

## 2022-02-10 PROCEDURE — 99213 OFFICE O/P EST LOW 20 MIN: CPT | Performed by: NURSE PRACTITIONER

## 2022-02-10 PROCEDURE — 82962 GLUCOSE BLOOD TEST: CPT

## 2022-02-10 PROCEDURE — 83605 ASSAY OF LACTIC ACID: CPT

## 2022-02-10 PROCEDURE — 74011636637 HC RX REV CODE- 636/637

## 2022-02-10 PROCEDURE — 74011000250 HC RX REV CODE- 250

## 2022-02-10 PROCEDURE — 94760 N-INVAS EAR/PLS OXIMETRY 1: CPT

## 2022-02-10 PROCEDURE — 80307 DRUG TEST PRSMV CHEM ANLYZR: CPT

## 2022-02-10 PROCEDURE — 85025 COMPLETE CBC W/AUTO DIFF WBC: CPT

## 2022-02-10 PROCEDURE — 74011250636 HC RX REV CODE- 250/636: Performed by: EMERGENCY MEDICINE

## 2022-02-10 PROCEDURE — 77030013140 HC MSK NEB VYRM -A

## 2022-02-10 PROCEDURE — 74011250636 HC RX REV CODE- 250/636: Performed by: STUDENT IN AN ORGANIZED HEALTH CARE EDUCATION/TRAINING PROGRAM

## 2022-02-10 RX ORDER — PROMETHAZINE HYDROCHLORIDE 25 MG/1
12.5 TABLET ORAL
Status: DISCONTINUED | OUTPATIENT
Start: 2022-02-10 | End: 2022-02-10

## 2022-02-10 RX ORDER — IPRATROPIUM BROMIDE AND ALBUTEROL SULFATE 2.5; .5 MG/3ML; MG/3ML
3 SOLUTION RESPIRATORY (INHALATION)
Status: DISCONTINUED | OUTPATIENT
Start: 2022-02-10 | End: 2022-02-11

## 2022-02-10 RX ORDER — PHENOBARBITAL SODIUM 65 MG/ML
65 INJECTION INTRAMUSCULAR 3 TIMES DAILY
Status: DISCONTINUED | OUTPATIENT
Start: 2022-02-10 | End: 2022-02-13

## 2022-02-10 RX ORDER — DEXTROSE 50 % IN WATER (D50W) INTRAVENOUS SYRINGE
12.5-25 AS NEEDED
Status: DISCONTINUED | OUTPATIENT
Start: 2022-02-10 | End: 2022-02-15 | Stop reason: HOSPADM

## 2022-02-10 RX ORDER — ASPIRIN 325 MG/1
100 TABLET, FILM COATED ORAL DAILY
Status: DISCONTINUED | OUTPATIENT
Start: 2022-02-10 | End: 2022-02-13

## 2022-02-10 RX ORDER — INSULIN LISPRO 100 [IU]/ML
INJECTION, SOLUTION INTRAVENOUS; SUBCUTANEOUS
Status: DISCONTINUED | OUTPATIENT
Start: 2022-02-10 | End: 2022-02-15 | Stop reason: HOSPADM

## 2022-02-10 RX ORDER — LORAZEPAM 2 MG/ML
2 INJECTION INTRAMUSCULAR
Status: DISCONTINUED | OUTPATIENT
Start: 2022-02-10 | End: 2022-02-14

## 2022-02-10 RX ORDER — POTASSIUM CHLORIDE 750 MG/1
10 TABLET, FILM COATED, EXTENDED RELEASE ORAL
Status: COMPLETED | OUTPATIENT
Start: 2022-02-10 | End: 2022-02-10

## 2022-02-10 RX ORDER — METOPROLOL TARTRATE 50 MG/1
50 TABLET ORAL 2 TIMES DAILY
Status: DISCONTINUED | OUTPATIENT
Start: 2022-02-10 | End: 2022-02-15 | Stop reason: HOSPADM

## 2022-02-10 RX ORDER — ONDANSETRON 2 MG/ML
4 INJECTION INTRAMUSCULAR; INTRAVENOUS
Status: COMPLETED | OUTPATIENT
Start: 2022-02-10 | End: 2022-02-10

## 2022-02-10 RX ORDER — SODIUM CHLORIDE 0.9 % (FLUSH) 0.9 %
5-40 SYRINGE (ML) INJECTION EVERY 8 HOURS
Status: DISCONTINUED | OUTPATIENT
Start: 2022-02-10 | End: 2022-02-15 | Stop reason: HOSPADM

## 2022-02-10 RX ORDER — PHENOBARBITAL SODIUM 65 MG/ML
65 INJECTION INTRAMUSCULAR 3 TIMES DAILY
Status: DISCONTINUED | OUTPATIENT
Start: 2022-02-10 | End: 2022-02-10

## 2022-02-10 RX ORDER — LORAZEPAM 2 MG/ML
2 INJECTION INTRAMUSCULAR
Status: DISCONTINUED | OUTPATIENT
Start: 2022-02-10 | End: 2022-02-10

## 2022-02-10 RX ORDER — ACETAMINOPHEN 325 MG/1
650 TABLET ORAL
Status: DISCONTINUED | OUTPATIENT
Start: 2022-02-10 | End: 2022-02-15 | Stop reason: HOSPADM

## 2022-02-10 RX ORDER — GUAIFENESIN 600 MG/1
600 TABLET, EXTENDED RELEASE ORAL EVERY 12 HOURS
Status: DISCONTINUED | OUTPATIENT
Start: 2022-02-10 | End: 2022-02-15 | Stop reason: HOSPADM

## 2022-02-10 RX ORDER — MAGNESIUM SULFATE 100 %
4 CRYSTALS MISCELLANEOUS AS NEEDED
Status: DISCONTINUED | OUTPATIENT
Start: 2022-02-10 | End: 2022-02-15 | Stop reason: HOSPADM

## 2022-02-10 RX ORDER — LORAZEPAM 2 MG/ML
2 INJECTION INTRAMUSCULAR ONCE
Status: DISCONTINUED | OUTPATIENT
Start: 2022-02-10 | End: 2022-02-10

## 2022-02-10 RX ORDER — PROCHLORPERAZINE EDISYLATE 5 MG/ML
10 INJECTION INTRAMUSCULAR; INTRAVENOUS
Status: DISCONTINUED | OUTPATIENT
Start: 2022-02-10 | End: 2022-02-12

## 2022-02-10 RX ORDER — IBUPROFEN 200 MG
1 TABLET ORAL DAILY
Status: DISCONTINUED | OUTPATIENT
Start: 2022-02-11 | End: 2022-02-15 | Stop reason: HOSPADM

## 2022-02-10 RX ORDER — FOLIC ACID 1 MG/1
1 TABLET ORAL DAILY
Status: DISCONTINUED | OUTPATIENT
Start: 2022-02-10 | End: 2022-02-13

## 2022-02-10 RX ORDER — LORAZEPAM 2 MG/ML
4 INJECTION INTRAMUSCULAR
Status: DISCONTINUED | OUTPATIENT
Start: 2022-02-10 | End: 2022-02-14

## 2022-02-10 RX ORDER — LISINOPRIL 20 MG/1
20 TABLET ORAL DAILY
Status: DISCONTINUED | OUTPATIENT
Start: 2022-02-11 | End: 2022-02-10

## 2022-02-10 RX ORDER — ONDANSETRON 2 MG/ML
4 INJECTION INTRAMUSCULAR; INTRAVENOUS
Status: DISCONTINUED | OUTPATIENT
Start: 2022-02-10 | End: 2022-02-10

## 2022-02-10 RX ORDER — SODIUM CHLORIDE 0.9 % (FLUSH) 0.9 %
5-40 SYRINGE (ML) INJECTION AS NEEDED
Status: DISCONTINUED | OUTPATIENT
Start: 2022-02-10 | End: 2022-02-15 | Stop reason: HOSPADM

## 2022-02-10 RX ORDER — LISINOPRIL 20 MG/1
20 TABLET ORAL DAILY
Status: DISCONTINUED | OUTPATIENT
Start: 2022-02-10 | End: 2022-02-15 | Stop reason: HOSPADM

## 2022-02-10 RX ORDER — ACETAMINOPHEN 650 MG/1
650 SUPPOSITORY RECTAL
Status: DISCONTINUED | OUTPATIENT
Start: 2022-02-10 | End: 2022-02-15 | Stop reason: HOSPADM

## 2022-02-10 RX ORDER — SODIUM CHLORIDE 9 MG/ML
125 INJECTION, SOLUTION INTRAVENOUS CONTINUOUS
Status: DISCONTINUED | OUTPATIENT
Start: 2022-02-10 | End: 2022-02-13

## 2022-02-10 RX ORDER — METOPROLOL TARTRATE 50 MG/1
50 TABLET ORAL 2 TIMES DAILY
Status: DISCONTINUED | OUTPATIENT
Start: 2022-02-10 | End: 2022-02-10

## 2022-02-10 RX ORDER — LORAZEPAM 2 MG/ML
4 INJECTION INTRAMUSCULAR ONCE
Status: COMPLETED | OUTPATIENT
Start: 2022-02-10 | End: 2022-02-10

## 2022-02-10 RX ADMIN — POTASSIUM CHLORIDE 10 MEQ: 750 TABLET, EXTENDED RELEASE ORAL at 15:12

## 2022-02-10 RX ADMIN — IPRATROPIUM BROMIDE AND ALBUTEROL SULFATE 3 ML: .5; 3 SOLUTION RESPIRATORY (INHALATION) at 22:03

## 2022-02-10 RX ADMIN — METOPROLOL TARTRATE 50 MG: 50 TABLET, FILM COATED ORAL at 16:13

## 2022-02-10 RX ADMIN — SODIUM CHLORIDE 125 ML/HR: 9 INJECTION, SOLUTION INTRAVENOUS at 22:19

## 2022-02-10 RX ADMIN — IPRATROPIUM BROMIDE AND ALBUTEROL SULFATE 3 ML: .5; 3 SOLUTION RESPIRATORY (INHALATION) at 16:23

## 2022-02-10 RX ADMIN — LORAZEPAM 2 MG: 2 INJECTION INTRAMUSCULAR; INTRAVENOUS at 20:34

## 2022-02-10 RX ADMIN — SODIUM CHLORIDE 125 ML/HR: 9 INJECTION, SOLUTION INTRAVENOUS at 13:12

## 2022-02-10 RX ADMIN — INSULIN LISPRO 2 UNITS: 100 INJECTION, SOLUTION INTRAVENOUS; SUBCUTANEOUS at 17:42

## 2022-02-10 RX ADMIN — GUAIFENESIN 600 MG: 600 TABLET, EXTENDED RELEASE ORAL at 20:34

## 2022-02-10 RX ADMIN — GUAIFENESIN 600 MG: 600 TABLET, EXTENDED RELEASE ORAL at 15:12

## 2022-02-10 RX ADMIN — PHENOBARBITAL SODIUM 65 MG: 65 INJECTION INTRAMUSCULAR at 22:09

## 2022-02-10 RX ADMIN — FOLIC ACID 1 MG: 1 TABLET ORAL at 10:47

## 2022-02-10 RX ADMIN — ARFORMOTEROL TARTRATE: 15 SOLUTION RESPIRATORY (INHALATION) at 22:03

## 2022-02-10 RX ADMIN — LORAZEPAM 4 MG: 2 INJECTION INTRAMUSCULAR; INTRAVENOUS at 10:48

## 2022-02-10 RX ADMIN — THIAMINE HYDROCHLORIDE: 100 INJECTION, SOLUTION INTRAMUSCULAR; INTRAVENOUS at 18:38

## 2022-02-10 RX ADMIN — SODIUM CHLORIDE 1000 ML: 9 INJECTION, SOLUTION INTRAVENOUS at 10:53

## 2022-02-10 RX ADMIN — SODIUM CHLORIDE, PRESERVATIVE FREE 10 ML: 5 INJECTION INTRAVENOUS at 22:10

## 2022-02-10 RX ADMIN — SODIUM CHLORIDE, PRESERVATIVE FREE 10 ML: 5 INJECTION INTRAVENOUS at 13:12

## 2022-02-10 RX ADMIN — LISINOPRIL 20 MG: 20 TABLET ORAL at 16:13

## 2022-02-10 RX ADMIN — LIDOCAINE HYDROCHLORIDE 40 ML: 20 SOLUTION OROPHARYNGEAL at 15:11

## 2022-02-10 RX ADMIN — LORAZEPAM 2 MG: 2 INJECTION INTRAMUSCULAR; INTRAVENOUS at 18:31

## 2022-02-10 RX ADMIN — PHENOBARBITAL SODIUM 65 MG: 65 INJECTION INTRAMUSCULAR at 16:14

## 2022-02-10 RX ADMIN — PHENOBARBITAL SODIUM 65 MG: 65 INJECTION INTRAMUSCULAR at 13:36

## 2022-02-10 RX ADMIN — LORAZEPAM 2 MG: 2 INJECTION INTRAMUSCULAR; INTRAVENOUS at 15:14

## 2022-02-10 RX ADMIN — ONDANSETRON 4 MG: 2 INJECTION INTRAMUSCULAR; INTRAVENOUS at 10:47

## 2022-02-10 RX ADMIN — Medication 100 MG: at 10:47

## 2022-02-10 NOTE — ED NOTES
Initial CIWA 13-Dr. Marilyn Eli and primary RN made aware. Pt with h/o seizures related to ETOH withdrawal. Seizure precautions initiated - pads on stretcher.

## 2022-02-10 NOTE — H&P
[address] [facility] [address] [place] [address]   Office [phone]  Fax [phone]       Admission H&P     Date of admission: 2/10/2022    Patient name: Marylen Hutchinson  MRN: [id number]  YOB: 1965  Age: 61 y.o. Primary care provider:  Alvin Lynn NP     Source of Information: patient, medical records    Chief complaint:  Nausea and tremors    History of Present Illness  Marylen Hutchinson is a 61 y.o. male with a known history of  known history of multi-substance abuse including alcohol, opioids, THC, and h/o crack-cocaine use; PUD 2/2 long-term NSAID use, MDD w/ h/o suicidal ideation, thrombocytopenia, T2DM, HTN, and COPD  who presented to the ED complaining of tremors and nausea. Patient was previously admitted to the Family Medicine Service from 1/17 to 1/20 for EtOH withdrawal and COPD exacerbation. He was subsequently sent home with a phenobarb taper, prednisone taper and a prescription for doxy. Patient states he drinks about 18-24 beers daily and consumes about 10-14 shots of liquor daily. He last had 2 beers (12 oz each) at 0800, but also drank 2 shots at 0300 on 2/10. He reports to having a history of withdrawal seizures in the past. As above, just hospitalized for this issue. Patient presents shortly after a visit with his PCP today (2/10), Dr. Albert Rodas, who assumed he was in acute withdrawal despite having beverages earlier in the day. He complains of mild tremors and nausea that began about 2-3 days ago. Patient additionally complaints of non-radiating, \"dull\", waxing/waning, epigastric pain this AM (2/10). He reports to chronic eye \"floaters\", but denies acute loss of vision. Pt notes \"episodes\" of \"red droplets\" shortly after he finishes voiding for the last several weeks. Denies: fever, chills, change in character of chronic cough, worsening dyspnea, vomiting, hemoptysis, diaphoresis, changes to hearing, dizziness, lightheadedness, SI/HI.      Of note, patient did spend 32 days at the \"[facility]\" in [place] in early 12/2021. Drank liquor shortly after he was discharged. COVID (+) on 1/17/2022. In the ED:   Vitals: 97.6, 97, 147/108, 20, 96% on RA. Labs: WBC 3.8, Platelets 164 (b/l 544), K 3.4, LA 3.8, Lipase 163, Trop 7, EtOH (serum) 184. Imaging:  - CXR: NAP  EKG: NSR at 91 bpm, normal axis, normal intervals, appropriate morphology of waves, no evidence of acute ischemia. Treatment: Total 8 mg IV ativan, Zofran 4 mg, 1L NS, Folic Acid 1 mg, Thiamine 100 mg    Review of Systems  Review of Systems   Constitutional: Negative. Negative for chills, diaphoresis and fever. Tremors   HENT: Negative. Eyes: Negative. Respiratory: Negative. Negative for cough, hemoptysis and shortness of breath. Cardiovascular: Negative. Negative for chest pain. Gastrointestinal: Positive for abdominal pain and nausea. Negative for diarrhea and vomiting. Genitourinary: Positive for hematuria. Musculoskeletal: Negative. Skin: Negative. Neurological: Negative. Psychiatric/Behavioral: Negative. Negative for suicidal ideas. All other systems reviewed and are negative. Home Medications   Prior to Admission medications    Medication Sig Start Date End Date Taking? Authorizing Provider   budesonide-formoteroL (Symbicort) 160-4.5 mcg/actuation HFAA Take 1 Puff by inhalation two (2) times a day. 1/20/22   Elmer Lucas MD   doxycycline (VIBRA-TABS) 100 mg tablet Take 1 Tablet by mouth every twelve (12) hours. 1/20/22   Elmer Lucas MD   albuterol (PROVENTIL HFA, VENTOLIN HFA, PROAIR HFA) 90 mcg/actuation inhaler Take 2 Puffs by inhalation every six (6) hours as needed for Wheezing, Shortness of Breath or Respiratory Distress. 1/20/22   Elmer Lucas MD   predniSONE (DELTASONE) 20 mg tablet Take 20 mg by mouth two (2) times a day. Take 60 mg (3 pills) by mouth, every 8 hours, for the first and second days.  Take 40 mg (2 pills) by mouth, every 12 hours, for the third and fourth days. Take 20 mg (1 pill) by mouth, every 24 hours, for the fifth and sixth days. 1/20/22   Jyotsna Wiley MD   omeprazole (PRILOSEC) 40 mg capsule Take 1 Cap by mouth Daily (before breakfast). 4/16/21   Hilton Flood MD   metFORMIN (GLUCOPHAGE) 1,000 mg tablet Take 1 Tab by mouth two (2) times daily (with meals). 2/8/21   Hilton Flood MD   lisinopriL (PRINIVIL, ZESTRIL) 20 mg tablet Take 1 Tab by mouth daily. Indications: high blood pressure 2/8/21   Hilton Flood MD   metoprolol tartrate (LOPRESSOR) 50 mg tablet Take 1 Tab by mouth two (2) times a day. Indications: high blood pressure 2/8/21   Hilton Flood MD   therapeutic multivitamin [facility]) tablet Take 1 Tab by mouth daily. 1/11/21   Yony Newman MD       Allergies   No Known Allergies    Past Medical History:   Diagnosis Date    Alcohol abuse     [facility]     Anxiety     COPD (chronic obstructive pulmonary disease) ([facility]     Dr. Trinity Tuttle is pulmonologist    Depression     Diabetes ([facility]     Type II    Diverticulitis     Family history of angina     GERD (gastroesophageal reflux disease)     Hemochromatosis     Hypertension        Past Surgical History:   Procedure Laterality Date    COLONOSCOPY N/A 9/6/2017    COLONOSCOPY performed by Rafita Levin MD at [address] [place] [address],   03/02/2021    ACDF C4/7    HX TOTAL COLECTOMY         No family history on file.     Social History   Patient resides  x  Independently (Lives in a [facility] surrounded [place] \"[address])     With family care      Assisted living      SNF      Ambulates  x  Independently      With cane       Assisted walker      Alcohol history     None     Social   x  Chronic (> 40+ years, consumes 18-24 beers daily, 10-14 shotes of liquor daily)     Smoking history  x  Non-tobacco smoker, dips daily     Former smoker     Current smoker     Social History     Tobacco Use   Smoking Status Unknown If Ever Smoked   Smokeless Tobacco Current User   Tobacco Comment    Pt uses Chewing tobacco       Drug history    None     Former drug user   x  Current drug user (THC daily)     Code status  x  Full code     DNR/DNI     Partial    Code status discussed with the patient/caregivers. Physical Exam  Visit Vitals  BP (!) 147/108 (BP 1 Location: Right upper arm, BP Patient Position: At rest)   Pulse 97   Temp 97.6 °F (36.4 °C)   Resp 20   Ht 6' 2\" (1.88 m)   Wt 218 lb (98.9 kg)   SpO2 96%   BMI 27.99 kg/m²          General: Alert. Cooperative. No acute distress. Generalized tremors. Head: Normocephalic. Atraumatic. Eyes:              Conjunctiva pink. Scleral icterus bilaterally. PERRL. Ears:              Hearing grossly intact. Nose:             Septum midline. Mucosa pink. No drainage. Throat: Mucosa pink. Dry mucous membranes. No tonsillar exudates or erythema. Palate movement equal bilaterally. Neck: Supple. Normal ROM. No stiffness. Respiratory: CTAB. No w/r/r/c.   Cardiovascular: RRR. Normal S1,S2. No m/r/g. Pulses 2+ throughout. GI: + bowel sounds. Soft. Mildly tender to epigastrium without rebound or guarding. Negative Morgan's sign. Nondistended. Extremities: No BLE edema. Distal pulses intact. Musculoskeletal: Full ROM in all extremities. Skin: Warm, dry. No rashes. Neuro: CN II-XII grossly intact. Strength and sensation grossly intact to all extremities.         Laboratory Data  Recent Results (from the past 24 hour(s))   CBC WITH AUTOMATED DIFF    Collection Time: 02/10/22 10:50 AM   Result Value Ref Range    WBC 3.8 (L) 4.1 - 11.1 K/uL    RBC 4.57 4.10 - 5.70 M/uL    HGB 15.9 12.1 - 17.0 g/dL    HCT 43.0 36.6 - 50.3 %    MCV 94.1 80.0 - 99.0 FL    MCH 34.8 (H) 26.0 - 34.0 PG    MCHC 37.0 (H) 30.0 - 36.5 g/dL    RDW 14.4 11.5 - 14.5 %    PLATELET 162 (L) 497 - 400 K/uL    MPV 9.0 8.9 - 12.9 FL    NRBC 0.0 0  WBC    ABSOLUTE NRBC 0.00 0.00 - 0.01 K/uL NEUTROPHILS 72 32 - 75 %    LYMPHOCYTES 15 12 - 49 %    MONOCYTES 11 5 - 13 %    EOSINOPHILS 1 0 - 7 %    BASOPHILS 1 0 - 1 %    IMMATURE GRANULOCYTES 0 0.0 - 0.5 %    ABS. NEUTROPHILS 2.8 1.8 - 8.0 K/UL    ABS. LYMPHOCYTES 0.6 (L) 0.8 - 3.5 K/UL    ABS. MONOCYTES 0.4 0.0 - 1.0 K/UL    ABS. EOSINOPHILS 0.0 0.0 - 0.4 K/UL    ABS. BASOPHILS 0.0 0.0 - 0.1 K/UL    ABS. IMM. GRANS. 0.0 0.00 - 0.04 K/UL    DF SMEAR SCANNED      RBC COMMENTS NORMOCYTIC, NORMOCHROMIC     METABOLIC PANEL, COMPREHENSIVE    Collection Time: 02/10/22 10:50 AM   Result Value Ref Range    Sodium 137 136 - 145 mmol/L    Potassium 3.4 (L) 3.5 - 5.1 mmol/L    Chloride 98 97 - 108 mmol/L    CO2 24 21 - 32 mmol/L    Anion gap 15 5 - 15 mmol/L    Glucose 107 (H) 65 - 100 mg/dL    BUN 8 6 - 20 MG/DL    Creatinine 0.80 0.70 - 1.30 MG/DL    BUN/Creatinine ratio 10 (L) 12 - 20      GFR est AA >60 >60 ml/min/1.73m2    GFR est non-AA >60 >60 ml/min/1.73m2    Calcium 8.8 8.5 - 10.1 MG/DL    Bilirubin, total 1.7 (H) 0.2 - 1.0 MG/DL    ALT (SGPT) 43 12 - 78 U/L    AST (SGOT) 44 (H) 15 - 37 U/L    Alk.  phosphatase 63 45 - 117 U/L    Protein, total 7.2 6.4 - 8.2 g/dL    Albumin 3.9 3.5 - 5.0 g/dL    Globulin 3.3 2.0 - 4.0 g/dL    A-G Ratio 1.2 1.1 - 2.2     LIPASE    Collection Time: 02/10/22 10:50 AM   Result Value Ref Range    Lipase 163 73 - 393 U/L   TROPONIN-HIGH SENSITIVITY    Collection Time: 02/10/22 10:50 AM   Result Value Ref Range    Troponin-High Sensitivity 7 0 - 76 ng/L   LACTIC ACID    Collection Time: 02/10/22 10:50 AM   Result Value Ref Range    Lactic acid 3.8 (HH) 0.4 - 2.0 MMOL/L   ETHYL ALCOHOL    Collection Time: 02/10/22 10:50 AM   Result Value Ref Range    ALCOHOL(ETHYL),SERUM 184 (H) <10 MG/DL   SAMPLES BEING HELD    Collection Time: 02/10/22 10:50 AM   Result Value Ref Range    SAMPLES BEING HELD 1SST,1BLUE     COMMENT        Add-on orders for these samples will be processed based on acceptable specimen integrity and analyte stability, which may vary by analyte. Imaging  XR CHEST PORT    Result Date: 2/10/2022  No acute abnormality            Assessment and Plan     Prateek Ochoa is a 61 y.o. male with a known history of multi-substance abuse including alcohol, opioids, THC, and h/o crack-cocaine use; PUD 2/2 long-term NSAID use, MDD w/ h/o suicidal ideation, thrombocytopenia, T2DM, HTN, and COPD who is admitted for acute EtOH withdrawal.    EtOH withdrawal in s/o polysubstance abuse and AUD: POA CIWA 13, EtOH level 184, UDS (+) for THC. Patient's last drink was 2 beers (12 oz each) at 0800 on 2/10. He has a history of withdrawal seizures in the past requiring hospitalizations, but denies ever being intubated. Multiple admissions for EtOH withdrawal most recently from 1/17 to 1/20. Previous UDS's (+) for opiates, THC, barbituates. Patient has a 40+ year history of EtOH consumption (see SH above). Sent in per PCP. S/p 8 mg IV Ativan in ED.   - Admit to Tele, VS per per unit protocol  - Cardiac monitoring   - CIWA protocol with Ativan 2mg (8-11) and 4mg (>12)  - IV banana bag q24hrs at 125 ml/hr for fluid rehydration   - CIWA w/ Ativan  - Load w/ Phenobarb 125 mg f/b 65 TID  - IV compazine prn nausea   - IV Protonix 40mg daily  - Seizure precautions  - Daily CMP, CBC, Mg    Acute Lactic Acidosis: POA LA 3.8. Likely Type B in s/o EtOH abuse. No evidence of sepsis. Currently meeting 1/4 SIRS (WBC < 4).   - IVF as above  - Trend lactic acid q4h     Concern for hematuria: Patient reports \"episodes\" of \"red droplets\" shortly after he finishes voiding. No recent injuries. Renal function appears to be at baseline.   - Obtain UA w/ micro    Chronic Thrombocytopenia:  (B/L ~ 110). Likely sequela of hepatic steatosis as noted on CT Abd/Pelv on 1/17/2022 in s/o chronic EtOH abuse. - Monitor daily on CBC  - Transfuse if <50 and bleeding or if < 10    Hx of COPD: POA CXR w/ NAP. He is not requiring supplemental O2, saturating well on room air. Notable scattery wheezing throughout which may be 2/2 polysubstance abuse. No evidence of acute exacerbation -- no increased dyspnea or change in cough character. - Sub home Advair w/ Brovana/Pulmicort inhaler BID  - Duo-nebs q4hrs, scheduled   - Mucinex BID    Hypokalemia: POA 3.4, mild. Likely 2/2 EtOH abuse.  - Replete PRN    Hypertension: BP on admission 160/89.  - Cont home medications of Lisinopril 20mg daily and metoprolol 50mg daily   - Will continue to monitor at this time and readjust as BP's trend     DMII: A1c 5.7 on 1/17/2022. POA Glucose 107. - Holding home medications of Metformin 1000 mg daily.  - Insulin Sliding Scale normal sensitivity with AC&HS glucose checks  - Hypoglycemia protocols ordered  - Goal glucose concentration >70, <140 pre-meal and <180 with all random glucoses    Prolonged QTc: Per chart review. POA EKG w/ QTc 432 msec. - Avoid QT-prolonging agents including Zofran and quinolones     MDD w/ Anxiety and h/o Suicidal Ideation: Pt on no home meds. Prior h/o of Hydroxyzine for anxiety. No SI/HI. - Recommend f/u outpt for counseling and possible SSRI therapy     Nicotine Dependence: long term, daily tobacco-snuff use. - Nicotine patch 21mg/24hr as needed     FEN/GI - Diabetic diet. NS at 125 ml/hr  Activity - Activity as tolerated w/ assist  DVT prophylaxis - SCDs  GI prophylaxis - Protonix  Fall prophylaxis - Fall precautions ordered. Disposition - Admit to Tele. Plan to d/c to Home. Consulting PT, OT and CM  Code Status - Full. Discussed with patient / caregivers.   Next of Kin Name and Contact - Darrell Santoro (Girlfriend) - [phone] - [phone]:  Full Code       Patient case will be discussed with Dr. Brett Kruger (Family Medicine Attending)       Kerry Peñaloza MD  [address] [facility] Problems  Date Reviewed: 2/8/2021          Codes Class Noted POA    Alcohol withdrawal ([facility] [address] ICD-10-CM: I42.711  ICD-9-CM: 291.81  9/4/2017 Unknown

## 2022-02-10 NOTE — PROGRESS NOTES
Identified pt with two pt identifiers(name and ).     Chief Complaint   Patient presents with   [facility] -    Anxiety        Health Maintenance Due   Topic    COVID-19 Vaccine (1)    Pneumococcal 0-64 years (1 of 2 - PPSV23)    Foot Exam Q1     MICROALBUMIN Q1     Eye Exam Retinal or Dilated     DTaP/Tdap/Td series (1 - Tdap)    Shingrix Vaccine Age 50> (1 of 2)    Flu Vaccine (1)    Lipid Screen     Depression Monitoring        Wt Readings from Last 3 Encounters:   02/10/22 218 lb (98.9 kg)   22 230 lb (104.3 kg)   21 224 lb 9.6 oz (101.9 kg)     Temp Readings from Last 3 Encounters:   02/10/22 97.4 °F (36.3 °C) (Oral)   22 97.5 °F (36.4 °C)   21 98.2 °F (36.8 °C)     BP Readings from Last 3 Encounters:   02/10/22 (!) 162/102   22 130/63   21 (!) 143/91     Pulse Readings from Last 3 Encounters:   02/10/22 (!) 118   22 74   21 79         Learning Assessment:  :     Learning Assessment 2021   PRIMARY LEARNER Patient   HIGHEST LEVEL OF EDUCATION - PRIMARY LEARNER  SOME COLLEGE   BARRIERS PRIMARY LEARNER NONE   CO-LEARNER CAREGIVER No   PRIMARY LANGUAGE ENGLISH   LEARNER PREFERENCE PRIMARY DEMONSTRATION     LISTENING   ANSWERED BY patient   RELATIONSHIP SELF       Depression Screening:  :     3 most recent PHQ Screens 2/10/2022   Little interest or pleasure in doing things Nearly every day   Feeling down, depressed, irritable, or hopeless Nearly every day   Total Score PHQ 2 6   Trouble falling or staying asleep, or sleeping too much Nearly every day   Feeling tired or having little energy Nearly every day   Poor appetite, weight loss, or overeating Nearly every day   Feeling bad about yourself - or that you are a failure or have let yourself or your family down Nearly every day   Trouble concentrating on things such as school, work, reading, or watching TV Nearly every day   Moving or speaking so slowly that other people could have noticed; or the opposite being so fidgety that others notice Not at all       Fall Risk Assessment:  :     No flowsheet data found. Abuse Screening:  :     Abuse Screening Questionnaire 2/10/2022 2/8/2021   Do you ever feel afraid of your partner? N N   Are you in a relationship with someone who physically or mentally threatens you? N N   Is it safe for you to go home? Y Y       Coordination of Care Questionnaire:  :     1) Have you been to an emergency room, urgent care clinic since your last visit? Yes [employer] for alcohol withdrawal covid +  Hospitalized since your last visit? no             2) Have you seen or consulted any other health care providers outside of [address] [place] [address] since your last visit? no  (Include any pap smears or colon screenings in this section.)    3) Do you have an Advance Directive on file? no  Are you interested in receiving information about Advance Directives? no      Reviewed record in preparation for visit and have obtained necessary documentation.

## 2022-02-10 NOTE — PROGRESS NOTES
HISTORY OF PRESENT ILLNESS  Kacie Albarado is a 61 y.o. male. HPI  Pt presents with \"hospital discharge follow up\"    Pt was admitted to [facility] from 1/17-1/20 for alcohol withdrawal.  Hospital Course per discharge note:  \"Acute EtOH Withdrawal in s/o Chronic Alcohol Misuse Disorder: Pt w/ BL alcohol consumption 1/2 gallon of liquor daily. H/o withdrawal seizures requiring hospitalization. Pt POA CIWA of 15. UDS+ for THC. Pt w/ 40-year history of regular alcohol abuse. Has done well with AA in the past.  - Finish PO Phenobarb taper; 32.4mg as single dose   - F/u outpt; refer to local AA as pt has a positive experience history w/ the program  Hematemesis and Melena in s/o Peristent N/V: POA Hgb 16.4. Likely PUD given h/o long-term NSAID use. Lipase 121. A/p CT negative for acute process, pancreatitis, or diverticulitis. Hgb remains stable.    - Start daily omeprazole, 40 mg daily  - Avoid NSAID use for pain control  - F/u CBC w/ PCP  - F/u w/ GI outpt  COPD Exacerbation: CXR w/ n/a/p; O2 sats remained over 90% on room air. Likely 2/2 chronic cannabis smoking. On home Advair and prn albuterol. RVP positive for COVID only. Pro-fadumo negative. - PO prednisone taper 60 x2 days, 40 x2 days, 20 x2 days  - Replacing home Advair with Symbicort inhaler BID d/t formulary preference   - Mucinex BID  - Doxycycline 100mg bid for 5-day course in total  - F/u w/ PCP on options for alternative, non-combustible forms of cannabis consumption, or, ideally, cessation of consumption altogether   Hypomagnesemia: POA Mg 1.4. Repleted prior to discharge.  - Repeat Mg level outpt  Thrombocytopenia: this is a chronic problem, likely d/t liver disease 2/2 chronic alcohol abuse vs possible history of hemochromatosis (per chart review).     - F/u w/ PCP, who may consider a heme/onc referral   Hypertension: BP on admission 160/89.  - Cont home medications of Lisinopril 20mg daily and metoprolol 50mg BID   - F/u w/ PCP for necessary adjustments that may be warranted   DMII: A1c 6.6 (2/2021). POA glucose 171.  - Continue Metformin 1000 mg daily.  - F/u w/ PCP for repeat A1c testing and ongoing blood glucose management  MDD w/ Anxiety and h/o Suicidal Ideation: Pt on no home meds. Prior h/o of hydroxyzine use for anxiety. No SI/HI. - Recommend f/u outpt for counseling and adjunct medical therapy (Cymbalta and/or bupropion for anxiety/depression/addiction)   - Recommend seeking further help for polysubstance use disorder, which is likely contributing to ongoing mental health disorders   Nicotine Dependence: long term, daily tobacco-snuff use. - F/u outpt (consider bupropion vs Varenicline vs patches)\"    Pt states that he needs to go back to the hospital upon me entering the room. He states that he has started drinking again, and knows that he is going to start going through withdrawal soon. He states that in addition to that, he is feeling short of breath. He is uncertain if this is a panic attack, or what. BP is elevated, as his HR  He drove himself to our office but does not feel like he can drive himself anywhere at this time  Review of Systems   Respiratory: Positive for shortness of breath. Psychiatric/Behavioral: Positive for substance abuse. The patient is nervous/anxious. Physical Exam  Constitutional:       Appearance: Normal appearance. Cardiovascular:      Rate and Rhythm: Tachycardia present. Neurological:      Mental Status: He is alert. ASSESSMENT and PLAN    ICD-10-CM ICD-9-CM    1. Hospital discharge follow-up  Z09 V67.59    2. Melena  K92.1 578.1 CBC W/O DIFF      REFERRAL TO GASTROENTEROLOGY   3. Alcohol withdrawal syndrome without complication (HCC)  U76.069 291.81    4. Hypomagnesemia  E83.42 275.2 MAGNESIUM   5. Thrombocytopenia (HCC)  D69.6 287.5 REFERRAL TO HEMATOLOGY ONCOLOGY     Will send to ER via EMS at this time.     EMS arrived for transfer to ER    Pt informed to return to office with worsening of symptoms, or PRN with any questions or concerns. Pt verbalizes understanding of plan of care and denies further questions or concerns at this time.

## 2022-02-10 NOTE — ED NOTES
Patient voided 170 mL in urinal. Post void bladder scan shows 16 mL. No need for straight cath at this time.

## 2022-02-10 NOTE — ED TRIAGE NOTES
Pt presents to ER via EMS from his doctor's office for alcohol detox. Pt last admitted in January 2022 for alcohol detox. Pt A,Ox4, reports drinking 18-24 beers daily with 10-15 shots of liquor. Pt requesting mental health and ETOH withdrawal. Denies HI or SI. States has not been drinking for 2 days, but couldn't take it any longer and had 2 beers at 0800 and 2 shots at 0300. Pt reports experiencing tremors, anxiety, abdominal pain, and headaches.      BG 82mg/dl upon arrival.

## 2022-02-10 NOTE — ED PROVIDER NOTES
HPI   This is a 58-year-old male with a past medical history of polysubstance abuse, alcohol abuse, COPD, diabetes, and hypertension who presents to the emergency department due to alcohol withdrawal.  Patient discharged from the hospital on 21 January due to a COPD exacerbation that was complicated by alcohol withdrawal.  At the time of his discharge she followed up with his primary care doctor and was scheduled to have an appointment today. Over the last 2 days he has decreased his alcohol intake in preparation for his appointment. He says normally he drinks 18-24 beers per day and up to 15 shots of liquor per day. He says he began to develop nausea as well as tremors in the middle of the night last night so had to wake up to take a few shots of alcohol. When he went to his primary care doctor he was found to be tremulous as well as tachycardic and hypertensive so EMS was called due to concerns for alcohol withdrawal and he was transported here. EMS reported a blood sugar of 82 and hypertension and tachycardia. Patient endorses tremors. He endorses chronic visual hallucinations that consist of flashing lights and floaters that have been unchanged. He endorses a chronic cough since getting Covid earlier this year that is been unchanged. He denies any abdominal pain. He endorses vomiting last night. Denies headache. He denies any trauma. His last drink was early this morning at which time he drank a beer.     Past Medical History:   Diagnosis Date    Alcohol abuse     Alcoholism ([facility] [address]     Anxiety     COPD (chronic obstructive pulmonary disease) (HCC)     Dr. Rosalva Goss is pulmonologist    Depression     Diabetes ([facility] [address]     Type II    Diverticulitis     Family history of angina     GERD (gastroesophageal reflux disease)     Hemochromatosis     Hypertension        Past Surgical History:   Procedure Laterality Date    COLONOSCOPY N/A 9/6/2017    COLONOSCOPY performed by Michaela Ladd MD at [facility]    HX CERVICAL FUSION  03/02/2021    ACDF C4/7    HX TOTAL COLECTOMY           No family history on file. Social History     Socioeconomic History    Marital status: SINGLE     Spouse name: Not on file    Number of children: Not on file    Years of education: Not on file    Highest education level: Not on file   Occupational History    Not on file   Tobacco Use    Smoking status: Unknown If Ever Smoked    Smokeless tobacco: Current User    Tobacco comment: Pt uses Chewing tobacco   Substance and Sexual Activity    Alcohol use: Yes     Comment: heavy liquor use daily    Drug use: Yes     Types: Marijuana     Comment: occasional    Sexual activity: Not on file   Other Topics Concern     Service Not Asked    Blood Transfusions Not Asked    Caffeine Concern Not Asked    Occupational Exposure Not Asked    Hobby Hazards Not Asked    Sleep Concern Not Asked    Stress Concern Not Asked    Weight Concern Not Asked    Special Diet Not Asked    Back Care Not Asked    Exercise Not Asked    Bike Helmet Not Asked   [address] [facility] Not Asked    Self-Exams Not Asked   Social History Narrative    Not on file     Social Determinants of Health     Financial Resource Strain:     Difficulty of Paying Living Expenses: Not on file   Food Insecurity:     Worried About Running Out of Food in the Last Year: Not on file    [place] of Food in the Last Year: Not on file   Transportation Needs:     Lack of Transportation (Medical): Not on file    Lack of Transportation (Non-Medical):  Not on file   Physical Activity:     Days of Exercise per Week: Not on file    Minutes of Exercise per Session: Not on file   Stress:     Feeling of Stress : Not on file   Social Connections:     Frequency of Communication with Friends and Family: Not on file    Frequency of Social Gatherings with Friends and Family: Not on file    Attends Alevism Services: Not on file   [employer] or Organizations: Not on file    Attends Club or Organization Meetings: Not on file    Marital Status: Not on file   Intimate Partner Violence:     Fear of Current or Ex-Partner: Not on file    Emotionally Abused: Not on file    Physically Abused: Not on file    Sexually Abused: Not on file   Housing Stability:     Unable to Pay for Housing in the Last Year: Not on file    Number of [place] in the Last Year: Not on file    Unstable Housing in the Last Year: Not on file         ALLERGIES: Patient has no known allergies. Review of Systems   A complete review of systems was performed and all systems reviewed were negative unless otherwise documented in the HPI. Vitals:    02/10/22 1030 02/10/22 1031   BP: (!) 147/108    Pulse: 97    Resp: 20    Temp: 97.6 °F (36.4 °C)    SpO2: 96%    Weight:  98.9 kg (218 lb)   Height:  6' 2\" (1.88 m)            Physical Exam  Constitutional:       Comments: Chronically ill-appearing. Not diaphoretic or acutely distressed   HENT:      Head:      Comments: No appreciable signs of head trauma     Mouth/Throat:      Comments: Mucous membranes. Tongue fasciculations appreciated  Eyes:      Extraocular Movements: Extraocular movements intact. Comments: Pupils 3 mm and reactive to light bilaterally   Neck:      Comments: Trachea midline. No JVD  Cardiovascular:      Comments: Regular rate and rhythm. 2+ radial pulses bilaterally. No appreciable murmurs. Pulmonary:      Effort: Pulmonary effort is normal. No respiratory distress. Breath sounds: Normal breath sounds. No wheezing or rales. Abdominal:      Comments: Isolated epigastric tenderness without guarding. No distention. Negative Morgan sign. Musculoskeletal:         General: No deformity. Normal range of motion. Skin:     General: Skin is warm and dry. Neurological:      Comments: Awake and alert. Tremor noted with outstretched hands. No dysarthria. No facial droop. No weakness appreciated. GCS 15.   Psychiatric:      Comments: Does not appear acutely intoxicated. No SI or HI.          MDM   79-year-old man who presents with the above chief complaint. His vital signs are stable. He is tremulous. He is not actively hallucinating. His initial CIWA score was 13. He was given 4 mg of Ativan and placed on CIWA protocol. His EKG shows normal sinus rhythm with a rate of 91 QTC of 432. No concerning ST elevations or depressions appreciated. He was also found to have epigastric tenderness on exam and may have a component of pancreatitis. Given his elevated CIWA score he will require admission and further disposition will be determined on the basis of his clinical status and work-up. 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 Alcohol withdrawal syndrome without complication ([facility] [address]        COVID-19 Suspicion:  no  Sepsis present:  no  Reassessment needed: yes  Code Status:  Full Code  Readmission: no  Isolation Requirements:  no  Recommended Level of Care:  telemetry  Department:  [address] [place] [address] ED - [phone]      Other: Patient presenting with alcohol withdrawal after trying to abstain from alcohol for the last 2 days in preparation for PCP appointment. Tremulous on exam initially. Initial CIWA score 13. Patient sleeping comfortably after 4 mg of Ativan. Work-up here reassuring.

## 2022-02-11 LAB
ALBUMIN SERPL-MCNC: 2.9 G/DL (ref 3.5–5)
ALBUMIN/GLOB SERPL: 1.2 {RATIO} (ref 1.1–2.2)
ALP SERPL-CCNC: 44 U/L (ref 45–117)
ALT SERPL-CCNC: 26 U/L (ref 12–78)
ANION GAP SERPL CALC-SCNC: 5 MMOL/L (ref 5–15)
AST SERPL-CCNC: 22 U/L (ref 15–37)
BASOPHILS # BLD: 0 K/UL (ref 0–0.1)
BASOPHILS NFR BLD: 0 % (ref 0–1)
BILIRUB SERPL-MCNC: 1.4 MG/DL (ref 0.2–1)
BUN SERPL-MCNC: 8 MG/DL (ref 6–20)
BUN/CREAT SERPL: 12 (ref 12–20)
CALCIUM SERPL-MCNC: 7.8 MG/DL (ref 8.5–10.1)
CHLORIDE SERPL-SCNC: 104 MMOL/L (ref 97–108)
CO2 SERPL-SCNC: 27 MMOL/L (ref 21–32)
CREAT SERPL-MCNC: 0.67 MG/DL (ref 0.7–1.3)
DIFFERENTIAL METHOD BLD: ABNORMAL
EOSINOPHIL # BLD: 0 K/UL (ref 0–0.4)
EOSINOPHIL NFR BLD: 1 % (ref 0–7)
ERYTHROCYTE [DISTWIDTH] IN BLOOD BY AUTOMATED COUNT: 14.4 % (ref 11.5–14.5)
GLOBULIN SER CALC-MCNC: 2.5 G/DL (ref 2–4)
GLUCOSE BLD STRIP.AUTO-MCNC: 133 MG/DL (ref 65–117)
GLUCOSE BLD STRIP.AUTO-MCNC: 186 MG/DL (ref 65–117)
GLUCOSE BLD STRIP.AUTO-MCNC: 94 MG/DL (ref 65–117)
GLUCOSE SERPL-MCNC: 106 MG/DL (ref 65–100)
HCT VFR BLD AUTO: 37.4 % (ref 36.6–50.3)
HGB BLD-MCNC: 13.1 G/DL (ref 12.1–17)
IMM GRANULOCYTES # BLD AUTO: 0 K/UL (ref 0–0.04)
IMM GRANULOCYTES NFR BLD AUTO: 0 % (ref 0–0.5)
LYMPHOCYTES # BLD: 0.8 K/UL (ref 0.8–3.5)
LYMPHOCYTES NFR BLD: 26 % (ref 12–49)
MAGNESIUM SERPL-MCNC: 1.8 MG/DL (ref 1.6–2.4)
MCH RBC QN AUTO: 34.5 PG (ref 26–34)
MCHC RBC AUTO-ENTMCNC: 35 G/DL (ref 30–36.5)
MCV RBC AUTO: 98.4 FL (ref 80–99)
MONOCYTES # BLD: 0.3 K/UL (ref 0–1)
MONOCYTES NFR BLD: 9 % (ref 5–13)
NEUTS SEG # BLD: 2 K/UL (ref 1.8–8)
NEUTS SEG NFR BLD: 63 % (ref 32–75)
NRBC # BLD: 0 K/UL (ref 0–0.01)
NRBC BLD-RTO: 0 PER 100 WBC
PLATELET # BLD AUTO: 63 K/UL (ref 150–400)
POTASSIUM SERPL-SCNC: 3.2 MMOL/L (ref 3.5–5.1)
PROT SERPL-MCNC: 5.4 G/DL (ref 6.4–8.2)
RBC # BLD AUTO: 3.8 M/UL (ref 4.1–5.7)
SERVICE CMNT-IMP: ABNORMAL
SERVICE CMNT-IMP: ABNORMAL
SERVICE CMNT-IMP: NORMAL
SODIUM SERPL-SCNC: 136 MMOL/L (ref 136–145)
WBC # BLD AUTO: 3.1 K/UL (ref 4.1–11.1)

## 2022-02-11 PROCEDURE — 97162 PT EVAL MOD COMPLEX 30 MIN: CPT

## 2022-02-11 PROCEDURE — 74011636637 HC RX REV CODE- 636/637

## 2022-02-11 PROCEDURE — 94640 AIRWAY INHALATION TREATMENT: CPT

## 2022-02-11 PROCEDURE — 74011000250 HC RX REV CODE- 250

## 2022-02-11 PROCEDURE — 94760 N-INVAS EAR/PLS OXIMETRY 1: CPT

## 2022-02-11 PROCEDURE — 74011000250 HC RX REV CODE- 250: Performed by: STUDENT IN AN ORGANIZED HEALTH CARE EDUCATION/TRAINING PROGRAM

## 2022-02-11 PROCEDURE — 97116 GAIT TRAINING THERAPY: CPT

## 2022-02-11 PROCEDURE — 99222 1ST HOSP IP/OBS MODERATE 55: CPT | Performed by: FAMILY MEDICINE

## 2022-02-11 PROCEDURE — 74011250636 HC RX REV CODE- 250/636: Performed by: STUDENT IN AN ORGANIZED HEALTH CARE EDUCATION/TRAINING PROGRAM

## 2022-02-11 PROCEDURE — 85025 COMPLETE CBC W/AUTO DIFF WBC: CPT

## 2022-02-11 PROCEDURE — 74011250637 HC RX REV CODE- 250/637

## 2022-02-11 PROCEDURE — 74011250636 HC RX REV CODE- 250/636

## 2022-02-11 PROCEDURE — C9113 INJ PANTOPRAZOLE SODIUM, VIA: HCPCS

## 2022-02-11 PROCEDURE — 82962 GLUCOSE BLOOD TEST: CPT

## 2022-02-11 PROCEDURE — 74011250637 HC RX REV CODE- 250/637: Performed by: STUDENT IN AN ORGANIZED HEALTH CARE EDUCATION/TRAINING PROGRAM

## 2022-02-11 PROCEDURE — 65610000006 HC RM INTENSIVE CARE

## 2022-02-11 PROCEDURE — 97165 OT EVAL LOW COMPLEX 30 MIN: CPT

## 2022-02-11 PROCEDURE — 97530 THERAPEUTIC ACTIVITIES: CPT

## 2022-02-11 PROCEDURE — 74011250636 HC RX REV CODE- 250/636: Performed by: EMERGENCY MEDICINE

## 2022-02-11 PROCEDURE — 74011000250 HC RX REV CODE- 250: Performed by: FAMILY MEDICINE

## 2022-02-11 PROCEDURE — 83735 ASSAY OF MAGNESIUM: CPT

## 2022-02-11 PROCEDURE — 80053 COMPREHEN METABOLIC PANEL: CPT

## 2022-02-11 PROCEDURE — 36415 COLL VENOUS BLD VENIPUNCTURE: CPT

## 2022-02-11 RX ORDER — QUETIAPINE FUMARATE 25 MG/1
25 TABLET, FILM COATED ORAL 3 TIMES DAILY
Status: DISCONTINUED | OUTPATIENT
Start: 2022-02-11 | End: 2022-02-13

## 2022-02-11 RX ORDER — IPRATROPIUM BROMIDE AND ALBUTEROL SULFATE 2.5; .5 MG/3ML; MG/3ML
3 SOLUTION RESPIRATORY (INHALATION)
Status: DISCONTINUED | OUTPATIENT
Start: 2022-02-11 | End: 2022-02-12

## 2022-02-11 RX ORDER — POTASSIUM CHLORIDE 750 MG/1
30 TABLET, FILM COATED, EXTENDED RELEASE ORAL
Status: COMPLETED | OUTPATIENT
Start: 2022-02-11 | End: 2022-02-11

## 2022-02-11 RX ORDER — DEXMEDETOMIDINE HYDROCHLORIDE 4 UG/ML
.1-1.5 INJECTION, SOLUTION INTRAVENOUS
Status: DISCONTINUED | OUTPATIENT
Start: 2022-02-11 | End: 2022-02-14

## 2022-02-11 RX ADMIN — PHENOBARBITAL SODIUM 65 MG: 65 INJECTION INTRAMUSCULAR at 08:09

## 2022-02-11 RX ADMIN — METOPROLOL TARTRATE 50 MG: 50 TABLET, FILM COATED ORAL at 17:31

## 2022-02-11 RX ADMIN — SODIUM CHLORIDE, PRESERVATIVE FREE 10 ML: 5 INJECTION INTRAVENOUS at 21:28

## 2022-02-11 RX ADMIN — ARFORMOTEROL TARTRATE: 15 SOLUTION RESPIRATORY (INHALATION) at 07:54

## 2022-02-11 RX ADMIN — QUETIAPINE FUMARATE 25 MG: 25 TABLET ORAL at 15:28

## 2022-02-11 RX ADMIN — SODIUM CHLORIDE 125 ML/HR: 9 INJECTION, SOLUTION INTRAVENOUS at 12:38

## 2022-02-11 RX ADMIN — METOPROLOL TARTRATE 50 MG: 50 TABLET, FILM COATED ORAL at 08:09

## 2022-02-11 RX ADMIN — QUETIAPINE FUMARATE 25 MG: 25 TABLET ORAL at 08:09

## 2022-02-11 RX ADMIN — GUAIFENESIN 600 MG: 600 TABLET, EXTENDED RELEASE ORAL at 21:28

## 2022-02-11 RX ADMIN — PHENOBARBITAL SODIUM 65 MG: 65 INJECTION INTRAMUSCULAR at 21:28

## 2022-02-11 RX ADMIN — LORAZEPAM 2 MG: 2 INJECTION INTRAMUSCULAR; INTRAVENOUS at 00:06

## 2022-02-11 RX ADMIN — QUETIAPINE FUMARATE 25 MG: 25 TABLET ORAL at 21:27

## 2022-02-11 RX ADMIN — LISINOPRIL 20 MG: 20 TABLET ORAL at 08:09

## 2022-02-11 RX ADMIN — DEXMEDETOMIDINE HYDROCHLORIDE 0.8 MCG/KG/HR: 4 INJECTION, SOLUTION INTRAVENOUS at 23:23

## 2022-02-11 RX ADMIN — LORAZEPAM 2 MG: 2 INJECTION INTRAMUSCULAR; INTRAVENOUS at 08:09

## 2022-02-11 RX ADMIN — SODIUM CHLORIDE, PRESERVATIVE FREE 40 MG: 5 INJECTION INTRAVENOUS at 08:08

## 2022-02-11 RX ADMIN — LORAZEPAM 4 MG: 2 INJECTION INTRAMUSCULAR; INTRAVENOUS at 23:24

## 2022-02-11 RX ADMIN — LORAZEPAM 4 MG: 2 INJECTION INTRAMUSCULAR; INTRAVENOUS at 19:45

## 2022-02-11 RX ADMIN — LORAZEPAM 4 MG: 2 INJECTION INTRAMUSCULAR; INTRAVENOUS at 20:35

## 2022-02-11 RX ADMIN — LORAZEPAM 2 MG: 2 INJECTION INTRAMUSCULAR; INTRAVENOUS at 02:55

## 2022-02-11 RX ADMIN — IPRATROPIUM BROMIDE AND ALBUTEROL SULFATE 3 ML: .5; 3 SOLUTION RESPIRATORY (INHALATION) at 04:53

## 2022-02-11 RX ADMIN — ARFORMOTEROL TARTRATE: 15 SOLUTION RESPIRATORY (INHALATION) at 19:29

## 2022-02-11 RX ADMIN — LORAZEPAM 2 MG: 2 INJECTION INTRAMUSCULAR; INTRAVENOUS at 12:31

## 2022-02-11 RX ADMIN — LORAZEPAM 2 MG: 2 INJECTION INTRAMUSCULAR; INTRAVENOUS at 14:15

## 2022-02-11 RX ADMIN — GUAIFENESIN 600 MG: 600 TABLET, EXTENDED RELEASE ORAL at 08:09

## 2022-02-11 RX ADMIN — INSULIN LISPRO 2 UNITS: 100 INJECTION, SOLUTION INTRAVENOUS; SUBCUTANEOUS at 16:22

## 2022-02-11 RX ADMIN — IPRATROPIUM BROMIDE AND ALBUTEROL SULFATE 3 ML: .5; 3 SOLUTION RESPIRATORY (INHALATION) at 07:54

## 2022-02-11 RX ADMIN — LORAZEPAM 4 MG: 2 INJECTION INTRAMUSCULAR; INTRAVENOUS at 05:11

## 2022-02-11 RX ADMIN — POTASSIUM CHLORIDE 30 MEQ: 750 TABLET, EXTENDED RELEASE ORAL at 09:56

## 2022-02-11 RX ADMIN — SODIUM CHLORIDE, PRESERVATIVE FREE 10 ML: 5 INJECTION INTRAVENOUS at 06:27

## 2022-02-11 RX ADMIN — PHENOBARBITAL SODIUM 65 MG: 65 INJECTION INTRAMUSCULAR at 15:28

## 2022-02-11 RX ADMIN — LORAZEPAM 4 MG: 2 INJECTION INTRAMUSCULAR; INTRAVENOUS at 06:25

## 2022-02-11 RX ADMIN — SODIUM CHLORIDE, PRESERVATIVE FREE 10 ML: 5 INJECTION INTRAVENOUS at 13:32

## 2022-02-11 RX ADMIN — IPRATROPIUM BROMIDE AND ALBUTEROL SULFATE 3 ML: .5; 3 SOLUTION RESPIRATORY (INHALATION) at 19:27

## 2022-02-11 RX ADMIN — THIAMINE HYDROCHLORIDE: 100 INJECTION, SOLUTION INTRAMUSCULAR; INTRAVENOUS at 17:31

## 2022-02-11 NOTE — PROGRESS NOTES
~0430: Pt arrived from ED. CHG bath completed. Dual skin assessment completed w/ RN [address] [facility]. Fall and seizure precautions in place. CIWA audit completed. CIWA score of 12 @0500 and CIWA score of 14 @ 0600, 4 mg IV ativan given x2. Last dose given @0625, pt to be reevaluated in an hour. Bedside shift change report given to Qiana Awad (oncoming nurse) by Evelin Mari (offgoing nurse). Report included the following information SBAR, Kardex, Intake/Output, MAR and Recent Results. This patient was assisted with Intentional Toileting every 2 hours during this shift as appropriate. Documentation of ambulation and output reflected on Flowsheet as appropriate. Purposeful hourly rounding was completed using AIDET and 5Ps. Outcomes of PHR documented as they occurred. Bed alarm in use as appropriate. Dual Suction and ambubag in place.

## 2022-02-11 NOTE — PROGRESS NOTES
Comprehensive Nutrition Assessment    Type and Reason for Visit: Initial,Positive nutrition screen    Nutrition Recommendations/Plan:   1. Add ONS BID - Ensure High Protein (160 fadumo & 16 g pro each)  2. Continue folic acid & MVI for home meds  3. Monitor lytes until stable x3 days with nutrient intake  4. If pt can take PO meds - change vitamins to PO due to national shortages  5. RD adjusted diet order: ALEKSANDER, 4 Carb Choice - MD consider removing sodium restriction if not necessary. Nutrition Assessment:        61 yr old male admitted for Alcohol withdrawal syndrome without complication (HCC) [U57.300] who  has a past medical history of Alcohol abuse, Alcoholism ([facility] [address] Anxiety, COPD (chronic obstructive pulmonary disease) ([facility] [address] Depression, Diabetes ([facility] [address] Diverticulitis, Family history of angina, GERD (gastroesophageal reflux disease), Hemochromatosis, and Hypertension. Wt appears stable from recent & remote records. RD visited pt & he didn't feel much like having b'fast today. He is agreeable to a nutritional shake & voiced he preferred vanilla. Pt is drowsy but did work with therapy after RD visit. Folic acid is normal, but pt will benefit from con't supplements. Diet order restricted for carbs and low sodium. Low sodium is not needed & BG has been WDL. Hx of DM noted, A1c% is WDL, but could be low related to bleeding if pt has current or recent bleed. Diet Order adjusted by RD: Removed NCS (not appropriate with carb controlled diet order - redundant); 4 Carb Choice diet order meets only 77% of calorie needs - RD added ONS BID to be able to meet >90% of calorie needs & 100% of protein needs; Changed 2gm Na to \"No Added Salt\" restriction will provide ~2,400mg of Sodium without reducing calories provided from the diet order.           Malnutrition Assessment:  Malnutrition Status:  No malnutrition    Context:  Acute illness     Findings of the 6 clinical characteristics of malnutrition:   Energy Intake: Unable to assess  Weight Loss:  No significant weight loss     Body Fat Loss:  No significant body fat loss,     Muscle Mass Loss:  No significant muscle mass loss,    Fluid Accumulation:  No significant fluid accumulation,     Strength:  Not performed     Estimated Daily Nutrient Needs:  Energy (kcal): 2260 kcal/d (GENE xAF 1.2); Weight Used for Energy Requirements: Current  Protein (g): 100 g (1g/kg); Weight Used for Protein Requirements: Current  Fluid (ml/day): 2260 mL; Method Used for Fluid Requirements: 1 ml/kcal    Nutrition Related Findings:    Last BM: 2/10 - brown, watery, large    ABD: active bowel sounds, intact, obese  Edema: No data recorded    Nutr. Related Meds:   IVF (goody bag with thiamine, folic acid, MVI),  Protonix     Nutr. Related Labs:   Lab Results   Component Value Date/Time    Hemoglobin A1c 5.7 (H) 01/17/2022 07:48 PM    Hemoglobin A1c 6.6 (H) 02/17/2021 01:23 PM    Hemoglobin A1c 8.1 (H) 01/08/2021 03:28 AM     Lab Results   Component Value Date/Time    Glucose 106 (H) 02/11/2022 07:58 AM    Glucose (POC) 186 (H) 02/11/2022 03:40 PM     Lab Results   Component Value Date/Time    Folate 16.4 01/17/2022 07:48 PM     Lab Results   Component Value Date/Time    Sodium 136 02/11/2022 07:58 AM    Potassium 3.2 (L) 02/11/2022 07:58 AM    Chloride 104 02/11/2022 07:58 AM    CO2 27 02/11/2022 07:58 AM     Wounds:    None       Current Nutrition Therapies:  ADULT DIET Regular; 4 carb choices (60 gm/meal); Low Sodium (2 gm);  No Concentrated Sweets  ADULT ORAL NUTRITION SUPPLEMENT Dinner, Breakfast; Low Calorie/High Protein    Documented Meal intake:  Patient Vitals for the past 168 hrs:   % Diet Eaten   02/11/22 1524 51 - 75%   02/11/22 1137 51 - 75%     Documentation of supplement intake:  Patient Vitals for the past 168 hrs:   Supplement intake %   02/11/22 1524 0%   02/11/22 1137 0%     Anthropometric Measures:  · Height:  6' 2\" (188 cm)  · Current Body Wt:  99 kg (218 lb 4.1 oz) · Admission Body Wt:  218 lb 4.1 oz    · Usual Body Wt:        · Ideal Body Wt:  190 lbs:  114.9 %   · BMI Category: Overweight (BMI 25.0-29. 9)     Body mass index is 28.02 kg/m². Last 3 Recorded Weights in this Encounter    02/10/22 1031 02/11/22 0440   Weight: 98.9 kg (218 lb) 99 kg (218 lb 4.1 oz)     Wt Readings from Last 6 Encounters:   02/11/22 99 kg (218 lb 4.1 oz)   02/10/22 98.9 kg (218 lb)   01/17/22 104.3 kg (230 lb)   05/13/21 101.9 kg (224 lb 9.6 oz)   02/08/21 107 kg (236 lb)   01/17/21 99.8 kg (220 lb)      Nutrition Diagnosis:   · Predicted inadequate energy intake related to cognitive or neurological impairment as evidenced by  hx of ETOH, poor intake prior to admission reported, didn't want to eat b'fast when interviewed by RD.      Nutrition Interventions:   Food and/or Nutrient Delivery: Continue current diet,Start oral nutrition supplement  Nutrition Education and Counseling: Counseling needed  Coordination of Nutrition Care: Continue to monitor while inpatient,Interdisciplinary rounds    Goals:  tolerance of 50-75% of ONS & meals over the next 5-6 days       Nutrition Monitoring and Evaluation:   Behavioral-Environmental Outcomes: Readiness for change  Food/Nutrient Intake Outcomes: Food and nutrient intake,Supplement intake  Physical Signs/Symptoms Outcomes: Biochemical data,Meal time behavior,Weight    Discharge Planning:    Continue daily MVI & Folic Acid vitamins after discharge     Electronically signed by Adair Lemons RD, MS on 2/11/2022   Contact via [employer] or office [phone]

## 2022-02-11 NOTE — PROGRESS NOTES
Transition of Care Plan: RUR-16%  1. PT/OT following  2. Pt's girlfriend to transport him home at d/c  3. Pt on seizure precautions  4. CM following for any needs  CODY Palacios

## 2022-02-11 NOTE — PROGRESS NOTES
Problem: Alcohol Withdrawal  Goal: *STG: Participates in treatment plan  Outcome: Progressing Towards Goal  Goal: *STG: Remains safe in hospital  Outcome: Progressing Towards Goal  Goal: *STG: Seeks staff when symptoms of withdrawal increase  Outcome: Progressing Towards Goal  Goal: *STG: Complies with medication therapy  Outcome: Progressing Towards Goal  Goal: *STG: Attends activities and groups  Outcome: Progressing Towards Goal  Goal: *STG: Will identify negative impact of chemical dependency including the use of tobacco, alcohol, and other substances  Outcome: Progressing Towards Goal  Goal: *STG: Verbalizes abstinence as an achievable goal  Outcome: Progressing Towards Goal  Goal: *STG: Agrees to participate in outpatient after care program to support ongoing mental health  Outcome: Progressing Towards Goal  Goal: *STG: Able to indentify relapse triggers including interpersonal/social and familial factors  Outcome: Progressing Towards Goal  Goal: *STG: Identify lifestyle changes to support long term sobriety such as vocation, employment, education, and legal issues  Outcome: Progressing Towards Goal  Goal: *STG: Maintains appropriate nutrition and hydration  Outcome: Progressing Towards Goal  Goal: *STG: Vital signs within defined limits  Outcome: Progressing Towards Goal  Goal: *STG/LTG: Relapse prevention plan in place to include housing/aftercare, leisure activities, and spirituality  Outcome: Progressing Towards Goal  Goal: Interventions  Outcome: Progressing Towards Goal     Problem: Patient Education: Go to Patient Education Activity  Goal: Patient/Family Education  Outcome: Progressing Towards Goal

## 2022-02-11 NOTE — PROGRESS NOTES
Progress Note     2/11/2022  PCP: Estefania Mckinney, NP     Assessment/Plan:     Naif Castle a 61 y. o. male with a known history of multi-substance abuse including alcohol, opioids, THC, and h/o crack-cocaine use; PUD 2/2 long-term NSAID use, MDD w/ h/o suicidal ideation, thrombocytopenia, T2DM, HTN, and COPD who is admitted for acute EtOH withdrawal.    Overnight Events: Most recent CIWA 12 (4-12 last night). Per nursing, patient with hallucinations. Telemetry: Sinus rhythm to 90's, QTc 446.     EtOH withdrawal in s/o polysubstance abuse and AUD: POA CIWA 13, EtOH level 184, UDS (+) for THC. Patient's last drink was 2 beers (12 oz each) at 0800 on 2/10. He has a history of withdrawal seizures in the past requiring hospitalizations, but denies ever being intubated. Multiple admissions for EtOH withdrawal most recently from 1/17 to 1/20. Previous UDS's (+) for opiates, THC, barbituates. Patient has a 40+ year history of EtOH consumption (see SH above). Sent in per PCP. S/p 8 mg IV Ativan in ED.   - Added Seroquel 25 mg TID for hallucinations  - Cardiac monitoring   - CIWA protocol with Ativan 2mg (8-11) and 4mg (>12)  - IV banana bag q24hrs at 125 ml/hr for fluid rehydration   - Load w/ Phenobarb 130 mg f/b 65 TID  - IV compazine prn nausea   - IV Protonix 40mg daily  - Seizure precautions  - Daily CMP, CBC, Mg     Concern for hematuria: Patient reports \"episodes\" of \"red droplets\" shortly after he finishes voiding. No recent injuries. Renal function appears to be at baseline. UA notable for trace blood and 300 protein.     Chronic Thrombocytopenia:  (B/L ~ 110). Likely sequela of hepatic steatosis as noted on CT Abd/Pelv on 1/17/2022 in s/o chronic EtOH abuse. - Monitor daily on CBC  - Transfuse if <50 and bleeding or if < 10     Hx of COPD: POA CXR w/ NAP. He is not requiring supplemental O2, saturating well on room air.  Notable scattery wheezing throughout which may be 2/2 polysubstance abuse. No evidence of acute exacerbation -- no increased dyspnea or change in cough character. - Sub home Advair w/ Brovana/Pulmicort inhaler BID  - Duo-nebs q4hrs, scheduled   - Mucinex BID     Hypokalemia: POA 3.4, mild. Likely 2/2 EtOH abuse.  - Replete PRN     Hypertension: BP on admission 160/89.  - Cont home medications of Lisinopril 20mg daily and metoprolol 50mg daily   - Will continue to monitor at this time and readjust as BP's trend     DMII: A1c 5.7 on 1/17/2022. POA Glucose 107. - Holding home medications of Metformin 1000 mg daily.  - Insulin Sliding Scale normal sensitivity with AC&HS glucose checks  - Hypoglycemia protocols ordered  - Goal glucose concentration >70, <140 pre-meal and <180 with all random glucoses     Prolonged QTc: Per chart review. POA EKG w/ QTc 432 msec.    - Avoid QT-prolonging agents including Zofran and quinolones     MDD w/ Anxiety and h/o Suicidal Ideation: Pt on no home meds. Prior h/o of Hydroxyzine for anxiety. No SI/HI. - Recommend f/u outpt for counseling and possible SSRI therapy     Nicotine Dependence: long term, daily tobacco-snuff use. - Nicotine patch 21mg/24hr as needed    Acute Lactic Acidosis (resolved): POA LA 3.8. Likely Type B in s/o EtOH abuse. No evidence of sepsis.      FEN/GI - Diabetic diet. NS at 125 ml/hr  Activity - Activity as tolerated w/ assist  DVT prophylaxis - SCDs  GI prophylaxis - Protonix  Fall prophylaxis - Fall precautions ordered. Disposition - Admit to Tele. Plan to d/c to Home. Consulting PT, OT and CM  Code Status - Full. Discussed with patient / caregivers. Next of Kin Name and Alveria Organ (Girlfriend) - [phone]     CODE STATUS:  Full Code     Arianna Dumont discussed with Dr. Mook Leroy. Subjective:   Pt was seen and examined at bedside. Afebrile and hemodynamically stable. Concerns overnight include: as above.  This AM, continues to report generalized tremors and \"blurry vision\", without loss of vision. Occasional bouts of diaphoresis. Mild epigastric abdominal pain. No n/v/d, pt had a BM last night. Objective:   Physical examination  Patient Vitals for the past 24 hrs:   Temp Pulse Resp BP SpO2   22 1021 97.6 °F (36.4 °C) 74 17 (!) 135/95 97 %   22 0755 -- -- -- -- 96 %   22 0622 98.5 °F (36.9 °C) 87 20 134/82 95 %   22 0440 -- 87 -- -- --   22 0436 98.4 °F (36.9 °C) 84 19 (!) 140/93 97 %   22 0000 -- 93 19 128/73 --   02/10/22 2300 -- 99 19 (!) 101/56 --   02/10/22 2203 -- -- -- -- 96 %   02/10/22 2200 -- 87 23 122/82 --   02/10/22 2100 -- 88 17 125/73 97 %   02/10/22 2000 -- 88 18 117/66 --   02/10/22 1900 -- 88 20 121/74 94 %   02/10/22 1845 -- 86 21 127/75 93 %   02/10/22 1830 -- 88 17 129/70 97 %   02/10/22 1815 -- 86 20 122/73 93 %   02/10/22 1800 -- 85 21 126/76 93 %   02/10/22 1745 -- 87 20 126/76 94 %   02/10/22 1730 -- 90 21 129/79 92 %   02/10/22 1700 -- (!) 103 23 (!) 160/93 --   02/10/22 1623 -- -- -- -- 94 %   02/10/22 1613 -- -- -- 138/86 --   02/10/22 1330 -- 96 18 (!) 152/94 97 %   02/10/22 1315 -- 94 12 (!) 150/90 98 %   02/10/22 1300 -- (!) 101 12 (!) 144/95 98 %   02/10/22 1245 -- 99 17 (!) 151/92 95 %   02/10/22 1230 -- 97 17 (!) 141/86 93 %   02/10/22 1215 -- 97 19 (!) 145/89 92 %   02/10/22 1200 -- 90 16 (!) 147/89 92 %   02/10/22 1145 -- 88 17 (!) 148/92 94 %   02/10/22 1130 -- 92 23 (!) 148/104 91 %   02/10/22 1115 -- 96 17 129/81 90 %   02/10/22 1100 -- 100 8 (!) 130/93 95 %      Temp (24hrs), Av.2 °F (36.8 °C), Min:97.6 °F (36.4 °C), Max:98.5 °F (36.9 °C)         O2 Device: None (Room air)    Date 02/10/22 07 - 22 0659 22 07 - 22 0659   Shift 2358-6961 6543-2358 24 Hour Total 5521-8814 5132-8573 24 Hour Total   INTAKE   P.O.  50 50        P. O.  50 50      I. V.(mL/kg/hr)  10(0) 10(0) 2250  2250     I.V.  10 10        Volume (0.9% sodium chloride 1,000 mL with mvi (adult no. 4 with vit K) 10 mL, thiamine 100 mg, folic acid 1 mg infusion)    1000  1000     Volume (0.9% sodium chloride infusion)    1250  1250   Shift Total(mL/kg)  60(0.6) 60(0.6) 2250(22.7)  2250(22.7)   OUTPUT   Urine(mL/kg/hr) 170(0.1) 600(0.5) 770(0.3)        Urine Voided 170 600 770        Urine Occurrence(s)  0 x 0 x      Emesis/NG output           Emesis Occurrence(s)  0 x 0 x      Stool           Stool Occurrence(s)  0 x 0 x      Shift Total(mL/kg) 170(1.7) 600(6.1) 770(7.8)      NET -170 -540 -555 2250  2250   Weight (kg) 98.9 99 99 99 99 99     General:   Alert, cooperative, no acute distress   Head:   Atraumatic   Eyes:   Conjunctivae clear   Lungs:   Clear to auscultation bilaterally    Heart:   Normal rate, regular rhythm, no murmur, rubs or gallops   Abdomen:    Soft, mild to moderate tenderness to epigastrium w/o guarding or rebound. No masses or organomegaly    Extremities:  No edema or DVT signs   Pulses:  Symmetric all extremities   Skin:  Warm and dry    No rashes or lesions   Neurologic:  Alert&oriented x4. Data Review:     CBC:  Recent Labs     02/11/22  0828 02/10/22  1050   WBC 3.1* 3.8*   HGB 13.1 15.9   HCT 37.4 43.0   PLT 63* 724*     Metabolic Panel:  Recent Labs     02/11/22  0758 02/10/22  1050    137   K 3.2* 3.4*    98   CO2 27 24   BUN 8 8   CREA 0.67* 0.80   * 107*   CA 7.8* 8.8   MG  --  2.1   ALB 2.9* 3.9   TBILI 1.4* 1.7*   ALT 26 43     Micro:  No results found for: CULT  Imaging:  CT ABD PELV W WO CONT    Result Date: 1/17/2022  EXAM: CT ABD PELV W WO CONT INDICATION: epigastric pain, r/o pancreatitis COMPARISON: Ultrasound 9/4/2017. CT 9/2/2017. Cornel Cardoso CONTRAST: 100 mL of Isovue-370. TECHNIQUE:  Multislice helical CT was performed from the diaphragm to the iliac crest prior to intravenous contrast administration and from the diaphragm to the symphysis pubis during uneventful rapid bolus intravenous contrast administration, during the hepatic arterial, portal venous and delayed venous phases.   Oral contrast was not administered. Contiguous 5 mm axial images were reconstructed and lung and soft tissue windows were generated. Coronal and sagittal reformations were generated. CT dose reduction was achieved through use of a standardized protocol tailored for this examination and automatic exposure control for dose modulation. The lack of oral contrast material diminishes the capacity of CT to evaluate the bowel and adjacent structures. FINDINGS: LOWER THORAX: Several bilateral subpleural nodules measuring up to 4 mm in size appear unchanged in number and size. LIVER: Diffuse severe hepatic steatosis. No hepatic mass or intrahepatic bile duct dilation demonstrated. BILIARY TREE: Gallbladder is within normal limits. CBD is not dilated. SPLEEN: within normal limits. PANCREAS: No mass or ductal dilatation. No CT imaging evidence of pancreatitis. ADRENALS: Unremarkable. KIDNEYS: No mass, calculus, or hydronephrosis. STOMACH: Unremarkable. SMALL BOWEL: No dilatation or wall thickening. COLON: Anastomotic suture ring at proximal sigmoid region. Transverse and descending colonic diverticulosis. No mural thickening or dilation demonstrated. APPENDIX: Normal. PERITONEUM: No ascites or pneumoperitoneum. RETROPERITONEUM: No lymphadenopathy or aortic aneurysm. REPRODUCTIVE ORGANS: Those shown appear unremarkable. URINARY BLADDER: No mass or calculus. BONES: No destructive bone lesion. ABDOMINAL WALL: No mass or hernia. ADDITIONAL COMMENTS: N/A     No evidence for acute pancreatitis or other acute finding. Details can be found in report. XR CHEST PORT    Result Date: 2/10/2022  EXAM:  XR CHEST PORT INDICATION:  cough COMPARISON:  1/17/2022 FINDINGS: A portable AP radiograph of the chest was obtained at 1059 hours. The patient is on a cardiac monitor. The lungs are clear. The cardiac and mediastinal contours and pulmonary vascularity are normal.  The bones and soft tissues are grossly within normal limits.      No acute abnormality     XR CHEST PORT    Result Date: 1/17/2022  EXAM:  XR CHEST PORT INDICATION: Wheezing, cough COMPARISON: Chest x-ray 1/6/2021. TECHNIQUE: Single frontal view of the chest. FINDINGS: No lobar consolidation, pleural effusion, or pneumothorax. The cardiomediastinal silhouette is not enlarged. No acute or aggressive osseous lesion. 1.  No evidence of an acute cardiopulmonary process. Medications reviewed  Current Facility-Administered Medications   Medication Dose Route Frequency    albuterol-ipratropium (DUO-NEB) 2.5 MG-0.5 MG/3 ML  3 mL Nebulization Q6H RT    QUEtiapine (SEROquel) tablet 25 mg  25 mg Oral TID    LORazepam (ATIVAN) injection 2 mg  2 mg IntraVENous Q1H PRN    LORazepam (ATIVAN) injection 4 mg  4 mg IntraVENous Q1H PRN    [Held by provider] folic acid (FOLVITE) tablet 1 mg  1 mg Oral DAILY    [Held by provider] thiamine mononitrate (B-1) tablet 100 mg  100 mg Oral DAILY    0.9% sodium chloride 1,000 mL with mvi (adult no. 4 with vit K) 10 mL, thiamine 107 mg, folic acid 1 mg infusion   IntraVENous Q24H    0.9% sodium chloride infusion  125 mL/hr IntraVENous CONTINUOUS    sodium chloride (NS) flush 5-40 mL  5-40 mL IntraVENous Q8H    sodium chloride (NS) flush 5-40 mL  5-40 mL IntraVENous PRN    acetaminophen (TYLENOL) tablet 650 mg  650 mg Oral Q6H PRN    Or    acetaminophen (TYLENOL) suppository 650 mg  650 mg Rectal Q6H PRN    PHENobarbital (LUMINAL) injection 65 mg  65 mg IntraVENous TID    prochlorperazine (COMPAZINE) injection 10 mg  10 mg IntraVENous Q6H PRN    pantoprazole (PROTONIX) 40 mg in 0.9% sodium chloride 10 mL injection  40 mg IntraVENous DAILY    insulin lispro (HUMALOG) injection   SubCUTAneous AC&HS    glucose chewable tablet 16 g  4 Tablet Oral PRN    dextrose (D50W) injection syrg 12.5-25 g  12.5-25 g IntraVENous PRN    glucagon (GLUCAGEN) injection 1 mg  1 mg IntraMUSCular PRN    arformoterol 15 mcg/budesonide 0.25 mg neb solution Nebulization BID RT    guaiFENesin ER (MUCINEX) tablet 600 mg  600 mg Oral Q12H    lisinopriL (PRINIVIL, ZESTRIL) tablet 20 mg  20 mg Oral DAILY    metoprolol tartrate (LOPRESSOR) tablet 50 mg  50 mg Oral BID    nicotine (NICODERM CQ) 21 mg/24 hr patch 1 Patch  1 Patch TransDERmal DAILY         Signed:   Marnie Salvador MD   Resident, Family Medicine      Attending note: Attending note to follow. ..

## 2022-02-11 NOTE — ED NOTES
Pt states that it looks like the blue bag (linen bag) is breathing and that he feels like he has seen something move when he looks around.

## 2022-02-11 NOTE — PROGRESS NOTES
1132 Bedside and Verbal shift change report given to Armando Jolley (oncoming nurse) by Jose Lanza (offgoing nurse). Report included the following information SBAR, Kardex, ED Summary, Intake/Output, MAR, Recent Results and Cardiac Rhythm NSR/ST. This patient was assisted with Intentional Toileting every 2 hours during this shift as appropriate. Documentation of ambulation and output reflected on Flowsheet as appropriate. Purposeful hourly rounding was completed using AIDET and 5Ps. Outcomes of PHR documented as they occurred. Bed alarm in use as appropriate. Dual Suction and ambubag in place. 1231 PRN Ativan given for CIWA score of 10. Will reassess in 1 hour. 1331 CIWA score of 2 after reassessment and vitals obtained. Will continue to assess. 1415 PRN Ativan given for CIWA score of 8. Reassess in 1 hour. 1525 CIWA score of 7 after reassessment and vitals obtained. Will continue to monitor. 1930 Bedside and Verbal shift change report given to Denette Hamman RN/Mamie MCKEON (oncoming nurse) by Ananda Galeano RN (offgoing nurse). Report included the following information SBAR, Kardex, ED Summary, Intake/Output, MAR, Recent Results and Cardiac Rhythm NSR/ST.

## 2022-02-11 NOTE — PROGRESS NOTES
Care Management Readmission Assessment        NAME:   Josef Orantes   :     1965   MRN:     [id number]             RUR Score/Risk Level:       RUR:  12%  Risk Level: Moderate    Assessment: In person with patient    Reason for Readmission:  Mr. Shavon Tillman is a 61 y.o. male with history that includes substance abuse, alcoholism, DM, COPD and HTN  who was emergently admitted for:  ETOH withdrawl    Patient Active Problem List   Diagnosis Code    Alcohol abuse F10.10    Opioid abuse ([facility] [address] F11.10    Mild tetrahydrocannabinol (THC) abuse F12.10    Drug abuse ([facility] [address] F19.10    Lactic acidosis E87.2    NSAID long-term use Z79.1    Weight loss R63.4    Alcohol withdrawal ([facility] [address] F10.239    Hypokalemia E87.6    Thrombocytopenia ([facility] [address] D69.6    Suspected COVID-19 virus infection Z20.822    Suicidal ideations R45.851    Headache R51.9    Elevated bilirubin R17    Major depression F32.9    DM (diabetes mellitus) ([facility] [address] E11.9    HTN (hypertension) I10       Has any pertinent information changed since previous Care Management Assessment? Living arrangements:  Pt lives alone in a 2 story home. Pt reports about 2 steps to enter house. Pt approximates 17 steps to bedroom upstairs. Pt denies any problems getting up/down stairs. ADLs:  Pt reports that he is independent with ADLs and ambulation. DME:   None   Pharmacy:  [facility]. Pt denies any problems obtaining/takings medications.     Insurer:  Payor: Sangita Mahmood / Plan: [facility] [employer] [facility] / Product Type: Managed Care Medicaid /     PCP: Annalisa Tidwell NP   Name of Practice:  [facility]   Current patient: Yes   Approximate date of last visit: today (2/10/22)   Access to virtual PCP visits:  Yes    Is a Care Conference indicated:   No    Did you attend your follow up appointment(s):  Yes  If not, why not:  N/A    Resources/supports as identified by patient/family:  Pt has supportive family         Top Challenges facing patient (as identified by patient/family and CM): Finances/Medication cost?  None identified  Transportation? None identified       Support system or lack thereof? None identified     Living arrangements? None identified         Self-care/ADLs/Cognition? None identified       Advance Directive:  Full Code, has an advance directive. Copy not available. Plan for utilizing home health:  None    Transition of Care Plan:      Unable to determine at this time. Awaiting clinical progress, and disposition recommendations    Additional information:  Pt admitted on 2/10/22 for ETOH withdrawal. CM met with Pt to complete initial assessment. Pt reports that he lives at home alone. Pt has no hx of HH, home O2, or DME. Pt reports that he is independent with ADLs. Pt denies any usual problems with ADLs, however, Pt does recognize that he lacks motivation when he's drinking. Pt denies any usual problems with ambulation. Pt reports \"stabbing\" pain in his foot at times. Indicates that he has not discussed this with providers. Pt reports that sometimes when this stabbing pain occurs he does have difficulty walking. Pt denies any falls. CM encouraged Pt to discuss symptoms with his provider. Pt reports that he is self employed and is able to drive. Pt reports his healthcare decision maker is Nick Brink (girlfriend) [phone]. Pt confirms this is who is AMD names as mPOA. CM will continue to follow for discharge needs. Pt is interested in a treatment program. Pt states that he has pursued inpatient treatment options, outpatient treatment options, he's participated in [employer] and he's seen counselors. Pt states that most programs work for [facility] while\". Pt is interested in an intensive outpatient treatment (IOP) upon discharge. CM told Pt best plan of action would be to reach out to insurance to determine what programs are in network with his insurance. Pt voiced understanding and agreement.  SAMEERA told Pt that CM would put number for his insurance on AVS as well as other IOP options CM finds locally. Pt agreeable. Pt also aware that CM will not be able to determine if options CM places on his AVS are in network or out of pocket. Pt voiced understanding. Pt also states that he wonders if anxiety/depression is also influencing his ETOH consumption. Pt states that he feels like the anxiety/depression needs to be addressed before any ETOH treatment would be successful. CM placed an option for a partial hospitalization program that addresses mental health on Pt's AVS.     ETOH IOP information added to AVS.     CM will continue to follow. _____________________________________  KIN Nunez - Care Management  2/10/2022   9:20 PM ]    Readmission Assessment  Number of days since last admission?: 8-30 days  Previous disposition: Home Alone  Who is being interviewed?: Patient  What was the patient's/caregiver's perception as to why they think they needed to return back to the hospital?: Other (Comment) (I started drinking again)  Did you visit your Primary Care Physician after you left the hospital, before you returned this time?: Yes  Did you see a specialist, such as Cardiac, Pulmonary, Orthopedic Physician, etc. after you left the hospital?: No  Who advised the patient to return to the hospital?: Self-referral  Does the patient report anything that got in the way of taking their medications?: No  In our efforts to provide the best possible care to you and others like you, can you think of anything that we could have done to help you after you left the hospital the first time, so that you might not have needed to return so soon?: Other (Comment) (no)        Care Management Interventions  PCP Verified by CM: Yes Raimundo Marshall NP)  Mode of Transport at Discharge:  Other (see comment) (family)  Transition of Care Consult (CM Consult): Discharge Planning  MyChart Signup: No  Discharge Durable Medical Equipment: No  Physical Therapy Consult: Yes  Occupational Therapy Consult: Yes  Speech Therapy Consult: No  Support Systems: Spouse/Significant Other,Other Family Member(s)  Confirm Follow Up Transport: Family  Discharge Location  Patient Expects to be Discharged to[de-identified] Unable to determine at this time

## 2022-02-11 NOTE — PROGRESS NOTES
Problem: Mobility Impaired (Adult and Pediatric)  Goal: *Acute Goals and Plan of Care (Insert Text)  Description: FUNCTIONAL STATUS PRIOR TO ADMISSION: Patient was independent and active without use of DME.    HOME SUPPORT PRIOR TO ADMISSION: The patient lived alone with no local support. Physical Therapy Goals  Initiated 2/11/2022  1. Patient will move from supine to sit and sit to supine  in bed with modified independence within 7 day(s). 2.  Patient will transfer from bed to chair and chair to bed with modified independence using the least restrictive device within 7 day(s). 3.  Patient will perform sit to stand with modified independence within 7 day(s). 4.  Patient will ambulate with modified independence for 150 feet with the least restrictive device within 7 day(s). 5.  Patient will ascend/descend 4 stairs with 2 handrail(s) with modified independence within 7 day(s). Outcome: Progressing Towards Goal  Note:   PHYSICAL THERAPY EVALUATION  Patient: Bala Marte (89 y.o. male)  Date: 2/11/2022  Primary Diagnosis: Alcohol withdrawal syndrome without complication (HCC) [S52.380]        Precautions:   Fall      ASSESSMENT  Based on the objective data described below, the patient presents with decreased strength, balance, activity tolerance increased lethargy following admission for ETOH withdraw. Patient just here 1/17-1/20 for ETOH withdraw and COPD exacerbation. Patient today with all vital stable. He was able to come to sitting MIN A and tranfers MOD A x2. Patient with increased instability without assistive device, use of RW for ambulation, he fatigues quickly and demonstrates increased forward flexed posture, shuffled steps and increased difficulty with RW management. He was returned to supine.     Current Level of Function Impacting Discharge (mobility/balance): MIN A bed mobility, MOD A x2 for tranfers and gait with RW, fatigues quickly and requires MAX A x1 for balance and RW management     Functional Outcome Measure: The patient scored Total Score: 4/28 on the Tinetti outcome measure which is indicative of high fall risk. Other factors to consider for discharge:      Patient will benefit from skilled therapy intervention to address the above noted impairments. PLAN :  Recommendations and Planned Interventions: bed mobility training, transfer training, gait training, therapeutic exercises, and therapeutic activities      Frequency/Duration: Patient will be followed by physical therapy:  5 times a week to address goals. Recommendation for discharge: (in order for the patient to meet his/her long term goals)  To be determined: This discharge recommendation:  Has been made in collaboration with the attending provider and/or case management    IF patient discharges home will need the following DME: none         SUBJECTIVE:   Patient stated Rafaela Byers had 4 pillows befroe.     OBJECTIVE DATA SUMMARY:   HISTORY:    Past Medical History:   Diagnosis Date    Alcohol abuse     Alcoholism ([facility]     Anxiety     COPD (chronic obstructive pulmonary disease) ([facility])     Dr. Koffi Pinto is pulmonologist    Depression     Diabetes ([facility]     Type II    Diverticulitis     Family history of angina     GERD (gastroesophageal reflux disease)     Hemochromatosis     Hypertension      Past Surgical History:   Procedure Laterality Date    COLONOSCOPY N/A 9/6/2017    COLONOSCOPY performed by Sofia Purcell MD at [address] [facility]  03/02/2021    ACDF C4/7    HX TOTAL COLECTOMY         Personal factors and/or comorbidities impacting plan of care: see above    Home Situation  Home Environment: Private residence  [address] [place] [address] St: One story  Living Alone: Yes  Support Systems: Other Family Member(s)  Patient Expects to be Discharged to[de-identified] Unable to determine at this time  Current DME Used/Available at Home: None    EXAMINATION/PRESENTATION/DECISION MAKING:   Critical Behavior:  Neurologic State: (P) Alert  Orientation Level: (P) Oriented X4  Cognition: (P) Follows commands,Impulsive     Hearing: Auditory  Auditory Impairment: None    Range Of Motion:  AROM: Generally decreased, functional           PROM: Generally decreased, functional           Strength:    Strength: Generally decreased, functional                    Tone & Sensation:                                  Coordination:  Coordination: Grossly decreased, non-functional  Vision:      Functional Mobility:  Bed Mobility:  Rolling: Minimum assistance  Supine to Sit: Minimum assistance  Sit to Supine: Minimum assistance     Transfers:  Sit to Stand: Minimum assistance;Assist x2; Additional time  Stand to Sit: Minimum assistance;Assist x2; Additional time        Bed to Chair: Moderate assistance;Assist x2              Balance:   Sitting: Intact  Standing: Impaired  Standing - Static: Constant support  Standing - Dynamic : Constant support  Ambulation/Gait Training:  Distance (ft): 50 Feet (ft)  Assistive Device: Walker, rolling;Gait belt  Ambulation - Level of Assistance: Moderate assistance;Assist x1;Additional time;Maximum assistance     Gait Description (WDL): Exceptions to WDL                 Functional Measure:  Tinetti test:    Sitting Balance: 1  Arises: 0  Attempts to Rise: 0  Immediate Standing Balance: 0  Standing Balance: 0  Nudged: 0  Eyes Closed: 0  Turn 360 Degrees - Continuous/Discontinuous: 0  Turn 360 Degrees - Steady/Unsteady: 0  Sitting Down: 0  Balance Score: 1 Balance total score  Indication of Gait: 0  R Step Length/Height: 0  L Step Length/Height: 0  R Foot Clearance: 1  L Foot Clearance: 1  Step Symmetry: 0  Step Continuity: 0  Path: 0  Trunk: 0  Walking Time: 1  Gait Score: 3 Gait total score  Total Score: 4/28 Overall total score         Tinetti Tool Score Risk of Falls  <19 = High Fall Risk  19-24 = Moderate Fall Risk  25-28 = Low Fall Risk  Tinetti ME.  Performance-Oriented Assessment of Mobility Problems in Elderly Patients. [facility]; [id number]. (Scoring Description: PT Bulletin Feb. 10, 1993)    Older adults: Heyward Goodell et al, 2009; n = 1000 [facility] elderly evaluated with ABC, EFRA, ADL, and IADL)  · Mean EFRA score for males aged 69-68 years = 26.21(3.40)  · Mean EFRA score for females age 69-68 years = 25.16(4.30)  · Mean EFRA score for males over 80 years = 23.29(6.02)  · Mean EFRA score for females over 80 years = 17.20(8.32)        Physical Therapy Evaluation Charge Determination   History Examination Presentation Decision-Making   HIGH Complexity :3+ comorbidities / personal factors will impact the outcome/ POC  MEDIUM Complexity : 3 Standardized tests and measures addressing body structure, function, activity limitation and / or participation in recreation  MEDIUM Complexity : Evolving with changing characteristics  Other outcome measures tinetti  HIGH       Based on the above components, the patient evaluation is determined to be of the following complexity level: HIGH     Pain Rating:  None reported    Activity Tolerance:   Fair      After treatment patient left in no apparent distress:   Supine in bed, Call bell within reach, and Bed / chair alarm activated    COMMUNICATION/EDUCATION:   The patients plan of care was discussed with: Registered nurse. Fall prevention education was provided and the patient/caregiver indicated understanding., Patient/family have participated as able in goal setting and plan of care. , and Patient/family agree to work toward stated goals and plan of care.     Thank you for this referral.  Era Block, PT, DPT   Time Calculation: 21 mins

## 2022-02-11 NOTE — PROGRESS NOTES
0700 - Bedside shift report received. 0730 - Shift assessment completed, patient A&Ox4 follows commands, states he has a mild HA, mild hallucinations, and tremors, no other complains, calm and cooperative, VSS. PIV attempted and labs drawn. 46 - Dr. Hannah Stone at the bedside. 0900 - Notified [facility] of patient's K.   1100 - Patient resting, VSS and will continue to monitor. 1130 - Bedside shift change report given to [facility] RN (oncoming nurse) by Virgen Pang (offgoing nurse).  Report included the following information SBAR, Kardex, MAR, Recent Results and Cardiac Rhythm SR.

## 2022-02-11 NOTE — PROGRESS NOTES
Problem: Self Care Deficits Care Plan (Adult)  Goal: *Acute Goals and Plan of Care (Insert Text)  Description:  FUNCTIONAL STATUS PRIOR TO ADMISSION: Patient was independent and active without use of DME.    HOME SUPPORT PRIOR TO ADMISSION: The patient lived alone with no local support. Occupational Therapy Goals  Initiated 2/11/2022  1. Patient will perform grooming with supervision/set-up standing at the sink within 7 day(s). 2.  Patient will perform upper body dressing and bathing with supervision/set-up within 7 day(s). 3.  Patient will perform lower body dressing and bathing with minimal assistance within 7 day(s). 4.  Patient will perform toilet transfers with minimal assistance within 7 day(s). 5.  Patient will perform all aspects of toileting with minimal assistance within 7 day(s). 6.  Patient will participate in upper extremity therapeutic exercise/activities with supervision/set-up for 10 minutes within 7 day(s). Outcome: Progressing Towards Goal   OCCUPATIONAL THERAPY EVALUATION  Patient: Nan London (47 y.o. male)  Date: 2/11/2022  Primary Diagnosis: [facility] withdrawal syndrome [facility] complication ([facility] [C58.066]        Precautions:  Fall    ASSESSMENT  Based on the objective data described below, the patient presents with decreased activity tolerance, generalized weakness, impaired balance, and confusion following admission on 2/10/22 for ETOH withdrawal.  Patient with recent admission (1/17-1/20) for ETOH withdrawal and COPD exacerbation. Patient performed bed mobility and x1 sit to stand transfer but unable to progress to additional transfers d/t weakness and fatigue. Patient requesting to return to bed and repositioned in supine with HOB elevated. Patient with fair to poor tolerance for ADL transfers; Increased assistance needed for LB and standing ADLs. Patient was educated on adaptive ADL techniques, safe transfer techniques, and energy conservation techniques.   Patient would benefit from continued skilled OT to progress towards goals and improve overall independence. Current Level of Function Impacting Discharge (ADLs/self-care): Patient lives with min A for bed mobility and min to mod A x2 for OOB transfers. Patient required supervision/setup to min A for UB ADLs and mod to max A for LB ADLs. Functional Outcome Measure: The patient scored Total: 30/100 on the Barthel Index outcome measure. Patient will benefit from skilled therapy intervention to address the above noted impairments. PLAN :  Recommendations and Planned Interventions: self care training, functional mobility training, therapeutic exercise, therapeutic activities, endurance activities, patient education, home safety training and family training/education    Frequency/Duration: Patient will be followed by occupational therapy 5 times a week to address goals. Recommendation for discharge: (in order for the patient to meet his/her long term goals)  To be determined: pending progress    This discharge recommendation:  Has been made in collaboration with the attending provider and/or case management    IF patient discharges home will need the following DME: none       SUBJECTIVE:   Patient agreeable to OT evaluation.       OBJECTIVE DATA SUMMARY:   HISTORY:   Past Medical History:   Diagnosis Date    Alcohol abuse     Alcoholism ([facility]     Anxiety     [facility] ([facility])     Dr. Buck Anderson is pulmonologist    Depression     Diabetes ([facility]     Type II    Diverticulitis     Family history of angina     GERD (gastroesophageal reflux disease)     Hemochromatosis     Hypertension      Past Surgical History:   Procedure Laterality Date    COLONOSCOPY N/A 9/6/2017    COLONOSCOPY performed by Orestes Woodall MD at [address] [place] [address]  03/02/2021    ACDF C4/7    HX TOTAL COLECTOMY         Expanded or extensive additional review of patient history: Home Situation  Home Environment: Private residence  One/Two Story Residence: One story  Living Alone: Yes  Support Systems: Other Family Member(s)  Patient Expects to be Discharged to[de-identified] Unable to determine at this time  Current DME Used/Available at Home: None    Hand dominance: Right    EXAMINATION OF PERFORMANCE DEFICITS:  Cognitive/Behavioral Status:  Neurologic State: Alert  Orientation Level: Oriented X4  Cognition: Follows commands; Impulsive  Perception: Appears intact  Perseveration: No perseveration noted  Safety/Judgement: Awareness of environment    Skin: Intact in the uppers    Edema: None noted in the uppers    Hearing: Auditory  Auditory Impairment: None    Vision/Perceptual:    Tracking: Able to track stimulus in all quadrants w/o difficulty    Diplopia: No    Acuity: Within Defined Limits       Range of Motion:  AROM: Generally decreased, functional  PROM: Generally decreased, functional    Strength:  Strength: Generally decreased, functional in the uppers    Coordination:  Fine Motor Skills-Upper: Left Impaired;Right Impaired    Gross Motor Skills-Upper: Left Impaired;Right Impaired    Tone & Sensation:  Tone: normal  Sensation: intact     Balance:  Sitting: Intact; High guard  Standing: Impaired  Standing - Static: Poor  Standing - Dynamic : Constant support    Functional Mobility and Transfers for ADLs:  Bed Mobility:  Rolling: Minimum assistance  Supine to Sit: Minimum assistance  Sit to Supine: Minimum assistance    Transfers:  Sit to Stand: Minimum assistance;Assist x2; Additional time  Stand to Sit: Minimum assistance;Assist x2; Additional time  Bed to Chair: Moderate assistance;Assist x2    ADL Assessment:  Feeding: Supervision;Setup    Oral Facial Hygiene/Grooming: Supervision;Setup    Bathing: Moderate assistance    Upper Body Dressing: Minimum assistance    Lower Body Dressing:  Moderate assistance    Toileting: Maximum assistance     Cognitive Retraining  Safety/Judgement: Awareness of environment    Functional Measure:    Barthel Index:  Bathin  Bladder: 10  Bowels: 10  Groomin  Dressin  Feedin  Mobility: 0  Stairs: 0  Toilet Use: 0  Transfer (Bed to Chair and Back): 5  Total: 30/100      The Barthel ADL Index: Guidelines  1. The index should be used as a record of what a patient does, not as a record of what a patient could do. 2. The main aim is to establish degree of independence from any help, physical or verbal, however minor and for whatever reason. 3. The need for supervision renders the patient not independent. 4. A patient's performance should be established using the best available evidence. Asking the patient, friends/relatives and nurses are the usual sources, but direct observation and common sense are also important. However direct testing is not needed. 5. Usually the patient's performance over the preceding 24-48 hours is important, but occasionally longer periods will be relevant. 6. Middle categories imply that the patient supplies over 50 per cent of the effort. 7. Use of aids to be independent is allowed. Score Interpretation (from [address] [address])    Independent   60-79 Minimally independent   40-59 Partially dependent   20-39 Very dependent   <20 Totally dependent     -Anne Mccabe., Barthel, D.W. (1965). Functional evaluation: the Barthel Index. [facility] ([address] (1997). The Barthel activities of daily living index: self-reporting versus actual performance in the old (> or = 75 years). Journal of [address] [place] [address] 45(7), [facility], JDORIANMSUSANNE, Maday Lux., Mariia Martinez. (). Measuring the change in disability after inpatient rehabilitation; comparison of the responsiveness of the Barthel Index and Functional [place] Measure. Journal of Neurology, Neurosurgery, and Psychiatry, 66(4), 303-199.   -Obed Amor N.J.ROSARIO, VIOLETA Espinal, Staci Figueroa, MJermainA. (2004) Assessment of post-stroke quality of life in cost-effectiveness studies: The usefulness of the Barthel Index and the EuroQoL-5D. Quality of Life Research, 15, 319-47     Occupational Therapy Evaluation Charge Determination   History Examination Decision-Making   LOW Complexity : Brief history review  LOW Complexity : 1-3 performance deficits relating to physical, cognitive , or psychosocial skils that result in activity limitations and / or participation restrictions  LOW Complexity : No comorbidities that affect functional and no verbal or physical assistance needed to complete eval tasks       Based on the above components, the patient evaluation is determined to be of the following complexity level: LOW     Activity Tolerance:   Fair    After treatment patient left in no apparent distress:    Supine in bed, Call bell within reach and Bed / chair alarm activated    COMMUNICATION/EDUCATION:   The patients plan of care was discussed with: Physical therapist, Registered nurse and patient. .     Home safety education was provided and the patient/caregiver indicated understanding., Patient/family have participated as able in goal setting and plan of care. and Patient/family agree to work toward stated goals and plan of care. This patients plan of care is appropriate for delegation to [facility] [place].     Thank you for this referral.  Dung Benjamin OTR/L  Time Calculation: 23 mins

## 2022-02-12 ENCOUNTER — APPOINTMENT (OUTPATIENT)
Dept: ULTRASOUND IMAGING | Age: 57
End: 2022-02-12
Attending: STUDENT IN AN ORGANIZED HEALTH CARE EDUCATION/TRAINING PROGRAM
Payer: MEDICAID

## 2022-02-12 LAB
ALBUMIN SERPL-MCNC: 3.1 G/DL (ref 3.5–5)
ALBUMIN/GLOB SERPL: 1.1 {RATIO} (ref 1.1–2.2)
ALP SERPL-CCNC: 49 U/L (ref 45–117)
ALT SERPL-CCNC: 29 U/L (ref 12–78)
ANION GAP SERPL CALC-SCNC: 8 MMOL/L (ref 5–15)
AST SERPL-CCNC: 23 U/L (ref 15–37)
BASOPHILS # BLD: 0 K/UL (ref 0–0.1)
BASOPHILS NFR BLD: 1 % (ref 0–1)
BILIRUB SERPL-MCNC: 1.4 MG/DL (ref 0.2–1)
BUN SERPL-MCNC: 4 MG/DL (ref 6–20)
BUN/CREAT SERPL: 6 (ref 12–20)
CALCIUM SERPL-MCNC: 8.1 MG/DL (ref 8.5–10.1)
CHLORIDE SERPL-SCNC: 107 MMOL/L (ref 97–108)
CO2 SERPL-SCNC: 24 MMOL/L (ref 21–32)
CREAT SERPL-MCNC: 0.67 MG/DL (ref 0.7–1.3)
DIFFERENTIAL METHOD BLD: ABNORMAL
EOSINOPHIL # BLD: 0.1 K/UL (ref 0–0.4)
EOSINOPHIL NFR BLD: 2 % (ref 0–7)
ERYTHROCYTE [DISTWIDTH] IN BLOOD BY AUTOMATED COUNT: 14 % (ref 11.5–14.5)
GLOBULIN SER CALC-MCNC: 2.7 G/DL (ref 2–4)
GLUCOSE BLD STRIP.AUTO-MCNC: 118 MG/DL (ref 65–117)
GLUCOSE BLD STRIP.AUTO-MCNC: 132 MG/DL (ref 65–117)
GLUCOSE BLD STRIP.AUTO-MCNC: 132 MG/DL (ref 65–117)
GLUCOSE BLD STRIP.AUTO-MCNC: 143 MG/DL (ref 65–117)
GLUCOSE SERPL-MCNC: 112 MG/DL (ref 65–100)
HCT VFR BLD AUTO: 37.4 % (ref 36.6–50.3)
HGB BLD-MCNC: 13.2 G/DL (ref 12.1–17)
IMM GRANULOCYTES # BLD AUTO: 0 K/UL (ref 0–0.04)
IMM GRANULOCYTES NFR BLD AUTO: 0 % (ref 0–0.5)
INR PPP: 1 (ref 0.9–1.1)
LYMPHOCYTES # BLD: 0.6 K/UL (ref 0.8–3.5)
LYMPHOCYTES NFR BLD: 22 % (ref 12–49)
MAGNESIUM SERPL-MCNC: 1.8 MG/DL (ref 1.6–2.4)
MCH RBC QN AUTO: 35.1 PG (ref 26–34)
MCHC RBC AUTO-ENTMCNC: 35.3 G/DL (ref 30–36.5)
MCV RBC AUTO: 99.5 FL (ref 80–99)
MONOCYTES # BLD: 0.3 K/UL (ref 0–1)
MONOCYTES NFR BLD: 11 % (ref 5–13)
NEUTS SEG # BLD: 1.6 K/UL (ref 1.8–8)
NEUTS SEG NFR BLD: 64 % (ref 32–75)
NRBC # BLD: 0 K/UL (ref 0–0.01)
NRBC BLD-RTO: 0 PER 100 WBC
PLATELET # BLD AUTO: 76 K/UL (ref 150–400)
PMV BLD AUTO: 9.5 FL (ref 8.9–12.9)
POTASSIUM SERPL-SCNC: 3.6 MMOL/L (ref 3.5–5.1)
PROT SERPL-MCNC: 5.8 G/DL (ref 6.4–8.2)
PROTHROMBIN TIME: 10.6 SEC (ref 9–11.1)
RBC # BLD AUTO: 3.76 M/UL (ref 4.1–5.7)
RBC MORPH BLD: ABNORMAL
SERVICE CMNT-IMP: ABNORMAL
SODIUM SERPL-SCNC: 139 MMOL/L (ref 136–145)
WBC # BLD AUTO: 2.6 K/UL (ref 4.1–11.1)

## 2022-02-12 PROCEDURE — 74011000250 HC RX REV CODE- 250: Performed by: STUDENT IN AN ORGANIZED HEALTH CARE EDUCATION/TRAINING PROGRAM

## 2022-02-12 PROCEDURE — 85025 COMPLETE CBC W/AUTO DIFF WBC: CPT

## 2022-02-12 PROCEDURE — 74011000250 HC RX REV CODE- 250

## 2022-02-12 PROCEDURE — 74011000250 HC RX REV CODE- 250: Performed by: FAMILY MEDICINE

## 2022-02-12 PROCEDURE — 74011636637 HC RX REV CODE- 636/637

## 2022-02-12 PROCEDURE — 65610000006 HC RM INTENSIVE CARE

## 2022-02-12 PROCEDURE — 83735 ASSAY OF MAGNESIUM: CPT

## 2022-02-12 PROCEDURE — 36415 COLL VENOUS BLD VENIPUNCTURE: CPT

## 2022-02-12 PROCEDURE — 94640 AIRWAY INHALATION TREATMENT: CPT

## 2022-02-12 PROCEDURE — 74011250636 HC RX REV CODE- 250/636: Performed by: STUDENT IN AN ORGANIZED HEALTH CARE EDUCATION/TRAINING PROGRAM

## 2022-02-12 PROCEDURE — 74011250636 HC RX REV CODE- 250/636: Performed by: EMERGENCY MEDICINE

## 2022-02-12 PROCEDURE — 76700 US EXAM ABDOM COMPLETE: CPT

## 2022-02-12 PROCEDURE — 85610 PROTHROMBIN TIME: CPT

## 2022-02-12 PROCEDURE — 74011250637 HC RX REV CODE- 250/637: Performed by: STUDENT IN AN ORGANIZED HEALTH CARE EDUCATION/TRAINING PROGRAM

## 2022-02-12 PROCEDURE — 74011250637 HC RX REV CODE- 250/637

## 2022-02-12 PROCEDURE — 93005 ELECTROCARDIOGRAM TRACING: CPT

## 2022-02-12 PROCEDURE — 99233 SBSQ HOSP IP/OBS HIGH 50: CPT | Performed by: FAMILY MEDICINE

## 2022-02-12 PROCEDURE — 77010033678 HC OXYGEN DAILY

## 2022-02-12 PROCEDURE — 77030029684 HC NEB SM VOL KT MONA -A

## 2022-02-12 PROCEDURE — 82962 GLUCOSE BLOOD TEST: CPT

## 2022-02-12 PROCEDURE — 74011250636 HC RX REV CODE- 250/636

## 2022-02-12 PROCEDURE — 80053 COMPREHEN METABOLIC PANEL: CPT

## 2022-02-12 PROCEDURE — C9113 INJ PANTOPRAZOLE SODIUM, VIA: HCPCS

## 2022-02-12 RX ORDER — HALOPERIDOL 5 MG/ML
INJECTION INTRAMUSCULAR
Status: COMPLETED
Start: 2022-02-12 | End: 2022-02-12

## 2022-02-12 RX ORDER — HALOPERIDOL 5 MG/ML
5 INJECTION INTRAMUSCULAR ONCE
Status: COMPLETED | OUTPATIENT
Start: 2022-02-12 | End: 2022-02-12

## 2022-02-12 RX ORDER — LABETALOL HYDROCHLORIDE 5 MG/ML
10 INJECTION, SOLUTION INTRAVENOUS
Status: COMPLETED | OUTPATIENT
Start: 2022-02-12 | End: 2022-02-12

## 2022-02-12 RX ORDER — IPRATROPIUM BROMIDE AND ALBUTEROL SULFATE 2.5; .5 MG/3ML; MG/3ML
3 SOLUTION RESPIRATORY (INHALATION)
Status: DISCONTINUED | OUTPATIENT
Start: 2022-02-13 | End: 2022-02-15 | Stop reason: HOSPADM

## 2022-02-12 RX ORDER — PROCHLORPERAZINE EDISYLATE 5 MG/ML
5 INJECTION INTRAMUSCULAR; INTRAVENOUS
Status: DISCONTINUED | OUTPATIENT
Start: 2022-02-12 | End: 2022-02-15 | Stop reason: HOSPADM

## 2022-02-12 RX ORDER — HALOPERIDOL 5 MG/ML
2 INJECTION INTRAMUSCULAR
Status: DISCONTINUED | OUTPATIENT
Start: 2022-02-12 | End: 2022-02-12

## 2022-02-12 RX ORDER — METOPROLOL TARTRATE 5 MG/5ML
5 INJECTION INTRAVENOUS EVERY 6 HOURS
Status: DISCONTINUED | OUTPATIENT
Start: 2022-02-12 | End: 2022-02-13

## 2022-02-12 RX ADMIN — SODIUM CHLORIDE, PRESERVATIVE FREE 40 MG: 5 INJECTION INTRAVENOUS at 08:00

## 2022-02-12 RX ADMIN — PROCHLORPERAZINE EDISYLATE 5 MG: 5 INJECTION INTRAMUSCULAR; INTRAVENOUS at 17:31

## 2022-02-12 RX ADMIN — DEXMEDETOMIDINE HYDROCHLORIDE 1.5 MCG/KG/HR: 4 INJECTION, SOLUTION INTRAVENOUS at 02:07

## 2022-02-12 RX ADMIN — IPRATROPIUM BROMIDE AND ALBUTEROL SULFATE 3 ML: .5; 3 SOLUTION RESPIRATORY (INHALATION) at 13:42

## 2022-02-12 RX ADMIN — METOPROLOL TARTRATE 5 MG: 5 INJECTION INTRAVENOUS at 17:32

## 2022-02-12 RX ADMIN — SODIUM CHLORIDE, PRESERVATIVE FREE 10 ML: 5 INJECTION INTRAVENOUS at 21:42

## 2022-02-12 RX ADMIN — PHENOBARBITAL SODIUM 65 MG: 65 INJECTION INTRAMUSCULAR at 15:22

## 2022-02-12 RX ADMIN — SODIUM CHLORIDE, PRESERVATIVE FREE 10 ML: 5 INJECTION INTRAVENOUS at 05:10

## 2022-02-12 RX ADMIN — PHENOBARBITAL SODIUM 65 MG: 65 INJECTION INTRAMUSCULAR at 08:00

## 2022-02-12 RX ADMIN — DEXMEDETOMIDINE HYDROCHLORIDE 0.8 MCG/KG/HR: 4 INJECTION, SOLUTION INTRAVENOUS at 16:05

## 2022-02-12 RX ADMIN — QUETIAPINE FUMARATE 25 MG: 25 TABLET ORAL at 21:42

## 2022-02-12 RX ADMIN — METOPROLOL TARTRATE 5 MG: 5 INJECTION INTRAVENOUS at 11:20

## 2022-02-12 RX ADMIN — LORAZEPAM 2 MG: 2 INJECTION INTRAMUSCULAR; INTRAVENOUS at 23:24

## 2022-02-12 RX ADMIN — PHENOBARBITAL SODIUM 65 MG: 65 INJECTION INTRAMUSCULAR at 21:42

## 2022-02-12 RX ADMIN — LABETALOL HYDROCHLORIDE 10 MG: 5 INJECTION INTRAVENOUS at 05:11

## 2022-02-12 RX ADMIN — DEXMEDETOMIDINE HYDROCHLORIDE 0.7 MCG/KG/HR: 4 INJECTION, SOLUTION INTRAVENOUS at 21:37

## 2022-02-12 RX ADMIN — ARFORMOTEROL TARTRATE: 15 SOLUTION RESPIRATORY (INHALATION) at 20:15

## 2022-02-12 RX ADMIN — HALOPERIDOL LACTATE 5 MG: 5 INJECTION, SOLUTION INTRAMUSCULAR at 00:38

## 2022-02-12 RX ADMIN — DEXMEDETOMIDINE HYDROCHLORIDE 0.7 MCG/KG/HR: 4 INJECTION, SOLUTION INTRAVENOUS at 21:52

## 2022-02-12 RX ADMIN — SODIUM CHLORIDE 125 ML/HR: 9 INJECTION, SOLUTION INTRAVENOUS at 07:13

## 2022-02-12 RX ADMIN — DEXMEDETOMIDINE HYDROCHLORIDE 1.4 MCG/KG/HR: 4 INJECTION, SOLUTION INTRAVENOUS at 08:37

## 2022-02-12 RX ADMIN — INSULIN LISPRO 2 UNITS: 100 INJECTION, SOLUTION INTRAVENOUS; SUBCUTANEOUS at 07:47

## 2022-02-12 RX ADMIN — LORAZEPAM 4 MG: 2 INJECTION INTRAMUSCULAR; INTRAVENOUS at 00:57

## 2022-02-12 RX ADMIN — LORAZEPAM 2 MG: 2 INJECTION INTRAMUSCULAR; INTRAVENOUS at 16:58

## 2022-02-12 RX ADMIN — THIAMINE HYDROCHLORIDE: 100 INJECTION, SOLUTION INTRAMUSCULAR; INTRAVENOUS at 16:41

## 2022-02-12 RX ADMIN — SODIUM CHLORIDE 125 ML/HR: 9 INJECTION, SOLUTION INTRAVENOUS at 09:23

## 2022-02-12 RX ADMIN — IPRATROPIUM BROMIDE AND ALBUTEROL SULFATE 3 ML: .5; 3 SOLUTION RESPIRATORY (INHALATION) at 20:08

## 2022-02-12 RX ADMIN — IPRATROPIUM BROMIDE AND ALBUTEROL SULFATE 3 ML: .5; 3 SOLUTION RESPIRATORY (INHALATION) at 07:46

## 2022-02-12 RX ADMIN — DEXMEDETOMIDINE HYDROCHLORIDE 1.3 MCG/KG/HR: 4 INJECTION, SOLUTION INTRAVENOUS at 07:13

## 2022-02-12 RX ADMIN — ARFORMOTEROL TARTRATE: 15 SOLUTION RESPIRATORY (INHALATION) at 07:41

## 2022-02-12 RX ADMIN — SODIUM CHLORIDE, PRESERVATIVE FREE 10 ML: 5 INJECTION INTRAVENOUS at 13:48

## 2022-02-12 RX ADMIN — DEXMEDETOMIDINE HYDROCHLORIDE 1.3 MCG/KG/HR: 4 INJECTION, SOLUTION INTRAVENOUS at 05:10

## 2022-02-12 RX ADMIN — HALOPERIDOL 5 MG: 5 INJECTION INTRAMUSCULAR at 00:38

## 2022-02-12 RX ADMIN — METOPROLOL TARTRATE 5 MG: 5 INJECTION INTRAVENOUS at 23:27

## 2022-02-12 NOTE — PROGRESS NOTES
Spiritual Care Assessment/Progress Note  [facility]      NAME: Alana Church      MRN: [id number]  AGE: 61 y.o.  SEX: male  Denominational Affiliation: Shinto   Language: English     2/12/2022           Spiritual Assessment begun in [facility] INTERVNT[facility] CARE through conversation with:         []Patient        [] Family    [] Friend(s)        Reason for Consult: Initial/Spiritual assessment, critical care     Spiritual beliefs: (Please include comment if needed)     [] Identifies with a justa tradition:         [] Supported by a justa community:            [] Claims no spiritual orientation:           [] Seeking spiritual identity:                [] Adheres to an individual form of spirituality:           [x] Not able to assess:                           Identified resources for coping:      [] Prayer                               [] Music                  [] Guided Imagery     [] Family/friends                 [] Pet visits     [] Devotional reading                         [] Unknown     [] Other:                                              Interventions offered during this visit: (See comments for more details)    Patient Interventions: Initial visit,Other (comment) (Note left at bedside)     Family/Friend(s): Other (comment) (Note left at bedside)     Plan of Care:     [] Support spiritual and/or cultural needs    [] Support AMD and/or advance care planning process      [] Support grieving process   [] Coordinate Rites and/or Rituals    [] Coordination with community clergy   [] No spiritual needs identified at this time   [] Detailed Plan of Care below (See Comments)  [] Make referral to Music Therapy  [] Make referral to Pet Therapy     [] Make referral to Addiction services  [] Make referral to [facility]  [] Make referral to Spiritual Care Partner  [] No future visits requested        [] Contact Spiritual Care for further referrals     Comments: Reviewed patient's chart prior to visiting Mr Mike Villarreal in room 3005 for initial spiritual assessment. Mr Cortney Simms appeared to be sleeping and did not arouse during visit;  no family was present. Unable to do assessment at that time. Left note at bedside assuring patient and family of  availability for support. : . Raman Slater.  Antonette Hernandez; [facility], to contact [phone] call: [phone]

## 2022-02-12 NOTE — CONSULTS
This is a Telehealth ICU note. Consulted by [facility] Medicine for critical care evaluation of severe ETOH withdrawal.  62yoM h/o polysubstance abuse with ETOH, opioids, THC, crack/cocaine, peptic ulcer dz from NSAID use, MDD with suicidal ideation, thrombocytopenia, DM type 2, HTN, COPD admitted for ETOH withdrawal to the floor with worsening mental status with hallucinations and CIWA 12-14 requiring Ativan 1-2mg q1hr. Notable labs include K 3.2. EKG with QTc 400ms.     ROS: unable to obtain due to AMS    All: NKDA    Meds: reviewed in EMR    SH: no smoke, polysubstance abuse per HPI    PE under audio/video assessment revealed patient confused in mild distress, HEENT - oromucosa dry, Heart - NSR at 82bpm with /77, Lungs - symmetrical chest rise at 17/min with SaO2 97% on RA, Abd - soft nondistended, Ext - no edema, Neuromuscular - nonfocal    Assessment include 1) ETOH withdrawal, polysubstance abuse 2) Hypokalemia 3) COPD 4) DM 5) HTN 6) Thrombocytopenia for which I recommend ICU admission for 1) Precedex gtt, Ativan per CIWA protocol, cont Phenobarb and Seroquel, trial of Haldol while monitoring QTc, 2) F/u K replace prn 3) BD 4) insulin SS 5) Cont antihypertensives 6) monitor plt 7) GI prophy    45min CCT

## 2022-02-12 NOTE — PROGRESS NOTES
Progress Note     2/12/2022  PCP: Art Lanier NP     Assessment/Plan:     Martha Hardy a 61 y. o. male with a known history of multi-substance abuse including alcohol, opioids, THC, and h/o crack-cocaine use; PUD 2/2 long-term NSAID use, MDD w/ h/o suicidal ideation, thrombocytopenia, T2DM, HTN, and COPD who is admitted for acute EtOH withdrawal.    Overnight Events: CIWA's to upper teens overnight. Code [employer] called at [address]. Overnight team initiated Precedex drip (received max dose) and given 5 mg haldol per recommendation of Tele-intensivist. Soft restraints were placed. 10 mg IV labetalol was given for elevated BP. Telemetry: Sinus rhythm to 90's, notable artifact. QTc 432 msec.     EtOH withdrawal in s/o polysubstance abuse and AUD: POA CIWA 13, EtOH level 184, UDS (+) for THC. Patient's last drink was 2 beers (12 oz each) at 0800 on 2/10. He has a history of withdrawal seizures in the past requiring hospitalizations, but denies ever being intubated. Multiple admissions for EtOH withdrawal most recently from 1/17 to 1/20. Previous UDS's (+) for opiates, THC, barbituates. Patient has a 40+ year history of EtOH consumption (see SH above). Sent in per PCP. S/p 8 mg IV Ativan in ED. S/p haldol 5 mg on 2/12.   - (2/11- ) Precedex gtt  - Added Seroquel 25 mg TID for hallucinations  - Cardiac monitoring   - CIWA protocol with Ativan 2mg (8-11) and 4mg (>12)  - IV banana bag q24hrs at 125 ml/hr for fluid rehydration   - Phenobarb 65 TID  - IV compazine prn nausea   - IV Protonix 40 mg daily  - Seizure precautions  - Daily CMP, CBC, Mg     Concern for hematuria: Patient reports \"episodes\" of \"red droplets\" shortly after he finishes voiding. No recent injuries. Renal function appears to be at baseline. UA notable for trace blood and 300 protein.     Chronic Thrombocytopenia:  (B/L ~ 110). Likely sequela of hepatic steatosis as noted on CT Abd/Pelv on 1/17/2022 in s/o chronic EtOH abuse.    - Monitor daily on CBC  - Transfuse if <50 and bleeding or if < 10     Hx of COPD: POA CXR w/ NAP. He is not requiring supplemental O2, saturating well on room air. Notable scattery wheezing throughout which may be 2/2 polysubstance abuse. No evidence of acute exacerbation -- no increased dyspnea or change in cough character. - Sub home Advair w/ Brovana/Pulmicort inhaler BID  - Duo-nebs q4hrs, scheduled   - Mucinex BID     Hypokalemia: POA 3.4, mild. Likely 2/2 EtOH abuse.  - Replete PRN     Hypertension: BP on admission 160/89.  - Holding home medications of Lisinopril 20mg daily and metoprolol 50mg daily   - IV Metoprolol 5 mg q6h  - Will continue to monitor at this time and readjust as BP's trend     DMII: A1c 5.7 on 1/17/2022. POA Glucose 107. - Holding home medications of Metformin 1000 mg daily.  - Insulin Sliding Scale normal sensitivity with AC&HS glucose checks  - Hypoglycemia protocols ordered  - Goal glucose concentration >70, <140 pre-meal and <180 with all random glucoses     Prolonged QTc: Per chart review. POA EKG w/ QTc 432 msec.    - Avoid QT-prolonging agents including Zofran and quinolones     MDD w/ Anxiety and h/o Suicidal Ideation: Pt on no home meds. Prior h/o of Hydroxyzine for anxiety. No SI/HI. - Recommend f/u outpt for counseling and possible SSRI therapy     Nicotine Dependence: long term, daily tobacco-snuff use. - Nicotine patch 21mg/24hr as needed    Acute Lactic Acidosis (resolved): POA LA 3.8. Likely Type B in s/o EtOH abuse. No evidence of sepsis.      FEN/GI - Diabetic diet. Activity - Activity as tolerated w/ assist  DVT prophylaxis - SCDs  GI prophylaxis - Protonix  Fall prophylaxis - Fall precautions ordered. Disposition - Admit to Tele. Plan to d/c to Home. Consulting PT, OT and CM  Code Status - Full. Discussed with patient / caregivers.   Next Romain Ling Name and Tk Adam (Girlfriend) - [phone]     CODE STATUS:  Full Code     Goldy Litter discussed with Dr. Lela Davidson. Subjective:   Pt was seen and examined at bedside. Sedated. Objective:   Physical examination  Patient Vitals for the past 24 hrs:   Temp Pulse Resp BP SpO2   22 0830 -- 86 23 126/85 98 %   22 0800 -- 79 20 -- 99 %   22 0745 -- 77 21 -- 97 %   22 0741 -- -- -- -- 98 %   22 0730 -- 73 21 -- 96 %   22 0715 -- 81 22 (!) 164/105 95 %   22 0705 -- 73 -- -- --   22 0700 97.4 °F (36.3 °C) 77 22 (!) 166/94 95 %   22 0600 -- 78 22 (!) 167/93 94 %   22 0530 -- 80 22 (!) 168/96 95 %   22 0500 -- 86 23 (!) 165/99 96 %   22 0430 -- 84 21 (!) 175/97 97 %   22 0400 97.4 °F (36.3 °C) 78 23 (!) 175/99 96 %   22 0330 -- 79 21 (!) 173/92 96 %   22 0300 -- 81 23 (!) 164/94 96 %   22 0230 -- 80 22 (!) 162/95 96 %   22 0200 -- 83 22 (!) 153/93 95 %   22 0130 -- 85 23 (!) 147/94 93 %   22 0100 -- 86 17 (!) 157/91 98 %   22 0000 97.9 °F (36.6 °C) 88 25 (!) 157/77 95 %   22 2314 -- 84 -- -- --   227 -- 84 24 -- 95 %   22 2200 -- 91 -- 132/80 96 %   22 2130 98 °F (36.7 °C) 82 20 119/70 96 %   22 2129 97.8 °F (36.6 °C) 82 18 119/70 97 %   22 2040 97.9 °F (36.6 °C) 87 16 126/77 95 %   22 1923 -- -- -- -- 95 %   22 1839 -- -- -- 130/87 --   22 1731 97.9 °F (36.6 °C) 90 16 (!) 140/94 95 %   22 1524 97.8 °F (36.6 °C) 94 16 121/80 94 %   22 1456 97.7 °F (36.5 °C) 93 15 126/83 96 %   22 1331 97.6 °F (36.4 °C) 80 16 123/84 98 %   22 1021 97.6 °F (36.4 °C) 74 17 (!) 135/95 97 %      Temp (24hrs), Av.7 °F (36.5 °C), Min:97.4 °F (36.3 °C), Max:98 °F (36.7 °C)     O2 Flow Rate (L/min): 2 l/min   O2 Device: Nasal cannula    Date 22 - 22 - 22 0659   Shift [id number] 24 Hour Total 1995-5315 4520-5496 24 Hour Total   INTAKE   P.O. 480  480        P. O. 480  480 I.V.(mL/kg/hr) 2250(1.9) 1005.7(0.8) 3255.7(1.4) 412.6  412.6     I.V. 0  0        Precedex Volume  230.7 230.7 64.7  64.7     Volume (0.9% sodium chloride 1,000 mL with mvi (adult no. 4 with vit K) 10 mL, thiamine 541 mg, folic acid 1 mg infusion) 1769 044 0199 250  250     Volume (0.9% sodium chloride infusion) 3760 042 0976 97.9  97.9   Blood 0  0        Autotransfused 0  0      Other 0  0        Other 0  0      Shift Total(mL/kg) 9393(75.0) 1005. 7(10.2) 3735. 7(37.7) 412.6(4.2)  412.6(4.2)   OUTPUT   Urine(mL/kg/hr) 1050(0.9) 2650(2.2) 3700(1.6) 550  550     Urine Voided 1050 2650 3700        Urine Occurrence(s) 3 x  3 x        Urine Output (mL) (External Urinary Catheter 02/12/22)    550  550   Stool           Stool Occurrence(s) 1 x  1 x      Shift Total(mL/kg) 1050(10.6) 2650(26.8) 3700(37.4) 550(5.6)  550(5.6)   NET 1680 -1644.3 35.7 -137.4  -137.4   Weight (kg) 99 99 99 99 99 99     General:   Sedated   Head:   Atraumatic   Lungs:   Clear to auscultation bilaterally    Heart:   Normal rate, regular rhythm, no murmur, rubs or gallops   Abdomen:    Soft, No masses or organomegaly    Extremities:  No edema or DVT signs   Pulses:  Symmetric all extremities   Skin:  Warm and dry    No rashes or lesions   Neurologic:  Sedated     Data Review:     CBC:  Recent Labs     02/12/22  0054 02/11/22  0828 02/10/22  1050   WBC 2.6* 3.1* 3.8*   HGB 13.2 13.1 15.9   HCT 37.4 37.4 43.0   PLT 76* 63* 891*     Metabolic Panel:  Recent Labs     02/12/22  0054 02/11/22  0758 02/10/22  1050    136 137   K 3.6 3.2* 3.4*    104 98   CO2 24 27 24   BUN 4* 8 8   CREA 0.67* 0.67* 0.80   * 106* 107*   CA 8.1* 7.8* 8.8   MG  --  1.8 2.1   ALB 3.1* 2.9* 3.9   TBILI 1.4* 1.4* 1.7*   ALT 29 26 43     Micro:  No results found for: CULT  Imaging:  CT ABD PELV W WO CONT    Result Date: 1/17/2022  EXAM: CT ABD PELV W WO CONT INDICATION: epigastric pain, r/o pancreatitis COMPARISON: Ultrasound 9/4/2017. CT 9/2/2017. Joann Hernandez CONTRAST: 100 mL of Isovue-370. TECHNIQUE:  Multislice helical CT was performed from the diaphragm to the iliac crest prior to intravenous contrast administration and from the diaphragm to the symphysis pubis during uneventful rapid bolus intravenous contrast administration, during the hepatic arterial, portal venous and delayed venous phases. Oral contrast was not administered. Contiguous 5 mm axial images were reconstructed and lung and soft tissue windows were generated. Coronal and sagittal reformations were generated. CT dose reduction was achieved through use of a standardized protocol tailored for this examination and automatic exposure control for dose modulation. The lack of oral contrast material diminishes the capacity of CT to evaluate the bowel and adjacent structures. FINDINGS: LOWER THORAX: Several bilateral subpleural nodules measuring up to 4 mm in size appear unchanged in number and size. LIVER: Diffuse severe hepatic steatosis. No hepatic mass or intrahepatic bile duct dilation demonstrated. BILIARY TREE: Gallbladder is within normal limits. CBD is not dilated. SPLEEN: within normal limits. PANCREAS: No mass or ductal dilatation. No CT imaging evidence of pancreatitis. ADRENALS: Unremarkable. KIDNEYS: No mass, calculus, or hydronephrosis. STOMACH: Unremarkable. SMALL BOWEL: No dilatation or wall thickening. COLON: Anastomotic suture ring at proximal sigmoid region. Transverse and descending colonic diverticulosis. No mural thickening or dilation demonstrated. APPENDIX: Normal. PERITONEUM: No ascites or pneumoperitoneum. RETROPERITONEUM: No lymphadenopathy or aortic aneurysm. REPRODUCTIVE ORGANS: Those shown appear unremarkable. URINARY BLADDER: No mass or calculus. BONES: No destructive bone lesion. ABDOMINAL WALL: No mass or hernia. ADDITIONAL COMMENTS: N/A     No evidence for acute pancreatitis or other acute finding. Details can be found in report.     XR CHEST PORT    Result Date: 2/10/2022  EXAM:  XR CHEST PORT INDICATION:  cough COMPARISON:  1/17/2022 FINDINGS: A portable AP radiograph of the chest was obtained at 1059 hours. The patient is on a cardiac monitor. The lungs are clear. The cardiac and mediastinal contours and pulmonary vascularity are normal.  The bones and soft tissues are grossly within normal limits. No acute abnormality     XR CHEST PORT    Result Date: 1/17/2022  EXAM:  XR CHEST PORT INDICATION: Wheezing, cough COMPARISON: Chest x-ray 1/6/2021. TECHNIQUE: Single frontal view of the chest. FINDINGS: No lobar consolidation, pleural effusion, or pneumothorax. The cardiomediastinal silhouette is not enlarged. No acute or aggressive osseous lesion. 1.  No evidence of an acute cardiopulmonary process. Medications reviewed  Current Facility-Administered Medications   Medication Dose Route Frequency    metoprolol (LOPRESSOR) injection 5 mg  5 mg IntraVENous Q6H    albuterol-ipratropium (DUO-NEB) 2.5 MG-0.5 MG/3 ML  3 mL Nebulization Q6H RT    QUEtiapine (SEROquel) tablet 25 mg  25 mg Oral TID    dexmedeTOMidine in 0.9 % NaCl (PRECEDEX) 400 mcg/100 mL (4 mcg/mL) infusion soln  0.1-1.5 mcg/kg/hr IntraVENous TITRATE    LORazepam (ATIVAN) injection 2 mg  2 mg IntraVENous Q1H PRN    LORazepam (ATIVAN) injection 4 mg  4 mg IntraVENous Q1H PRN    [Held by provider] folic acid (FOLVITE) tablet 1 mg  1 mg Oral DAILY    [Held by provider] thiamine mononitrate (B-1) tablet 100 mg  100 mg Oral DAILY    0.9% sodium chloride 1,000 mL with mvi (adult no. 4 with vit K) 10 mL, thiamine 483 mg, folic acid 1 mg infusion   IntraVENous Q24H    0.9% sodium chloride infusion  125 mL/hr IntraVENous CONTINUOUS    sodium chloride (NS) flush 5-40 mL  5-40 mL IntraVENous Q8H    sodium chloride (NS) flush 5-40 mL  5-40 mL IntraVENous PRN    acetaminophen (TYLENOL) tablet 650 mg  650 mg Oral Q6H PRN    Or    acetaminophen (TYLENOL) suppository 650 mg  650 mg Rectal Q6H PRN    PHENobarbital (LUMINAL) injection 65 mg  65 mg IntraVENous TID    pantoprazole (PROTONIX) 40 mg in 0.9% sodium chloride 10 mL injection  40 mg IntraVENous DAILY    insulin lispro (HUMALOG) injection   SubCUTAneous AC&HS    glucose chewable tablet 16 g  4 Tablet Oral PRN    dextrose (D50W) injection syrg 12.5-25 g  12.5-25 g IntraVENous PRN    glucagon (GLUCAGEN) injection 1 mg  1 mg IntraMUSCular PRN    arformoterol 15 mcg/budesonide 0.25 mg neb solution   Nebulization BID RT    [Held by provider] guaiFENesin ER (MUCINEX) tablet 600 mg  600 mg Oral Q12H    [Held by provider] lisinopriL (PRINIVIL, ZESTRIL) tablet 20 mg  20 mg Oral DAILY    [Held by provider] metoprolol tartrate (LOPRESSOR) tablet 50 mg  50 mg Oral BID    nicotine (NICODERM CQ) 21 mg/24 hr patch 1 Patch  1 Patch TransDERmal DAILY         Signed:   Reba Block MD   Resident, Family Medicine      Attending note: Attending note to follow. ..

## 2022-02-12 NOTE — ROUTINE PROCESS
1939  Patient complaining that he feels like he is not getting the help he needs. Stated he went to a facility for alcohol withdrawal but was not successful because he believes he has mental problems. States he has been in ICU before and has had to be \"tied down. \"  Patient states he feels this is the calm before the storm. 2012  Patient states he is now seeing things. 2036  Patient states hallucinations are not as bad as they were last time he was here. States he feels anxious and jumpy. State he feels \"bad. \"    2104  Update provided to Dr. Manuela Mcfarlane. 2108  Responded to bed exit alarm. Patient stating he needs to urinate. 2140  Patient becoming more restless in bed. Tearful. Awaiting a clean bed in ICU. Updated Dr. Manuela Mcfarlane. 2200  Responded to bed alarm. Patient attempting to get out of bed. Found out he was being moved and is not being argumentative and cussing. 2203  TRANSFER - OUT REPORT:    Verbal report given to [address] [place] [address] (name) on [facility]  being transferred to ICU (unit) for change in patient condition(Increased CIWA)       Report consisted of patients Situation, Background, Assessment and   Recommendations(SBAR). Information from the following report(s) SBAR, Kardex, ED Summary, Procedure Summary, Intake/Output, MAR, Recent Results and Cardiac Rhythm NSR was reviewed with the receiving nurse. Lines:   Peripheral IV 02/10/22 Posterior;Right Hand (Active)   Site Assessment Clean, dry, & intact 02/11/22 1524   Phlebitis Assessment 0 02/11/22 1524   Infiltration Assessment 0 02/11/22 1524   Dressing Status Clean, dry, & intact 02/11/22 1524   Dressing Type Transparent 02/11/22 1524   Hub Color/Line Status Blue 02/11/22 1524   Action Taken Open ports on tubing capped 02/11/22 1524   Alcohol Cap Used Yes 02/11/22 1524       Peripheral IV 02/11/22 Distal;Right (Active)        Opportunity for questions and clarification was provided.       Patient transported with:   Registered Nurse  [facility]

## 2022-02-12 NOTE — PROGRESS NOTES
Lilly Connor called at [facility]. Patient at ~48 hours since last drink. Currently receiving phenobarb 65 TID, ativan 2/4mg prn, and maximum rate of precedex gtt. Patient agitated and pulling out IVs.  He is alert but not oriented. Haldol 5mg given once. Will place order for soft restraints to prevent pulling of lines. Consider increasing phenobarb tomorrow if patient remains agitated.

## 2022-02-12 NOTE — PROGRESS NOTES
Problem: Alcohol Withdrawal  Goal: *STG: Participates in treatment plan  Outcome: Progressing Towards Goal  Goal: *STG: Remains safe in hospital  Outcome: Progressing Towards Goal  Goal: *STG: Seeks staff when symptoms of withdrawal increase  Outcome: Progressing Towards Goal  Goal: *STG: Complies with medication therapy  Outcome: Progressing Towards Goal  Goal: *STG: Attends activities and groups  Outcome: Progressing Towards Goal  Goal: *STG: Will identify negative impact of chemical dependency including the use of tobacco, alcohol, and other substances  Outcome: Progressing Towards Goal  Goal: *STG: Verbalizes abstinence as an achievable goal  Outcome: Progressing Towards Goal  Goal: *STG: Agrees to participate in outpatient after care program to support ongoing mental health  Outcome: Progressing Towards Goal  Goal: *STG: Able to indentify relapse triggers including interpersonal/social and familial factors  Outcome: Progressing Towards Goal  Goal: *STG: Identify lifestyle changes to support long term sobriety such as vocation, employment, education, and legal issues  Outcome: Progressing Towards Goal  Goal: *STG: Maintains appropriate nutrition and hydration  Outcome: Progressing Towards Goal  Goal: *STG: Vital signs within defined limits  Outcome: Progressing Towards Goal  Goal: *STG/LTG: Relapse prevention plan in place to include housing/aftercare, leisure activities, and spirituality  Outcome: Progressing Towards Goal  Goal: Interventions  Outcome: Progressing Towards Goal     Problem: Patient Education: Go to Patient Education Activity  Goal: Patient/Family Education  Outcome: Progressing Towards Goal     Problem: Pressure Injury - Risk of  Goal: *Prevention of pressure injury  Description: Document Parker Scale and appropriate interventions in the flowsheet.   Outcome: Progressing Towards Goal  Note: Pressure Injury Interventions:       Moisture Interventions: Absorbent underpads    Activity Interventions: Increase time out of bed    Mobility Interventions: Assess need for specialty bed,Float heels,PT/OT evaluation,Turn and reposition approx. every two hours(pillow and wedges)    Nutrition Interventions: Document food/fluid/supplement intake                     Problem: Patient Education: Go to Patient Education Activity  Goal: Patient/Family Education  Outcome: Progressing Towards Goal     Problem: Falls - Risk of  Goal: *Absence of Falls  Description: Document Joan Julian Fall Risk and appropriate interventions in the flowsheet.   Outcome: Progressing Towards Goal  Note: Fall Risk Interventions:  Mobility Interventions: Assess mobility with egress test,Bed/chair exit alarm,Communicate number of staff needed for ambulation/transfer,Patient to call before getting OOB         Medication Interventions: Bed/chair exit alarm,Teach patient to arise slowly,Patient to call before getting OOB    Elimination Interventions: Bed/chair exit alarm,Call light in reach,Patient to call for help with toileting needs,Stay With Me (per policy)              Problem: Patient Education: Go to Patient Education Activity  Goal: Patient/Family Education  Outcome: Progressing Towards Goal     Problem: Patient Education: Go to Patient Education Activity  Goal: Patient/Family Education  Outcome: Progressing Towards Goal     Problem: Patient Education: Go to Patient Education Activity  Goal: Patient/Family Education  Outcome: Progressing Towards Goal

## 2022-02-12 NOTE — PROGRESS NOTES
[address] [place] [address] with [address] [place] [address]     Resident Progress Note in Brief    S:   Patient seen and examined at bedside. Patient is sleeping comfortably. O:    Vital Signs:  Visit Vitals  BP (!) 148/89   Pulse 83   Temp 98.5 °F (36.9 °C)   Resp 20   Ht 6' 2\" (1.88 m)   Wt 218 lb 4.1 oz (99 kg)   SpO2 98%   BMI 28.02 kg/m²          Intake/Output:     Intake/Output Summary (Last 24 hours) at 2/12/2022 1829  Last data filed at 2/12/2022 1600  Gross per 24 hour   Intake 3156.46 ml   Output 4200 ml   Net -1043.54 ml       LABS AND  DATA: Personally reviewed  Recent Labs     02/12/22 0054 02/11/22  0828   WBC 2.6* 3.1*   HGB 13.2 13.1   HCT 37.4 37.4   PLT 76* 63*     Recent Labs     02/12/22 0054 02/11/22  0758    136   K 3.6 3.2*    104   CO2 24 27   BUN 4* 8   CREA 0.67* 0.67*   * 106*   CA 8.1* 7.8*   MG 1.8 1.8     Recent Labs     02/12/22  0054 02/11/22  0758 02/10/22  1050 02/10/22  1050   AP 49 44*   < > 63   TP 5.8* 5.4*   < > 7.2   ALB 3.1* 2.9*   < > 3.9   GLOB 2.7 2.5   < > 3.3   LPSE  --   --   --  163    < > = values in this interval not displayed. Recent Labs     02/12/22  0941   INR 1.0   PTP 10.6        Physical Examination  General: Sleeping. No acute distress. Head: Normocephalic. Atraumatic. Respiratory: CTAB. No w/r/r. No accessory muscle use. Cardiovascular: Difficult to assess heart sounds due to snoring. No murmurs. GI: Soft. Nondistended. Neuro: No focal deficits. Skin: Warm. No rashes. No jaundice or cyanosis. A/P:   Alana Church is a 61 y.o. male with PMHx of polysubstance abuse, HTN, MDD, DMII, nicotine dependence who is admitted for alcohol withdrawal.     Alcohol withdrawal in s/o polysubstance abuse: Last drink 2/10 at 8AM. Hx of withdrawal seizures in the past. Started on Precedex gtt (2/11) due to worsening agitation, hallucinations, & disorientation. S/p Haldol 5mg (2/12).   - IV Phenobarb 65mg TID  - Seroquel 25mg TID for hallucinations   - CIWA w/ Ativan: 2mg (8-11) & 4mg (>12)   - MIVF   - Seizure precautions           Please see daily progress note for detailed plan.      Abebe Oh MD  Family Medicine Resident

## 2022-02-12 NOTE — ROUTINE PROCESS
Bedside shift change report given to Ziyad Lee RN (oncoming nurse) by Lesley Caputo RN (offgoing nurse). Report included the following information SBAR, Kardex, ED Summary, Intake/Output, MAR, Accordion, Recent Results, Med Rec Status and Cardiac Rhythm NSR, PVCs.

## 2022-02-12 NOTE — PROGRESS NOTES
Patient already admitted by our service earlier this am.  Reviewed med records and clinical progression to date.   Discussed case with night nurse and daytime team.    S: ongoing delerium and withdraw sx; on both enteral and IV agents    O:Vitals:        Temp: 97.4 °F (36.3 °C)  Temp Source: Axillary  Pulse (Heart Rate): 86  Heart Rate Source: Monitor  Level of Consciousness: Alert (0)  BP: 126/85  MAP (Calculated): 99  BP 1 Location: Left arm  BP 1 Method: Automatic  BP Patient Position: At rest  Resp Rate: 23  O2 Sat (%): 98 %  O2 Device: Nasal cannula  O2 Flow Rate (L/min): 2 l/min  MEWS Score: 2      gen-awake but lethargic; spont agitation followed by somnolence again  Head - nc at  ent - normal external anatomy  Eyes - closed  cvs - sinus rhythm without external evidence of hypoperfusion  pulm - normal wob  msk - normal bulk  Skin - normal color and intact where visible  Neuro - moves all ext; does not follow commands; no tremor    Labs & Vitals  Vitals Value   O2 device Nasal cannula   FIO2     Temp 97.4 °F (36.3 °C)   Labs Value   Platelets Lab Results   Component Value Date/Time    PLATELET 76 (L) 05/28/4002 12:54 AM      Hemoglobin Lab Results   Component Value Date/Time    HGB 13.2 02/12/2022 12:54 AM      WBC Lab Results   Component Value Date/Time    WBC 2.6 (L) 02/12/2022 12:54 AM      Procalcitonin Lab Results   Component Value Date/Time    Procalcitonin <0.05 01/17/2022 07:48 PM        Alcohol Withdraw  Delirium   Polysubstance Abuse  COPD  Thrombocytopenia  DM  HTN       Neuro/psych - cont current regimen for withdrae delirium; may be able to increase scheduled agents and come down on prn and gtt needs    cvs - monitor for rebound htn and can increase BB dose  - given h/o DM can continue the ACEi so long as renal function permits  - may also benefit from asa and if lfts permit, statin    pulm - h/o COPD but no evidence of AE  - aspiration precautions    Gi - denied complaints    gu - at low to intermediate risk for CAIN, mostly from prerenal/dehydraion    Heme - no evidence of hemorrhage and will cont dvt proph  Monitor for worsening thrombocytopenia (suspect re;ated to nutrition and etoh abuse)    Id - no compeling evidence of acute bacterial infection    Endo - monitor BS and adjjust scheduled regimen if needed    Fen - monitor and correct electrolytes as needed    CCT 35 minutes excluding teaching and procedures  I performed all aspects of the physical examination via Telemedicine associated with two way audio and video communication and with the on-site assistance of the bedside nurse. I am located in [place] and the patient is located in [place] at [address] [place] [address]   The patient is critically ill in the ICU. I  personally  reviewed the pertinent medical records, laboratory/ pathology data and radiographic images. The decision making regarding this patient is as documented above, which was generated  following  discussion  with the multidisciplinary ICU team and creation of a treatment plan for  the patient. We discussed the patient's interval history and future coordination of care and  plans. The patient's medications  were reviewed and changes made as stipulated above. Due to  critical illness impairing one or more vital organs of this patient resulting in life threatening clinical situation  I have provided direct, frequent personal  assessment and manipulation in management plan.

## 2022-02-12 NOTE — ROUTINE PROCESS
0700 - Bedside shift change report given to Sirena Zimmerman RN (oncoming nurse) by Gely Farley RN (offgoing nurse). Report included the following information SBAR, Kardex, ED Summary, Intake/Output, MAR, Accordion, Recent Results, Med Rec Status and Cardiac Rhythm NSR.     0720 - RN spoke with family medicine. Notified them that pt is on high dose of precedex and not oriented enough to have oral intake. Pt made NPO at this time. Robert Dougherty MD at bedside, notified him of elevated BP and that we are holding PO lisinopril. MD to place PRN medications, waiting for orders to follow. 1700 - pt woke up briefly. Stated he felt like he is dying. Pt reports nausea & HA. Alert & oriented to self, place & situation. Pt very redirectable, pleasant and calm. Pt given ice chips. CIWA score 8. Will give PRN ativan. 200 - RN called brother that was listed in chart to provide update. Brother had no idea pt had been admitted to the hospital again. RN apologized and answered all questions.

## 2022-02-13 LAB
ALBUMIN SERPL-MCNC: 3 G/DL (ref 3.5–5)
ALBUMIN/GLOB SERPL: 1 {RATIO} (ref 1.1–2.2)
ALP SERPL-CCNC: 54 U/L (ref 45–117)
ALT SERPL-CCNC: 33 U/L (ref 12–78)
ANION GAP SERPL CALC-SCNC: 6 MMOL/L (ref 5–15)
AST SERPL-CCNC: 29 U/L (ref 15–37)
ATRIAL RATE: 80 BPM
ATRIAL RATE: 91 BPM
BASOPHILS # BLD: 0 K/UL (ref 0–0.1)
BASOPHILS NFR BLD: 0 % (ref 0–1)
BILIRUB SERPL-MCNC: 1.5 MG/DL (ref 0.2–1)
BUN SERPL-MCNC: 4 MG/DL (ref 6–20)
BUN/CREAT SERPL: 6 (ref 12–20)
CALCIUM SERPL-MCNC: 8.3 MG/DL (ref 8.5–10.1)
CALCULATED P AXIS, ECG09: 45 DEGREES
CALCULATED P AXIS, ECG09: 69 DEGREES
CALCULATED R AXIS, ECG10: 28 DEGREES
CALCULATED R AXIS, ECG10: 47 DEGREES
CALCULATED T AXIS, ECG11: 42 DEGREES
CALCULATED T AXIS, ECG11: 53 DEGREES
CHLORIDE SERPL-SCNC: 105 MMOL/L (ref 97–108)
CO2 SERPL-SCNC: 26 MMOL/L (ref 21–32)
CREAT SERPL-MCNC: 0.64 MG/DL (ref 0.7–1.3)
DIAGNOSIS, 93000: NORMAL
DIAGNOSIS, 93000: NORMAL
DIFFERENTIAL METHOD BLD: ABNORMAL
EOSINOPHIL # BLD: 0.1 K/UL (ref 0–0.4)
EOSINOPHIL NFR BLD: 2 % (ref 0–7)
ERYTHROCYTE [DISTWIDTH] IN BLOOD BY AUTOMATED COUNT: 14 % (ref 11.5–14.5)
GLOBULIN SER CALC-MCNC: 3 G/DL (ref 2–4)
GLUCOSE BLD STRIP.AUTO-MCNC: 101 MG/DL (ref 65–117)
GLUCOSE BLD STRIP.AUTO-MCNC: 109 MG/DL (ref 65–117)
GLUCOSE BLD STRIP.AUTO-MCNC: 127 MG/DL (ref 65–117)
GLUCOSE BLD STRIP.AUTO-MCNC: 131 MG/DL (ref 65–117)
GLUCOSE SERPL-MCNC: 110 MG/DL (ref 65–100)
HCT VFR BLD AUTO: 39.9 % (ref 36.6–50.3)
HGB BLD-MCNC: 14.3 G/DL (ref 12.1–17)
IMM GRANULOCYTES # BLD AUTO: 0 K/UL (ref 0–0.04)
IMM GRANULOCYTES NFR BLD AUTO: 0 % (ref 0–0.5)
LYMPHOCYTES # BLD: 0.7 K/UL (ref 0.8–3.5)
LYMPHOCYTES NFR BLD: 22 % (ref 12–49)
MAGNESIUM SERPL-MCNC: 1.7 MG/DL (ref 1.6–2.4)
MCH RBC QN AUTO: 34.4 PG (ref 26–34)
MCHC RBC AUTO-ENTMCNC: 35.8 G/DL (ref 30–36.5)
MCV RBC AUTO: 95.9 FL (ref 80–99)
MONOCYTES # BLD: 0.4 K/UL (ref 0–1)
MONOCYTES NFR BLD: 13 % (ref 5–13)
NEUTS SEG # BLD: 2 K/UL (ref 1.8–8)
NEUTS SEG NFR BLD: 63 % (ref 32–75)
NRBC # BLD: 0 K/UL (ref 0–0.01)
NRBC BLD-RTO: 0 PER 100 WBC
P-R INTERVAL, ECG05: 166 MS
P-R INTERVAL, ECG05: 192 MS
PLATELET # BLD AUTO: 97 K/UL (ref 150–400)
PMV BLD AUTO: 9.8 FL (ref 8.9–12.9)
POTASSIUM SERPL-SCNC: 3.4 MMOL/L (ref 3.5–5.1)
PROT SERPL-MCNC: 6 G/DL (ref 6.4–8.2)
Q-T INTERVAL, ECG07: 352 MS
Q-T INTERVAL, ECG07: 354 MS
QRS DURATION, ECG06: 84 MS
QRS DURATION, ECG06: 90 MS
QTC CALCULATION (BEZET), ECG08: 408 MS
QTC CALCULATION (BEZET), ECG08: 432 MS
RBC # BLD AUTO: 4.16 M/UL (ref 4.1–5.7)
RBC MORPH BLD: ABNORMAL
SERVICE CMNT-IMP: ABNORMAL
SERVICE CMNT-IMP: ABNORMAL
SERVICE CMNT-IMP: NORMAL
SERVICE CMNT-IMP: NORMAL
SODIUM SERPL-SCNC: 137 MMOL/L (ref 136–145)
VENTRICULAR RATE, ECG03: 80 BPM
VENTRICULAR RATE, ECG03: 91 BPM
WBC # BLD AUTO: 3.2 K/UL (ref 4.1–11.1)

## 2022-02-13 PROCEDURE — 74011250636 HC RX REV CODE- 250/636: Performed by: STUDENT IN AN ORGANIZED HEALTH CARE EDUCATION/TRAINING PROGRAM

## 2022-02-13 PROCEDURE — 83735 ASSAY OF MAGNESIUM: CPT

## 2022-02-13 PROCEDURE — 36415 COLL VENOUS BLD VENIPUNCTURE: CPT

## 2022-02-13 PROCEDURE — 80053 COMPREHEN METABOLIC PANEL: CPT

## 2022-02-13 PROCEDURE — 94640 AIRWAY INHALATION TREATMENT: CPT

## 2022-02-13 PROCEDURE — 2709999900 HC NON-CHARGEABLE SUPPLY

## 2022-02-13 PROCEDURE — C9113 INJ PANTOPRAZOLE SODIUM, VIA: HCPCS

## 2022-02-13 PROCEDURE — 74011250637 HC RX REV CODE- 250/637

## 2022-02-13 PROCEDURE — 74011000250 HC RX REV CODE- 250: Performed by: STUDENT IN AN ORGANIZED HEALTH CARE EDUCATION/TRAINING PROGRAM

## 2022-02-13 PROCEDURE — 82962 GLUCOSE BLOOD TEST: CPT

## 2022-02-13 PROCEDURE — 74011000250 HC RX REV CODE- 250: Performed by: FAMILY MEDICINE

## 2022-02-13 PROCEDURE — 65610000006 HC RM INTENSIVE CARE

## 2022-02-13 PROCEDURE — 74011000250 HC RX REV CODE- 250

## 2022-02-13 PROCEDURE — 74011250636 HC RX REV CODE- 250/636

## 2022-02-13 PROCEDURE — 99233 SBSQ HOSP IP/OBS HIGH 50: CPT | Performed by: FAMILY MEDICINE

## 2022-02-13 PROCEDURE — 74011250637 HC RX REV CODE- 250/637: Performed by: STUDENT IN AN ORGANIZED HEALTH CARE EDUCATION/TRAINING PROGRAM

## 2022-02-13 PROCEDURE — 74011250636 HC RX REV CODE- 250/636: Performed by: EMERGENCY MEDICINE

## 2022-02-13 PROCEDURE — 85025 COMPLETE CBC W/AUTO DIFF WBC: CPT

## 2022-02-13 RX ORDER — LABETALOL HYDROCHLORIDE 5 MG/ML
10 INJECTION, SOLUTION INTRAVENOUS
Status: DISCONTINUED | OUTPATIENT
Start: 2022-02-13 | End: 2022-02-15 | Stop reason: HOSPADM

## 2022-02-13 RX ORDER — PHENOBARBITAL SODIUM 65 MG/ML
90 INJECTION INTRAMUSCULAR 3 TIMES DAILY
Status: DISCONTINUED | OUTPATIENT
Start: 2022-02-13 | End: 2022-02-13

## 2022-02-13 RX ORDER — THERA TABS 400 MCG
1 TAB ORAL DAILY
Status: DISCONTINUED | OUTPATIENT
Start: 2022-02-14 | End: 2022-02-15 | Stop reason: HOSPADM

## 2022-02-13 RX ORDER — ASPIRIN 325 MG/1
100 TABLET, FILM COATED ORAL DAILY
Status: DISCONTINUED | OUTPATIENT
Start: 2022-02-14 | End: 2022-02-15 | Stop reason: HOSPADM

## 2022-02-13 RX ORDER — PANTOPRAZOLE SODIUM 40 MG/1
40 TABLET, DELAYED RELEASE ORAL
Status: DISCONTINUED | OUTPATIENT
Start: 2022-02-14 | End: 2022-02-15 | Stop reason: HOSPADM

## 2022-02-13 RX ORDER — POTASSIUM CHLORIDE 7.45 MG/ML
10 INJECTION INTRAVENOUS
Status: COMPLETED | OUTPATIENT
Start: 2022-02-13 | End: 2022-02-13

## 2022-02-13 RX ORDER — PHENOBARBITAL 32.4 MG/1
97.2 TABLET ORAL 3 TIMES DAILY
Status: DISCONTINUED | OUTPATIENT
Start: 2022-02-13 | End: 2022-02-15 | Stop reason: HOSPADM

## 2022-02-13 RX ORDER — QUETIAPINE FUMARATE 25 MG/1
50 TABLET, FILM COATED ORAL 3 TIMES DAILY
Status: DISCONTINUED | OUTPATIENT
Start: 2022-02-13 | End: 2022-02-14

## 2022-02-13 RX ORDER — THERA TABS 400 MCG
1 TAB ORAL DAILY
Status: DISCONTINUED | OUTPATIENT
Start: 2022-02-14 | End: 2022-02-13

## 2022-02-13 RX ORDER — FOLIC ACID 1 MG/1
1 TABLET ORAL DAILY
Status: DISCONTINUED | OUTPATIENT
Start: 2022-02-14 | End: 2022-02-15 | Stop reason: HOSPADM

## 2022-02-13 RX ADMIN — IPRATROPIUM BROMIDE AND ALBUTEROL SULFATE 3 ML: .5; 3 SOLUTION RESPIRATORY (INHALATION) at 20:14

## 2022-02-13 RX ADMIN — POTASSIUM CHLORIDE 10 MEQ: 10 INJECTION, SOLUTION INTRAVENOUS at 07:47

## 2022-02-13 RX ADMIN — LORAZEPAM 2 MG: 2 INJECTION INTRAMUSCULAR; INTRAVENOUS at 17:17

## 2022-02-13 RX ADMIN — LORAZEPAM 2 MG: 2 INJECTION INTRAMUSCULAR; INTRAVENOUS at 08:31

## 2022-02-13 RX ADMIN — METOPROLOL TARTRATE 50 MG: 50 TABLET, FILM COATED ORAL at 17:10

## 2022-02-13 RX ADMIN — SODIUM CHLORIDE, PRESERVATIVE FREE 10 ML: 5 INJECTION INTRAVENOUS at 21:08

## 2022-02-13 RX ADMIN — METOPROLOL TARTRATE 5 MG: 5 INJECTION INTRAVENOUS at 11:48

## 2022-02-13 RX ADMIN — POTASSIUM CHLORIDE 10 MEQ: 10 INJECTION, SOLUTION INTRAVENOUS at 08:54

## 2022-02-13 RX ADMIN — SODIUM CHLORIDE, PRESERVATIVE FREE 10 ML: 5 INJECTION INTRAVENOUS at 05:20

## 2022-02-13 RX ADMIN — QUETIAPINE FUMARATE 25 MG: 25 TABLET ORAL at 08:31

## 2022-02-13 RX ADMIN — POTASSIUM CHLORIDE 10 MEQ: 10 INJECTION, SOLUTION INTRAVENOUS at 10:06

## 2022-02-13 RX ADMIN — PHENOBARBITAL SODIUM 65 MG: 65 INJECTION INTRAMUSCULAR at 08:30

## 2022-02-13 RX ADMIN — IPRATROPIUM BROMIDE AND ALBUTEROL SULFATE 3 ML: .5; 3 SOLUTION RESPIRATORY (INHALATION) at 07:15

## 2022-02-13 RX ADMIN — PHENOBARBITAL SODIUM 90 MG: 65 INJECTION INTRAMUSCULAR at 16:05

## 2022-02-13 RX ADMIN — DEXMEDETOMIDINE HYDROCHLORIDE 0.7 MCG/KG/HR: 4 INJECTION, SOLUTION INTRAVENOUS at 03:04

## 2022-02-13 RX ADMIN — QUETIAPINE FUMARATE 50 MG: 25 TABLET ORAL at 16:06

## 2022-02-13 RX ADMIN — METOPROLOL TARTRATE 5 MG: 5 INJECTION INTRAVENOUS at 05:20

## 2022-02-13 RX ADMIN — QUETIAPINE FUMARATE 50 MG: 25 TABLET ORAL at 21:09

## 2022-02-13 RX ADMIN — LORAZEPAM 2 MG: 2 INJECTION INTRAMUSCULAR; INTRAVENOUS at 05:31

## 2022-02-13 RX ADMIN — ARFORMOTEROL TARTRATE: 15 SOLUTION RESPIRATORY (INHALATION) at 07:20

## 2022-02-13 RX ADMIN — PHENOBARBITAL 97.2 MG: 32.4 TABLET ORAL at 21:08

## 2022-02-13 RX ADMIN — DEXMEDETOMIDINE HYDROCHLORIDE 0.4 MCG/KG/HR: 4 INJECTION, SOLUTION INTRAVENOUS at 11:03

## 2022-02-13 RX ADMIN — LORAZEPAM 2 MG: 2 INJECTION INTRAMUSCULAR; INTRAVENOUS at 12:39

## 2022-02-13 RX ADMIN — LORAZEPAM 2 MG: 2 INJECTION INTRAMUSCULAR; INTRAVENOUS at 21:09

## 2022-02-13 RX ADMIN — SODIUM CHLORIDE, PRESERVATIVE FREE 40 MG: 5 INJECTION INTRAVENOUS at 08:31

## 2022-02-13 RX ADMIN — GUAIFENESIN 600 MG: 600 TABLET, EXTENDED RELEASE ORAL at 21:08

## 2022-02-13 RX ADMIN — LABETALOL HYDROCHLORIDE 10 MG: 5 INJECTION INTRAVENOUS at 18:18

## 2022-02-13 RX ADMIN — IPRATROPIUM BROMIDE AND ALBUTEROL SULFATE 3 ML: .5; 3 SOLUTION RESPIRATORY (INHALATION) at 13:10

## 2022-02-13 RX ADMIN — LORAZEPAM 4 MG: 2 INJECTION INTRAMUSCULAR; INTRAVENOUS at 23:19

## 2022-02-13 RX ADMIN — SODIUM CHLORIDE, PRESERVATIVE FREE 10 ML: 5 INJECTION INTRAVENOUS at 13:38

## 2022-02-13 RX ADMIN — ARFORMOTEROL TARTRATE: 15 SOLUTION RESPIRATORY (INHALATION) at 20:23

## 2022-02-13 NOTE — PROGRESS NOTES
Progress Note     2/13/2022  PCP: Mariely Pride NP     Assessment/Plan:     Tristin Bellamy a 61 y. o. male with a known history of multi-substance abuse including alcohol, opioids, THC, and h/o crack-cocaine use; PUD 2/2 long-term NSAID use, MDD w/ h/o suicidal ideation, thrombocytopenia, T2DM, HTN, and COPD who is admitted for acute EtOH withdrawal.    Overnight Events: visual hallucination. Patient was reoriented. Telemetry: Sinus Rhythm 80's     EtOH withdrawal in s/o polysubstance abuse and AUD: Child-Baca = 6. POA CIWA 13, EtOH level 184, UDS (+) for THC. Patient's last drink was 2 beers (12 oz each) at 0800 on 2/10. He has a history of withdrawal seizures in the past requiring hospitalizations, but denies ever being intubated. Multiple admissions for EtOH withdrawal most recently from 1/17 to 1/20. Previous UDS's (+) for opiates, THC, barbituates. Patient has a 40+ year history of EtOH consumption (see SH above). Sent in per PCP. S/p 8 mg IV Ativan in ED. S/p haldol 5 mg on 2/12. Abd U/S notable for hepatic steatosis and gallbladder adenomyomatosis.   - (2/11- ) Precedex gtt  - Seroquel 25 mg TID for hallucinations increased to 50 mg TID  - Cardiac monitoring   - CIWA protocol with Ativan 2mg (8-11) and 4mg (>12)  - Phenobarb 65 TID increased to 90 mg TID   - IV compazine prn nausea   - IV Protonix 40 mg daily  - Seizure precautions  - Daily CMP, CBC, Mg     Concern for hematuria: Patient reports \"episodes\" of \"red droplets\" shortly after he finishes voiding. No recent injuries. Renal function appears to be at baseline. UA notable for trace blood and 300 protein.     Chronic Thrombocytopenia:  (B/L ~ 110). Likely sequela of hepatic steatosis as noted on CT Abd/Pelv on 1/17/2022 in s/o chronic EtOH abuse. - Monitor daily on CBC  - Transfuse if <50 and bleeding or if < 10     Hx of COPD: POA CXR w/ NAP. He is not requiring supplemental O2, saturating well on room air.  Notable scattery wheezing throughout which may be 2/2 polysubstance abuse. No evidence of acute exacerbation -- no increased dyspnea or change in cough character. - Sub home Advair w/ Brovana/Pulmicort inhaler BID  - Duo-nebs q4hrs, scheduled   - Mucinex BID     Hypokalemia: POA 3.4, mild. Likely 2/2 EtOH abuse.  - Replete PRN     Hypertension: BP on admission 160/89.  - Holding home medications of Lisinopril 20mg daily and metoprolol 50mg daily   - IV Metoprolol 5 mg q6h  - Will continue to monitor at this time and readjust as BP's trend     DMII: A1c 5.7 on 1/17/2022. POA Glucose 107. - Holding home medications of Metformin 1000 mg daily.  - Insulin Sliding Scale normal sensitivity with AC&HS glucose checks  - Hypoglycemia protocols ordered  - Goal glucose concentration >70, <140 pre-meal and <180 with all random glucoses     Prolonged QTc: Per chart review. POA EKG w/ QTc 432 msec.    - Avoid QT-prolonging agents including Zofran and quinolones     MDD w/ Anxiety and h/o Suicidal Ideation: Pt on no home meds. Prior h/o of Hydroxyzine for anxiety. No SI/HI. - Recommend f/u outpt for counseling and possible SSRI therapy     Nicotine Dependence: long term, daily tobacco-snuff use. - Nicotine patch 21mg/24hr as needed    Acute Lactic Acidosis (resolved): POA LA 3.8. Likely Type B in s/o EtOH abuse. No evidence of sepsis.      FEN/GI - Diabetic diet. Activity - Activity as tolerated w/ assist  DVT prophylaxis - SCDs  GI prophylaxis - Protonix  Fall prophylaxis - Fall precautions ordered. Disposition - Admit to Tele. Plan to d/c to Home. Consulting PT, OT and CM  Code Status - Full. Discussed with patient / caregivers. Next of Kin Name and Samanta Beaulieu (Girlfriend) - [phone]     CODE STATUS:  Full Code     Alana Church discussed with Dr. Elisabeth Bianchi. Subjective:   Pt was seen and examined at bedside. Sleeping peacefully with Duoneb in place.       Objective:   Physical examination  Patient Vitals for the past 24 hrs:   Temp Pulse Resp BP SpO2   22 0900 -- 83 19 (!) 138/91 95 %   22 0830 -- 83 19 (!) 140/93 96 %   22 0800 98.3 °F (36.8 °C) 85 16 (!) 150/100 98 %   22 0730 -- 78 19 (!) 143/91 100 %   22 0715 -- -- -- -- 99 %   22 0701 -- 77 -- -- --   22 0600 -- 74 20 (!) 161/101 95 %   22 0500 -- 78 22 (!) 157/93 98 %   22 0400 98 °F (36.7 °C) 77 20 (!) 158/94 97 %   22 0300 -- 79 20 (!) 146/96 95 %   22 0200 -- 80 23 (!) 139/99 98 %   22 0100 -- 78 19 (!) 147/96 95 %   22 0000 98 °F (36.7 °C) 78 22 (!) 154/96 98 %   22 2307 -- 83 -- -- --   22 2300 -- 84 21 (!) 151/86 97 %   22 2200 -- 86 22 (!) 146/87 94 %   22 2130 -- 88 16 (!) 136/91 94 %   22 2100 -- 89 23 (!) 150/85 97 %   22 2030 -- 83 18 (!) 147/94 98 %   22 2000 -- 85 14 (!) 143/96 96 %   22 1900 98.1 °F (36.7 °C) 83 23 (!) 147/90 95 %   22 1615 -- 83 20 -- 98 %   22 1600 98.5 °F (36.9 °C) 83 21 (!) 148/89 95 %   22 1505 -- 83 -- -- --   22 1500 -- 84 21 (!) 141/91 95 %   02/12/22 1445 -- 84 20 -- 94 %   22 1430 -- 84 21 (!) 140/94 94 %   22 1415 -- 83 21 -- 95 %   22 1400 -- 84 21 (!) 135/99 94 %   22 1345 -- 76 21 -- 99 %   22 1330 -- 78 21 (!) 152/96 97 %   22 1315 -- 79 21 -- 95 %   22 1300 -- 75 23 (!) 160/101 98 %   22 1245 -- 80 25 -- 93 %   22 1230 -- 79 22 (!) 159/92 96 %   22 1215 -- 79 21 -- 96 %   22 1200 97.7 °F (36.5 °C) 80 25 (!) 140/84 96 %   22 1145 -- 80 21 -- 95 %   22 1130 -- 80 22 (!) 161/99 95 %   22 1115 -- 80 21 -- 96 %   22 1100 -- 83 22 (!) 154/102 97 %   22 1045 -- 81 23 -- 98 %   22 1030 -- 82 23 (!) 166/98 97 %      Temp (24hrs), Av.1 °F (36.7 °C), Min:97.7 °F (36.5 °C), Max:98.5 °F (36.9 °C)     O2 Flow Rate (L/min): 2 l/min   O2 Device: None (Room air)    Date 02/12/22 0700 - 02/13/22 0659 02/13/22 0700 - 02/14/22 0659   Shift [id number] 1900-0659 24 Hour Total 4759-4830 6030-7284 24 Hour Total   INTAKE   I.V.(mL/kg/hr) 3023.7(2.5) 842.8(0.7) 3866.5(1.6) 134.1  134.1     Precedex Volume 302.8 255.3 558.1 34.1  34.1     Volume (0.9% sodium chloride 1,000 mL with mvi (adult no. 4 with vit K) 10 mL, thiamine 603 mg, folic acid 1 mg infusion) 1622.9 127.1 1750        Volume (0.9% sodium chloride infusion) 1097.9 460.4 1558.3 0  0     Volume (potassium chloride 10 mEq in 100 ml IVPB)    100  100   Shift Total(mL/kg) 3023.7(29.5) 842. 8(8.2) 3866.5(37.8) 134.1(1.3)  134.1(1.3)   OUTPUT   Urine(mL/kg/hr) 1550(1.3) 1400(1.1) 2950(1.2) 0  0     Urine Output (mL) (External Urinary Catheter 02/12/22) 1550 1400 2950 0  0   Shift Total(mL/kg) 1550(15.1) 1400(13.7) 2950(28.8) 0(0)  0(0)   NET 1473.7 -557.2 916.5 134.1  134.1   Weight (kg) 102.4 102.4 102.4 102.4 102.4 102.4     General:   Sedated   Head:   Atraumatic   Lungs:   Clear to auscultation bilaterally    Heart:   Normal rate, regular rhythm, no murmur, rubs or gallops   Abdomen:    Soft, No masses or organomegaly    Extremities:  No edema or DVT signs   Pulses:  Symmetric all extremities   Skin:  Warm and dry    No rashes or lesions   Neurologic:  Sedated     Data Review:     CBC:  Recent Labs     02/13/22  0159 02/12/22  0054 02/11/22  0828   WBC 3.2* 2.6* 3.1*   HGB 14.3 13.2 13.1   HCT 39.9 37.4 37.4   PLT 97* 76* 63*     Metabolic Panel:  Recent Labs     02/13/22  0159 02/12/22  0941 02/12/22  0054 02/11/22  0758 02/10/22  1050 02/10/22  1050     --  139 136   < > 137   K 3.4*  --  3.6 3.2*   < > 3.4*     --  107 104   < > 98   CO2 26  --  24 27   < > 24   BUN 4*  --  4* 8   < > 8   CREA 0.64*  --  0.67* 0.67*   < > 0.80   *  --  112* 106*   < > 107*   CA 8.3*  --  8.1* 7.8*   < > 8.8   MG  --   --  1.8 1.8  --  2.1   ALB 3.0*  --  3.1* 2.9*   < > 3.9   TBILI 1.5*  --  1.4* 1.4*   < > 1.7*   ALT 33  --  29 26   < > 43   INR  --  1.0  --   --   --   --     < > = values in this interval not displayed. Micro:  No results found for: CULT  Imaging:  CT ABD PELV W WO CONT    Result Date: 1/17/2022  EXAM: CT ABD PELV W WO CONT INDICATION: epigastric pain, r/o pancreatitis COMPARISON: Ultrasound 9/4/2017. CT 9/2/2017. Kala Stands CONTRAST: 100 mL of Isovue-370. TECHNIQUE:  Multislice helical CT was performed from the diaphragm to the iliac crest prior to intravenous contrast administration and from the diaphragm to the symphysis pubis during uneventful rapid bolus intravenous contrast administration, during the hepatic arterial, portal venous and delayed venous phases. Oral contrast was not administered. Contiguous 5 mm axial images were reconstructed and lung and soft tissue windows were generated. Coronal and sagittal reformations were generated. CT dose reduction was achieved through use of a standardized protocol tailored for this examination and automatic exposure control for dose modulation. The lack of oral contrast material diminishes the capacity of CT to evaluate the bowel and adjacent structures. FINDINGS: LOWER THORAX: Several bilateral subpleural nodules measuring up to 4 mm in size appear unchanged in number and size. LIVER: Diffuse severe hepatic steatosis. No hepatic mass or intrahepatic bile duct dilation demonstrated. BILIARY TREE: Gallbladder is within normal limits. CBD is not dilated. SPLEEN: within normal limits. PANCREAS: No mass or ductal dilatation. No CT imaging evidence of pancreatitis. ADRENALS: Unremarkable. KIDNEYS: No mass, calculus, or hydronephrosis. STOMACH: Unremarkable. SMALL BOWEL: No dilatation or wall thickening. COLON: Anastomotic suture ring at proximal sigmoid region. Transverse and descending colonic diverticulosis. No mural thickening or dilation demonstrated. APPENDIX: Normal. PERITONEUM: No ascites or pneumoperitoneum. RETROPERITONEUM: No lymphadenopathy or aortic aneurysm. REPRODUCTIVE ORGANS: Those shown appear unremarkable. URINARY BLADDER: No mass or calculus. BONES: No destructive bone lesion. ABDOMINAL WALL: No mass or hernia. ADDITIONAL COMMENTS: N/A     No evidence for acute pancreatitis or other acute finding. Details can be found in report. XR CHEST PORT    Result Date: 2/10/2022  EXAM:  XR CHEST PORT INDICATION:  cough COMPARISON:  1/17/2022 FINDINGS: A portable AP radiograph of the chest was obtained at 1059 hours. The patient is on a cardiac monitor. The lungs are clear. The cardiac and mediastinal contours and pulmonary vascularity are normal.  The bones and soft tissues are grossly within normal limits. No acute abnormality     XR CHEST PORT    Result Date: 1/17/2022  EXAM:  XR CHEST PORT INDICATION: Wheezing, cough COMPARISON: Chest x-ray 1/6/2021. TECHNIQUE: Single frontal view of the chest. FINDINGS: No lobar consolidation, pleural effusion, or pneumothorax. The cardiomediastinal silhouette is not enlarged. No acute or aggressive osseous lesion. 1.  No evidence of an acute cardiopulmonary process.     Medications reviewed  Current Facility-Administered Medications   Medication Dose Route Frequency    potassium chloride 10 mEq in 100 ml IVPB  10 mEq IntraVENous Q1H    labetaloL (NORMODYNE;TRANDATE) injection 10 mg  10 mg IntraVENous Q6H PRN    PHENobarbital (LUMINAL) injection 90 mg  90 mg IntraVENous TID    QUEtiapine (SEROquel) tablet 50 mg  50 mg Oral TID    metoprolol (LOPRESSOR) injection 5 mg  5 mg IntraVENous Q6H    prochlorperazine (COMPAZINE) injection 5 mg  5 mg IntraVENous Q6H PRN    albuterol-ipratropium (DUO-NEB) 2.5 MG-0.5 MG/3 ML  3 mL Nebulization Q6HWA RT    dexmedeTOMidine in 0.9 % NaCl (PRECEDEX) 400 mcg/100 mL (4 mcg/mL) infusion soln  0.1-1.5 mcg/kg/hr IntraVENous TITRATE    LORazepam (ATIVAN) injection 2 mg  2 mg IntraVENous Q1H PRN    LORazepam (ATIVAN) injection 4 mg  4 mg IntraVENous Q1H PRN    [Held by provider] folic acid (FOLVITE) tablet 1 mg  1 mg Oral DAILY    [Held by provider] thiamine mononitrate (B-1) tablet 100 mg  100 mg Oral DAILY    0.9% sodium chloride 1,000 mL with mvi (adult no. 4 with vit K) 10 mL, thiamine 561 mg, folic acid 1 mg infusion   IntraVENous Q24H    0.9% sodium chloride infusion  125 mL/hr IntraVENous CONTINUOUS    sodium chloride (NS) flush 5-40 mL  5-40 mL IntraVENous Q8H    sodium chloride (NS) flush 5-40 mL  5-40 mL IntraVENous PRN    acetaminophen (TYLENOL) tablet 650 mg  650 mg Oral Q6H PRN    Or    acetaminophen (TYLENOL) suppository 650 mg  650 mg Rectal Q6H PRN    pantoprazole (PROTONIX) 40 mg in 0.9% sodium chloride 10 mL injection  40 mg IntraVENous DAILY    insulin lispro (HUMALOG) injection   SubCUTAneous AC&HS    glucose chewable tablet 16 g  4 Tablet Oral PRN    dextrose (D50W) injection syrg 12.5-25 g  12.5-25 g IntraVENous PRN    glucagon (GLUCAGEN) injection 1 mg  1 mg IntraMUSCular PRN    arformoterol 15 mcg/budesonide 0.25 mg neb solution   Nebulization BID RT    [Held by provider] guaiFENesin ER (MUCINEX) tablet 600 mg  600 mg Oral Q12H    [Held by provider] lisinopriL (PRINIVIL, ZESTRIL) tablet 20 mg  20 mg Oral DAILY    [Held by provider] metoprolol tartrate (LOPRESSOR) tablet 50 mg  50 mg Oral BID    nicotine (NICODERM CQ) 21 mg/24 hr patch 1 Patch  1 Patch TransDERmal DAILY         Signed:   Meliza Martines MD   Resident, Family Medicine      Attending note: Attending note to follow. ..

## 2022-02-13 NOTE — PROGRESS NOTES
2000 Pt drowsy, easily awakens from time to time with no stimulation. Brief conversation with the patient, alert and oriented to self only. \"I need to get up\". Pt reoriented and repositioned. Educated re: restraints. Pt became tearful, saying he wants to quit alcohol \"so bad\". Pt does not recall events last night saying that it was his \"hallucinations\". Pt also mentioned that he wanted his girlfriend \"Cora\" to be his decision maker instead of his brother. CECILIA Pavon 55 Pt woke up calling for help, notable sweats on face, verbalized he we was having visual hallucinations, seeing things like his \"truck moving, farms, tadpoles\". Pt reoriented, repositioned, medicated as indicated.

## 2022-02-13 NOTE — PROGRESS NOTES
Progress Note  Date:2022       Room:[address] [place] [address]  Patient Julia Flores     YOB: 1965     Age:56 y.o. Subjective    Subjective remains on precedex but improved sx and decreased rate  Review of Systemsdenied pain, fever, chills; constipation yes; dyusira; remainder of ros negative  Objective         Vitals Last 24 Hours:  TEMPERATURE:  Temp  Av.1 °F (36.7 °C)  Min: 97.7 °F (36.5 °C)  Max: 98.5 °F (36.9 °C)  RESPIRATIONS RANGE: Resp  Av.8  Min: 14  Max: 25  PULSE OXIMETRY RANGE: SpO2  Av.2 %  Min: 93 %  Max: 100 %  PULSE RANGE: Pulse  Av.2  Min: 74  Max: 89  BLOOD PRESSURE RANGE: Systolic (32IIS), RSM:009 , Min:135 , SYY:289   ; Diastolic (13IZP), WRF:56, Min:84, Max:102    I/O (24Hr): Intake/Output Summary (Last 24 hours) at 2022 1032  Last data filed at 2022 0800  Gross per 24 hour   Intake 3587.98 ml   Output 2400 ml   Net 1187.98 ml     Objective   gen-awake but lethargic; spont agitation followed by somnolence again  Head - nc at  ent - normal external anatomy  Eyes - eomi; anicteric sclera  cvs - sinus rhythm without external evidence of hypoperfusion  pulm - normal wob; adequate SaO2  msk - normal bulk; no deformities  Ext - no c/c/e  Skin - normal color and intact where visible  Neuro - moves all ext; does not follow commands; no tremor     Labs/Imaging/Diagnostics    Labs:  CBC:  Recent Labs     22  0828   WBC 3.2* 2.6* 3.1*   RBC 4.16 3.76* 3.80*   HGB 14.3 13.2 13.1   HCT 39.9 37.4 37.4   MCV 95.9 99.5* 98.4   RDW 14.0 14.0 14.4   PLT 97* 76* 63*     CHEMISTRIES:  Recent Labs     22  0758 02/10/22  1050 02/10/22  1050    139 136   < > 137   K 3.4* 3.6 3.2*   < > 3.4*    107 104   < > 98   CO2 26 24 27   < > 24   BUN 4* 4* 8   < > 8   CA 8.3* 8.1* 7.8*   < > 8.8   MG  --  1.8 1.8  --  2.1    < > = values in this interval not displayed. PT/INR:  Recent Labs     02/12/22  0941   INR 1.0     APTT:No results for input(s): APTT in the last 72 hours. LIVER PROFILE:  Recent Labs     02/13/22  0159 02/12/22  0054 02/11/22  0758   AST 29 23 22   ALT 33 29 26     Lab Results   Component Value Date/Time    ALT (SGPT) 33 02/13/2022 01:59 AM    AST (SGOT) 29 02/13/2022 01:59 AM    Alk. phosphatase 54 02/13/2022 01:59 AM    Bilirubin, total 1.5 (H) 02/13/2022 01:59 AM       Imaging Last 24 Hours:  US ABD COMP    Result Date: 2/12/2022  EXAM: US ABD COMP INDICATION: Chronic thrombocytopenia in the setting of alcohol use. COMPARISON: CT Abdomen Pelvis 1/17/2022. TECHNIQUE: Real-time sonography of the abdomen was performed with multiple static images of the liver, gallbladder, pancreas, spleen, kidneys and retroperitoneum obtained. FINDINGS: LIVER: The liver is hyperechoic compatible with steatosis with no mass or other focal abnormality. LIVER VASCULATURE: The portal vein flow is antegrade. GALLBLADDER: The gallbladder demonstrates trace sludge with no stones. There is slight gallbladder wall thickening with sonographic appearance compatible with adenomyomatosis. COMMON BILE DUCT: There is no biliary duct dilatation and the common duct measures 5 mm in diameter. PANCREAS: Obscured by bowel gas. SPLEEN: The spleen is normal in echotexture and size and measures 11.3 cm in length. RIGHT KIDNEY: The right kidney demonstrates normal echogenicity with no mass, stone or hydronephrosis. The right kidney measures 11.4 cm in length. LEFT KIDNEY: The left kidney demonstrates normal echogenicity with no mass, stone or hydronephrosis. The left kidney measures 12.3 cm in length. RETROPERITONEUM: The aorta tapers normally. The IVC is normal. There is no ascites. 1. Hepatic steatosis without mass. 2. Gallbladder adenomyomatosis. 3. No splenomegaly, ascites or acute finding.      Assessment//Plan   Active Problems:    Alcohol withdrawal ([address] (9/4/2017)      Assessment & Plan  Alcohol Withdraw  Delirium             Polysubstance Abuse  COPD  Thrombocytopenia  DM  HTN        Neuro/psych - cont current regimen for withdrae delirium; anticipate further reductions in precedex gtt after increase in scheduled agents; his AMS is improving     cvs - monitor for rebound htn and resumed BB; depending on renal function and MAPs, can resume ACEi (he has DM)  - may also benefit from asa and if lfts permit, statin     pulm - h/o COPD but no evidence of AE  - aspiration precautions     Gi - denied complaints     gu - at low to intermediate risk for CAIN, mostly from prerenal/dehydraion that now addressed     Heme - no evidence of hemorrhage and will cont dvt proph  Monitor for worsening thrombocytopenia (suspect related to nutrition and etoh abuse)     Id - no compeling evidence of acute bacterial infection     Endo - monitor BS and adjjust scheduled regimen if needed     Fen - monitor and correct electrolytes as needed     CCT 35 minutes excluding teaching and procedures  I performed all aspects of the physical examination via Telemedicine associated with two way audio and video communication and with the on-site assistance of the bedside nurse.  I am located in [place] and the patient is located in [facility]   The patient is critically ill in the ICU. Dona Espana  reviewed the pertinent medical records, laboratory/ pathology data and radiographic images.  The decision making regarding this patient is as documented above, which was generated  following  discussion  with the multidisciplinary ICU team and creation of a treatment plan for  the patient.  We discussed the patient's interval history and future coordination of care and Donnell Werner patient's medications  were reviewed and changes made as stipulated above.  Due to  critical illness impairing one or more vital organs of this patient resulting in life threatening clinical situation  I have provided direct, frequent personal  assessment and manipulation in management plan.   Electronically signed by Marianne Schneider MD on 2/13/2022 at 10:32 AM

## 2022-02-13 NOTE — PROGRESS NOTES
0700: Bedside and Verbal shift change report given to Jazmin Mcgee RN (oncoming nurse) by Shahzad Leiva RN (offgoing nurse). Report included the following information SBAR, Kardex, Intake/Output and Cardiac Rhythm SR. Primary Nurse Miles Bolaños RN and Areli Krishnamurthy RN performed a dual skin assessment on this patient No impairment noted  Parker score is see flowsheet. 1378: Precedex decreased to 0.6 mcg/kg/hr. 0800: Assessment completed, see doc flowsheet. Patient drowsy, but opens eyes to voice, oriented x4. SR on monitor, S1S2 auscultated, hypertensive. Breath sounds clear all lobes on room air. Patient currently NPO, passed bedside swallow. Bowel sounds x4. External male catheter in place. CIWA score 8, plan to give PRN Ativan. Bilateral wrist restraints discontinued, patient verbalized understanding of importance of not pulling lines. 0830: Dr. Promise Spain in room via telehealth monitor to assess patient. Patient much more alert, answers all questions appropriately. PRN Ativan given and scheduled phenobarbital given. Precedex decreased to 0.5 mcg/kg/min. Per Dr. Promise Spain plan to discuss increasing dosage of scheduled medications in order to decrease precedex and PRN meds. 1000: Patient assisted turn and reposition in bed.    1200: Reassessment completed, see doc flowsheet. Patient assisted to turn and reposition in bed. Meal tray set up and patient eating. 1230: CIWA score 8. 2mg PRN Ativan given per order. 1400: Patient assisted to turn and reposition in bed. 1533: Precedex stopped. 1600: Reassessment completed, see doc flowsheet. Patient assisted to turn and reposition in bed. 1717: Patient called out stating he is feeling more anxious. HR elevated in 130s-140s. CIWA score 8. 2 mg PRN Ativan given. Patient assisted with exercises to help decrease HR. Scheduled metoprolol given.     1900: Bedside and Verbal shift change report given to MIKE Ruelas (oncoming nurse) by Jazmin Mcgee RN (offgoing nurse).  Report included the following information SBAR, Kardex, Intake/Output and Cardiac Rhythm ST.

## 2022-02-13 NOTE — PROGRESS NOTES
[address] [place] [address] with [address] [place] [address]     Resident Progress Note in Brief    S:   Patient seen and examined at bedside. Denies chest pain. Had some headache and some nausea. Notes he still has come visual halluciations but doesn't specify what. Spoke with nursing who notes he was up roaming around room agitated after speaking with someone on the phone around time of tachycardia. Telemetry: Sinus tachycardia 115 with 30 mins of sinus tachycardia 140-160s. O:  Vital Signs:  Visit Vitals  BP (!) 165/107   Pulse (!) 113   Temp 98.4 °F (36.9 °C)   Resp 16   Ht 6' 2\" (1.88 m)   Wt 225 lb 12 oz (102.4 kg)   SpO2 96%   BMI 28.98 kg/m²          Intake/Output:     Intake/Output Summary (Last 24 hours) at 2/13/2022 1913  Last data filed at 2/13/2022 1600  Gross per 24 hour   Intake 875.44 ml   Output 3800 ml   Net -2924.56 ml       LABS AND  DATA: Personally reviewed  Recent Labs     02/13/22 0159 02/12/22 0054   WBC 3.2* 2.6*   HGB 14.3 13.2   HCT 39.9 37.4   PLT 97* 76*     Recent Labs     02/13/22 0159 02/12/22 0054    139   K 3.4* 3.6    107   CO2 26 24   BUN 4* 4*   CREA 0.64* 0.67*   * 112*   CA 8.3* 8.1*   MG 1.7 1.8     Recent Labs     02/13/22 0159 02/12/22 0054   AP 54 49   TP 6.0* 5.8*   ALB 3.0* 3.1*   GLOB 3.0 2.7     Recent Labs     02/12/22  0941   INR 1.0   PTP 10.6        Physical Examination  General: Anxious. Tearful. Head: Normocephalic. Atraumatic. Respiratory: CTAB. No w/r/r. No accessory muscle use. Cardiovascular: Sinus rhythm. Tachycardic. No murmurs. GI: Soft. Nondistended. Neuro: No focal deficits. Tremor with outstretched hands. Skin: Warm. No rashes. No jaundice or cyanosis.        A/P:   Maryellen Breen is a 61 y.o. male with PMHx of polysubstance abuse, HTN, MDD, DMII, nicotine dependence who is admitted for alcohol withdrawal.     Alcohol withdrawal in s/o polysubstance abuse: Last drink 2/10 at 8AM. Hx of withdrawal seizures in the past. Started on Precedex gtt (2/11-2/13) due to worsening agitation, hallucinations, & disorientation. S/p Haldol 5mg (2/12). - IV Phenobarb 90mg TID  - Seroquel 50mg TID for hallucinations   - CIWA w/ Ativan: 2mg (8-11) & 4mg (>12)   - MIVF   - Seizure precautions     Tachycardia (improving): Sinus rhythm on telemetry. Likely secondary to alcohol withdrawal and agitation, as sustained tachycardia began since Precedex stopped this afternoon. Nursing noted he was very anxious when speaking with family member and pacing room. - S/p Labetalol 10mg IV   - Continue home 50mg metoprolol BID  - Continue to manage alcohol withdrawal as above  - Will continue to monitor     Please see daily progress note for detailed plan.      Em Freeman MD  Family Medicine Resident

## 2022-02-13 NOTE — PROGRESS NOTES
Problem: Alcohol Withdrawal  Goal: *STG: Participates in treatment plan  Outcome: Progressing Towards Goal  Goal: *STG: Remains safe in hospital  Outcome: Progressing Towards Goal  Goal: *STG: Seeks staff when symptoms of withdrawal increase  Outcome: Progressing Towards Goal  Goal: *STG: Complies with medication therapy  Outcome: Progressing Towards Goal     Problem: Pressure Injury - Risk of  Goal: *Prevention of pressure injury  Description: Document Parker Scale and appropriate interventions in the flowsheet. Outcome: Progressing Towards Goal    Problem: Falls - Risk of  Goal: *Absence of Falls  Description: Document W[place]ell Beer Fall Risk and appropriate interventions in the flowsheet.   Outcome: Progressing Towards Goal

## 2022-02-14 ENCOUNTER — APPOINTMENT (OUTPATIENT)
Dept: VASCULAR SURGERY | Age: 57
End: 2022-02-14
Attending: STUDENT IN AN ORGANIZED HEALTH CARE EDUCATION/TRAINING PROGRAM
Payer: MEDICAID

## 2022-02-14 LAB
ALBUMIN SERPL-MCNC: 3.3 G/DL (ref 3.5–5)
ALBUMIN/GLOB SERPL: 1.2 {RATIO} (ref 1.1–2.2)
ALP SERPL-CCNC: 57 U/L (ref 45–117)
ALT SERPL-CCNC: 31 U/L (ref 12–78)
ANION GAP SERPL CALC-SCNC: 10 MMOL/L (ref 5–15)
AST SERPL-CCNC: 24 U/L (ref 15–37)
BASOPHILS # BLD: 0 K/UL (ref 0–0.1)
BASOPHILS NFR BLD: 0 % (ref 0–1)
BILIRUB SERPL-MCNC: 1.6 MG/DL (ref 0.2–1)
BUN SERPL-MCNC: 8 MG/DL (ref 6–20)
BUN/CREAT SERPL: 11 (ref 12–20)
CALCIUM SERPL-MCNC: 8.6 MG/DL (ref 8.5–10.1)
CHLORIDE SERPL-SCNC: 103 MMOL/L (ref 97–108)
CO2 SERPL-SCNC: 24 MMOL/L (ref 21–32)
CREAT SERPL-MCNC: 0.71 MG/DL (ref 0.7–1.3)
DIFFERENTIAL METHOD BLD: ABNORMAL
EOSINOPHIL # BLD: 0 K/UL (ref 0–0.4)
EOSINOPHIL NFR BLD: 1 % (ref 0–7)
ERYTHROCYTE [DISTWIDTH] IN BLOOD BY AUTOMATED COUNT: 14.3 % (ref 11.5–14.5)
GLOBULIN SER CALC-MCNC: 2.8 G/DL (ref 2–4)
GLUCOSE BLD STRIP.AUTO-MCNC: 119 MG/DL (ref 65–117)
GLUCOSE BLD STRIP.AUTO-MCNC: 145 MG/DL (ref 65–117)
GLUCOSE BLD STRIP.AUTO-MCNC: 84 MG/DL (ref 65–117)
GLUCOSE SERPL-MCNC: 112 MG/DL (ref 65–100)
HCT VFR BLD AUTO: 41.2 % (ref 36.6–50.3)
HGB BLD-MCNC: 14.9 G/DL (ref 12.1–17)
IMM GRANULOCYTES # BLD AUTO: 0 K/UL (ref 0–0.04)
IMM GRANULOCYTES NFR BLD AUTO: 0 % (ref 0–0.5)
LYMPHOCYTES # BLD: 1 K/UL (ref 0.8–3.5)
LYMPHOCYTES NFR BLD: 25 % (ref 12–49)
MAGNESIUM SERPL-MCNC: 1.6 MG/DL (ref 1.6–2.4)
MCH RBC QN AUTO: 34.4 PG (ref 26–34)
MCHC RBC AUTO-ENTMCNC: 36.2 G/DL (ref 30–36.5)
MCV RBC AUTO: 95.2 FL (ref 80–99)
MONOCYTES # BLD: 0.7 K/UL (ref 0–1)
MONOCYTES NFR BLD: 17 % (ref 5–13)
NEUTS SEG # BLD: 2.2 K/UL (ref 1.8–8)
NEUTS SEG NFR BLD: 57 % (ref 32–75)
NRBC # BLD: 0 K/UL (ref 0–0.01)
NRBC BLD-RTO: 0 PER 100 WBC
PLATELET # BLD AUTO: 92 K/UL (ref 150–400)
POTASSIUM SERPL-SCNC: 3.5 MMOL/L (ref 3.5–5.1)
PROT SERPL-MCNC: 6.1 G/DL (ref 6.4–8.2)
RBC # BLD AUTO: 4.33 M/UL (ref 4.1–5.7)
RBC MORPH BLD: ABNORMAL
RETICS # AUTO: 0.14 M/UL (ref 0.03–0.1)
RETICS/RBC NFR AUTO: 3.3 % (ref 0.7–2.1)
SERVICE CMNT-IMP: ABNORMAL
SERVICE CMNT-IMP: ABNORMAL
SERVICE CMNT-IMP: NORMAL
SODIUM SERPL-SCNC: 137 MMOL/L (ref 136–145)
VIT B12 SERPL-MCNC: 337 PG/ML (ref 193–986)
WBC # BLD AUTO: 3.9 K/UL (ref 4.1–11.1)

## 2022-02-14 PROCEDURE — 74011250637 HC RX REV CODE- 250/637: Performed by: STUDENT IN AN ORGANIZED HEALTH CARE EDUCATION/TRAINING PROGRAM

## 2022-02-14 PROCEDURE — 97116 GAIT TRAINING THERAPY: CPT

## 2022-02-14 PROCEDURE — 74011000250 HC RX REV CODE- 250: Performed by: STUDENT IN AN ORGANIZED HEALTH CARE EDUCATION/TRAINING PROGRAM

## 2022-02-14 PROCEDURE — 99232 SBSQ HOSP IP/OBS MODERATE 35: CPT | Performed by: FAMILY MEDICINE

## 2022-02-14 PROCEDURE — 94761 N-INVAS EAR/PLS OXIMETRY MLT: CPT

## 2022-02-14 PROCEDURE — 85025 COMPLETE CBC W/AUTO DIFF WBC: CPT

## 2022-02-14 PROCEDURE — 94640 AIRWAY INHALATION TREATMENT: CPT

## 2022-02-14 PROCEDURE — 97530 THERAPEUTIC ACTIVITIES: CPT

## 2022-02-14 PROCEDURE — 82607 VITAMIN B-12: CPT

## 2022-02-14 PROCEDURE — 77030029684 HC NEB SM VOL KT MONA -A

## 2022-02-14 PROCEDURE — 93970 EXTREMITY STUDY: CPT

## 2022-02-14 PROCEDURE — 97535 SELF CARE MNGMENT TRAINING: CPT

## 2022-02-14 PROCEDURE — 84425 ASSAY OF VITAMIN B-1: CPT

## 2022-02-14 PROCEDURE — 74011000250 HC RX REV CODE- 250: Performed by: FAMILY MEDICINE

## 2022-02-14 PROCEDURE — 65660000000 HC RM CCU STEPDOWN

## 2022-02-14 PROCEDURE — 74011636637 HC RX REV CODE- 636/637

## 2022-02-14 PROCEDURE — 36415 COLL VENOUS BLD VENIPUNCTURE: CPT

## 2022-02-14 PROCEDURE — 74011250636 HC RX REV CODE- 250/636: Performed by: EMERGENCY MEDICINE

## 2022-02-14 PROCEDURE — 84207 ASSAY OF VITAMIN B-6: CPT

## 2022-02-14 PROCEDURE — 2709999900 HC NON-CHARGEABLE SUPPLY

## 2022-02-14 PROCEDURE — 83735 ASSAY OF MAGNESIUM: CPT

## 2022-02-14 PROCEDURE — 80053 COMPREHEN METABOLIC PANEL: CPT

## 2022-02-14 PROCEDURE — 74011250637 HC RX REV CODE- 250/637

## 2022-02-14 PROCEDURE — 85045 AUTOMATED RETICULOCYTE COUNT: CPT

## 2022-02-14 PROCEDURE — 82962 GLUCOSE BLOOD TEST: CPT

## 2022-02-14 PROCEDURE — 94664 DEMO&/EVAL PT USE INHALER: CPT

## 2022-02-14 RX ORDER — LORAZEPAM 1 MG/1
2 TABLET ORAL
Status: DISCONTINUED | OUTPATIENT
Start: 2022-02-14 | End: 2022-02-15 | Stop reason: HOSPADM

## 2022-02-14 RX ORDER — SERTRALINE HYDROCHLORIDE 50 MG/1
25 TABLET, FILM COATED ORAL DAILY
Status: DISCONTINUED | OUTPATIENT
Start: 2022-02-14 | End: 2022-02-15 | Stop reason: HOSPADM

## 2022-02-14 RX ORDER — QUETIAPINE FUMARATE 25 MG/1
25 TABLET, FILM COATED ORAL 3 TIMES DAILY
Status: DISCONTINUED | OUTPATIENT
Start: 2022-02-14 | End: 2022-02-15

## 2022-02-14 RX ORDER — LORAZEPAM 1 MG/1
4 TABLET ORAL
Status: DISCONTINUED | OUTPATIENT
Start: 2022-02-14 | End: 2022-02-15 | Stop reason: HOSPADM

## 2022-02-14 RX ORDER — LANOLIN ALCOHOL/MO/W.PET/CERES
3 CREAM (GRAM) TOPICAL ONCE
Status: COMPLETED | OUTPATIENT
Start: 2022-02-14 | End: 2022-02-14

## 2022-02-14 RX ADMIN — SODIUM CHLORIDE, PRESERVATIVE FREE 10 ML: 5 INJECTION INTRAVENOUS at 21:12

## 2022-02-14 RX ADMIN — ARFORMOTEROL TARTRATE: 15 SOLUTION RESPIRATORY (INHALATION) at 08:40

## 2022-02-14 RX ADMIN — GUAIFENESIN 600 MG: 600 TABLET, EXTENDED RELEASE ORAL at 08:09

## 2022-02-14 RX ADMIN — PHENOBARBITAL 97.2 MG: 32.4 TABLET ORAL at 21:05

## 2022-02-14 RX ADMIN — PANTOPRAZOLE SODIUM 40 MG: 40 TABLET, DELAYED RELEASE ORAL at 08:09

## 2022-02-14 RX ADMIN — PHENOBARBITAL 97.2 MG: 32.4 TABLET ORAL at 16:13

## 2022-02-14 RX ADMIN — FOLIC ACID 1 MG: 1 TABLET ORAL at 08:09

## 2022-02-14 RX ADMIN — LORAZEPAM 2 MG: 2 INJECTION INTRAMUSCULAR; INTRAVENOUS at 08:11

## 2022-02-14 RX ADMIN — Medication 3 MG: at 23:05

## 2022-02-14 RX ADMIN — LORAZEPAM 2 MG: 1 TABLET ORAL at 13:24

## 2022-02-14 RX ADMIN — IPRATROPIUM BROMIDE AND ALBUTEROL SULFATE 3 ML: .5; 3 SOLUTION RESPIRATORY (INHALATION) at 08:35

## 2022-02-14 RX ADMIN — PHENOBARBITAL 97.2 MG: 32.4 TABLET ORAL at 08:08

## 2022-02-14 RX ADMIN — LORAZEPAM 2 MG: 2 INJECTION INTRAMUSCULAR; INTRAVENOUS at 06:31

## 2022-02-14 RX ADMIN — LORAZEPAM 2 MG: 2 INJECTION INTRAMUSCULAR; INTRAVENOUS at 13:01

## 2022-02-14 RX ADMIN — QUETIAPINE FUMARATE 50 MG: 25 TABLET ORAL at 08:09

## 2022-02-14 RX ADMIN — LORAZEPAM 2 MG: 1 TABLET ORAL at 21:05

## 2022-02-14 RX ADMIN — INSULIN LISPRO 2 UNITS: 100 INJECTION, SOLUTION INTRAVENOUS; SUBCUTANEOUS at 13:32

## 2022-02-14 RX ADMIN — QUETIAPINE FUMARATE 25 MG: 25 TABLET ORAL at 16:13

## 2022-02-14 RX ADMIN — METOPROLOL TARTRATE 50 MG: 50 TABLET, FILM COATED ORAL at 17:48

## 2022-02-14 RX ADMIN — METOPROLOL TARTRATE 50 MG: 50 TABLET, FILM COATED ORAL at 08:09

## 2022-02-14 RX ADMIN — LORAZEPAM 2 MG: 1 TABLET ORAL at 16:12

## 2022-02-14 RX ADMIN — SODIUM CHLORIDE, PRESERVATIVE FREE 10 ML: 5 INJECTION INTRAVENOUS at 06:23

## 2022-02-14 RX ADMIN — GUAIFENESIN 600 MG: 600 TABLET, EXTENDED RELEASE ORAL at 21:05

## 2022-02-14 RX ADMIN — THIAMINE HCL TAB 100 MG 100 MG: 100 TAB at 08:09

## 2022-02-14 RX ADMIN — QUETIAPINE FUMARATE 25 MG: 25 TABLET ORAL at 21:05

## 2022-02-14 RX ADMIN — IPRATROPIUM BROMIDE AND ALBUTEROL SULFATE 3 ML: .5; 3 SOLUTION RESPIRATORY (INHALATION) at 14:09

## 2022-02-14 RX ADMIN — SODIUM CHLORIDE, PRESERVATIVE FREE 10 ML: 5 INJECTION INTRAVENOUS at 13:27

## 2022-02-14 RX ADMIN — SERTRALINE 25 MG: 50 TABLET, FILM COATED ORAL at 08:09

## 2022-02-14 RX ADMIN — LORAZEPAM 4 MG: 1 TABLET ORAL at 23:05

## 2022-02-14 RX ADMIN — THERA TABS 1 TABLET: TAB at 08:09

## 2022-02-14 NOTE — PROGRESS NOTES
Bedside and Verbal shift change report given to Martir Betancourt RN (oncoming nurse) by Pj Dolan RN (offgoing nurse). Report included the following information SBAR, Kardex, MAR and Cardiac Rhythm Sinus Tach.    0700: Patient c/o headache. 0815: Pt c/o headache. Complained of visual disturbance described as flashing light we he blinks. 1230: Patient in bed. Emotional. Crying. 1310: IV infiltrated while administering Ativan. Dose not given. 1520: Pt's BLE appears mottled in color. Dr. Fran Rosas informed. Pt stated he has had numbness and tingling in his feet for years. Family Practice informed. Pt also stated he had a PE when he was around 44years old. Also informed Family Practice that RRT nurse was unable to get an IV using ultrasound. 1600: 18g IV placed in left forearm by Albert Montes., ICU Director. Ultrasound used. TRANSFER - OUT REPORT:    Verbal report given to Bassam Bustillo RN (name) on Annamary Loges  being transferred to [facility] (unit) for routine progression of care       Report consisted of patients Situation, Background, Assessment and   Recommendations(SBAR). Information from the following report(s) SBAR, Kardex, [facility] and Cardiac Rhythm Sinus Tach was reviewed with the receiving nurse. Lines:   Peripheral IV 02/13/22 Proximal;Right;Upper (Active)   Site Assessment Clean, dry, & intact 02/14/22 0700   Phlebitis Assessment 0 02/14/22 0700   Infiltration Assessment 0 02/14/22 0700   Dressing Status Clean, dry, & intact 02/14/22 0700   Dressing Type Transparent 02/14/22 0700   Hub Color/Line Status Pink;Flushed 02/14/22 0700   Action Taken Open ports on tubing capped 02/14/22 0700   Alcohol Cap Used Yes 02/14/22 0700       Peripheral IV 02/14/22 Anterior;Left;Proximal Forearm (Active)        Opportunity for questions and clarification was provided.       Patient transported with:   Monitor  Tech

## 2022-02-14 NOTE — PROGRESS NOTES
Progress Note     2/13/2022  PCP: Joanna Morales NP     Assessment/Plan:     Carolina Lieberman a 61 y. o. male with a known history of multi-substance abuse including alcohol, opioids, THC, and h/o crack-cocaine use; PUD 2/2 long-term NSAID use, MDD w/ h/o suicidal ideation, thrombocytopenia, T2DM, HTN, and COPD who is admitted for acute EtOH withdrawal.    Overnight Events: CIWA 2-9 overnight, most recently 2. Labetalol 10 mg @ 1800. Telemetry: Sinus rhythm to 90's, QTc 460.      EtOH withdrawal in s/o polysubstance abuse and AUD (Improved): Child-Baca = 6. POA CIWA 13, EtOH level 184, UDS (+) for THC. Patient's last drink was 2 beers (12 oz each) at 0800 on 2/10. He has a history of withdrawal seizures in the past requiring hospitalizations, but denies ever being intubated. Multiple admissions for EtOH withdrawal most recently from 1/17 to 1/20. Previous UDS's (+) for opiates, THC, barbituates. Patient has a 40+ year history of EtOH consumption (see SH above). Sent in per PCP. S/p 8 mg IV Ativan in ED. S/p haldol 5 mg on 2/12. Abd U/S notable for hepatic steatosis and gallbladder adenomyomatosis. S/p precedex gtt (2/11-2/13). - Seroquel 50 mg TID for hallucinations   - Cardiac monitoring   - CIWA protocol with Ativan 2mg (8-11) and 4mg (>12)  - Phenobarb 97.2 TID  - IV compazine prn nausea   - IV Protonix 40 mg daily  - Seizure precautions  - Daily CMP, CBC, Mg    MDD w/ Anxiety and h/o SI: Pt on no home meds. Prior h/o of Hydroxyzine for anxiety. No SI/HI currently.  - (2/14- ) Initiated sertraline 25 mg daily  - Will see if CM can assist in arranging outpatient psych evaluation     Concern for hematuria: Patient reports \"episodes\" of \"red droplets\" shortly after he finishes voiding. No recent injuries. Renal function appears to be at baseline. UA notable for trace blood and 300 protein.     Chronic Thrombocytopenia:  (B/L ~ 110).  Likely sequela of hepatic steatosis as noted on CT Abd/Pelv on 1/17/2022 in s/o chronic EtOH abuse. - Monitor daily on CBC  - Transfuse if <50 and bleeding or if < 10     Hx of COPD: POA CXR w/ NAP. He is not requiring supplemental O2, saturating well on room air. Notable scattery wheezing throughout which may be 2/2 polysubstance abuse. No evidence of acute exacerbation -- no increased dyspnea or change in cough character. - Sub home Advair w/ Brovana/Pulmicort inhaler BID  - Duo-nebs q6hrs, scheduled   - Mucinex BID     Hypokalemia: POA 3.4, mild. Likely 2/2 EtOH abuse.  - Replete PRN     Hypertension: BP on admission 160/89.  - Holding home medications of Lisinopril 20mg daily  - Cont home metoprolol 50 mg BID  - Will continue to monitor at this time and readjust as BP's trend     DMII: A1c 5.7 on 1/17/2022. POA Glucose 107. - Holding home medications of Metformin 1000 mg daily.  - Insulin Sliding Scale normal sensitivity with AC&HS glucose checks  - Hypoglycemia protocols ordered  - Goal glucose concentration >70, <140 pre-meal and <180 with all random glucoses     Prolonged QTc: Per chart review. POA EKG w/ QTc 432 msec.    - Avoid QT-prolonging agents including Zofran and quinolones     Nicotine Dependence: long term, daily tobacco-snuff use. - Nicotine patch 21mg/24hr as needed    Acute Lactic Acidosis (resolved): POA LA 3.8. Likely Type B in s/o EtOH abuse. No evidence of sepsis.      FEN/GI - Diabetic diet. Activity - Activity as tolerated w/ assist  DVT prophylaxis - SCDs  GI prophylaxis - Protonix  Fall prophylaxis - Fall precautions ordered. Disposition - ICU > Tele. Plan to d/c to Home. Consulted PT, OT and CM  Code Status - Full. Discussed with patient / caregivers. Next of Kin Name and Beau Akhtar (Girlfriend) - [phone]     CODE STATUS:  Full Code     Jacques Guillen discussed with Dr. Chantel Adamson. Subjective:   Pt was seen and examined at bedside.  Patient tearful this AM, states that his alcoholism is the source of his problems and that he will likely drink again if his depression is not addressed. Denies any SI/HI. Otherwise without any other complaints.      Objective:   Physical examination  Patient Vitals for the past 24 hrs:   Temp Pulse Resp BP SpO2   22 2030 -- 99 10 (!) 148/95 97 %   22 2000 98.6 °F (37 °C) 99 21 (!) 141/107 96 %   22 1930 -- (!) 105 25 (!) 147/96 96 %   22 1900 -- (!) 113 16 (!) 171/108 96 %   22 1845 -- (!) 114 25 -- --   22 1700 -- (!) 136 26 (!) 165/107 98 %   22 1630 -- (!) 110 15 (!) 156/99 99 %   22 1600 98.4 °F (36.9 °C) (!) 108 25 (!) 154/89 97 %   22 1530 -- (!) 106 15 (!) 151/91 99 %   22 1501 -- (!) 107 -- -- --   22 1500 -- (!) 108 21 -- 97 %   22 1430 -- (!) 101 15 (!) 156/97 100 %   22 1400 -- 97 14 -- 98 %   22 1330 -- (!) 110 25 (!) 139/91 98 %   22 1300 -- 89 21 138/84 97 %   22 1230 -- 85 18 -- 99 %   22 1200 98.3 °F (36.8 °C) 83 14 123/89 99 %   22 1130 -- 81 19 (!) 139/94 99 %   22 1100 -- 82 18 (!) 136/92 96 %   22 1030 -- 81 19 -- 97 %   22 1000 -- 85 21 (!) 145/85 96 %   22 0930 -- 83 20 139/85 93 %   22 0900 -- 83 19 (!) 138/91 95 %   22 0830 -- 83 19 (!) 140/93 96 %   22 0800 98.3 °F (36.8 °C) 85 16 (!) 150/100 98 %   22 0730 -- 78 19 (!) 143/91 100 %   22 0715 -- -- -- -- 99 %   22 0701 -- 77 -- -- --   22 0600 -- 74 20 (!) 161/101 95 %   22 0500 -- 78 22 (!) 157/93 98 %   22 0400 98 °F (36.7 °C) 77 20 (!) 158/94 97 %   22 0300 -- 79 20 (!) 146/96 95 %   22 0200 -- 80 23 (!) 139/99 98 %   22 0100 -- 78 19 (!) 147/96 95 %   22 0000 98 °F (36.7 °C) 78 22 (!) 154/96 98 %   22 2307 -- 83 -- -- --   22 2300 -- 84 21 (!) 151/86 97 %   22 2200 -- 86 22 (!) 146/87 94 %      Temp (24hrs), Av.3 °F (36.8 °C), Min:98 °F (36.7 °C), Max:98.6 °F (37 °C)     O2 Flow Rate (L/min): 2 l/min   O2 Device: None (Room air)    Date 02/12/22 1900 - 02/13/22 0659 02/13/22 0700 - 02/14/22 0659   Shift 1485-2367 24 Hour Total 7840-7832 3383-6214 24 Hour Total   INTAKE   I.V.(mL/kg/hr) 842.8 3866.5 178.6(0.1)  178.6     Precedex Volume 255.3 558.1 78.6  78.6     Volume (0.9% sodium chloride 1,000 mL with mvi (adult no. 4 with vit K) 10 mL, thiamine 478 mg, folic acid 1 mg infusion) 127.1 1750        Volume (0.9% sodium chloride infusion) 460.4 1558.3 0  0     Volume (potassium chloride 10 mEq in 100 ml IVPB)   100  100   Shift Total(mL/kg) 842. 8(8.2) 3866.5(37.8) 178.6(1.7)  178.6(1.7)   OUTPUT   Urine(mL/kg/hr) 1400 2950 2400(2)  2400     Urine Voided   2400  2400     Urine Occurrence(s)   1 x  1 x     Urine Output (mL) ([REMOVED] External Urinary Catheter 02/12/22) 1400 2950 0  0   Stool          Stool Occurrence(s)   1 x  1 x   Shift Total(mL/kg) 1400(13.7) 7147(65.5) 2400(23.4)  2400(23.4)   NET -557.2 916.5 -2221.4  -2221.4   Weight (kg) 102.4 102.4 102.4 102.4 102.4     General:   Awake. Alert. Tearful. Head:   Atraumatic   Lungs:   Clear to auscultation bilaterally    Heart:   Normal rate, regular rhythm, no murmur, rubs or gallops   Abdomen:    Soft, No masses or organomegaly    Extremities:  No edema or DVT signs   Psych:  Flat to tearful affect. Skin:  Warm and dry    No rashes or lesions   Neurologic:  Awake and alert. A&O x 4.         Data Review:     CBC:  Recent Labs     02/13/22  0159 02/12/22  0054 02/11/22  0828   WBC 3.2* 2.6* 3.1*   HGB 14.3 13.2 13.1   HCT 39.9 37.4 37.4   PLT 97* 76* 63*     Metabolic Panel:  Recent Labs     02/13/22  0159 02/12/22  0941 02/12/22  0054 02/11/22  0758     --  139 136   K 3.4*  --  3.6 3.2*     --  107 104   CO2 26  --  24 27   BUN 4*  --  4* 8   CREA 0.64*  --  0.67* 0.67*   *  --  112* 106*   CA 8.3*  --  8.1* 7.8*   MG 1.7  --  1.8 1.8   ALB 3.0*  --  3.1* 2.9*   TBILI 1.5*  --  1.4* 1.4*   ALT 33  --  29 26   INR --  1.0  --   --      Micro:  No results found for: CULT  Imaging:  CT ABD PELV W WO CONT    Result Date: 1/17/2022  EXAM: CT ABD PELV W WO CONT INDICATION: epigastric pain, r/o pancreatitis COMPARISON: Ultrasound 9/4/2017. CT 9/2/2017. Rhenda Mili CONTRAST: 100 mL of Isovue-370. TECHNIQUE:  Multislice helical CT was performed from the diaphragm to the iliac crest prior to intravenous contrast administration and from the diaphragm to the symphysis pubis during uneventful rapid bolus intravenous contrast administration, during the hepatic arterial, portal venous and delayed venous phases. Oral contrast was not administered. Contiguous 5 mm axial images were reconstructed and lung and soft tissue windows were generated. Coronal and sagittal reformations were generated. CT dose reduction was achieved through use of a standardized protocol tailored for this examination and automatic exposure control for dose modulation. The lack of oral contrast material diminishes the capacity of CT to evaluate the bowel and adjacent structures. FINDINGS: LOWER THORAX: Several bilateral subpleural nodules measuring up to 4 mm in size appear unchanged in number and size. LIVER: Diffuse severe hepatic steatosis. No hepatic mass or intrahepatic bile duct dilation demonstrated. BILIARY TREE: Gallbladder is within normal limits. CBD is not dilated. SPLEEN: within normal limits. PANCREAS: No mass or ductal dilatation. No CT imaging evidence of pancreatitis. ADRENALS: Unremarkable. KIDNEYS: No mass, calculus, or hydronephrosis. STOMACH: Unremarkable. SMALL BOWEL: No dilatation or wall thickening. COLON: Anastomotic suture ring at proximal sigmoid region. Transverse and descending colonic diverticulosis. No mural thickening or dilation demonstrated. APPENDIX: Normal. PERITONEUM: No ascites or pneumoperitoneum. RETROPERITONEUM: No lymphadenopathy or aortic aneurysm. REPRODUCTIVE ORGANS: Those shown appear unremarkable.  URINARY BLADDER: No mass or calculus. BONES: No destructive bone lesion. ABDOMINAL WALL: No mass or hernia. ADDITIONAL COMMENTS: N/A     No evidence for acute pancreatitis or other acute finding. Details can be found in report. XR CHEST PORT    Result Date: 2/10/2022  EXAM:  XR CHEST PORT INDICATION:  cough COMPARISON:  1/17/2022 FINDINGS: A portable AP radiograph of the chest was obtained at 1059 hours. The patient is on a cardiac monitor. The lungs are clear. The cardiac and mediastinal contours and pulmonary vascularity are normal.  The bones and soft tissues are grossly within normal limits. No acute abnormality     XR CHEST PORT    Result Date: 1/17/2022  EXAM:  XR CHEST PORT INDICATION: Wheezing, cough COMPARISON: Chest x-ray 1/6/2021. TECHNIQUE: Single frontal view of the chest. FINDINGS: No lobar consolidation, pleural effusion, or pneumothorax. The cardiomediastinal silhouette is not enlarged. No acute or aggressive osseous lesion. 1.  No evidence of an acute cardiopulmonary process.     Medications reviewed  Current Facility-Administered Medications   Medication Dose Route Frequency    labetaloL (NORMODYNE;TRANDATE) injection 10 mg  10 mg IntraVENous Q6H PRN    QUEtiapine (SEROquel) tablet 50 mg  50 mg Oral TID    [START ON 2/14/2022] thiamine mononitrate (B-1) tablet 100 mg  100 mg Oral DAILY    [START ON 8/75/2636] folic acid (FOLVITE) tablet 1 mg  1 mg Oral DAILY    [START ON 2/14/2022] therapeutic multivitamin (THERAGRAN) tablet 1 Tablet  1 Tablet Oral DAILY    [START ON 2/14/2022] pantoprazole (PROTONIX) tablet 40 mg  40 mg Oral ACB    PHENobarbitaL (LUMINAL) tablet 97.2 mg  97.2 mg Oral TID    prochlorperazine (COMPAZINE) injection 5 mg  5 mg IntraVENous Q6H PRN    albuterol-ipratropium (DUO-NEB) 2.5 MG-0.5 MG/3 ML  3 mL Nebulization Q6HWA RT    dexmedeTOMidine in 0.9 % NaCl (PRECEDEX) 400 mcg/100 mL (4 mcg/mL) infusion soln  0.1-1.5 mcg/kg/hr IntraVENous TITRATE    LORazepam (ATIVAN) injection 2 mg  2 mg IntraVENous Q1H PRN    LORazepam (ATIVAN) injection 4 mg  4 mg IntraVENous Q1H PRN    sodium chloride (NS) flush 5-40 mL  5-40 mL IntraVENous Q8H    sodium chloride (NS) flush 5-40 mL  5-40 mL IntraVENous PRN    acetaminophen (TYLENOL) tablet 650 mg  650 mg Oral Q6H PRN    Or    acetaminophen (TYLENOL) suppository 650 mg  650 mg Rectal Q6H PRN    insulin lispro (HUMALOG) injection   SubCUTAneous AC&HS    glucose chewable tablet 16 g  4 Tablet Oral PRN    dextrose (D50W) injection syrg 12.5-25 g  12.5-25 g IntraVENous PRN    glucagon (GLUCAGEN) injection 1 mg  1 mg IntraMUSCular PRN    arformoterol 15 mcg/budesonide 0.25 mg neb solution   Nebulization BID RT    guaiFENesin ER (MUCINEX) tablet 600 mg  600 mg Oral Q12H    [Held by provider] lisinopriL (PRINIVIL, ZESTRIL) tablet 20 mg  20 mg Oral DAILY    metoprolol tartrate (LOPRESSOR) tablet 50 mg  50 mg Oral BID    nicotine (NICODERM CQ) 21 mg/24 hr patch 1 Patch  1 Patch TransDERmal DAILY         Signed:   River Brenner MD   Resident, Family Medicine      Attending note: Attending note to follow. ..

## 2022-02-14 NOTE — PROGRESS NOTES
1900- received report regarding the patient Kardex and plan of care. 2001- assessed patient, discussed plan of care for night and last drink after rehab. Pt wants to have a plan of care after discharge. States that he will call the  in the morning, states that the cross on the wall is moving and that he doesn't see bears floating in the room, but see thing moving around all the time. He denies hearing any voices. States he just needs someone to talk to , wants to speak to Sarah Hartman about getting a counselor once released from the hospital. Continue to state that his anxiety is bad and request medication. See Mar.     2210- Pt ambulated in room to commode, denies any pain or discomfort. Requested a pin to make a list of things he needs to do. Discussed his support system his girlfriend and that he does not understand why he can do so go and then have a turn for the worse. He states,\" this is a disease that I just can control. \"     0538- Pt sitting at bedside, states anxiety is ok, writing his list.     2245-tele call regarding elevated , pt bp retaken 162/115 (127) states he was on the phone with brother, who makes his anxiety increase. He is asymptomatic denies any chest pain, talking and showing me pictures. BP is currently 159/93 (114) hr 113. Stayed in room with pt until 2325. See mar of medication given. 0000- pt watching a movie on cell phone, no distress. 80- pt states that he wants the monitors off, they make too much noise. Educated regarding he is in ICU and needs to keep the monitors on so that we can continue to provide he great care. Pt agreed to not remove any items. Pt was left in room watching movie no visible distress or c/o anxiety. 0623-pt calm and talking, watching tv no visible distress,  Pt asymptomatic resting in bed. Pt hands have mild finger in tip, requesting ativan states he need something to keep anxiety down that he can fell coming up.  And states he headache is back and does not know why. Pt wants a scan on his brain feels that he has lost black matter in brain. Educated on [address] [facility]. Will provide see mar.     0700- Bedside and Verbal shift change report given to Marivel Hawkins RN  (oncoming nurse) by Jai Levy RN (offgoing nurse). Report included the following information SBAR, Kardex, MAR and Recent Results.

## 2022-02-14 NOTE — PROGRESS NOTES
Problem: Mobility Impaired (Adult and Pediatric)  Goal: *Acute Goals and Plan of Care (Insert Text)  Description: FUNCTIONAL STATUS PRIOR TO ADMISSION: Patient was independent and active without use of DME.    HOME SUPPORT PRIOR TO ADMISSION: The patient lived alone with no local support. Physical Therapy Goals  Initiated 2/11/2022  1. Patient will move from supine to sit and sit to supine  in bed with modified independence within 7 day(s). 2.  Patient will transfer from bed to chair and chair to bed with modified independence using the least restrictive device within 7 day(s). 3.  Patient will perform sit to stand with modified independence within 7 day(s). 4.  Patient will ambulate with modified independence for 150 feet with the least restrictive device within 7 day(s). 5.  Patient will ascend/descend 4 stairs with 2 handrail(s) with modified independence within 7 day(s). Outcome: Progressing Towards Goal  Note:   PHYSICAL THERAPY TREATMENT  Patient: Agatha Grijalva (60 y.o. male)  Date: 2/14/2022  Diagnosis: Alcohol withdrawal syndrome without complication (HCC) [T84.445] <principal problem not specified>       Precautions: Fall  Chart, physical therapy assessment, plan of care and goals were reviewed. ASSESSMENT  Patient continues with skilled PT services and is progressing towards goals. Patient able to increase ambulation distance and mild path deviaitons noted, but did not require an assistive device. Patient blood pressure elevated, but stable- see below. Heart rate controled. .     Current Level of Function Impacting Discharge (mobility/balance): MOD I-CGA    Other factors to consider for discharge:          PLAN :  Patient continues to benefit from skilled intervention to address the above impairments. Continue treatment per established plan of care. to address goals.     Recommendation for discharge: (in order for the patient to meet his/her long term goals)  To be determined: This discharge recommendation:  Has been made in collaboration with the attending provider and/or case management    IF patient discharges home will need the following DME: none       SUBJECTIVE:   Patient stated .    OBJECTIVE DATA SUMMARY:   Critical Behavior:  Neurologic State: Alert  Orientation Level: Oriented X4  Cognition: Follows commands  Safety/Judgement: Awareness of environment  Vitals:    02/14/22 1340 02/14/22 1400 02/14/22 1409 02/14/22 1430   BP: (!) 173/90 (!) 140/102  (!) 145/91   BP 1 Location: Right arm      BP Patient Position: Comment: post activity      Pulse: 85 90  (!) 115   Temp:       Resp:  12  23   Height:       Weight:       SpO2: 99% 97% 100% 95%       Functional Mobility Training:  Bed Mobility:  Rolling: Modified independent  Supine to Sit: Modified independent  Sit to Supine: Modified independent           Transfers:  Sit to Stand: Modified independent  Stand to Sit: Modified independent        Bed to Chair: Stand-by assistance                    Balance:     Ambulation/Gait Training:  Distance (ft): 150 Feet (ft)  Assistive Device: Gait belt  Ambulation - Level of Assistance: Contact guard assistance        Gait Abnormalities: Path deviations        Pain Rating:  None noted    Activity Tolerance:   Fair    After treatment patient left in no apparent distress:   Supine in bed, Call bell within reach, and Bed / chair alarm activated    COMMUNICATION/COLLABORATION:   The patients plan of care was discussed with: Registered nurse.      Kayla Colindres PT, DPT   Time Calculation: 15 mins

## 2022-02-14 NOTE — PROGRESS NOTES
TRANSFER - IN REPORT:    Verbal report received  From Jocelyn Feliz on Angus Gold being transferred to ICU on 2/11/22 for precedex gtt  Report consisted of patients Situation, Background, Assessment and   Recommendations(SBAR). RUR 14(Score %) low   Is This a Readmission NO  Is this a Bundle NO    1.transferred to ICU on 2/11/22 for precedex gtt due to acute agitation  2.precedex gtt off now since 16:00 pm yesterday  3. On 2/13/22 pt had tachycardia with heart rate in the 160 s managed with labetolol( now today is in SR in the 90 s)  4.history of alcohol,cocaine,THC and opiod abuse  5.recently completed rehab for substance abuse in December but relapsed   6. Pt lives alone in a to story home wit 2 entry steps. There are 17 steps to his bedroom on the second floor. Pt is independent with his ADLs  7. Information on [employer] placed on pt.'s AVS.  8.Transfer to telemetry. Pt wants his girlfriend to be his medical decision-maker. Her name is Kirby Cramer (girlfriend) [phone].   However,since pt does mot have an AMD and desires for his girlfriend to be his primary decision-maker,I have requested for the  to please complete an AMD.    Keny Ramesh  [phone]

## 2022-02-14 NOTE — PROGRESS NOTES
Physician Progress Note      PATIENT:               Annamaria Gowers  CSN #:                  [id number]  :                       1965  ADMIT DATE:       2/10/2022 10:23 AM  DISCH DATE:  RESPONDING  PROVIDER #:        Migdalia Mahmood MD          QUERY TEXT:    Pt admitted with ETOH  Noted documentation of AMS per nursing notes If possible, please document in progress notes and discharge summary:      The medical record reflects the following:  Risk Factors:ETOH  Clinical Indicators:  ED  Pt states that it looks like the blue bag (linen bag) is breathing and that he feels like he has seen something move when he looks around. Treatment:CIWA w/ ativan  Thank you,  Shauna Cifuentes RN, [facility], Radha, [facility]  Certified Clinical   [phone]  Options provided:  -- visual hallucinations are  clinically significant  -- visual hallucinations are not clinically significant  -- Other - I will add my own diagnosis  -- Disagree - Not applicable / Not valid  -- Disagree - Clinically unable to determine / Unknown  -- Refer to Clinical Documentation Reviewer    PROVIDER RESPONSE TEXT:    visual hallucinations are not clinically significant.     Query created by: Linh Hodge on 2022 2:40 PM      Electronically signed by:  Migdalia Mahmood MD 2022 1:29 PM

## 2022-02-14 NOTE — DISCHARGE INSTRUCTIONS
[facility]/ED Visit Follow-Up Instructions/Information    You may have an in home follow up visit set up with [facility] or may wish to contact [facility] to set-up a visit:    What are we? [facility] is an in-home urgent care service staffed with emergency trained medical teams. We come to your home in a vehicle stocked with medical supplies and technology. An ER physician is always available if needed. When? As a part of your hospital follow-up, an appointment has been/ or can be set up for us to come see you. Usually, this will be 24-72 hours after you leave the hospital or as needed. [facility] is open 7am-9pm, 7 days a week, 365 days a year, including holidays. Why? We know that you cannot always get to your doctor after being in the hospital and that your doctor is not always available when you need them. Once your workup is complete, we'll call in your prescriptions, update your family doctor, and handle billing with your insurance so you can focus on feeling better, faster without leaving home. How much? We accept most major health insurance plans, including Medicaid, Medicare, and Medicare Advantage [employer], [employer], [employer], [employer], and [employer]. We also accept: credit, debit, health savings account (HSA), health reimbursement account (HRA) and flexible spending account (FSA) payments. [facility]'s prices compare to conventional urgent care facilities, but we bring the care to you. How to reach us? Getting care is easy- use our mobile ernst (Nala), website ([email or web address]) or call us [phone].

## 2022-02-14 NOTE — ACP (ADVANCE CARE PLANNING)
Advance Care Planning   Advance Care Planning Inpatient Note  [facility] Department    Today's Date: 2/14/2022  Unit: [facility]    Received request from IDT member. Upon review of chart and communication with care team, patient's decision making abilities are not in question. Patient was/were present in the room during visit. Goals of ACP Conversation:  Discuss Advance Care planning documents    Health Care Decision Makers:    No healthcare decision makers have been documented. Click here to complete [employer] including selection of the Healthcare Decision Maker Relationship (ie \"Primary\")    Summary:  No Decision Maker named by patient at this time    Advance Care Planning Documents (Patient Wishes) on file:  None     Assessment:    Visited patient in response to a request for an AMD conversation. He verbally stated that he wants his girl friend Leopoldo Presume  ([phone]) to be his MPOA. However he was unwilling to compete an AMD at this time. He was informed that in the absence of an AMD any medical decision making, in the event he is unable to do so, will go to his legal next of kin. He reports having adult children. Interventions:  Discussed and provided education on state decision maker hierarchy  Encouraged ongoing ACP conversation with future decision makers and loved ones    Care Preferences Communicated:  No    Outcomes/Plan:  AMD not completed.      [facility] on 2/14/2022 at 12:55 PM

## 2022-02-14 NOTE — PROGRESS NOTES
Comprehensive Nutrition Assessment    Type and Reason for Visit: Reassess,RD nutrition re-screen/LOS    Nutrition Recommendations/Plan:   1. Continue Ensure High Protein BID (320 kcal, 38 g carbs, 32 g protein)    2. Continue folic acid & MVI for home meds  3. RD adjusted diet order: 4 Carb Choice, to better fit patient calorie needs    Nutrition Assessment:   2/14: Follow up. Patient day 3 in ICU - moved from off floor d/t increased CIWA score. PO intake averaging 75% of all meals and supplements. He endorses good appetite. Unsure of UBW. Patient with no c/o N/V/D. Has been experiencing some GERD symptoms, relates this to anxiety and 'the flashing lights'. Current diet order of 3 Carb Choice provides 1527 kcal/day - RD modified to 4 Carb choice to provides 1750 kcal/day to better fit patient calorie needs. Last three BG , 119, 127. Unsure method of collection for most recent weight - is a 7# gain x 4 days, unlikely with only trace BLE edema. Needs remain calculated based upon admission weight 218#. Last 3 Recorded Weights in this Encounter    02/10/22 1031 02/11/22 0440 02/12/22 1858   Weight: 98.9 kg (218 lb) 99 kg (218 lb 4.1 oz) 102.4 kg (225 lb 12 oz)       2/11:  61 yr old male admitted for Alcohol withdrawal syndrome without complication ([facility] [address] [Y36.254] who  has a past medical history of Alcohol abuse, Alcoholism ([facility] Anxiety, COPD (chronic obstructive pulmonary disease) ([facility] Depression, Diabetes ([facility] Diverticulitis, Family history of angina, GERD (gastroesophageal reflux disease), Hemochromatosis, and Hypertension. Wt appears stable from recent & remote records. RD visited pt & he didn't feel much like having b'fast today. He is agreeable to a nutritional shake & voiced he preferred vanilla. Pt is drowsy but did work with therapy after RD visit. Folic acid is normal, but pt will benefit from con't supplements. Diet order restricted for carbs and low sodium.  Low sodium is not needed & BG has been WDL. Hx of DM noted, A1c% is WDL, but could be low related to bleeding if pt has current or recent bleed.      Diet Order adjusted by RD: Removed NCS (not appropriate with carb controlled diet order - redundant); 4 Carb Choice diet order meets only 77% of calorie needs - RD added ONS BID to be able to meet >90% of calorie needs & 100% of protein needs; Changed 2gm Na to \"No Added Salt\" restriction will provide ~2,400mg of Sodium without reducing calories provided from the diet order. Malnutrition Assessment:  Malnutrition Status:  No malnutrition    Context:  Acute illness     Findings of the 6 clinical characteristics of malnutrition:   Energy Intake:  No significant decrease in energy intake  Weight Loss:  No significant weight loss     Body Fat Loss:  No significant body fat loss,     Muscle Mass Loss:  No significant muscle mass loss,    Fluid Accumulation:  No significant fluid accumulation,     Strength:  Not performed     Estimated Daily Nutrient Needs:  Energy (kcal): 2260 (MSJ x 1.2); Weight Used for Energy Requirements: Admission  Protein (g): 100 (1g/kg); Weight Used for Protein Requirements: Admission  Fluid (ml/day): 2260; Method Used for Fluid Requirements: 1 ml/kcal    Nutrition Related Findings:    ABD: Good appetite, active bowel sounds   Last BM: 2/13, watery  Edema: LUE: Trace (2/14/2022  7:00 AM)  RUE: Trace (2/14/2022  7:00 AM)    Nutr.  Labs:  Lab Results   Component Value Date/Time    GFR est AA >60 02/14/2022 04:49 AM    GFR est non-AA >60 02/14/2022 04:49 AM    Creatinine 0.71 02/14/2022 04:49 AM    BUN 8 02/14/2022 04:49 AM    Sodium 137 02/14/2022 04:49 AM    Potassium 3.5 02/14/2022 04:49 AM    Chloride 103 02/14/2022 04:49 AM    CO2 24 02/14/2022 04:49 AM     Lab Results   Component Value Date/Time    Glucose 112 (H) 02/14/2022 04:49 AM    Glucose (POC) 145 (H) 02/14/2022 11:22 AM     Lab Results   Component Value Date/Time    Hemoglobin A1c 5.7 (H) 01/17/2022 07:48 PM Nutr. Meds:  Folic Acid, humalog, labetalol, lopressor, protonix, luminal, theragran, Thiamine    Wounds:    None       Current Nutrition Therapies:  ADULT ORAL NUTRITION SUPPLEMENT Dinner, Breakfast; Low Calorie/High Protein  ADULT DIET Regular; 4 carb choices (60 gm/meal)    Anthropometric Measures:  · Height:  6' 2.02\" (188 cm)  · Current Body Wt:  102.4 kg (225 lb 12 oz)   · Admission Body Wt:  218 lb 4.1 oz    · Usual Body Wt:     unsure  · Ideal Body Wt:  190 lbs:  118.8 %   Body mass index is 28.97 kg/m². · BMI Category: Overweight (BMI 25.0-29. 9)       Nutrition Diagnosis:   · Increased nutrient needs related to cognitive or neurological impairment as evidenced by  (ETOH withdrawal)     · Predicted inadequate energy intake related to cognitive or neurological impairment as evidenced by hx of ETOH, poor intake prior to admission reported, didn't want to eat b'fast when interviewed by RD - RESOLVED    Nutrition Interventions:   Food and/or Nutrient Delivery: Modify current diet,Continue oral nutrition supplement  Nutrition Education and Counseling: Counseling needed  Coordination of Nutrition Care: Continue to monitor while inpatient,Interdisciplinary rounds    Goals:  PO intake >/= 75% of all meals and supplements within 5- 7 days       Nutrition Monitoring and Evaluation:   Behavioral-Environmental Outcomes: Readiness for change  Food/Nutrient Intake Outcomes: Food and nutrient intake,Supplement intake  Physical Signs/Symptoms Outcomes: Biochemical data,Skin,Weight,Hemodynamic status,Meal time behavior    Discharge Planning:    No discharge needs at this time     Electronically signed by Ronit Schultz RD on 7/64/7770   Contact: [phone] or via Location

## 2022-02-14 NOTE — PROGRESS NOTES
Problem: Self Care Deficits Care Plan (Adult)  Goal: *Acute Goals and Plan of Care (Insert Text)  Description:  FUNCTIONAL STATUS PRIOR TO ADMISSION: Patient was independent and active without use of DME.    HOME SUPPORT PRIOR TO ADMISSION: The patient lived alone with no local support. Occupational Therapy Goals  Initiated 2/11/2022; Goals met 2/14/22  1. Patient will perform grooming with supervision/set-up standing at the sink within 7 day(s). 2.  Patient will perform upper body dressing and bathing with supervision/set-up within 7 day(s). 3.  Patient will perform lower body dressing and bathing with minimal assistance within 7 day(s). 4.  Patient will perform toilet transfers with minimal assistance within 7 day(s). 5.  Patient will perform all aspects of toileting with minimal assistance within 7 day(s). 6.  Patient will participate in upper extremity therapeutic exercise/activities with supervision/set-up for 10 minutes within 7 day(s). Outcome: Progressing Towards Goal   OCCUPATIONAL THERAPY TREATMENT/DISCHARGE  Patient: Lawyer Story (89 y.o. male)  Date: 2/14/2022  Diagnosis: Alcohol withdrawal syndrome without complication ([facility] [I68.889] <principal problem not specified>       Precautions: Fall  Chart, occupational therapy assessment, plan of care, and goals were reviewed. ASSESSMENT  Patient continues with skilled OT services and has progressed towards goals. Mr. Rosangela Lafleur was alert, oriented x4, and following all commands. He performed all transfers without LOB or physical assistance needed. Patient engaged in ADLs with good tolerance; He needs increased time for LB and standing tasks but no assistance needed. Patient was educated on energy conservation techniques including pacing. Patient has no further skilled OT needs and is cleared from OT services at this time.       Current Level of Function (ADLs/self-care): Patient is independent to mod I level for all functional mobility and ADLs.           PLAN :  Rationale for discharge: Goals achieved  Recommend with staff:   Recommend patient be OOB to chair as frequently as tolerated; Goal of 3x/day for all meals for 60 minutes at a time. For toileting needs, recommend transfers to/from bathroom. Encourage patient involvement in personal care as able. .     Recommendation for discharge: (in order for the patient to meet his/her long term goals)  No skilled occupational therapy/ follow up rehabilitation needs identified at this time. This discharge recommendation:  Has been made in collaboration with the attending provider and/or case management    IF patient discharges home will need the following DME:none       SUBJECTIVE:   Patient agreeable to OT tx.       OBJECTIVE DATA SUMMARY:   Cognitive/Behavioral Status:  Neurologic State: Alert  Orientation Level: Oriented X4  Cognition: Follows commands  Perception: Appears intact  Perseveration: No perseveration noted  Safety/Judgement: Awareness of environment    Functional Mobility and Transfers for ADLs:  Bed Mobility:  Rolling: Modified independent  Supine to Sit: Modified independent  Sit to Supine: Modified independent    Transfers:  Sit to Stand: Modified independent  Functional Transfers  Toilet Transfer : Modified independent  Bed to Chair: Stand-by assistance    Balance:  Sitting: Intact  Standing: Intact    ADL Intervention:  Lower Body Dressing Assistance  Dressing Assistance: Modified independent  Socks: Modified independent    Toileting  Toileting Assistance: Modified independent  Bladder Hygiene: Modified independent  Bowel Hygiene: Modified indpendent  Clothing Management: Modified independent  Adaptive Equipment: Grab bars    Cognitive Retraining  Safety/Judgement: Awareness of environment      Activity Tolerance:   Good    After treatment patient left in no apparent distress:   Sitting in chair    COMMUNICATION/COLLABORATION:   The patients plan of care was discussed with: Physical therapist, Registered nurse, and patient .      Kristin Adams, OTR/L  Time Calculation: 40 mins

## 2022-02-14 NOTE — PROGRESS NOTES
Spiritual Care Assessment/Progress Note  [address]      NAME: Kacie Albarado      MRN: [id number]  AGE: 61 y.o.  SEX: male  Congregation Affiliation: Congregation   Language: English     2/14/2022     Total Time (in minutes): 41     Spiritual Assessment begun in [facility]NL CARE through conversation with:         [x]Patient        [] Family    [] Friend(s)        Reason for Consult: Advance medical directive consult     Spiritual beliefs: (Please include comment if needed)     [x] Identifies with a justa tradition:  Amna Rust       [] Supported by a justa community:            [] Claims no spiritual orientation:           [] Seeking spiritual identity:                [] Adheres to an individual form of spirituality:           [] Not able to assess:                           Identified resources for coping:      [x] Prayer                               [] Music                  [] Guided Imagery     [x] Family/friends                 [] Pet visits     [] Devotional reading                         [] Unknown     [] Other:                                               Interventions offered during this visit: (See comments for more details)    Patient Interventions: Advance medical directive consult,Affirmation of emotions/emotional suffering,Affirmation of justa,Catharsis/review of pertinent events in supportive environment,Iconic (affirming the presence of God/Higher Power),Normalization of emotional/spiritual concerns,Prayer (actual)     Family/Friend(s): Other (comment) (Note left at bedside)     Plan of Care:     [] Support spiritual and/or cultural needs    [] Support AMD and/or advance care planning process      [] Support grieving process   [] Coordinate Rites and/or Rituals    [] Coordination with community clergy   [] No spiritual needs identified at this time   [] Detailed Plan of Care below (See Comments)  [] Make referral to Music Therapy  [] Make referral to Pet Therapy     [] Make referral to Addiction services  [] Make referral to [facility]  [] Make referral to Spiritual Care Partner  [] No future visits requested        [x] Follow up visits as needed     Visited patient in response to a request for an AMD conversation. He verbally stated that he wants his girl friend Kirby Cramer  ([phone]) to be his MPOA. However he was unwilling to compete an AMD at this time. He was informed that in the absence of an AMD any medical decision making, in the event he is unable to do so, will go to his legal next of kin. He reports having adult children. He spoke at length about his EtOH abuse and multiple efforts to remain sober.       Chaplain Stephenie, MDiv, MS, [facility]

## 2022-02-15 VITALS
HEIGHT: 74 IN | WEIGHT: 220.2 LBS | DIASTOLIC BLOOD PRESSURE: 79 MMHG | OXYGEN SATURATION: 96 % | RESPIRATION RATE: 16 BRPM | HEART RATE: 89 BPM | BODY MASS INDEX: 28.26 KG/M2 | TEMPERATURE: 98.3 F | SYSTOLIC BLOOD PRESSURE: 129 MMHG

## 2022-02-15 LAB
ALBUMIN SERPL-MCNC: 3.9 G/DL (ref 3.5–5)
ALBUMIN/GLOB SERPL: 1 {RATIO} (ref 1.1–2.2)
ALP SERPL-CCNC: 63 U/L (ref 45–117)
ALT SERPL-CCNC: 35 U/L (ref 12–78)
ANION GAP SERPL CALC-SCNC: 8 MMOL/L (ref 5–15)
AST SERPL-CCNC: 18 U/L (ref 15–37)
BASOPHILS # BLD: 0 K/UL (ref 0–0.1)
BASOPHILS NFR BLD: 1 % (ref 0–1)
BILIRUB SERPL-MCNC: 1.7 MG/DL (ref 0.2–1)
BUN SERPL-MCNC: 8 MG/DL (ref 6–20)
BUN/CREAT SERPL: 9 (ref 12–20)
CALCIUM SERPL-MCNC: 9.9 MG/DL (ref 8.5–10.1)
CHLORIDE SERPL-SCNC: 100 MMOL/L (ref 97–108)
CO2 SERPL-SCNC: 25 MMOL/L (ref 21–32)
CREAT SERPL-MCNC: 0.9 MG/DL (ref 0.7–1.3)
DIFFERENTIAL METHOD BLD: ABNORMAL
EOSINOPHIL # BLD: 0.1 K/UL (ref 0–0.4)
EOSINOPHIL NFR BLD: 2 % (ref 0–7)
ERYTHROCYTE [DISTWIDTH] IN BLOOD BY AUTOMATED COUNT: 14.2 % (ref 11.5–14.5)
GLOBULIN SER CALC-MCNC: 3.9 G/DL (ref 2–4)
GLUCOSE BLD STRIP.AUTO-MCNC: 127 MG/DL (ref 65–117)
GLUCOSE BLD STRIP.AUTO-MCNC: 133 MG/DL (ref 65–117)
GLUCOSE BLD STRIP.AUTO-MCNC: 169 MG/DL (ref 65–117)
GLUCOSE SERPL-MCNC: 120 MG/DL (ref 65–100)
HCT VFR BLD AUTO: 44.9 % (ref 36.6–50.3)
HGB BLD-MCNC: 16.4 G/DL (ref 12.1–17)
IMM GRANULOCYTES # BLD AUTO: 0 K/UL (ref 0–0.04)
IMM GRANULOCYTES NFR BLD AUTO: 1 % (ref 0–0.5)
LYMPHOCYTES # BLD: 1.2 K/UL (ref 0.8–3.5)
LYMPHOCYTES NFR BLD: 27 % (ref 12–49)
MAGNESIUM SERPL-MCNC: 1.8 MG/DL (ref 1.6–2.4)
MCH RBC QN AUTO: 34.4 PG (ref 26–34)
MCHC RBC AUTO-ENTMCNC: 36.5 G/DL (ref 30–36.5)
MCV RBC AUTO: 94.1 FL (ref 80–99)
MONOCYTES # BLD: 1 K/UL (ref 0–1)
MONOCYTES NFR BLD: 23 % (ref 5–13)
NEUTS SEG # BLD: 2.1 K/UL (ref 1.8–8)
NEUTS SEG NFR BLD: 46 % (ref 32–75)
NRBC # BLD: 0 K/UL (ref 0–0.01)
NRBC BLD-RTO: 0 PER 100 WBC
PLATELET # BLD AUTO: 149 K/UL (ref 150–400)
PMV BLD AUTO: 9.3 FL (ref 8.9–12.9)
POTASSIUM SERPL-SCNC: 3.6 MMOL/L (ref 3.5–5.1)
PROT SERPL-MCNC: 7.8 G/DL (ref 6.4–8.2)
RBC # BLD AUTO: 4.77 M/UL (ref 4.1–5.7)
RBC MORPH BLD: ABNORMAL
SERVICE CMNT-IMP: ABNORMAL
SODIUM SERPL-SCNC: 133 MMOL/L (ref 136–145)
WBC # BLD AUTO: 4.4 K/UL (ref 4.1–11.1)

## 2022-02-15 PROCEDURE — 74011636637 HC RX REV CODE- 636/637

## 2022-02-15 PROCEDURE — 74011000250 HC RX REV CODE- 250: Performed by: FAMILY MEDICINE

## 2022-02-15 PROCEDURE — 36415 COLL VENOUS BLD VENIPUNCTURE: CPT

## 2022-02-15 PROCEDURE — 83735 ASSAY OF MAGNESIUM: CPT

## 2022-02-15 PROCEDURE — 93042 RHYTHM ECG REPORT: CPT | Performed by: FAMILY MEDICINE

## 2022-02-15 PROCEDURE — 99232 SBSQ HOSP IP/OBS MODERATE 35: CPT | Performed by: FAMILY MEDICINE

## 2022-02-15 PROCEDURE — 74011250637 HC RX REV CODE- 250/637

## 2022-02-15 PROCEDURE — 74011000250 HC RX REV CODE- 250: Performed by: STUDENT IN AN ORGANIZED HEALTH CARE EDUCATION/TRAINING PROGRAM

## 2022-02-15 PROCEDURE — 74011250637 HC RX REV CODE- 250/637: Performed by: STUDENT IN AN ORGANIZED HEALTH CARE EDUCATION/TRAINING PROGRAM

## 2022-02-15 PROCEDURE — 82962 GLUCOSE BLOOD TEST: CPT

## 2022-02-15 PROCEDURE — 80053 COMPREHEN METABOLIC PANEL: CPT

## 2022-02-15 PROCEDURE — 94640 AIRWAY INHALATION TREATMENT: CPT

## 2022-02-15 PROCEDURE — 85025 COMPLETE CBC W/AUTO DIFF WBC: CPT

## 2022-02-15 PROCEDURE — 97116 GAIT TRAINING THERAPY: CPT

## 2022-02-15 RX ADMIN — IPRATROPIUM BROMIDE AND ALBUTEROL SULFATE 3 ML: .5; 3 SOLUTION RESPIRATORY (INHALATION) at 13:10

## 2022-02-15 RX ADMIN — PANTOPRAZOLE SODIUM 40 MG: 40 TABLET, DELAYED RELEASE ORAL at 09:15

## 2022-02-15 RX ADMIN — METOPROLOL TARTRATE 50 MG: 50 TABLET, FILM COATED ORAL at 09:16

## 2022-02-15 RX ADMIN — SODIUM CHLORIDE, PRESERVATIVE FREE 10 ML: 5 INJECTION INTRAVENOUS at 16:50

## 2022-02-15 RX ADMIN — THIAMINE HCL TAB 100 MG 100 MG: 100 TAB at 09:00

## 2022-02-15 RX ADMIN — ACETAMINOPHEN 650 MG: 325 TABLET ORAL at 12:18

## 2022-02-15 RX ADMIN — LORAZEPAM 2 MG: 1 TABLET ORAL at 03:23

## 2022-02-15 RX ADMIN — LORAZEPAM 2 MG: 1 TABLET ORAL at 09:16

## 2022-02-15 RX ADMIN — FOLIC ACID 1 MG: 1 TABLET ORAL at 09:16

## 2022-02-15 RX ADMIN — THERA TABS 1 TABLET: TAB at 09:16

## 2022-02-15 RX ADMIN — QUETIAPINE FUMARATE 25 MG: 25 TABLET ORAL at 09:16

## 2022-02-15 RX ADMIN — GUAIFENESIN 600 MG: 600 TABLET, EXTENDED RELEASE ORAL at 09:15

## 2022-02-15 RX ADMIN — ACETAMINOPHEN 650 MG: 325 TABLET ORAL at 00:09

## 2022-02-15 RX ADMIN — INSULIN LISPRO 2 UNITS: 100 INJECTION, SOLUTION INTRAVENOUS; SUBCUTANEOUS at 12:18

## 2022-02-15 RX ADMIN — LORAZEPAM 2 MG: 1 TABLET ORAL at 00:09

## 2022-02-15 RX ADMIN — LORAZEPAM 4 MG: 1 TABLET ORAL at 12:18

## 2022-02-15 RX ADMIN — SERTRALINE 25 MG: 50 TABLET, FILM COATED ORAL at 09:15

## 2022-02-15 RX ADMIN — PHENOBARBITAL 97.2 MG: 32.4 TABLET ORAL at 09:15

## 2022-02-15 NOTE — PROGRESS NOTES
Patient refusing aerosol treatments, stating he is breathing fine. No respiratory distress noted. Patient on room air. Up walking in room. Will check back later at time of scheduled treatment.

## 2022-02-15 NOTE — ROUTINE PROCESS
[id number] - Accessed chart to assist primary nurse. Luca Agudelo 42 MD to see if we can get IV Ativan back on board for this patient due to [address] of [address].

## 2022-02-15 NOTE — PROGRESS NOTES
Problem: Mobility Impaired (Adult and Pediatric)  Goal: *Acute Goals and Plan of Care (Insert Text)  Description: FUNCTIONAL STATUS PRIOR TO ADMISSION: Patient was independent and active without use of DME.    HOME SUPPORT PRIOR TO ADMISSION: The patient lived alone with no local support. Physical Therapy Goals  Initiated 2/11/2022  1. Patient will move from supine to sit and sit to supine  in bed with modified independence within 7 day(s). 2.  Patient will transfer from bed to chair and chair to bed with modified independence using the least restrictive device within 7 day(s). 3.  Patient will perform sit to stand with modified independence within 7 day(s). 4.  Patient will ambulate with modified independence for 150 feet with the least restrictive device within 7 day(s). 5.  Patient will ascend/descend 4 stairs with 2 handrail(s) with modified independence within 7 day(s). Outcome: Progressing Towards Goal  Note:   PHYSICAL THERAPY TREATMENT/DISCHARGE  Patient: Marylen Hutchinson (4 y.o. male)  Date: 2/15/2022  Diagnosis: Alcohol withdrawal syndrome without complication [facility] [H40.232] <principal problem not specified>       Precautions: Fall  Chart, physical therapy assessment, plan of care and goals were reviewed. ASSESSMENT  Patient continues with skilled PT services and has progressed towards goals. Patient now Independent with all mobility, no loss of balance or instability. Patient received sitting at edge of bed, dressed. Patient is now at his baseline for all mobility, heart rate stable 100 bpm with activity. Other factors to consider for discharge:          PLAN :  Patient will be discharged from acute skilled physical therapy at this time. Rationale for discharge:  Goals achieved    Recommendation for discharge: (in order for the patient to meet his/her long term goals)  No skilled physical therapy/ follow up rehabilitation needs identified at this time.     This discharge recommendation:  Has been made in collaboration with the attending provider and/or case management    IF patient discharges home will need the following DME: none       SUBJECTIVE:   Patient stated Jennifer Dural we walk longer.     OBJECTIVE DATA SUMMARY:   Critical Behavior:  Neurologic State: Alert  Orientation Level: Oriented X4  Cognition: Appropriate decision making,Appropriate for age attention/concentration,Appropriate safety awareness,Follows commands  Safety/Judgement: Awareness of environment  Functional Mobility Training:  Bed Mobility:                    Transfers:  Sit to Stand: Independent  Stand to Sit: Independent        Bed to Chair: Independent                    Balance:     Ambulation/Gait Training:  Distance (ft): 1000 Feet (ft)  Assistive Device: Gait belt  Ambulation - Level of Assistance: Independent                    Pain Rating:  None noted    Activity Tolerance:   Good    After treatment patient left in no apparent distress:   Sitting in chair, Call bell within reach, and Bed / chair alarm activated    COMMUNICATION/COLLABORATION:   The patients plan of care was discussed with: Registered nurse.      Kasey Hankins PT, DPT   Time Calculation: 10 mins

## 2022-02-15 NOTE — PROGRESS NOTES
Problem: Alcohol Withdrawal  Goal: *STG: Participates in treatment plan  Outcome: Progressing Towards Goal  Goal: *STG: Remains safe in hospital  Outcome: Progressing Towards Goal  Goal: *STG: Seeks staff when symptoms of withdrawal increase  Outcome: Progressing Towards Goal  Goal: *STG: Complies with medication therapy  Outcome: Progressing Towards Goal  Goal: *STG: Attends activities and groups  Outcome: Progressing Towards Goal  Goal: *STG: Will identify negative impact of chemical dependency including the use of tobacco, alcohol, and other substances  Outcome: Progressing Towards Goal  Goal: *STG: Verbalizes abstinence as an achievable goal  Outcome: Progressing Towards Goal  Goal: *STG: Agrees to participate in outpatient after care program to support ongoing mental health  Outcome: Progressing Towards Goal  Goal: *STG: Able to indentify relapse triggers including interpersonal/social and familial factors  Outcome: Progressing Towards Goal  Goal: *STG: Identify lifestyle changes to support long term sobriety such as vocation, employment, education, and legal issues  Outcome: Progressing Towards Goal  Goal: *STG: Maintains appropriate nutrition and hydration  Outcome: Progressing Towards Goal  Goal: *STG: Vital signs within defined limits  Outcome: Progressing Towards Goal  Goal: *STG/LTG: Relapse prevention plan in place to include housing/aftercare, leisure activities, and spirituality  Outcome: Progressing Towards Goal  Goal: Interventions  Outcome: Progressing Towards Goal     Problem: Patient Education: Go to Patient Education Activity  Goal: Patient/Family Education  Outcome: Progressing Towards Goal     Problem: Pressure Injury - Risk of  Goal: *Prevention of pressure injury  Description: Document Parker Scale and appropriate interventions in the flowsheet.   Outcome: Progressing Towards Goal  Note: Pressure Injury Interventions:  Sensory Interventions: Assess changes in LOC,Check visual cues for pain,Keep linens dry and wrinkle-free,Minimize linen layers    Moisture Interventions: Absorbent underpads    Activity Interventions: Increase time out of bed    Mobility Interventions: Assess need for specialty bed    Nutrition Interventions: Document food/fluid/supplement intake    Friction and Shear Interventions: HOB 30 degrees or less                Problem: Patient Education: Go to Patient Education Activity  Goal: Patient/Family Education  Outcome: Progressing Towards Goal     Problem: Falls - Risk of  Goal: *Absence of Falls  Description: Document Marii Fall Risk and appropriate interventions in the flowsheet.   Outcome: Progressing Towards Goal  Note: Fall Risk Interventions:  Mobility Interventions: Communicate number of staff needed for ambulation/transfer,Patient to call before getting OOB    Mentation Interventions: Bed/chair exit alarm,Room close to nurse's station,More frequent rounding    Medication Interventions: Patient to call before getting OOB,Bed/chair exit alarm    Elimination Interventions: Call light in reach,Bed/chair exit alarm,Patient to call for help with toileting needs              Problem: Patient Education: Go to Patient Education Activity  Goal: Patient/Family Education  Outcome: Progressing Towards Goal

## 2022-02-15 NOTE — PROGRESS NOTES
Transition of Care Plan: RUR-14%, LOS 5 days  1. PT/OT following  2. Family to transport pt home at d/c  3. OP f/u  4. CM following for any needs prior to d/c  CODY Palacios

## 2022-02-15 NOTE — PROGRESS NOTES
Progress Note     2/15/2022  PCP: Mariely Pride NP     Assessment/Plan:     Tristin Bellamy a 61 y. o. male with a known history of multi-substance abuse including alcohol, opioids, THC, and h/o crack-cocaine use; PUD 2/2 long-term NSAID use, MDD w/ h/o suicidal ideation, thrombocytopenia, T2DM, HTN, and COPD who is admitted for acute EtOH withdrawal.    Overnight Events: CIWA 1-12 overnight, most recently 1. Telemetry: Sinus rhythm to 90's. Periods of sinus tachycardia to 110's.      EtOH withdrawal in s/o polysubstance abuse and AUD (Improved): Child-Baca = 6. POA CIWA 13, EtOH level 184, UDS (+) for THC. Patient's last drink was 2 beers (12 oz each) at 0800 on 2/10. He has a history of withdrawal seizures in the past requiring hospitalizations, but denies ever being intubated. Multiple admissions for EtOH withdrawal most recently from 1/17 to 1/20. Previous UDS's (+) for opiates, THC, barbituates. Patient has a 40+ year history of EtOH consumption (see SH above). Sent in per PCP. S/p 8 mg IV Ativan in ED. S/p haldol 5 mg on 2/12. Abd U/S notable for hepatic steatosis and gallbladder adenomyomatosis. S/p precedex gtt (2/11-2/13). - Seroquel 50>25 mg TID for hallucinations; plan to continue weaning  - Cardiac monitoring   - CIWA protocol with Ativan 2mg (8-11) and 4mg (>12)  - Phenobarb 97.2 TID  - IV compazine prn nausea   - IV Protonix 40 mg daily  - Seizure precautions  - Daily CMP, CBC, Mg    MDD w/ Anxiety and h/o SI: Pt on no home meds. Prior h/o of Hydroxyzine for anxiety. No SI/HI currently.  - (2/14- ) Initiated sertraline 25 mg daily  - Will see if CM can assist in arranging outpatient psych evaluation     Concern for hematuria: Patient reports \"episodes\" of \"red droplets\" shortly after he finishes voiding. No recent injuries. Renal function appears to be at baseline. UA notable for trace blood and 300 protein.     Chronic Thrombocytopenia:  (B/L ~ 110).  Likely sequela of hepatic steatosis as noted on CT Abd/Pelv on 1/17/2022 in s/o chronic EtOH abuse. - Monitor daily on CBC  - Transfuse if <50 and bleeding or if < 10     Hx of COPD: POA CXR w/ NAP. He is not requiring supplemental O2, saturating well on room air. Notable scattery wheezing throughout which may be 2/2 polysubstance abuse. No evidence of acute exacerbation -- no increased dyspnea or change in cough character. - Sub home Advair w/ Brovana/Pulmicort inhaler BID  - Duo-nebs q6hrs, scheduled   - Mucinex BID     Hypokalemia: POA 3.4, mild. Likely 2/2 EtOH abuse.  - Replete PRN     Hypertension: BP on admission 160/89.  - Holding home medications of Lisinopril 20mg daily  - Cont home metoprolol 50 mg BID  - Will continue to monitor at this time and readjust as BP's trend     DMII: A1c 5.7 on 1/17/2022. POA Glucose 107. - Holding home medications of Metformin 1000 mg daily.  - Insulin Sliding Scale normal sensitivity with AC&HS glucose checks  - Hypoglycemia protocols ordered  - Goal glucose concentration >70, <140 pre-meal and <180 with all random glucoses     Prolonged QTc: Per chart review. POA EKG w/ QTc 432 msec.    - Avoid QT-prolonging agents including Zofran and quinolones     Nicotine Dependence: long term, daily tobacco-snuff use. - Nicotine patch 21mg/24hr as needed    Acute Lactic Acidosis (resolved): POA LA 3.8. Likely Type B in s/o EtOH abuse. No evidence of sepsis.      FEN/GI - Diabetic diet. Activity - Activity as tolerated w/ assist  DVT prophylaxis - SCDs  GI prophylaxis - Protonix  Fall prophylaxis - Fall precautions ordered. Disposition - ICU > Tele. Plan to d/c to Home. Consulted PT, OT and CM  Code Status - Full. Discussed with patient / caregivers. Next of Kin Name and Beryl Ledezma (Girlfriend) - [phone]     CODE STATUS:  Full Code     Breanna Garcia discussed with Dr. rT Sanders. Subjective:   Pt was seen and examined at bedside.  Patient without any complaints at this time. No pain or discomfort. Objective:   Physical examination  Patient Vitals for the past 24 hrs:   Temp Pulse Resp BP SpO2   02/15/22 0800 -- -- -- -- 98 %   02/15/22 0658 97.5 °F (36.4 °C) 97 16 (!) 150/89 98 %   02/15/22 0321 97.8 °F (36.6 °C) 90 15 127/82 98 %   22 2354 98.2 °F (36.8 °C) 100 14 (!) 129/90 99 %   22 2304 -- 95 -- -- --   22 2000 98 °F (36.7 °C) 86 16 (!) 156/94 97 %   22 1800 -- (!) 111 11 (!) 155/89 97 %   22 1740 98.1 °F (36.7 °C) (!) 110 22 (!) 142/98 97 %   22 1630 -- 89 21 (!) 134/93 97 %   22 1544 -- 96 23 (!) 148/93 98 %   22 1501 -- (!) 103 -- -- --   22 1430 -- (!) 115 23 (!) 145/91 95 %   22 1409 -- -- -- -- 100 %   22 1400 -- 90 12 (!) 140/102 97 %   22 1340 -- 85 -- (!) 173/90 99 %   22 1330 -- 88 24 (!) 154/99 95 %   22 1300 97.5 °F (36.4 °C) 99 13 (!) 153/97 95 %   22 1100 -- 95 18 135/68 95 %      Temp (24hrs), Av.9 °F (36.6 °C), Min:97.5 °F (36.4 °C), Max:98.2 °F (36.8 °C)     O2 Flow Rate (L/min): 0 l/min   O2 Device: None (Room air)    Date 22 0700 - 02/15/22 0659 02/15/22 07 - 22 0659   Shift [id number] 24 Hour Total 9300-0813 0549-6189 24 Hour Total   INTAKE   P.O. 840 120 960 720  720     P. O. 840 120 960 720  720   Shift Total(mL/kg) 840(8.2) 120(1.2) 960(9.6) 720(7.2)  720(7.2)   OUTPUT   Urine(mL/kg/hr) 650(0.5)  650(0.3)        Urine Voided 650  650        Urine Occurrence(s)    1 x  1 x   Emesis/NG output           Emesis Occurrence(s)    0 x  0 x   Stool           Stool Occurrence(s)    0 x  0 x   Shift Total(mL/kg) 650(6.3)  650(6.5)       120 310 720  720   Weight (kg) 102.4 99.9 99.9 99.9 99.9 99.9     General:   Awake. Alert.    Head:   Atraumatic   Lungs:   Clear to auscultation bilaterally    Heart:   Normal rate, regular rhythm, no murmur, rubs or gallops   Abdomen:    Soft, No masses or organomegaly    Extremities:  No edema or DVT signs   Psych:  Optimistic. Skin:  Warm and dry    No rashes or lesions   Neurologic:  Awake and alert. A&O x 4. Data Review:     CBC:  Recent Labs     02/15/22  0824 02/14/22  0449 02/13/22  0159   WBC 4.4 3.9* 3.2*   HGB 16.4 14.9 14.3   HCT 44.9 41.2 39.9   * 92* 97*     Metabolic Panel:  Recent Labs     02/15/22  0824 02/14/22  0449 02/13/22  0159   * 137 137   K 3.6 3.5 3.4*    103 105   CO2 25 24 26   BUN 8 8 4*   CREA 0.90 0.71 0.64*   * 112* 110*   CA 9.9 8.6 8.3*   MG 1.8 1.6 1.7   ALB 3.9 3.3* 3.0*   TBILI 1.7* 1.6* 1.5*   ALT 35 31 33     Micro:  No results found for: CULT  Imaging:  CT ABD PELV W WO CONT    Result Date: 1/17/2022  EXAM: CT ABD PELV W WO CONT INDICATION: epigastric pain, r/o pancreatitis COMPARISON: Ultrasound 9/4/2017. CT 9/2/2017. [facility] CONTRAST: 100 mL of Isovue-370. TECHNIQUE:  Multislice helical CT was performed from the diaphragm to the iliac crest prior to intravenous contrast administration and from the diaphragm to the symphysis pubis during uneventful rapid bolus intravenous contrast administration, during the hepatic arterial, portal venous and delayed venous phases. Oral contrast was not administered. Contiguous 5 mm axial images were reconstructed and lung and soft tissue windows were generated. Coronal and sagittal reformations were generated. CT dose reduction was achieved through use of a standardized protocol tailored for this examination and automatic exposure control for dose modulation. The lack of oral contrast material diminishes the capacity of CT to evaluate the bowel and adjacent structures. FINDINGS: LOWER THORAX: Several bilateral subpleural nodules measuring up to 4 mm in size appear unchanged in number and size. LIVER: Diffuse severe hepatic steatosis. No hepatic mass or intrahepatic bile duct dilation demonstrated. BILIARY TREE: Gallbladder is within normal limits. CBD is not dilated. SPLEEN: within normal limits. PANCREAS: No mass or ductal dilatation. No CT imaging evidence of pancreatitis. ADRENALS: Unremarkable. KIDNEYS: No mass, calculus, or hydronephrosis. STOMACH: Unremarkable. SMALL BOWEL: No dilatation or wall thickening. COLON: Anastomotic suture ring at proximal sigmoid region. Transverse and descending colonic diverticulosis. No mural thickening or dilation demonstrated. APPENDIX: Normal. PERITONEUM: No ascites or pneumoperitoneum. RETROPERITONEUM: No lymphadenopathy or aortic aneurysm. REPRODUCTIVE ORGANS: Those shown appear unremarkable. URINARY BLADDER: No mass or calculus. BONES: No destructive bone lesion. ABDOMINAL WALL: No mass or hernia. ADDITIONAL COMMENTS: N/A     No evidence for acute pancreatitis or other acute finding. Details can be found in report. XR CHEST PORT    Result Date: 2/10/2022  EXAM:  XR CHEST PORT INDICATION:  cough COMPARISON:  1/17/2022 FINDINGS: A portable AP radiograph of the chest was obtained at 1059 hours. The patient is on a cardiac monitor. The lungs are clear. The cardiac and mediastinal contours and pulmonary vascularity are normal.  The bones and soft tissues are grossly within normal limits. No acute abnormality     XR CHEST PORT    Result Date: 1/17/2022  EXAM:  XR CHEST PORT INDICATION: Wheezing, cough COMPARISON: Chest x-ray 1/6/2021. TECHNIQUE: Single frontal view of the chest. FINDINGS: No lobar consolidation, pleural effusion, or pneumothorax. The cardiomediastinal silhouette is not enlarged. No acute or aggressive osseous lesion. 1.  No evidence of an acute cardiopulmonary process.     Medications reviewed  Current Facility-Administered Medications   Medication Dose Route Frequency    sertraline (ZOLOFT) tablet 25 mg  25 mg Oral DAILY    QUEtiapine (SEROquel) tablet 25 mg  25 mg Oral TID    LORazepam (ATIVAN) tablet 2 mg  2 mg Oral Q1H PRN    LORazepam (ATIVAN) tablet 4 mg  4 mg Oral Q1H PRN    labetaloL (NORMODYNE;TRANDATE) injection 10 mg  10 mg IntraVENous Q6H PRN    thiamine mononitrate (B-1) tablet 100 mg  100 mg Oral DAILY    folic acid (FOLVITE) tablet 1 mg  1 mg Oral DAILY    therapeutic multivitamin (THERAGRAN) tablet 1 Tablet  1 Tablet Oral DAILY    pantoprazole (PROTONIX) tablet 40 mg  40 mg Oral ACB    PHENobarbitaL (LUMINAL) tablet 97.2 mg  97.2 mg Oral TID    prochlorperazine (COMPAZINE) injection 5 mg  5 mg IntraVENous Q6H PRN    albuterol-ipratropium (DUO-NEB) 2.5 MG-0.5 MG/3 ML  3 mL Nebulization Q6HWA RT    sodium chloride (NS) flush 5-40 mL  5-40 mL IntraVENous Q8H    sodium chloride (NS) flush 5-40 mL  5-40 mL IntraVENous PRN    acetaminophen (TYLENOL) tablet 650 mg  650 mg Oral Q6H PRN    Or    acetaminophen (TYLENOL) suppository 650 mg  650 mg Rectal Q6H PRN    insulin lispro (HUMALOG) injection   SubCUTAneous AC&HS    glucose chewable tablet 16 g  4 Tablet Oral PRN    dextrose (D50W) injection syrg 12.5-25 g  12.5-25 g IntraVENous PRN    glucagon (GLUCAGEN) injection 1 mg  1 mg IntraMUSCular PRN    arformoterol 15 mcg/budesonide 0.25 mg neb solution   Nebulization BID RT    guaiFENesin ER (MUCINEX) tablet 600 mg  600 mg Oral Q12H    [Held by provider] lisinopriL (PRINIVIL, ZESTRIL) tablet 20 mg  20 mg Oral DAILY    metoprolol tartrate (LOPRESSOR) tablet 50 mg  50 mg Oral BID    nicotine (NICODERM CQ) 21 mg/24 hr patch 1 Patch  1 Patch TransDERmal DAILY         Signed:   Pratima Fitzpatrick MD   Resident, Family Medicine      Attending note: Attending note to follow. ..

## 2022-02-16 NOTE — DISCHARGE SUMMARY
[address] [address]   Office [phone]  Fax [phone]       AMA Discharge Summary     Name: Lexis Hewitt MRN: [id number]  Sex: Male   YOB: 1965  Age: 61 y.o. PCP: Carlene Templeton NP     Date of admission: 2/10/2022  Date of discharge/transfer: 2/15/2022    Attending physician at admission: Arnie Evangelista MD  Attending physician at discharge/transfer: Susan Davenport MD  Resident physician at discharge/transfer: Shelley Nath MD     Consultants during hospitalization  None     Admission diagnoses   Alcohol withdrawal syndrome without complication ([facility] [P70.278]    Recommended follow-up after discharge    1. PCP-Olga Lidia Bailey NP     Things to follow up on with PCP:   - Routine evaluation following hospitalization  - EtOH cessation  - Referral to psychiatry   ----------------------------------------------------------------------------------------------------------------------------  The patient was well enough to be discharged from the hospital. However, because they were inpatient in a hospital, they are at greater risk of having been exposed to the coronavirus. PLEASE stay inside and self-quarantine for 14 days to prevent further spread of the coronavirus.  ------------------------------------------------------------------------------------------------------------------    Medication Changes:  START None     STOP None     CHANGES None     No other changes were made to your medications, please take all your other home medicines as previously prescribed. History of Present Illness    As per admitting provider, Dr. Nacho Adams:   Stacy Galicia is a 61 y.o. male with a known history of  known history of multi-substance abuse including alcohol, opioids, THC, and h/o crack-cocaine use; PUD 2/2 long-term NSAID use, MDD w/ h/o suicidal ideation, thrombocytopenia, T2DM, HTN, and COPD  who presented to the ED complaining of tremors and nausea.  Patient was previously admitted to the [facility] from 1/17 to 1/20 for EtOH withdrawal and COPD exacerbation. He was subsequently sent home with a phenobarb taper, prednisone taper and a prescription for doxy.      Patient states he drinks about 18-24 beers daily and consumes about 10-14 shots of liquor daily. He last had 2 beers (12 oz each) at 0800, but also drank 2 shots at 0300 on 2/10. He reports to having a history of withdrawal seizures in the past. As above, just hospitalized for this issue. Patient presents shortly after a visit with his PCP today (2/10), Dr. Juanito Benitez, who assumed he was in acute withdrawal despite having beverages earlier in the day. He complains of mild tremors and nausea that began about 2-3 days ago. Patient additionally complaints of non-radiating, \"dull\", waxing/waning, epigastric pain this AM (2/10). He reports to chronic eye \"floaters\", but denies acute loss of vision. Pt notes \"episodes\" of \"red droplets\" shortly after he finishes voiding for the last several weeks.      Denies: fever, chills, change in character of chronic cough, worsening dyspnea, vomiting, hemoptysis, diaphoresis, changes to hearing, dizziness, lightheadedness, SI/HI.      Of note, patient did spend 32 days at the \"[facility]\" in [facility] in early 12/2021. Drank liquor shortly after he was [facility] course  Mr. Zion Tyler was admitted to the [address] from 2/10 to 2/15 for EtOH withdrawal. He initially presented with tremors, diaphoresis, and generalized pain with rapid progression of withdrawal requiring ICU level care and initiation of  precedex drip from 2/11 to 2/13 that was gradually weaned. Patient remained HDS from 2/13 to 2/15. He reported feeling more \"depressed\" and attributed his alcoholism to MDD. As far as he knows, he was never previously diagnosed with bipolar disorder. Nevertheless, SSRI was initiated on 2/14.  He expressed no interest in rehabilitation further noting that he had been in a rehab facility before and felt he wasted \"thousands of dollars\" on it. He requested following up with a psychiatrist, but was informed that it would likely take several weeks for such an appointment to be made. Further, on evening of 2/15, patient opted to leave the hospital AMA. All paperwork was signed and patient expressed understanding of repercussions related to leaving in such a state. EtOH withdrawal in s/o polysubstance abuse and AUD (Improved): Child-Baca = 6. POA CIWA 13, EtOH level 184, UDS (+) for THC. Patient's last drink was 2 beers (12 oz each) at 0800 on 2/10. He has a history of withdrawal seizures in the past requiring hospitalizations, but denies ever being intubated. Patient has a 40+ year history of EtOH consumption. Abd U/S notable for hepatic steatosis and gallbladder adenomyomatosis. S/p precedex gtt (2/11-2/13). - Left AMA     MDD w/ Anxiety and h/o SI: Pt on no home meds. Prior h/o of Hydroxyzine for anxiety. No SI/HI.  - (2/14- ) Initiated sertraline 25 mg daily  - Left AMA     Concern for hematuria: Patient reports \"episodes\" of \"red droplets\" shortly after he finishes voiding. No recent injuries. Renal function appears to be at baseline. UA notable for trace blood and 300 protein. - Left AMA     Chronic Thrombocytopenia:  (B/L ~ 110). Likely sequela of hepatic steatosis as noted on CT Abd/Pelv on 1/17/2022 in s/o chronic EtOH abuse.   - Left AMA     Hx of COPD: POA CXR w/ NAP.    - Left AMA     Hypokalemia: POA 3.4, mild. Likely 2/2 EtOH abuse. - Left AMA     Hypertension: BP on admission 160/89. - Left AMA     DMII: A1c 5.7 on 1/17/2022. POA Glucose 107.   - Left AMA     Prolonged QTc: Per chart review. POA EKG w/ QTc 432 msec.    - Left AMA     Nicotine Dependence: long term, daily tobacco-snuff use. - Left AMA     Acute Lactic Acidosis (resolved): POA LA 3.8. Likely Type B in s/o EtOH abuse. Physical exam:    Vitals Reviewed.    Patient Vitals for the past 12 hrs:   Temp Pulse Resp BP SpO2   02/15/22 1529 98.3 °F (36.8 °C) 89 16 129/79 96 %   02/15/22 1500 -- 92 -- -- --   02/15/22 1310 -- -- -- -- 98 %   02/15/22 1105 98.2 °F (36.8 °C) 88 16 (!) 139/92 98 %        General:   Awake. Alert. Head:   Atraumatic   Lungs:   Clear to auscultation bilaterally    Heart:   Normal rate, regular rhythm, no murmur, rubs or gallops   Abdomen:    Soft, No masses or organomegaly    Extremities:  No edema or DVT signs   Psych:  Optimistic. Skin:  Warm and dry    No rashes or lesions   Neurologic:  Awake and alert. A&O x 4. Condition at discharge: Stable. Labs  Recent Labs     02/15/22  0824 02/14/22  0449 02/13/22  0159   WBC 4.4 3.9* 3.2*   HGB 16.4 14.9 14.3   HCT 44.9 41.2 39.9   * 92* 97*     Recent Labs     02/15/22  0824 02/14/22  0449 02/13/22  0159   * 137 137   K 3.6 3.5 3.4*    103 105   CO2 25 24 26   BUN 8 8 4*   CREA 0.90 0.71 0.64*   * 112* 110*   CA 9.9 8.6 8.3*   MG 1.8 1.6 1.7     Recent Labs     02/15/22  0824 02/14/22  0449 02/13/22  0159   ALT 35 31 33   AP 63 57 54   TBILI 1.7* 1.6* 1.5*   TP 7.8 6.1* 6.0*   ALB 3.9 3.3* 3.0*   GLOB 3.9 2.8 3.0     Recent Labs     02/15/22  1459 02/15/22  1108 02/15/22  0824 02/14/22  1645 02/14/22  1122   GLUCPOC 133* 169* 127* 84 145*       Micro:  No results found for: CULT    Imaging:  CT ABD PELV W WO CONT    Result Date: 1/17/2022  EXAM: CT ABD PELV W WO CONT INDICATION: epigastric pain, r/o pancreatitis COMPARISON: Ultrasound 9/4/2017. CT 9/2/2017. Rhenda Mili CONTRAST: 100 mL of Isovue-370. TECHNIQUE:  Multislice helical CT was performed from the diaphragm to the iliac crest prior to intravenous contrast administration and from the diaphragm to the symphysis pubis during uneventful rapid bolus intravenous contrast administration, during the hepatic arterial, portal venous and delayed venous phases. Oral contrast was not administered.  Contiguous 5 mm axial images were reconstructed and lung and soft tissue windows were generated. Coronal and sagittal reformations were generated. CT dose reduction was achieved through use of a standardized protocol tailored for this examination and automatic exposure control for dose modulation. The lack of oral contrast material diminishes the capacity of CT to evaluate the bowel and adjacent structures. FINDINGS: LOWER THORAX: Several bilateral subpleural nodules measuring up to 4 mm in size appear unchanged in number and size. LIVER: Diffuse severe hepatic steatosis. No hepatic mass or intrahepatic bile duct dilation demonstrated. BILIARY TREE: Gallbladder is within normal limits. CBD is not dilated. SPLEEN: within normal limits. PANCREAS: No mass or ductal dilatation. No CT imaging evidence of pancreatitis. ADRENALS: Unremarkable. KIDNEYS: No mass, calculus, or hydronephrosis. STOMACH: Unremarkable. SMALL BOWEL: No dilatation or wall thickening. COLON: Anastomotic suture ring at proximal sigmoid region. Transverse and descending colonic diverticulosis. No mural thickening or dilation demonstrated. APPENDIX: Normal. PERITONEUM: No ascites or pneumoperitoneum. RETROPERITONEUM: No lymphadenopathy or aortic aneurysm. REPRODUCTIVE ORGANS: Those shown appear unremarkable. URINARY BLADDER: No mass or calculus. BONES: No destructive bone lesion. ABDOMINAL WALL: No mass or hernia. ADDITIONAL COMMENTS: N/A     No evidence for acute pancreatitis or other acute finding. Details can be found in report. US ABD COMP    Result Date: 2/12/2022  EXAM: US ABD COMP INDICATION: Chronic thrombocytopenia in the setting of alcohol use. COMPARISON: CT Abdomen Pelvis 1/17/2022. TECHNIQUE: Real-time sonography of the abdomen was performed with multiple static images of the liver, gallbladder, pancreas, spleen, kidneys and retroperitoneum obtained. FINDINGS: LIVER: The liver is hyperechoic compatible with steatosis with no mass or other focal abnormality.  LIVER VASCULATURE: The portal vein flow is antegrade. GALLBLADDER: The gallbladder demonstrates trace sludge with no stones. There is slight gallbladder wall thickening with sonographic appearance compatible with adenomyomatosis. COMMON BILE DUCT: There is no biliary duct dilatation and the common duct measures 5 mm in diameter. PANCREAS: Obscured by bowel gas. SPLEEN: The spleen is normal in echotexture and size and measures 11.3 cm in length. RIGHT KIDNEY: The right kidney demonstrates normal echogenicity with no mass, stone or hydronephrosis. The right kidney measures 11.4 cm in length. LEFT KIDNEY: The left kidney demonstrates normal echogenicity with no mass, stone or hydronephrosis. The left kidney measures 12.3 cm in length. RETROPERITONEUM: The aorta tapers normally. The IVC is normal. There is no ascites. 1. Hepatic steatosis without mass. 2. Gallbladder adenomyomatosis. 3. No splenomegaly, ascites or acute finding. XR CHEST PORT    Result Date: 2/10/2022  EXAM:  XR CHEST PORT INDICATION:  cough COMPARISON:  1/17/2022 FINDINGS: A portable AP radiograph of the chest was obtained at 1059 hours. The patient is on a cardiac monitor. The lungs are clear. The cardiac and mediastinal contours and pulmonary vascularity are normal.  The bones and soft tissues are grossly within normal limits. No acute abnormality     XR CHEST PORT    Result Date: 1/17/2022  EXAM:  XR CHEST PORT INDICATION: Wheezing, cough COMPARISON: Chest x-ray 1/6/2021. TECHNIQUE: Single frontal view of the chest. FINDINGS: No lobar consolidation, pleural effusion, or pneumothorax. The cardiomediastinal silhouette is not enlarged. No acute or aggressive osseous lesion. 1.  No evidence of an acute cardiopulmonary process. DUPLEX LOWER EXT VENOUS BILAT    Result Date: 2/15/2022  Bilateral lower extremity venous duplex negative for deep venous thrombosis or thrombophlebitis.     Chronic diagnoses   Problem List as of 2/15/2022 Date Reviewed: 2/8/2021 Codes Class Noted - Resolved    DM (diabetes mellitus) ([address] [address] ICD-10-CM: E11.9  ICD-9-CM: 250.00  1/17/2022 - Present        HTN (hypertension) ICD-10-CM: I10  ICD-9-CM: 401.9  1/17/2022 - Present        Major depression ICD-10-CM: F32.9  ICD-9-CM: 296.20  1/10/2021 - Present        Hypokalemia ICD-10-CM: E87.6  ICD-9-CM: 276.8  1/6/2021 - Present        Thrombocytopenia ([address] [address].) ICD-10-CM: D69.6  ICD-9-CM: 287.5  1/6/2021 - Present        Suspected COVID-19 virus infection ICD-10-CM: Z20.822  ICD-9-CM: V01.79  1/6/2021 - Present        Suicidal ideations ICD-10-CM: R45.851  ICD-9-CM: V62.84  1/6/2021 - Present        Headache ICD-10-CM: R51.9  ICD-9-CM: 784.0  1/6/2021 - Present        Elevated bilirubin ICD-10-CM: R17  ICD-9-CM: 277.4  1/6/2021 - Present        Alcohol withdrawal ([address] [address].) ICD-10-CM: C54.583  ICD-9-CM: 291.81  9/4/2017 - Present        Alcohol abuse (Chronic) ICD-10-CM: F10.10  ICD-9-CM: 305.00  Unknown - Present        Opioid abuse ([address] [address].) (Chronic) ICD-10-CM: F11.10  ICD-9-CM: 305.50  9/2/2017 - Present        Mild tetrahydrocannabinol (THC) abuse (Chronic) ICD-10-CM: F12.10  ICD-9-CM: 305.20  9/2/2017 - Present        Drug abuse ([facility] ICD-10-CM: F19.10  ICD-9-CM: 305.90  9/2/2017 - Present        Lactic acidosis ICD-10-CM: E87.2  ICD-9-CM: 276.2  9/2/2017 - Present        NSAID long-term use (Chronic) ICD-10-CM: Z79.1  ICD-9-CM: V58.64  9/2/2017 - Present        Weight loss ICD-10-CM: R63.4  ICD-9-CM: 783.21  9/2/2017 - Present        RESOLVED: Dehydration ICD-10-CM: E86.0  ICD-9-CM: 276.51  9/2/2017 - 9/4/2017        RESOLVED: Nausea & vomiting ICD-10-CM: R11.2  ICD-9-CM: 787.01  9/2/2017 - 9/4/2017        RESOLVED: Syncope ICD-10-CM: R55  ICD-9-CM: 780.2  9/2/2017 - 9/7/2017        RESOLVED: Elevated lipase ICD-10-CM: R74.8  ICD-9-CM: 790.5  9/2/2017 - 9/4/2017        RESOLVED: Hematemesis ICD-10-CM: K92.0  ICD-9-CM: 578.0  9/2/2017 - 9/7/2017              Discharge Medication List as of 2/15/2022 6:04 PM           Diet:  Diabetic diet. Activity:  As tolerated     Disposition: Home or Self Care    Discharge instructions to patient/family  Please seek medical attention for any new or worsening symptoms particularly fever, chest pain, shortness of breath, abdominal pain, nausea, vomiting    Follow up plans/appointments  Follow-up Information     Follow up With Specialties Details Why Contact Angel Bianchi your insurance for in network [address] at [address]    [facility]   -Outpatient Partial Hospitalization Program (intensive IOP for anxiety/depression).  Substance Abuse Partial Hospitalization Program (IOP)  [phone]    [facility]ly [facility]   [address] [facility]  [address] [place] [address] Jose Juan           Laura Noguera MD  Family Medicine Resident     For Billing    Chief Complaint   Patient presents with   Manrique Alcohol Problem       Hospital Problems  Date Reviewed: 2/8/2021          Codes Class Noted POA    Alcohol withdrawal ([facility] ICD-10-CM: Z06.704  ICD-9-CM: 291.81  9/4/2017 Unknown

## 2022-02-17 ENCOUNTER — PATIENT OUTREACH (OUTPATIENT)
Dept: CASE MANAGEMENT | Age: 57
End: 2022-02-17

## 2022-02-18 ENCOUNTER — OFFICE VISIT (OUTPATIENT)
Dept: FAMILY MEDICINE CLINIC | Age: 57
End: 2022-02-18
Payer: MEDICAID

## 2022-02-18 VITALS
BODY MASS INDEX: 28.49 KG/M2 | HEIGHT: 74 IN | OXYGEN SATURATION: 95 % | WEIGHT: 222 LBS | RESPIRATION RATE: 16 BRPM | TEMPERATURE: 96.9 F | DIASTOLIC BLOOD PRESSURE: 102 MMHG | SYSTOLIC BLOOD PRESSURE: 162 MMHG | HEART RATE: 110 BPM

## 2022-02-18 DIAGNOSIS — I10 PRIMARY HYPERTENSION: ICD-10-CM

## 2022-02-18 DIAGNOSIS — F10.939 ALCOHOL WITHDRAWAL SYNDROME WITH COMPLICATION (HCC): ICD-10-CM

## 2022-02-18 DIAGNOSIS — M1A.0720 CHRONIC GOUT OF LEFT FOOT, UNSPECIFIED CAUSE: ICD-10-CM

## 2022-02-18 DIAGNOSIS — F33.1 MODERATE EPISODE OF RECURRENT MAJOR DEPRESSIVE DISORDER (HCC): ICD-10-CM

## 2022-02-18 DIAGNOSIS — E11.69 TYPE 2 DIABETES MELLITUS WITH OTHER SPECIFIED COMPLICATION, WITHOUT LONG-TERM CURRENT USE OF INSULIN (HCC): ICD-10-CM

## 2022-02-18 DIAGNOSIS — R31.9 HEMATURIA, UNSPECIFIED TYPE: ICD-10-CM

## 2022-02-18 DIAGNOSIS — Z09 HOSPITAL DISCHARGE FOLLOW-UP: Primary | ICD-10-CM

## 2022-02-18 DIAGNOSIS — K21.9 GASTROESOPHAGEAL REFLUX DISEASE, UNSPECIFIED WHETHER ESOPHAGITIS PRESENT: ICD-10-CM

## 2022-02-18 DIAGNOSIS — E83.42 HYPOMAGNESEMIA: ICD-10-CM

## 2022-02-18 DIAGNOSIS — E11.9 TYPE 2 DIABETES MELLITUS WITHOUT COMPLICATION, WITHOUT LONG-TERM CURRENT USE OF INSULIN (HCC): ICD-10-CM

## 2022-02-18 DIAGNOSIS — I10 ESSENTIAL HYPERTENSION: ICD-10-CM

## 2022-02-18 PROCEDURE — 99214 OFFICE O/P EST MOD 30 MIN: CPT | Performed by: NURSE PRACTITIONER

## 2022-02-18 RX ORDER — METOPROLOL TARTRATE 50 MG/1
50 TABLET ORAL 2 TIMES DAILY
Qty: 60 TABLET | Refills: 2 | Status: SHIPPED | OUTPATIENT
Start: 2022-02-18 | End: 2022-07-05

## 2022-02-18 RX ORDER — OMEPRAZOLE 40 MG/1
40 CAPSULE, DELAYED RELEASE ORAL
Qty: 30 CAPSULE | Refills: 2 | Status: SHIPPED | OUTPATIENT
Start: 2022-02-18 | End: 2022-04-26

## 2022-02-18 RX ORDER — SERTRALINE HYDROCHLORIDE 50 MG/1
50 TABLET, FILM COATED ORAL DAILY
Qty: 30 TABLET | Refills: 2 | Status: SHIPPED | OUTPATIENT
Start: 2022-02-18 | End: 2022-04-13 | Stop reason: ALTCHOICE

## 2022-02-18 RX ORDER — METFORMIN HYDROCHLORIDE 1000 MG/1
1000 TABLET ORAL 2 TIMES DAILY WITH MEALS
Qty: 60 TABLET | Refills: 2 | Status: SHIPPED | OUTPATIENT
Start: 2022-02-18 | End: 2022-07-28

## 2022-02-18 RX ORDER — LISINOPRIL 20 MG/1
20 TABLET ORAL DAILY
Qty: 30 TABLET | Refills: 2 | Status: SHIPPED | OUTPATIENT
Start: 2022-02-18 | End: 2022-07-20

## 2022-02-18 NOTE — PROGRESS NOTES
[facility] 2/10-2/15 for acute ETOH withdrawal. Left AMA. Called 2/17, unable to LM. Called again 2/18, unable to LM. Note- had MANUEL with pcp today. Will continue to try and reach.

## 2022-02-18 NOTE — PROGRESS NOTES
Identified pt with two pt identifiers(name and ).     Chief Complaint   Patient presents with   [facility] Follow Up    Diabetes        Health Maintenance Due   Topic    COVID-19 Vaccine (1)    Pneumococcal 0-64 years (1 of 2 - PPSV23)    Foot Exam Q1     MICROALBUMIN Q1     Eye Exam Retinal or Dilated     DTaP/Tdap/Td series (1 - Tdap)    Shingrix Vaccine Age 50> (1 of 2)    Flu Vaccine (1)    Lipid Screen        Wt Readings from Last 3 Encounters:   22 222 lb (100.7 kg)   02/15/22 220 lb 3.2 oz (99.9 kg)   02/10/22 218 lb (98.9 kg)     Temp Readings from Last 3 Encounters:   22 96.9 °F (36.1 °C) (Temporal)   02/15/22 98.3 °F (36.8 °C)   02/10/22 97.4 °F (36.3 °C) (Oral)     BP Readings from Last 3 Encounters:   22 (!) 162/102   02/15/22 129/79   02/10/22 (!) 162/102     Pulse Readings from Last 3 Encounters:   22 (!) 110   02/15/22 89   02/10/22 (!) 118         Learning Assessment:  :     Learning Assessment 2021   PRIMARY LEARNER Patient   HIGHEST LEVEL OF EDUCATION - PRIMARY LEARNER  SOME COLLEGE   BARRIERS PRIMARY LEARNER NONE   CO-LEARNER CAREGIVER No   PRIMARY LANGUAGE ENGLISH   LEARNER PREFERENCE PRIMARY DEMONSTRATION     LISTENING   ANSWERED BY patient   RELATIONSHIP SELF       Depression Screening:  :     3 most recent PHQ Screens 2022   Little interest or pleasure in doing things Not at all   Feeling down, depressed, irritable, or hopeless Not at all   Total Score PHQ 2 0   Trouble falling or staying asleep, or sleeping too much -   Feeling tired or having little energy -   Poor appetite, weight loss, or overeating -   Feeling bad about yourself - or that you are a failure or have let yourself or your family down -   Trouble concentrating on things such as school, work, reading, or watching TV -   Moving or speaking so slowly that other people could have noticed; or the opposite being so fidgety that others notice -       Fall Risk Assessment:  :     No flowsheet data found. Abuse Screening:  :     Abuse Screening Questionnaire 2/18/2022 2/10/2022 2/8/2021   Do you ever feel afraid of your partner? N N N   Are you in a relationship with someone who physically or mentally threatens you? N N N   Is it safe for you to go home? Jed Coto       Coordination of Care Questionnaire:  :     1) Have you been to an emergency room, urgent care clinic since your last visit? Yes 2/10/22  Hospitalized since your last visit? no             2) Have you seen or consulted any other health care providers outside of [address] [place] [address] since your last visit? no  (Include any pap smears or colon screenings in this section.)    3) Do you have an Advance Directive on file? no  Are you interested in receiving information about Advance Directives? no      Reviewed record in preparation for visit and have obtained necessary documentation.

## 2022-02-18 NOTE — PROGRESS NOTES
HISTORY OF PRESENT ILLNESS  Maryellen Breen is a 61 y.o. male. HPI  Pt was admitted to [facility] from 2/10-2/15  He left AMA. Hospital Course  \"EtOH withdrawal in s/o polysubstance abuse and AUD (Improved): Child-Baca = 6. POA CIWA 13, EtOH level 184, UDS (+) for THC. Patient's last drink was 2 beers (12 oz each) at 0800 on 2/10. He has a history of withdrawal seizures in the past requiring hospitalizations, but denies ever being intubated. Patient has a 40+ year history of EtOH consumption. Abd U/S notable for hepatic steatosis and gallbladder adenomyomatosis. S/p precedex gtt (2/11-2/13). - Left AMA  MDD w/ Anxiety and h/o SI: Pt on no home meds. Prior h/o of Hydroxyzine for anxiety. No SI/HI.  - (2/14- ) Initiated sertraline 25 mg daily  - Left AMA  Concern for hematuria: Patient reports \"episodes\" of \"red droplets\" shortly after he finishes voiding. No recent injuries. Renal function appears to be at baseline. UA notable for trace blood and 300 protein. - Left AMA  Chronic Thrombocytopenia:  (B/L ~ 110). Likely sequela of hepatic steatosis as noted on CT Abd/Pelv on 1/17/2022 in s/o chronic EtOH abuse.   - Left AMA  Hypokalemia: POA 3.4, mild. Likely 2/2 EtOH abuse. - Left AMA  Hypertension: BP on admission 160/89. - Left AMA  DMII: A1c 5.7 on 1/17/2022. POA Glucose 107.   - Left AMA\"    Pt states that he wanted to be discharged, but the doctor never rounded again, so he decided to leave AMA. He states that he has been feeling better since being home. He is drinking, but has \"cut back\"  He is now drinking beer and only 6 shots of liquor a day, as opposed to 15    He is interested in restarting his chronic medications, as well as getting in with psychiatry. He truly believes that his depression and anxiety is the root cause for his addiction, and would like to get into be seen by someone to help manage this  He is willing to start the Zoloft outpatient, as well.     He notes that the middle toe on his left foot is red. There is no pain in the area, but he states that he has not had sensation in his feet for years. He does have hx of gout. Review of Systems   Constitutional: Negative for fever. Physical Exam  Constitutional:       Appearance: Normal appearance. Cardiovascular:      Rate and Rhythm: Normal rate and regular rhythm. Heart sounds: Normal heart sounds. Pulmonary:      Effort: Pulmonary effort is normal.      Breath sounds: Normal breath sounds. Musculoskeletal:        Feet:    Neurological:      Mental Status: He is alert. Psychiatric:         Mood and Affect: Mood normal.         Behavior: Behavior normal.         ASSESSMENT and PLAN    ICD-10-CM ICD-9-CM    1. Hospital discharge follow-up  Z09 V67.59    2. Primary hypertension  I10 401.9 CBC W/O DIFF      METABOLIC PANEL, COMPREHENSIVE      METABOLIC PANEL, COMPREHENSIVE      CBC W/O DIFF   3. Type 2 diabetes mellitus with other specified complication, without long-term current use of insulin ([facility])  E11.69 250.80 MICROALBUMIN, UR, RAND W/ MICROALB/CREAT RATIO      MICROALBUMIN, UR, RAND W/ MICROALB/CREAT RATIO   4. Alcohol withdrawal syndrome with complication ([facility])  O63.027 291.81 REFERRAL TO PSYCHIATRY   5. Hematuria, unspecified type  R31.9 599.70 REFERRAL TO UROLOGY   6. Moderate episode of recurrent major depressive disorder ([facility])  F33.1 296.32 REFERRAL TO PSYCHIATRY      sertraline (ZOLOFT) 50 mg tablet   7. Essential hypertension  I10 401.9 metoprolol tartrate (LOPRESSOR) 50 mg tablet      lisinopriL (PRINIVIL, ZESTRIL) 20 mg tablet   8. Gastroesophageal reflux disease, unspecified whether esophagitis present  K21.9 530.81 omeprazole (PRILOSEC) 40 mg capsule   9. Type 2 diabetes mellitus without complication, without long-term current use of insulin ([facility])  E11.9 250.00 metFORMIN (GLUCOPHAGE) 1,000 mg tablet   10. Chronic gout of left foot, unspecified cause  M1A.0720 274.02 URIC ACID      URIC ACID   11. Hypomagnesemia  E83.42 275.2 MAGNESIUM     Will notify when labs return, and inform him of any change in plan of care at that time    Educated about importance of taking medications daily, as BP is very elevated  Should always go to ER with ANY chest pain, shortness of breath, etc.    Educated about calling psychiatry today, as there can often times be a wait to be seen  Educated about Zoloft, how this medication works and common side effects  Should return in 2 weeks for BP re-check    Pt informed to return to office with worsening of symptoms, or PRN with any questions or concerns. Pt verbalizes understanding of plan of care and denies further questions or concerns at this time.

## 2022-02-19 LAB
ALBUMIN SERPL-MCNC: 4 G/DL (ref 3.5–5)
ALBUMIN/GLOB SERPL: 1.3 {RATIO} (ref 1.1–2.2)
ALP SERPL-CCNC: 65 U/L (ref 45–117)
ALT SERPL-CCNC: 32 U/L (ref 12–78)
ANION GAP SERPL CALC-SCNC: 8 MMOL/L (ref 5–15)
AST SERPL-CCNC: 24 U/L (ref 15–37)
BILIRUB SERPL-MCNC: 0.6 MG/DL (ref 0.2–1)
BUN SERPL-MCNC: 6 MG/DL (ref 6–20)
BUN/CREAT SERPL: 8 (ref 12–20)
CALCIUM SERPL-MCNC: 9.6 MG/DL (ref 8.5–10.1)
CHLORIDE SERPL-SCNC: 101 MMOL/L (ref 97–108)
CO2 SERPL-SCNC: 27 MMOL/L (ref 21–32)
COMMENT, HOLDF: NORMAL
CREAT SERPL-MCNC: 0.75 MG/DL (ref 0.7–1.3)
CREAT UR-MCNC: 479 MG/DL
ERYTHROCYTE [DISTWIDTH] IN BLOOD BY AUTOMATED COUNT: 14.6 % (ref 11.5–14.5)
GLOBULIN SER CALC-MCNC: 3.1 G/DL (ref 2–4)
GLUCOSE SERPL-MCNC: 124 MG/DL (ref 65–100)
HCT VFR BLD AUTO: 43.1 % (ref 36.6–50.3)
HGB BLD-MCNC: 14.5 G/DL (ref 12.1–17)
MAGNESIUM SERPL-MCNC: 1.8 MG/DL (ref 1.6–2.4)
MCH RBC QN AUTO: 33.9 PG (ref 26–34)
MCHC RBC AUTO-ENTMCNC: 33.6 G/DL (ref 30–36.5)
MCV RBC AUTO: 100.7 FL (ref 80–99)
MICROALBUMIN UR-MCNC: 21.9 MG/DL
MICROALBUMIN/CREAT UR-RTO: 46 MG/G (ref 0–30)
NRBC # BLD: 0 K/UL (ref 0–0.01)
NRBC BLD-RTO: 0 PER 100 WBC
PLATELET # BLD AUTO: 276 K/UL (ref 150–400)
PMV BLD AUTO: 10 FL (ref 8.9–12.9)
POTASSIUM SERPL-SCNC: 3.8 MMOL/L (ref 3.5–5.1)
PROT SERPL-MCNC: 7.1 G/DL (ref 6.4–8.2)
RBC # BLD AUTO: 4.28 M/UL (ref 4.1–5.7)
SAMPLES BEING HELD,HOLD: NORMAL
SODIUM SERPL-SCNC: 136 MMOL/L (ref 136–145)
URATE SERPL-MCNC: 5.4 MG/DL (ref 3.5–7.2)
WBC # BLD AUTO: 4.5 K/UL (ref 4.1–11.1)

## 2022-02-21 ENCOUNTER — TELEPHONE (OUTPATIENT)
Dept: FAMILY MEDICINE CLINIC | Age: 57
End: 2022-02-21

## 2022-02-21 LAB — VIT B6 SERPL-MCNC: 11.7 UG/L (ref 5.3–46.7)

## 2022-02-21 NOTE — TELEPHONE ENCOUNTER
----- Message from Natalie Singh NP sent at 2/21/2022  8:15 AM EST -----  Please call patient and let him know that his labs returned stable. Uric acid is within normal limits. If toe is still red, should be seen by podiatry. I recommend [facility] at [facility] [place].   Should return in 2-4 weeks for BP re-check  Thanks

## 2022-02-21 NOTE — PROGRESS NOTES
Please call patient and let him know that his labs returned stable. Uric acid is within normal limits. If toe is still red, should be seen by podiatry. I recommend [facility] at [facility].   Should return in 2-4 weeks for BP re-check  Thanks

## 2022-02-22 ENCOUNTER — PATIENT OUTREACH (OUTPATIENT)
Dept: CASE MANAGEMENT | Age: 57
End: 2022-02-22

## 2022-02-22 NOTE — PROGRESS NOTES
Had tried to reach on 2/17 and 2/18. Unable to reach, unable to LM. Tried to call again today, 2/22. VM not set up, unable to LM. Will try again tomorrow for final attempt.

## 2022-02-23 ENCOUNTER — PATIENT OUTREACH (OUTPATIENT)
Dept: CASE MANAGEMENT | Age: 57
End: 2022-02-23

## 2022-02-23 LAB — VIT B1 BLD-SCNC: 244.7 NMOL/L (ref 66.5–200)

## 2022-02-23 NOTE — PROGRESS NOTES
Called 2/22, unable to reach or LM. Called again today, 2/23-unable to reach or LM. Closing episode at this time as unable to contact the patient.

## 2022-03-11 ENCOUNTER — OFFICE VISIT (OUTPATIENT)
Dept: FAMILY MEDICINE CLINIC | Age: 57
End: 2022-03-11
Payer: MEDICAID

## 2022-03-11 VITALS
HEART RATE: 84 BPM | HEIGHT: 74 IN | TEMPERATURE: 97.1 F | DIASTOLIC BLOOD PRESSURE: 80 MMHG | BODY MASS INDEX: 27.72 KG/M2 | RESPIRATION RATE: 16 BRPM | WEIGHT: 216 LBS | OXYGEN SATURATION: 97 % | SYSTOLIC BLOOD PRESSURE: 132 MMHG

## 2022-03-11 DIAGNOSIS — J01.00 ACUTE NON-RECURRENT MAXILLARY SINUSITIS: ICD-10-CM

## 2022-03-11 DIAGNOSIS — I10 PRIMARY HYPERTENSION: Primary | ICD-10-CM

## 2022-03-11 DIAGNOSIS — G47.00 INSOMNIA, UNSPECIFIED TYPE: ICD-10-CM

## 2022-03-11 PROCEDURE — 99213 OFFICE O/P EST LOW 20 MIN: CPT | Performed by: NURSE PRACTITIONER

## 2022-03-11 RX ORDER — FLUTICASONE PROPIONATE AND SALMETEROL 50; 250 UG/1; UG/1
POWDER RESPIRATORY (INHALATION)
COMMUNITY
Start: 2022-01-26

## 2022-03-11 RX ORDER — HYDROXYZINE 50 MG/1
50 TABLET, FILM COATED ORAL
Qty: 30 TABLET | Refills: 2 | Status: SHIPPED | OUTPATIENT
Start: 2022-03-11 | End: 2022-07-28

## 2022-03-11 RX ORDER — AMOXICILLIN AND CLAVULANATE POTASSIUM 875; 125 MG/1; MG/1
1 TABLET, FILM COATED ORAL 2 TIMES DAILY
Qty: 20 TABLET | Refills: 0 | Status: SHIPPED | OUTPATIENT
Start: 2022-03-11 | End: 2022-03-21

## 2022-03-11 NOTE — PROGRESS NOTES
Subjective:     Jacques Guillen is a 61 y.o. male who presents for follow up of hypertension. Diet and Lifestyle: generally follows a low fat low cholesterol diet  Home BP Monitoring: is not measured at home    Cardiovascular ROS: taking medications as instructed, no medication side effects noted, no TIA's, no chest pain on exertion, no dyspnea on exertion, no swelling of ankles. New concerns: Pt has been taking medications as prescribed, and BP is well controlled. He notes that he has been dealing with a runny nose and ear pressure for about a week  He believes that his symptoms were triggered by allergies, but have continued. Ears are popping. He had assessment for psychiatry, [address] , yesterday. They are going to get him set up with addiction psychiatrist.  In the meantime, he has been battling insomnia. He states that his mind is racing, and he is unable to sleep due to this. He is wondering if there is something that he can take in the mean time? Patient Active Problem List    Diagnosis Date Noted    DM [facility]diabetes mellitus) ([facility] 01/17/2022    HTN (hypertension) 01/17/2022    Major depression 01/10/2021    Hypokalemia 01/06/2021    Thrombocytopenia ([facility] 01/06/2021    Suspected COVID-19 virus infection 01/06/2021    Suicidal ideations 01/06/2021    Headache 01/06/2021    Elevated bilirubin 01/06/2021    Alcohol withdrawal ([facility] 09/04/2017    Opioid abuse ([facility] 09/02/2017    Mild tetrahydrocannabinol (THC) abuse 09/02/2017    Drug abuse ([facility] 09/02/2017    Lactic acidosis 09/02/2017    NSAID long-term use 09/02/2017    Weight loss 09/02/2017    Alcohol abuse      Current Outpatient Medications   Medication Sig Dispense Refill    Advair Diskus 250-50 mcg/dose diskus inhaler USE 1 PUFF TWICE A DAY      hydrOXYzine HCL (ATARAX) 50 mg tablet Take 1 Tablet by mouth nightly as needed for Sleep.  30 Tablet 2    amoxicillin-clavulanate (AUGMENTIN) 875-125 mg per tablet Take 1 Tablet by mouth two (2) times a day for 10 days. 20 Tablet 0    metoprolol tartrate (LOPRESSOR) 50 mg tablet Take 1 Tablet by mouth two (2) times a day. Indications: high blood pressure 60 Tablet 2    lisinopriL (PRINIVIL, ZESTRIL) 20 mg tablet Take 1 Tablet by mouth daily. Indications: high blood pressure 30 Tablet 2    omeprazole (PRILOSEC) 40 mg capsule Take 1 Capsule by mouth Daily (before breakfast). 30 Capsule 2    metFORMIN (GLUCOPHAGE) 1,000 mg tablet Take 1 Tablet by mouth two (2) times daily (with meals). 60 Tablet 2    sertraline (ZOLOFT) 50 mg tablet Take 1 Tablet by mouth daily. 30 Tablet 2    budesonide-formoteroL (Symbicort) 160-4.5 mcg/actuation HFAA Take 1 Puff by inhalation two (2) times a day. 10.2 g 1    albuterol (PROVENTIL HFA, VENTOLIN HFA, PROAIR HFA) 90 mcg/actuation inhaler Take 2 Puffs by inhalation every six (6) hours as needed for Wheezing, Shortness of Breath or Respiratory Distress. 18 g 1    therapeutic multivitamin (THERAGRAN) tablet Take 1 Tab by mouth daily. 30 Tab 0     No Known Allergies  Past Medical History:   Diagnosis Date    Alcohol abuse     [facility]     Anxiety     COPD (chronic obstructive pulmonary disease) ([facility])     Dr. Koffi Pinto is pulmonologist    Depression     Diabetes ([facility]     Type II    Diverticulitis     Family history of angina     GERD (gastroesophageal reflux disease)     Hemochromatosis     Hypertension      Past Surgical History:   Procedure Laterality Date    COLONOSCOPY N/A 9/6/2017    COLONOSCOPY performed by Sofia Purcell MD at [address] [place] [address]  03/02/2021    ACDF C4/7    HX TOTAL COLECTOMY       No family history on file.   Social History     Tobacco Use    Smoking status: Unknown If Ever Smoked    Smokeless tobacco: Current User    Tobacco comment: Pt uses Chewing tobacco   Substance Use Topics    Alcohol use: Yes     Comment: heavy liquor use daily        Lab Results Component Value Date/Time    WBC 4.5 02/18/2022 01:27 PM    HGB 14.5 02/18/2022 01:27 PM    HCT 43.1 02/18/2022 01:27 PM    PLATELET 099 24/62/0193 01:27 PM    .7 (H) 02/18/2022 01:27 PM     Lab Results   Component Value Date/Time    Cholesterol, total 171 01/12/2021 06:00 AM    HDL Cholesterol 40 01/12/2021 06:00 AM    LDL, calculated 86.4 01/12/2021 06:00 AM    Triglyceride 223 (H) 01/12/2021 06:00 AM    CHOL/HDL Ratio 4.3 01/12/2021 06:00 AM     Lab Results   Component Value Date/Time    GFR est non-AA >60 02/18/2022 01:27 PM    GFR est AA >60 02/18/2022 01:27 PM    Creatinine 0.75 02/18/2022 01:27 PM    BUN 6 02/18/2022 01:27 PM    Sodium 136 02/18/2022 01:27 PM    Potassium 3.8 02/18/2022 01:27 PM    Chloride 101 02/18/2022 01:27 PM    CO2 27 02/18/2022 01:27 PM    Magnesium 1.8 02/18/2022 01:27 PM    Phosphorus 3.7 01/20/2022 05:02 AM        Review of Systems, additional:  Pertinent items are noted in HPI. Objective:     Visit Vitals  /80 (BP 1 Location: Right arm, BP Patient Position: Sitting, BP Cuff Size: Adult)   Pulse 84   Temp 97.1 °F (36.2 °C) (Temporal)   Resp 16   Ht 6' 2\" (1.88 m)   Wt 216 lb (98 kg)   SpO2 97%   BMI 27.73 kg/m²     Appearance: alert, well appearing, and in no distress. General exam: CVS exam BP noted to be well controlled today in office, S1, S2 normal, no gallop, no murmur, chest clear, no JVD, no HSM, no edema. Lab review: orders written for new lab studies as appropriate; see orders. Assessment/Plan:     hypertension stable. current treatment plan is effective, no change in therapy. ICD-10-CM ICD-9-CM    1. Primary hypertension  I10 401.9    2. Insomnia, unspecified type  G47.00 780.52 hydrOXYzine HCL (ATARAX) 50 mg tablet   3.  Acute non-recurrent maxillary sinusitis  J01.00 461.0      Educated about taking medications as prescribed  Should return for office visit and fasting labs in 6 months  Should keep appointment with psychiatry    Pt informed to return to office with worsening of symptoms, or PRN with any questions or concerns. Pt verbalizes understanding of plan of care and denies further questions or concerns at this time.

## 2022-03-11 NOTE — PROGRESS NOTES
Identified pt with two pt identifiers(name and ). Chief Complaint   Patient presents with    Blood Pressure Check        Health Maintenance Due   Topic    COVID-19 Vaccine (1)    Pneumococcal 0-64 years (1 of 2 - PPSV23)    Foot Exam Q1     Eye Exam Retinal or Dilated     DTaP/Tdap/Td series (1 - Tdap)    Shingrix Vaccine Age 50> (1 of 2)    Lipid Screen        Wt Readings from Last 3 Encounters:   22 216 lb (98 kg)   22 222 lb (100.7 kg)   02/15/22 220 lb 3.2 oz (99.9 kg)     Temp Readings from Last 3 Encounters:   22 97.1 °F (36.2 °C) (Temporal)   22 96.9 °F (36.1 °C) (Temporal)   02/15/22 98.3 °F (36.8 °C)     BP Readings from Last 3 Encounters:   22 132/80   22 (!) 162/102   02/15/22 129/79     Pulse Readings from Last 3 Encounters:   22 84   22 (!) 110   02/15/22 89         Learning Assessment:  :     Learning Assessment 2021   PRIMARY LEARNER Patient   HIGHEST LEVEL OF EDUCATION - PRIMARY LEARNER  SOME COLLEGE   BARRIERS PRIMARY LEARNER NONE   CO-LEARNER CAREGIVER No   PRIMARY LANGUAGE ENGLISH   LEARNER PREFERENCE PRIMARY DEMONSTRATION     LISTENING   ANSWERED BY patient   RELATIONSHIP SELF       Depression Screening:  :     3 most recent PHQ Screens 3/11/2022   Little interest or pleasure in doing things Not at all   Feeling down, depressed, irritable, or hopeless Not at all   Total Score PHQ 2 0   Trouble falling or staying asleep, or sleeping too much -   Feeling tired or having little energy -   Poor appetite, weight loss, or overeating -   Feeling bad about yourself - or that you are a failure or have let yourself or your family down -   Trouble concentrating on things such as school, work, reading, or watching TV -   Moving or speaking so slowly that other people could have noticed; or the opposite being so fidgety that others notice -       Fall Risk Assessment:  :     No flowsheet data found.     Abuse Screening:  :     Abuse Screening Questionnaire 3/11/2022 2/18/2022 2/10/2022 2/8/2021   Do you ever feel afraid of your partner? N N N N   Are you in a relationship with someone who physically or mentally threatens you? N N N N   Is it safe for you to go home? Y Y Y Y       Coordination of Care Questionnaire:  :     1) Have you been to an emergency room, urgent care clinic since your last visit? no   Hospitalized since your last visit? no             2) Have you seen or consulted any other health care providers outside of [address] [place] [address] since your last visit? no  (Include any pap smears or colon screenings in this section.)    3) Do you have an Advance Directive on file? no  Are you interested in receiving information about Advance Directives? no    Patient is accompanied by N/A I have received verbal consent from Pacheco Robison to discuss any/all medical information while they are present in the room. 4.  For patients aged 39-70: Has the patient had a colonoscopy / FIT/ Cologuard? No      If the patient is female:    5. For patients aged 41-77: Has the patient had a mammogram within the past 2 years? NA - based on age or sex      10. For patients aged 21-65: Has the patient had a pap smear?  NA - based on age or sex

## 2022-03-18 PROBLEM — F11.10 OPIOID ABUSE (HCC): Status: ACTIVE | Noted: 2017-09-02

## 2022-03-18 PROBLEM — E87.20 LACTIC ACIDOSIS: Status: ACTIVE | Noted: 2017-09-02

## 2022-03-18 PROBLEM — I10 HTN (HYPERTENSION): Status: ACTIVE | Noted: 2022-01-17

## 2022-03-18 PROBLEM — Z79.1 NSAID LONG-TERM USE: Status: ACTIVE | Noted: 2017-09-02

## 2022-03-18 PROBLEM — R63.4 WEIGHT LOSS: Status: ACTIVE | Noted: 2017-09-02

## 2022-03-18 PROBLEM — F32.9 MAJOR DEPRESSION: Status: ACTIVE | Noted: 2021-01-10

## 2022-03-18 PROBLEM — F19.10 DRUG ABUSE (HCC): Status: ACTIVE | Noted: 2017-09-02

## 2022-03-19 ENCOUNTER — APPOINTMENT (OUTPATIENT)
Dept: GENERAL RADIOLOGY | Age: 57
End: 2022-03-19
Attending: STUDENT IN AN ORGANIZED HEALTH CARE EDUCATION/TRAINING PROGRAM
Payer: MEDICAID

## 2022-03-19 ENCOUNTER — HOSPITAL ENCOUNTER (INPATIENT)
Age: 57
LOS: 1 days | Discharge: HOME | End: 2022-03-20
Attending: EMERGENCY MEDICINE | Admitting: FAMILY MEDICINE
Payer: MEDICAID

## 2022-03-19 DIAGNOSIS — F10.930 ALCOHOL WITHDRAWAL SYNDROME WITHOUT COMPLICATION (HCC): Primary | ICD-10-CM

## 2022-03-19 DIAGNOSIS — F10.939 ALCOHOL WITHDRAWAL SYNDROME WITH COMPLICATION (HCC): ICD-10-CM

## 2022-03-19 DIAGNOSIS — G47.00 INSOMNIA, UNSPECIFIED TYPE: ICD-10-CM

## 2022-03-19 PROBLEM — R74.01 ELEVATED TRANSAMINASE LEVEL: Status: ACTIVE | Noted: 2022-03-19

## 2022-03-19 PROBLEM — J44.1 COPD EXACERBATION (HCC): Status: ACTIVE | Noted: 2022-03-19

## 2022-03-19 PROBLEM — F41.9 ANXIETY AND DEPRESSION: Status: ACTIVE | Noted: 2022-03-19

## 2022-03-19 PROBLEM — E11.9 DM (DIABETES MELLITUS) (HCC): Status: ACTIVE | Noted: 2022-01-17

## 2022-03-19 PROBLEM — F12.10 MILD TETRAHYDROCANNABINOL (THC) ABUSE: Status: ACTIVE | Noted: 2017-09-02

## 2022-03-19 PROBLEM — R45.851 SUICIDAL IDEATIONS: Status: ACTIVE | Noted: 2021-01-06

## 2022-03-19 PROBLEM — F32.A ANXIETY AND DEPRESSION: Status: ACTIVE | Noted: 2022-03-19

## 2022-03-19 PROBLEM — R51.9 HEADACHE: Status: ACTIVE | Noted: 2021-01-06

## 2022-03-19 PROBLEM — D69.6 THROMBOCYTOPENIA (HCC): Status: ACTIVE | Noted: 2021-01-06

## 2022-03-19 PROBLEM — K21.9 GERD (GASTROESOPHAGEAL REFLUX DISEASE): Status: ACTIVE | Noted: 2022-03-19

## 2022-03-19 PROBLEM — E87.6 HYPOKALEMIA: Status: ACTIVE | Noted: 2021-01-06

## 2022-03-19 PROBLEM — R17 ELEVATED BILIRUBIN: Status: ACTIVE | Noted: 2021-01-06

## 2022-03-19 LAB
ALBUMIN SERPL-MCNC: 4.2 G/DL (ref 3.5–5)
ALBUMIN/GLOB SERPL: 1.2 {RATIO} (ref 1.1–2.2)
ALP SERPL-CCNC: 62 U/L (ref 45–117)
ALT SERPL-CCNC: 80 U/L (ref 12–78)
ANION GAP SERPL CALC-SCNC: 13 MMOL/L (ref 5–15)
APAP SERPL-MCNC: <2 UG/ML (ref 10–30)
AST SERPL-CCNC: 70 U/L (ref 15–37)
BASOPHILS # BLD: 0.1 K/UL (ref 0–0.1)
BASOPHILS NFR BLD: 1 % (ref 0–1)
BILIRUB SERPL-MCNC: 0.6 MG/DL (ref 0.2–1)
BUN SERPL-MCNC: 10 MG/DL (ref 6–20)
BUN/CREAT SERPL: 8 (ref 12–20)
CALCIUM SERPL-MCNC: 8.7 MG/DL (ref 8.5–10.1)
CHLORIDE SERPL-SCNC: 97 MMOL/L (ref 97–108)
CO2 SERPL-SCNC: 27 MMOL/L (ref 21–32)
COMMENT, HOLDF: NORMAL
COVID-19 RAPID TEST, COVR: NOT DETECTED
CREAT SERPL-MCNC: 1.19 MG/DL (ref 0.7–1.3)
DIFFERENTIAL METHOD BLD: ABNORMAL
EOSINOPHIL # BLD: 0 K/UL (ref 0–0.4)
EOSINOPHIL NFR BLD: 1 % (ref 0–7)
ERYTHROCYTE [DISTWIDTH] IN BLOOD BY AUTOMATED COUNT: 14.5 % (ref 11.5–14.5)
ETHANOL SERPL-MCNC: 397 MG/DL
GLOBULIN SER CALC-MCNC: 3.4 G/DL (ref 2–4)
GLUCOSE BLD STRIP.AUTO-MCNC: 134 MG/DL (ref 65–117)
GLUCOSE BLD STRIP.AUTO-MCNC: 140 MG/DL (ref 65–117)
GLUCOSE SERPL-MCNC: 130 MG/DL (ref 65–100)
HCT VFR BLD AUTO: 44.3 % (ref 36.6–50.3)
HGB BLD-MCNC: 15.8 G/DL (ref 12.1–17)
IMM GRANULOCYTES # BLD AUTO: 0 K/UL (ref 0–0.04)
IMM GRANULOCYTES NFR BLD AUTO: 0 % (ref 0–0.5)
LIPASE SERPL-CCNC: 288 U/L (ref 73–393)
LYMPHOCYTES # BLD: 1.5 K/UL (ref 0.8–3.5)
LYMPHOCYTES NFR BLD: 29 % (ref 12–49)
MAGNESIUM SERPL-MCNC: 1.8 MG/DL (ref 1.6–2.4)
MCH RBC QN AUTO: 34.1 PG (ref 26–34)
MCHC RBC AUTO-ENTMCNC: 35.7 G/DL (ref 30–36.5)
MCV RBC AUTO: 95.5 FL (ref 80–99)
MONOCYTES # BLD: 0.6 K/UL (ref 0–1)
MONOCYTES NFR BLD: 11 % (ref 5–13)
NEUTS SEG # BLD: 3 K/UL (ref 1.8–8)
NEUTS SEG NFR BLD: 58 % (ref 32–75)
NRBC # BLD: 0 K/UL (ref 0–0.01)
NRBC BLD-RTO: 0 PER 100 WBC
PLATELET # BLD AUTO: 213 K/UL (ref 150–400)
PMV BLD AUTO: 8.5 FL (ref 8.9–12.9)
POTASSIUM SERPL-SCNC: 3.9 MMOL/L (ref 3.5–5.1)
PROT SERPL-MCNC: 7.6 G/DL (ref 6.4–8.2)
RBC # BLD AUTO: 4.64 M/UL (ref 4.1–5.7)
SALICYLATES SERPL-MCNC: <1.7 MG/DL (ref 2.8–20)
SAMPLES BEING HELD,HOLD: NORMAL
SERVICE CMNT-IMP: ABNORMAL
SERVICE CMNT-IMP: ABNORMAL
SODIUM SERPL-SCNC: 137 MMOL/L (ref 136–145)
SOURCE, COVRS: NORMAL
TROPONIN-HIGH SENSITIVITY: 4 NG/L (ref 0–76)
WBC # BLD AUTO: 5.2 K/UL (ref 4.1–11.1)

## 2022-03-19 PROCEDURE — 85025 COMPLETE CBC W/AUTO DIFF WBC: CPT

## 2022-03-19 PROCEDURE — 82077 ASSAY SPEC XCP UR&BREATH IA: CPT

## 2022-03-19 PROCEDURE — 80179 DRUG ASSAY SALICYLATE: CPT

## 2022-03-19 PROCEDURE — 74011250637 HC RX REV CODE- 250/637: Performed by: STUDENT IN AN ORGANIZED HEALTH CARE EDUCATION/TRAINING PROGRAM

## 2022-03-19 PROCEDURE — 71045 X-RAY EXAM CHEST 1 VIEW: CPT

## 2022-03-19 PROCEDURE — 94640 AIRWAY INHALATION TREATMENT: CPT

## 2022-03-19 PROCEDURE — 83690 ASSAY OF LIPASE: CPT

## 2022-03-19 PROCEDURE — 87635 SARS-COV-2 COVID-19 AMP PRB: CPT

## 2022-03-19 PROCEDURE — 84484 ASSAY OF TROPONIN QUANT: CPT

## 2022-03-19 PROCEDURE — 36415 COLL VENOUS BLD VENIPUNCTURE: CPT

## 2022-03-19 PROCEDURE — 74011000250 HC RX REV CODE- 250: Performed by: HOSPITALIST

## 2022-03-19 PROCEDURE — 82962 GLUCOSE BLOOD TEST: CPT

## 2022-03-19 PROCEDURE — 99285 EMERGENCY DEPT VISIT HI MDM: CPT

## 2022-03-19 PROCEDURE — 65660000000 HC RM CCU STEPDOWN

## 2022-03-19 PROCEDURE — 74011250637 HC RX REV CODE- 250/637: Performed by: HOSPITALIST

## 2022-03-19 PROCEDURE — 93005 ELECTROCARDIOGRAM TRACING: CPT

## 2022-03-19 PROCEDURE — 74011250637 HC RX REV CODE- 250/637: Performed by: EMERGENCY MEDICINE

## 2022-03-19 PROCEDURE — 74011000250 HC RX REV CODE- 250: Performed by: STUDENT IN AN ORGANIZED HEALTH CARE EDUCATION/TRAINING PROGRAM

## 2022-03-19 PROCEDURE — 74011250636 HC RX REV CODE- 250/636: Performed by: HOSPITALIST

## 2022-03-19 PROCEDURE — 74011250636 HC RX REV CODE- 250/636: Performed by: STUDENT IN AN ORGANIZED HEALTH CARE EDUCATION/TRAINING PROGRAM

## 2022-03-19 PROCEDURE — 83735 ASSAY OF MAGNESIUM: CPT

## 2022-03-19 PROCEDURE — 80053 COMPREHEN METABOLIC PANEL: CPT

## 2022-03-19 PROCEDURE — 80143 DRUG ASSAY ACETAMINOPHEN: CPT

## 2022-03-19 PROCEDURE — 74011250636 HC RX REV CODE- 250/636: Performed by: EMERGENCY MEDICINE

## 2022-03-19 RX ORDER — PHENOBARBITAL SODIUM 65 MG/ML
125 INJECTION INTRAMUSCULAR ONCE
Status: COMPLETED | OUTPATIENT
Start: 2022-03-19 | End: 2022-03-19

## 2022-03-19 RX ORDER — ONDANSETRON 4 MG/1
4 TABLET, ORALLY DISINTEGRATING ORAL
Status: DISCONTINUED | OUTPATIENT
Start: 2022-03-19 | End: 2022-03-20 | Stop reason: HOSPADM

## 2022-03-19 RX ORDER — ASPIRIN 325 MG/1
100 TABLET, FILM COATED ORAL DAILY
Status: DISCONTINUED | OUTPATIENT
Start: 2022-03-19 | End: 2022-03-20 | Stop reason: HOSPADM

## 2022-03-19 RX ORDER — ONDANSETRON 2 MG/ML
4 INJECTION INTRAMUSCULAR; INTRAVENOUS
Status: DISCONTINUED | OUTPATIENT
Start: 2022-03-19 | End: 2022-03-20 | Stop reason: HOSPADM

## 2022-03-19 RX ORDER — LORAZEPAM 1 MG/1
4 TABLET ORAL
Status: DISCONTINUED | OUTPATIENT
Start: 2022-03-19 | End: 2022-03-19

## 2022-03-19 RX ORDER — IBUPROFEN 200 MG
1 TABLET ORAL DAILY
Status: DISCONTINUED | OUTPATIENT
Start: 2022-03-20 | End: 2022-03-20 | Stop reason: HOSPADM

## 2022-03-19 RX ORDER — ENOXAPARIN SODIUM 100 MG/ML
40 INJECTION SUBCUTANEOUS DAILY
Status: DISCONTINUED | OUTPATIENT
Start: 2022-03-20 | End: 2022-03-20 | Stop reason: HOSPADM

## 2022-03-19 RX ORDER — DEXTROSE MONOHYDRATE 100 MG/ML
0-250 INJECTION, SOLUTION INTRAVENOUS AS NEEDED
Status: DISCONTINUED | OUTPATIENT
Start: 2022-03-19 | End: 2022-03-20 | Stop reason: HOSPADM

## 2022-03-19 RX ORDER — SERTRALINE HYDROCHLORIDE 50 MG/1
50 TABLET, FILM COATED ORAL DAILY
Status: DISCONTINUED | OUTPATIENT
Start: 2022-03-20 | End: 2022-03-20 | Stop reason: HOSPADM

## 2022-03-19 RX ORDER — AMOXICILLIN AND CLAVULANATE POTASSIUM 875; 125 MG/1; MG/1
1 TABLET, FILM COATED ORAL 2 TIMES DAILY
Status: DISCONTINUED | OUTPATIENT
Start: 2022-03-19 | End: 2022-03-19

## 2022-03-19 RX ORDER — METOPROLOL TARTRATE 50 MG/1
50 TABLET ORAL 2 TIMES DAILY
Status: DISCONTINUED | OUTPATIENT
Start: 2022-03-19 | End: 2022-03-20 | Stop reason: HOSPADM

## 2022-03-19 RX ORDER — LORAZEPAM 1 MG/1
2 TABLET ORAL ONCE
Status: COMPLETED | OUTPATIENT
Start: 2022-03-19 | End: 2022-03-19

## 2022-03-19 RX ORDER — PHENOBARBITAL SODIUM 65 MG/ML
65 INJECTION INTRAMUSCULAR 3 TIMES DAILY
Status: DISCONTINUED | OUTPATIENT
Start: 2022-03-20 | End: 2022-03-20 | Stop reason: HOSPADM

## 2022-03-19 RX ORDER — SODIUM CHLORIDE 0.9 % (FLUSH) 0.9 %
5-40 SYRINGE (ML) INJECTION EVERY 8 HOURS
Status: DISCONTINUED | OUTPATIENT
Start: 2022-03-19 | End: 2022-03-20 | Stop reason: HOSPADM

## 2022-03-19 RX ORDER — AMOXICILLIN AND CLAVULANATE POTASSIUM 875; 125 MG/1; MG/1
1 TABLET, FILM COATED ORAL 2 TIMES DAILY
Status: DISCONTINUED | OUTPATIENT
Start: 2022-03-19 | End: 2022-03-20 | Stop reason: HOSPADM

## 2022-03-19 RX ORDER — MAGNESIUM SULFATE 100 %
4 CRYSTALS MISCELLANEOUS AS NEEDED
Status: DISCONTINUED | OUTPATIENT
Start: 2022-03-19 | End: 2022-03-20 | Stop reason: HOSPADM

## 2022-03-19 RX ORDER — DEXTROSE MONOHYDRATE AND SODIUM CHLORIDE 5; .9 G/100ML; G/100ML
125 INJECTION, SOLUTION INTRAVENOUS CONTINUOUS
Status: DISCONTINUED | OUTPATIENT
Start: 2022-03-19 | End: 2022-03-19

## 2022-03-19 RX ORDER — LORAZEPAM 1 MG/1
2 TABLET ORAL
Status: DISCONTINUED | OUTPATIENT
Start: 2022-03-19 | End: 2022-03-19

## 2022-03-19 RX ORDER — POLYETHYLENE GLYCOL 3350 17 G/17G
17 POWDER, FOR SOLUTION ORAL DAILY PRN
Status: DISCONTINUED | OUTPATIENT
Start: 2022-03-19 | End: 2022-03-20 | Stop reason: HOSPADM

## 2022-03-19 RX ORDER — LORAZEPAM 2 MG/ML
2 INJECTION INTRAMUSCULAR
Status: DISCONTINUED | OUTPATIENT
Start: 2022-03-19 | End: 2022-03-20 | Stop reason: HOSPADM

## 2022-03-19 RX ORDER — LISINOPRIL 20 MG/1
20 TABLET ORAL DAILY
Status: DISCONTINUED | OUTPATIENT
Start: 2022-03-20 | End: 2022-03-20 | Stop reason: HOSPADM

## 2022-03-19 RX ORDER — FOLIC ACID 1 MG/1
1 TABLET ORAL DAILY
Status: DISCONTINUED | OUTPATIENT
Start: 2022-03-19 | End: 2022-03-20 | Stop reason: HOSPADM

## 2022-03-19 RX ORDER — ACETAMINOPHEN 325 MG/1
650 TABLET ORAL
Status: DISCONTINUED | OUTPATIENT
Start: 2022-03-19 | End: 2022-03-20 | Stop reason: HOSPADM

## 2022-03-19 RX ORDER — LORAZEPAM 2 MG/ML
1 INJECTION INTRAMUSCULAR ONCE
Status: DISCONTINUED | OUTPATIENT
Start: 2022-03-19 | End: 2022-03-19 | Stop reason: SDUPTHER

## 2022-03-19 RX ORDER — ALBUTEROL SULFATE 0.83 MG/ML
1.25 SOLUTION RESPIRATORY (INHALATION)
Status: DISCONTINUED | OUTPATIENT
Start: 2022-03-19 | End: 2022-03-20 | Stop reason: HOSPADM

## 2022-03-19 RX ORDER — IPRATROPIUM BROMIDE AND ALBUTEROL SULFATE 2.5; .5 MG/3ML; MG/3ML
3 SOLUTION RESPIRATORY (INHALATION)
Status: DISCONTINUED | OUTPATIENT
Start: 2022-03-19 | End: 2022-03-19

## 2022-03-19 RX ORDER — SODIUM CHLORIDE 0.9 % (FLUSH) 0.9 %
5-40 SYRINGE (ML) INJECTION AS NEEDED
Status: DISCONTINUED | OUTPATIENT
Start: 2022-03-19 | End: 2022-03-20 | Stop reason: HOSPADM

## 2022-03-19 RX ORDER — PREDNISONE 20 MG/1
40 TABLET ORAL
Status: DISCONTINUED | OUTPATIENT
Start: 2022-03-20 | End: 2022-03-20 | Stop reason: HOSPADM

## 2022-03-19 RX ORDER — SODIUM CHLORIDE 9 MG/ML
125 INJECTION, SOLUTION INTRAVENOUS CONTINUOUS
Status: DISCONTINUED | OUTPATIENT
Start: 2022-03-19 | End: 2022-03-20 | Stop reason: HOSPADM

## 2022-03-19 RX ORDER — BUDESONIDE 0.5 MG/2ML
500 INHALANT ORAL
Status: DISCONTINUED | OUTPATIENT
Start: 2022-03-20 | End: 2022-03-20 | Stop reason: HOSPADM

## 2022-03-19 RX ORDER — INSULIN LISPRO 100 [IU]/ML
INJECTION, SOLUTION INTRAVENOUS; SUBCUTANEOUS
Status: DISCONTINUED | OUTPATIENT
Start: 2022-03-19 | End: 2022-03-20 | Stop reason: HOSPADM

## 2022-03-19 RX ORDER — ARFORMOTEROL TARTRATE 15 UG/2ML
15 SOLUTION RESPIRATORY (INHALATION)
Status: DISCONTINUED | OUTPATIENT
Start: 2022-03-20 | End: 2022-03-20 | Stop reason: HOSPADM

## 2022-03-19 RX ORDER — ONDANSETRON 4 MG/1
8 TABLET, ORALLY DISINTEGRATING ORAL
Status: COMPLETED | OUTPATIENT
Start: 2022-03-19 | End: 2022-03-19

## 2022-03-19 RX ORDER — LORAZEPAM 2 MG/ML
4 INJECTION INTRAMUSCULAR
Status: DISCONTINUED | OUTPATIENT
Start: 2022-03-19 | End: 2022-03-20 | Stop reason: HOSPADM

## 2022-03-19 RX ORDER — PANTOPRAZOLE SODIUM 40 MG/1
40 TABLET, DELAYED RELEASE ORAL DAILY
Status: DISCONTINUED | OUTPATIENT
Start: 2022-03-20 | End: 2022-03-20 | Stop reason: HOSPADM

## 2022-03-19 RX ORDER — ACETAMINOPHEN 650 MG/1
650 SUPPOSITORY RECTAL
Status: DISCONTINUED | OUTPATIENT
Start: 2022-03-19 | End: 2022-03-20 | Stop reason: HOSPADM

## 2022-03-19 RX ADMIN — PHENOBARBITAL SODIUM 125 MG: 65 INJECTION INTRAMUSCULAR at 20:15

## 2022-03-19 RX ADMIN — ARFORMOTEROL TARTRATE: 15 SOLUTION RESPIRATORY (INHALATION) at 20:30

## 2022-03-19 RX ADMIN — FOLIC ACID 1 MG: 1 TABLET ORAL at 17:00

## 2022-03-19 RX ADMIN — SODIUM CHLORIDE 125 ML/HR: 9 INJECTION, SOLUTION INTRAVENOUS at 20:55

## 2022-03-19 RX ADMIN — ONDANSETRON 8 MG: 4 TABLET, ORALLY DISINTEGRATING ORAL at 17:07

## 2022-03-19 RX ADMIN — Medication 1 TABLET: at 19:19

## 2022-03-19 RX ADMIN — SODIUM CHLORIDE, PRESERVATIVE FREE 10 ML: 5 INJECTION INTRAVENOUS at 23:28

## 2022-03-19 RX ADMIN — THIAMINE HCL TAB 100 MG 100 MG: 100 TAB at 17:00

## 2022-03-19 RX ADMIN — IPRATROPIUM BROMIDE AND ALBUTEROL 1 PUFF: 20; 100 SPRAY, METERED RESPIRATORY (INHALATION) at 23:33

## 2022-03-19 RX ADMIN — AMOXICILLIN AND CLAVULANATE POTASSIUM 1 TABLET: 875; 125 TABLET, FILM COATED ORAL at 21:16

## 2022-03-19 RX ADMIN — SODIUM CHLORIDE, PRESERVATIVE FREE 10 ML: 5 INJECTION INTRAVENOUS at 20:53

## 2022-03-19 RX ADMIN — LORAZEPAM 2 MG: 2 INJECTION INTRAMUSCULAR at 23:28

## 2022-03-19 RX ADMIN — LORAZEPAM 2 MG: 1 TABLET ORAL at 17:07

## 2022-03-19 RX ADMIN — SODIUM CHLORIDE, POTASSIUM CHLORIDE, SODIUM LACTATE AND CALCIUM CHLORIDE 1000 ML: 600; 310; 30; 20 INJECTION, SOLUTION INTRAVENOUS at 19:18

## 2022-03-19 NOTE — PROGRESS NOTES
[facility]   Senior Resident Admission Note    CC: Tremors and anxiety    HPI:  Alvaro Smith is a 61 y.o. male with PMHx of alcohol use disorder, alcohol withdrawal seizures, anxiety/depression, COPD, DM2, HTN, hemochromatosis, GERD who presents to the ED with symptoms of alcohol withdrawal. Pt reports that he has been feeling shaky and somewhat anxious, these symptoms started today in the morning, mentioned that this is the typical presentation of alcohol withdrawal. He decided to come to the ED and be admitted. Pt mentioned that he drinks ~ 10-15 beers and some liquors daily. His last drink was ~ 1400 PM today. He uses marijuana (mostly chewable), but denies any other substances. Denies SI/HI. No fever, no CP. Has been treated for Sinusitis since 3/11 with Augmentin, has been coomplaint with meds. However pt mentioned having some SOB, wheezing, increased sputum production for many days, not quite sure of changes in sputum coloration, stated symptoms have improved mildly. Has been using his rescue inhaler more frequent. Of note, pt was recently admitted for alcohol withdrawal last month from 2/10/22 through 2/15/22. During his hospital stay he required ICU level of care with Precedex gtt. He left AMA on 2/15/22.      COVID vaccine x2 with [employer]. Had covid infection in Jan/2022.      In the ED:  Vitals: Temp 98.1   BP 87/59 (68)   HR 72   RR 16   SatO2  93% on RA. Pt on O2, RN mentioned that his O2sat dropped to 85 while he was sleeping. Labs: EtOH 397, WBC 5.2, Hgb 15.8, platelets 657, glucose 130, Cr 1.19 (BL: 0.75), Mg 1.8, ALT 80, AST 70, lipase wnl, troponin neg, salicylate/Tylenol negative. Imaging: None  Treatment: Folate 1 mg PO x1, thiamine 100 mg PO x1, Zofran 8 mg PO x1, Ativan 2 mg PO x1. NS 2 L. Chart reviewed. Patient seen, examined, and discussed with Dr. Rainer Beltran (PGY-1). See H&P note for more details.     PE:  Physical Exam  Constitutional: General: He is not in acute distress. HENT:      Mouth/Throat:      Comments: MM mildly dry. Cardiovascular:      Rate and Rhythm: Normal rate. Pulses: Normal pulses. Heart sounds: Normal heart sounds. Pulmonary:      Comments: Good air movement throughout, mild wheezing in posterior lower lungs, no crackles or rales. Abdominal:      General: Bowel sounds are normal.      Palpations: Abdomen is soft. Musculoskeletal:      Comments: Mild tremor noted in BL UE   Skin:     Capillary Refill: Capillary refill takes less than 2 seconds. Neurological:      General: No focal deficit present. Mental Status: He is alert. Psychiatric:      Comments: A bit anxious         A/P:   Thomas Lafleur a 61 y. o. male with a known history of multi-substance abuse including alcohol, opioids, THC, and h/o crack-cocaine use; PUD 2/2 long-term NSAID use, MDD w/ h/o suicidal ideation, thrombocytopenia, T2DM, HTN, and COPD who is admitted for acute EtOH withdrawal.    Acute EtOH withdrawal: Multiple prior admission for the same cause. Hx of alcohol withdrawal seizures. Left AMA back in February. Pt decided to come to ED as he is familiar with his symptoms. Alcohol level ~ 400. - Admit to Telemetry  - Vitals sign per unit   - Diabetic diet  - CIWA w/ Ativan per protocol  - Phenobarbital 125 mg loading dose  - Phenobarbital 65 mg TID  - IV Compazine PRN nausea   - Fall/Seizure precautions  - Thiamine 100 mg PO daily , Multivitamin PO daily , and folic acid 1 mg PO daily   - CBC, CMP, Mg and Phos daily.   - Replete electrolytes PRN    AHRF in the setting of COPD Exacerbation?: RN verbalized a O2Sat of 85% while pt sleeping, however it was not documented. Pt sanchez snot use oxy at home. Pt with increase SOB and sputum production and current sinus infection. Covid negative. CXR NAP.  During encounter pt' O2Sats wnl on RA.   - Continue Augmentin PO BID daily (5 doses)  - Prednisone 40 mg PO daily x 5 days   - Jennifer q6hrs  - Albuterol Q4H as needed  - CBC daily      I agree with remaining assessment and plan as documented in Dr. Marian Berrios note.     Pt discussed with Dr. Torsten Tong (on-call attending physician)    Joe Carmona MD  Family Medicine Resident

## 2022-03-19 NOTE — ED PROVIDER NOTES
Patient is here because he is going through alcohol withdrawal and wants to get inpatient rehab. He states that he has a history of alcoholism. Drinks multiple beers and liquors per day. Last drink was several hours ago. He states he has gotten shaky which is the first time he gets from alcohol withdrawal.  He has a history of alcohol withdrawal seizures in the past.  Denies any illegal drugs other than marijuana. Denies any other complaints. Denies homicidal ideation or suicidal ideation. Past Medical History:   Diagnosis Date    Alcohol abuse     [facility]     Anxiety     COPD (chronic obstructive pulmonary disease) ([facility])     Dr. Soham Hammer is pulmonologist    Depression     Diabetes ([facility]     Type II    Diverticulitis     Family history of angina     GERD (gastroesophageal reflux disease)     Hemochromatosis     Hypertension        Past Surgical History:   Procedure Laterality Date    COLONOSCOPY N/A 9/6/2017    COLONOSCOPY performed by Chip Nieto MD at [address] [place] [address]  03/02/2021    ACDF C4/7    HX TOTAL COLECTOMY           No family history on file.     Social History     Socioeconomic History    Marital status: SINGLE     Spouse name: Not on file    Number of children: Not on file    Years of education: Not on file    Highest education level: Not on file   Occupational History    Not on file   Tobacco Use    Smoking status: Unknown If Ever Smoked    Smokeless tobacco: Current User    Tobacco comment: Pt uses Chewing tobacco   Substance and Sexual Activity    Alcohol use: Yes     Comment: heavy liquor use daily    Drug use: Yes     Types: Marijuana     Comment: occasional    Sexual activity: Not on file   Other Topics Concern     Service Not Asked    Blood Transfusions Not Asked    Caffeine Concern Not Asked    Occupational Exposure Not Asked    Hobby Hazards Not Asked    Sleep Concern Not Asked    Stress Concern Not Asked    Weight Concern Not Asked    Special Diet Not Asked    Back Care Not Asked    Exercise Not Asked    Bike Helmet Not Asked   [address] Not Asked    Self-Exams Not Asked   Social History Narrative    Not on file     Social Determinants of Health     Financial Resource Strain:     Difficulty of Paying Living Expenses: Not on file   Food Insecurity:     Worried About Running Out of Food in the Last Year: Not on file    [place] of Food in the Last Year: Not on file   Transportation Needs:     Lack of Transportation (Medical): Not on file    Lack of Transportation (Non-Medical): Not on file   Physical Activity:     Days of Exercise per Week: Not on file    Minutes of Exercise per Session: Not on file   Stress:     Feeling of Stress : Not on file   Social Connections:     Frequency of Communication with Friends and Family: Not on file    Frequency of Social Gatherings with Friends and Family: Not on file    Attends Rastafarian Services: Not on file    Active Member of [address] [place] [address] or Organizations: Not on file    Attends Club or Organization Meetings: Not on file    Marital Status: Not on file   Intimate Partner Violence:     Fear of Current or Ex-Partner: Not on file    Emotionally Abused: Not on file    Physically Abused: Not on file    Sexually Abused: Not on file   Housing Stability:     Unable to Pay for Housing in the Last Year: Not on file    Number of [place] in the Last Year: Not on file    Unstable Housing in the Last Year: Not on file         ALLERGIES: Bee venom protein (honey bee)    Review of Systems   Constitutional: Negative for chills, fatigue and fever. HENT: Negative for rhinorrhea and sore throat. Eyes: Negative for photophobia and pain. Respiratory: Negative for cough, shortness of breath and wheezing. Cardiovascular: Negative for chest pain, palpitations and leg swelling. Gastrointestinal: Positive for nausea.  Negative for abdominal pain, constipation, diarrhea and vomiting. Genitourinary: Negative for dysuria, flank pain and hematuria. Musculoskeletal: Negative for arthralgias, back pain, gait problem, joint swelling, myalgias, neck pain and neck stiffness. Skin: Negative for color change, pallor, rash and wound. Neurological: Positive for tremors. Negative for dizziness, syncope, light-headedness, numbness and headaches. Vitals:    03/19/22 1612 03/19/22 1615 03/19/22 1625 03/19/22 1630   BP: (!) 87/59 (!) 87/59  (!) 90/59   Pulse:  72  77   Resp: 16 16  13   Temp:  98.1 °F (36.7 °C)     SpO2:  93% 95% 96%   Weight:  99.8 kg (220 lb)     Height:  6' 2\" (1.88 m)              Physical Exam  Vitals and nursing note reviewed. Exam conducted with a chaperone present. Constitutional:       General: He is not in acute distress. Appearance: Normal appearance. He is normal weight. He is not ill-appearing, toxic-appearing or diaphoretic. HENT:      Head: Normocephalic and atraumatic. Right Ear: External ear normal.      Left Ear: External ear normal.      Nose: Nose normal.      Mouth/Throat:      Mouth: Mucous membranes are moist.      Pharynx: Oropharynx is clear. Eyes:      Extraocular Movements: Extraocular movements intact. Conjunctiva/sclera: Conjunctivae normal.      Pupils: Pupils are equal, round, and reactive to light. Comments: Pupils 4 mm and reactive bilaterally. Cardiovascular:      Rate and Rhythm: Normal rate and regular rhythm. Pulses: Normal pulses. Heart sounds: Normal heart sounds. Pulmonary:      Effort: Pulmonary effort is normal.      Breath sounds: Normal breath sounds. Abdominal:      General: Abdomen is flat. Bowel sounds are normal. There is no distension. Palpations: Abdomen is soft. There is no mass. Tenderness: There is no abdominal tenderness. There is no guarding or rebound. Hernia: No hernia is present.    Musculoskeletal:         General: No swelling, tenderness, deformity or signs of injury. Normal range of motion. Cervical back: Normal range of motion and neck supple. Right lower leg: No edema. Left lower leg: No edema. Skin:     General: Skin is warm and dry. Capillary Refill: Capillary refill takes less than 2 seconds. Coloration: Skin is not jaundiced or pale. Findings: No bruising, erythema, lesion or rash. Neurological:      General: No focal deficit present. Mental Status: He is alert and oriented to person, place, and time. Mental status is at baseline. Psychiatric:         Mood and Affect: Mood normal.         Behavior: Behavior normal.          [facility]  ED Course as of 03/19/22 1831   Sat Mar 19, 2022   1640 Patient examined. Patient tremulous mildly low blood pressure. States he is going through alcohol withdrawal and wants treatment. Does appear to have alcohol withdrawal at this time. Will give IV fluids thiamine folate CIWA. Will get labs. [AF]   1654 GLUCOSE, POC(!):    GLUCOSE,FAST - (!)   Performed by Vitaliy Lee [AF]   7701 EKG with normal sinus rhythm rate 68 normal AK interval normal QRS normal ST segments. Normal QTC. [AF]   1739 ETHYL ALCOHOL(!):    ALCOHOL(ETHYL),SERUM 397(!) [AF]   6800 SALICYLATE(!):    Salicylate level <3.9(!) [AF]   1739 TROPONIN-HIGH SENSITIVITY:    Troponin-High Sensitivity 4 [AF]   1739 ACETAMINOPHEN(!):    Acetaminophen level <2(!) [AF]   1739 MAGNESIUM:    Magnesium 1.8 [AF]   1739 LIPASE:    Lipase 288 [AF]   1739 COMPREHENSIVE METABOLIC PANEL(!):    Sodium 137   Potassium 3.9   Chloride 97   CO2 27   Anion gap 13   Glucose 130(!)   BUN 10   Creatinine 1.19   BUN/Creatinine ratio 8(!)   GFR est AA >60   GFR est non-AA >60   Calcium 8.7   Bilirubin, total 0.6   ALT 80(!)   AST 70(!)   Alk.  phosphatase 62   Protein, total 7.6   Albumin 4.2   Globulin 3.4   A-G Ratio 1.2 [AF]   1739 CBC WITH AUTOMATED DIFF(!):    WBC 5.2   RBC 4.64   HGB 15.8   HCT 44.3   MCV 95.5   MCH 34.1(!)   MCHC 35.7 RDW 14.5   PLATELET 486   MPV 8.5(!)   NRBC 0.0   ABSOLUTE NRBC 0.00   NEUTROPHILS 58   LYMPHOCYTES 29   MONOCYTES 11   EOSINOPHILS 1   BASOPHILS 1   IMMATURE GRANULOCYTES 0   ABS. NEUTROPHILS 3.0   ABS. LYMPHOCYTES 1.5   ABS. MONOCYTES 0.6   ABS. EOSINOPHILS 0.0   ABS. BASOPHILS 0.1   ABS. IMM. Jaci Service 0.0   DF AUTOMATED [AF]      ED Course User Index  [AF] Ousmane Mayfield, DO Zendejas Serve Consult for Admission  5:40 PM    ED Room Number: ER08/08  Patient Name and age:  Marylen Hutchinson 61 y.o.  male  Working Diagnosis:   1. Alcohol withdrawal syndrome without complication ([facility]        COVID-19 Suspicion:  no  Sepsis present:  no  Reassessment needed: yes  Code Status:  Full Code  Readmission: no  Isolation Requirements:  no  Recommended Level of Care:  telemetry  Department:[facility] ED - [phone]  Other:  Here for ethanol withdrawal; hx of seizures with alcohol withdrawal; appears sober at this time but with tremors; appears to be withdrawing.         Procedures

## 2022-03-19 NOTE — Clinical Note
Status[de-identified] INPATIENT [101]   Type of Bed: Intensive Care [6]   Cardiac Monitoring Required?: Yes   Inpatient Hospitalization Certified Necessary for the Following Reasons: 9. Other (further clarification in H&P documentation)   Admitting Diagnosis: Alcohol withdrawal ([facility] [291.81. ICD-9-CM]   Admitting Physician: Maureen Saini [[id number]]   Attending Physician: Maureen Saini [[id number]]   Estimated Length of Stay: 2 Midnights   Discharge Plan[de-identified] Home with Office Follow-up

## 2022-03-19 NOTE — H&P
Admission H&P     Name: Nan London MRN: [id number]    Sex: Male   YOB: 1965  Age: 61 y.o. PCP: Michael Craig NP      Source of Information: patient, medical records    Chief complaint: Tremors, alcohol withdrawal    History of Present Illness  Nan London is a 61 y.o. male with PMHx of alcohol use disorder, alcohol withdrawal seizures, multiple recent admissions for alcohol withdrawal, anxiety/depression, COPD, DM2, HTN, GERD who presents to the ED with symptoms of alcohol withdrawal. Pt states that he has been feeling shaky, which he reports is typically his first symptom of alcohol withdrawal. Pt reports consuming up to 14 drinks daily. His last drink was at 2 PM on 3/19/22 (just a few hours prior to arriving in the ED). Of note, pt was recently admitted for alcohol withdrawal last month from 2/10/22 until 2/15/22. During his hospital stay he required ICU level of care with Precedex gtt. He left AMA on 2/15/22. He was also admitted in January 2022 for alcohol withdrawal. He reports that he is interested in quitting drinking. He has attended [facility] SCREEMO meetings in the past. He denies SI/HI. Additionally, pt reports increased SOB, wheezing, increased sputum production, and subjective fevers/chills for the past few days. Not sure if the appearance of the sputum has changed. He has been using his rescue inhaler more frequently. He is currently being treated by his PCP w/ Augmentin for a sinus infection. Pt vaccinated against COVID x2 with psicofxp. In the ED:  Vitals: Temp 98.1   BP 87/59 (68)   HR 72   RR 16   SatO2  93% on RA  Labs: EtOH 397, WBC 5.2, Hgb 15.8, platelets 429, glucose 130, creatinine 1.19 (previously 0.75), Mg 1.8, ALT 80, AST 70, lipase wnl, troponin 4, salicylate negative, Tylenol negative. Imaging: None  Treatment: Folate 1 mg PO x1, thiamine 100 mg PO x1, Zofran 8 mg PO x1, Ativan 2 mg PO x1    EKG: NSR, HR 68, QTc 406, normal axis.     Patient Vitals for the past 12 hrs:   Temp Pulse Resp BP SpO2   03/19/22 2322 98.2 °F (36.8 °C) 78 18 110/75 94 %   03/19/22 2234 -- 71 -- -- --   03/19/22 2155 98.5 °F (36.9 °C) 76 18 101/63 93 %   03/19/22 2105 -- (!) 59 -- -- --   03/19/22 2039 98.2 °F (36.8 °C) 68 19 105/81 90 %   03/19/22 2000 -- (!) 56 -- 101/65 94 %   03/19/22 1943 -- -- -- -- 98 %   03/19/22 1900 -- 68 18 (!) 89/58 97 %   03/19/22 1857 -- -- -- (!) 89/58 --   03/19/22 1630 -- 77 13 (!) 90/59 96 %   03/19/22 1625 -- -- -- -- 95 %   03/19/22 1615 98.1 °F (36.7 °C) 72 16 (!) 87/59 93 %   03/19/22 1612 -- -- 16 (!) 87/59 --       Review of Systems  Review of Systems   Constitutional: Negative for fever. HENT: Positive for sinus pain. Negative for congestion. Respiratory: Positive for shortness of breath and wheezing. Cardiovascular: Negative for chest pain. Gastrointestinal: Positive for nausea. Negative for abdominal pain. Genitourinary: Negative for dysuria. Musculoskeletal: Negative for arthralgias and myalgias. Neurological: Negative for headaches. Psychiatric/Behavioral: Negative for agitation. Home Medications  Prior to Admission medications    Medication Sig Start Date End Date Taking? Authorizing Provider   Advair Diskus 250-50 mcg/dose diskus inhaler USE 1 PUFF TWICE A DAY 1/26/22   Provider, Historical   hydrOXYzine HCL (ATARAX) 50 mg tablet Take 1 Tablet by mouth nightly as needed for Sleep. 3/11/22   Mikala Weathers NP   amoxicillin-clavulanate (AUGMENTIN) 875-125 mg per tablet Take 1 Tablet by mouth two (2) times a day for 10 days. 3/11/22 3/21/22  Mikala Weathers NP   metoprolol tartrate (LOPRESSOR) 50 mg tablet Take 1 Tablet by mouth two (2) times a day. Indications: high blood pressure 2/18/22   Miguel Bailey NP   lisinopriL (PRINIVIL, ZESTRIL) 20 mg tablet Take 1 Tablet by mouth daily.  Indications: high blood pressure 2/18/22   Mikala Weathers NP   omeprazole (PRILOSEC) 40 mg capsule Take 1 Capsule by mouth Daily (before breakfast). 2/18/22   Mariely Pride NP   metFORMIN (GLUCOPHAGE) 1,000 mg tablet Take 1 Tablet by mouth two (2) times daily (with meals). 2/18/22   Mariely Pride NP   sertraline (ZOLOFT) 50 mg tablet Take 1 Tablet by mouth daily. 2/18/22   Mariely Pride NP   budesonide-formoteroL (Symbicort) 160-4.5 mcg/actuation HFAA Take 1 Puff by inhalation two (2) times a day. 1/20/22   Elzie Homans, MD   albuterol (PROVENTIL HFA, VENTOLIN HFA, PROAIR HFA) 90 mcg/actuation inhaler Take 2 Puffs by inhalation every six (6) hours as needed for Wheezing, Shortness of Breath or Respiratory Distress. 1/20/22   Elzie Homans, MD   therapeutic multivitamin [facility]) tablet Take 1 Tab by mouth daily. 1/11/21   Tyra Newman MD       Allergies  Allergies   Allergen Reactions    Bee Venom Protein (Honey Bee) Unable to Obtain       Past Medical History  Past Medical History:   Diagnosis Date    Alcohol abuse     [facility]ism ([facility]     Anxiety     COPD [facility]chronic obstructive pulmonary disease) [facility])     Dr. Neema Salgado is pulmonologist    Depression     Diabetes ([facility]     Type II    Diverticulitis     Family history of angina     GERD (gastroesophageal reflux disease)     Hemochromatosis     Hypertension        Previous Hospitalization(s)  Past Surgical History:   Procedure Laterality Date    COLONOSCOPY N/A 9/6/2017    COLONOSCOPY performed by Cynthia Olsen MD at [facility] HX CERVICAL FUSION  03/02/2021    ACDF C4/7    HX TOTAL COLECTOMY         Family History  No family history on file. Social History  Alcohol history: Pt reports consuming up to 14 drinks per day. Smoking history: Pt denies smoking, but reports dipping tobacco  Illicit drug history: Pt reports daily marijuana use, but denies any other substance use. Living arrangement: patient lives alone.   Ambulates: Independently     Vital Signs  Visit Vitals  /75 (BP 1 Location: Right upper arm, BP Patient Position: At rest) Pulse 78   Temp 98.2 °F (36.8 °C)   Resp 18   Ht 6' 2\" (1.88 m)   Wt 218 lb 11.1 oz (99.2 kg)   SpO2 94%   BMI 28.08 kg/m²       Physical Examination:  General: No acute distress  Head: Normocephalic  Eyes: Conjunctiva pink  Neck: Supple  ENT: No rhinorrhea  CV: Heart: regular rate  Resp: Lungs: clear to auscultation bilaterally  GI: non-tender to palpation  Extremities: No lower extremity edema  Skin: Warm, dry  Neuro: Awake, alert, and conversant. Moving all four extremities spontaneously. Laboratory Data  Recent Results (from the past 8 hour(s))   SAMPLES BEING HELD    Collection Time: 03/19/22  4:32 PM   Result Value Ref Range    SAMPLES BEING HELD 1PST,1SST,1LAV,1RED,1BL     COMMENT        Add-on orders for these samples will be processed based on acceptable specimen integrity and analyte stability, which may vary by analyte. ACETAMINOPHEN    Collection Time: 03/19/22  4:32 PM   Result Value Ref Range    Acetaminophen level <2 (L) 10 - 30 ug/mL   CBC WITH AUTOMATED DIFF    Collection Time: 03/19/22  4:32 PM   Result Value Ref Range    WBC 5.2 4.1 - 11.1 K/uL    RBC 4.64 4.10 - 5.70 M/uL    HGB 15.8 12.1 - 17.0 g/dL    HCT 44.3 36.6 - 50.3 %    MCV 95.5 80.0 - 99.0 FL    MCH 34.1 (H) 26.0 - 34.0 PG    MCHC 35.7 30.0 - 36.5 g/dL    RDW 14.5 11.5 - 14.5 %    PLATELET 918 736 - 610 K/uL    MPV 8.5 (L) 8.9 - 12.9 FL    NRBC 0.0 0  WBC    ABSOLUTE NRBC 0.00 0.00 - 0.01 K/uL    NEUTROPHILS 58 32 - 75 %    LYMPHOCYTES 29 12 - 49 %    MONOCYTES 11 5 - 13 %    EOSINOPHILS 1 0 - 7 %    BASOPHILS 1 0 - 1 %    IMMATURE GRANULOCYTES 0 0.0 - 0.5 %    ABS. NEUTROPHILS 3.0 1.8 - 8.0 K/UL    ABS. LYMPHOCYTES 1.5 0.8 - 3.5 K/UL    ABS. MONOCYTES 0.6 0.0 - 1.0 K/UL    ABS. EOSINOPHILS 0.0 0.0 - 0.4 K/UL    ABS. BASOPHILS 0.1 0.0 - 0.1 K/UL    ABS. IMM.  GRANS. 0.0 0.00 - 0.04 K/UL    DF AUTOMATED     METABOLIC PANEL, COMPREHENSIVE    Collection Time: 03/19/22  4:32 PM   Result Value Ref Range    Sodium 137 136 - 145 mmol/L    Potassium 3.9 3.5 - 5.1 mmol/L    Chloride 97 97 - 108 mmol/L    CO2 27 21 - 32 mmol/L    Anion gap 13 5 - 15 mmol/L    Glucose 130 (H) 65 - 100 mg/dL    BUN 10 6 - 20 MG/DL    Creatinine 1.19 0.70 - 1.30 MG/DL    BUN/Creatinine ratio 8 (L) 12 - 20      GFR est AA >60 >60 ml/min/1.73m2    GFR est non-AA >60 >60 ml/min/1.73m2    Calcium 8.7 8.5 - 10.1 MG/DL    Bilirubin, total 0.6 0.2 - 1.0 MG/DL    ALT (SGPT) 80 (H) 12 - 78 U/L    AST (SGOT) 70 (H) 15 - 37 U/L    Alk.  phosphatase 62 45 - 117 U/L    Protein, total 7.6 6.4 - 8.2 g/dL    Albumin 4.2 3.5 - 5.0 g/dL    Globulin 3.4 2.0 - 4.0 g/dL    A-G Ratio 1.2 1.1 - 2.2     LIPASE    Collection Time: 03/19/22  4:32 PM   Result Value Ref Range    Lipase 288 73 - 673 U/L   SALICYLATE    Collection Time: 03/19/22  4:32 PM   Result Value Ref Range    Salicylate level <1.9 (L) 2.8 - 20.0 MG/DL   MAGNESIUM    Collection Time: 03/19/22  4:32 PM   Result Value Ref Range    Magnesium 1.8 1.6 - 2.4 mg/dL   TROPONIN-HIGH SENSITIVITY    Collection Time: 03/19/22  4:32 PM   Result Value Ref Range    Troponin-High Sensitivity 4 0 - 76 ng/L   ETHYL ALCOHOL    Collection Time: 03/19/22  4:32 PM   Result Value Ref Range    ALCOHOL(ETHYL),SERUM 397 (H) <10 MG/DL   GLUCOSE, POC    Collection Time: 03/19/22  4:52 PM   Result Value Ref Range    Glucose (POC) 134 (H) 65 - 117 mg/dL    Performed by Chinyere Hearn    EKG, 12 LEAD, INITIAL    Collection Time: 03/19/22  4:59 PM   Result Value Ref Range    Ventricular Rate 68 BPM    Atrial Rate 68 BPM    P-R Interval 194 ms    QRS Duration 76 ms    Q-T Interval 382 ms    QTC Calculation (Bezet) 406 ms    Calculated P Axis 41 degrees    Calculated R Axis 58 degrees    Calculated T Axis 49 degrees    Diagnosis       Normal sinus rhythm  Low voltage QRS  Septal infarct (cited on or before 19-MAR-2022)  Abnormal ECG  When compared with ECG of 12-FEB-2022 00:09,  No significant change was found     COVID-19 RAPID TEST    Collection Time: 03/19/22  9:18 PM   Result Value Ref Range    Specimen source Nasopharyngeal      COVID-19 rapid test Not detected NOTD     GLUCOSE, POC    Collection Time: 03/19/22  9:53 PM   Result Value Ref Range    Glucose (POC) 140 (H) 65 - 117 mg/dL    Performed by [facility]        Imaging  CXR Results  (Last 48 hours)               03/19/22 2018  XR CHEST PORT Final result    Impression:  No evidence of acute cardiopulmonary process. Narrative:  INDICATION:  COPD, wheezing        COMPARISON: February 2022       FINDINGS: Single AP portable view of the chest obtained at 2014 demonstrates a   stable cardiomediastinal silhouette. The lungs are clear bilaterally. No osseous   abnormalities are seen. CT Results  (Last 48 hours)    None              Assessment and Plan     Anupama Zafar is a 61 y.o. male with a PMHx of alcohol use disorder, alcohol withdrawal seizures, anxiety/depression, COPD, DM2, HTN, GERD who is admitted for acute alcohol withdrawal.    Acute alcohol withdrawal: Hx of Alcohol Use Disorder and hx of alcohol withdrawal seizures. Pt recently admitted with similar presentation in January 2022 and again in February 2022 when he required Precedex gtt and ICU-level care. Pt reports tremors, which improved s/p Ativan 2 mg PO in the ED. EtOH level 397 in the ED. Lipase wnl.  - Admit to telemetry  - VS per per unit protocol  - Cardiac monitoring  - CIWA w/ Ativan  - Phenobarbital 125 mg loading dose, followed by phenobarbital 65 mg TID given hx of withdrawal seizures. - Folate and thiamine PO daily  - Zofran PRN for nausea   - Fall and seizure precautions  - Daily CBC, CMP, Magnesium    AHRF in the s/o COPD Exacerbation?: Increased shortness of breath and wheezing along with increased sputum production. Currently taking Augmentin for sinus infection. O2 sat 85% while pt sleeping per nursing staff, placed on supplemental O2. Pt does not wear O2 at home. No reported hx of KYLER.   - Obtain CXR  - Obtain rapid COVID  - Prednisone 40 mg daily for 5 days  - Scheduled Combivent Respimat Q6H  - Brovana/Pulmicort BID  - Albuterol Q6H PRN  - Continue Augmentin 875-125 mg PO BID for remaining 5 doses  - Continuous oximetry and supplemental O2 therapy for target SpO2 88-94%. Polysubstance use: AUD and pt reports consuming marijuana gummy products/dipping snuff. Denies any other substance use. - Obtain UDS  - Nicotine patch daily (21 mg)    At-risk CAIN: Creatinine 1.19 POA. (Baseline 0.7-0.9). - Continue IVF rehydration w/ NS (125 mL/hr)  - Follow daily labs    DM2: Last A1c 5.7 on 1/17/22. Home regimen consists of metformin 1000 mg BID with meals.  - Hold metformin while inpatient  - SSI ACHS  - POC glucose checks ACHS  - Hypoglycemic protocols ordered    HTN: BP 87/59 POA. MAP 68. Home regimen consists of lisinopril 20 mg daily and metoprolol 50 mg BID.  - HOLD lisinopril 20 mg daily given relative hypotension  - HOLD metoprolol 50 mg BID given relative hypotension    GERD: Pt takes Prilosec 40 mg daily at home. - Substitute Protonix 40 mg daily for home Prilosec    Anxiety and depression: Home regimen: Zoloft 50 mg daily. - Continue home Zoloft 50 mg daily    Elevated ALT: ALT 80 POA. - Monitor on daily labs    Elevated AST: AST 70 POA. - Monitor on daily labs    Chronic thrombocytopenia: Platelets 826 POA (previously 60s-90s). - Monitor daily labs        FEN/GI - Diabetic diet. NS at 125 mL/hr. Activity - Ambulate with assistance  DVT prophylaxis - Lovenox  GI prophylaxis - Protonix substituted for home Prilosec  Fall prophylaxis - Fall precautions ordered. Disposition - Admit to Telemetry. Plan to d/c to TBD. Consulting PT and OT  Code Status - Full. Discussed with patient / caregivers. Next of Kin Name and Contact - Sheridanpaigerehan Gonzalez [facility]    Patient Alvaro Smith will be discussed with Dr. Robson Kwon.     6:46 PM, 03/19/22  Arya Langley MD  Family Medicine Resident       For Billing    Chief Complaint   Patient presents with   [facility] Problems  Date Reviewed: 2/8/2021          Codes Class Noted POA    COPD exacerbation ([facility] ICD-10-CM: J44.1  ICD-9-CM: 491.21  3/19/2022 Unknown        GERD (gastroesophageal reflux disease) ICD-10-CM: K21.9  ICD-9-CM: 530.81  3/19/2022 Unknown        Anxiety and depression ICD-10-CM: F41.9, F32. A  ICD-9-CM: 300.00, 311  3/19/2022 Unknown        Elevated transaminase level ICD-10-CM: R74.01  ICD-9-CM: 790.4  3/19/2022 Unknown        DM (diabetes mellitus) ([facility] ICD-10-CM: E11.9  ICD-9-CM: 250.00  1/17/2022 Yes        HTN (hypertension) ICD-10-CM: I10  ICD-9-CM: 401.9  1/17/2022 Yes        Thrombocytopenia ([facility] ICD-10-CM: D69.6  ICD-9-CM: 287.5  1/6/2021 Yes        Elevated bilirubin ICD-10-CM: R17  ICD-9-CM: 277.4  1/6/2021 Yes        * (Principal) Alcohol withdrawal ([facility] ICD-10-CM: C39.587  ICD-9-CM: 291.81  9/4/2017 Yes        Alcohol abuse (Chronic) ICD-10-CM: F10.10  ICD-9-CM: 305.00  Unknown Yes        Mild tetrahydrocannabinol (THC) abuse (Chronic) ICD-10-CM: F12.10  ICD-9-CM: 305.20  9/2/2017 Yes        Drug abuse ([facility] ICD-10-CM: F19.10  ICD-9-CM: 305.90  9/2/2017 Yes

## 2022-03-19 NOTE — ED TRIAGE NOTES
Pt c/o general unwellness. Denies chest pain and SOB. Pt reports alcoholism. Pt states he had 7-8 shots of bourbon and 2 beers drink this morning and smoked \"a little bit of pot\". Blood glucose enroute to ER via ambulance was 119.

## 2022-03-20 VITALS
WEIGHT: 218.7 LBS | HEART RATE: 61 BPM | HEIGHT: 74 IN | RESPIRATION RATE: 17 BRPM | DIASTOLIC BLOOD PRESSURE: 96 MMHG | SYSTOLIC BLOOD PRESSURE: 147 MMHG | TEMPERATURE: 97.8 F | BODY MASS INDEX: 28.07 KG/M2 | OXYGEN SATURATION: 98 %

## 2022-03-20 PROBLEM — Z20.822 SUSPECTED COVID-19 VIRUS INFECTION: Status: ACTIVE | Noted: 2021-01-06

## 2022-03-20 PROBLEM — R17 ELEVATED BILIRUBIN: Status: RESOLVED | Noted: 2021-01-06 | Resolved: 2022-03-20

## 2022-03-20 PROBLEM — J44.1 COPD EXACERBATION (HCC): Status: RESOLVED | Noted: 2022-03-19 | Resolved: 2022-03-20

## 2022-03-20 PROBLEM — K70.9 ALCOHOLIC LIVER DISEASE (HCC): Status: ACTIVE | Noted: 2022-03-20

## 2022-03-20 LAB
ALBUMIN SERPL-MCNC: 3.3 G/DL (ref 3.5–5)
ALBUMIN/GLOB SERPL: 1.3 {RATIO} (ref 1.1–2.2)
ALP SERPL-CCNC: 52 U/L (ref 45–117)
ALT SERPL-CCNC: 57 U/L (ref 12–78)
ANION GAP SERPL CALC-SCNC: 8 MMOL/L (ref 5–15)
AST SERPL-CCNC: 46 U/L (ref 15–37)
ATRIAL RATE: 68 BPM
BASOPHILS # BLD: 0 K/UL (ref 0–0.1)
BASOPHILS NFR BLD: 1 % (ref 0–1)
BILIRUB SERPL-MCNC: 0.7 MG/DL (ref 0.2–1)
BUN SERPL-MCNC: 10 MG/DL (ref 6–20)
BUN/CREAT SERPL: 12 (ref 12–20)
CALCIUM SERPL-MCNC: 7.9 MG/DL (ref 8.5–10.1)
CALCULATED P AXIS, ECG09: 41 DEGREES
CALCULATED R AXIS, ECG10: 58 DEGREES
CALCULATED T AXIS, ECG11: 49 DEGREES
CHLORIDE SERPL-SCNC: 103 MMOL/L (ref 97–108)
CO2 SERPL-SCNC: 26 MMOL/L (ref 21–32)
CREAT SERPL-MCNC: 0.86 MG/DL (ref 0.7–1.3)
DIAGNOSIS, 93000: NORMAL
DIFFERENTIAL METHOD BLD: ABNORMAL
EOSINOPHIL # BLD: 0 K/UL (ref 0–0.4)
EOSINOPHIL NFR BLD: 1 % (ref 0–7)
ERYTHROCYTE [DISTWIDTH] IN BLOOD BY AUTOMATED COUNT: 14.4 % (ref 11.5–14.5)
GLOBULIN SER CALC-MCNC: 2.6 G/DL (ref 2–4)
GLUCOSE BLD STRIP.AUTO-MCNC: 113 MG/DL (ref 65–117)
GLUCOSE BLD STRIP.AUTO-MCNC: 167 MG/DL (ref 65–117)
GLUCOSE SERPL-MCNC: 108 MG/DL (ref 65–100)
HCT VFR BLD AUTO: 36.4 % (ref 36.6–50.3)
HGB BLD-MCNC: 13.3 G/DL (ref 12.1–17)
IMM GRANULOCYTES # BLD AUTO: 0 K/UL (ref 0–0.04)
IMM GRANULOCYTES NFR BLD AUTO: 0 % (ref 0–0.5)
LYMPHOCYTES # BLD: 0.8 K/UL (ref 0.8–3.5)
LYMPHOCYTES NFR BLD: 27 % (ref 12–49)
MAGNESIUM SERPL-MCNC: 1.4 MG/DL (ref 1.6–2.4)
MCH RBC QN AUTO: 35 PG (ref 26–34)
MCHC RBC AUTO-ENTMCNC: 36.5 G/DL (ref 30–36.5)
MCV RBC AUTO: 95.8 FL (ref 80–99)
MONOCYTES # BLD: 0.5 K/UL (ref 0–1)
MONOCYTES NFR BLD: 16 % (ref 5–13)
NEUTS SEG # BLD: 1.6 K/UL (ref 1.8–8)
NEUTS SEG NFR BLD: 55 % (ref 32–75)
NRBC # BLD: 0 K/UL (ref 0–0.01)
NRBC BLD-RTO: 0 PER 100 WBC
P-R INTERVAL, ECG05: 194 MS
PHOSPHATE SERPL-MCNC: 3.2 MG/DL (ref 2.6–4.7)
PLATELET # BLD AUTO: 141 K/UL (ref 150–400)
PMV BLD AUTO: 8.2 FL (ref 8.9–12.9)
POTASSIUM SERPL-SCNC: 3.7 MMOL/L (ref 3.5–5.1)
PROT SERPL-MCNC: 5.9 G/DL (ref 6.4–8.2)
Q-T INTERVAL, ECG07: 382 MS
QRS DURATION, ECG06: 76 MS
QTC CALCULATION (BEZET), ECG08: 406 MS
RBC # BLD AUTO: 3.8 M/UL (ref 4.1–5.7)
SERVICE CMNT-IMP: ABNORMAL
SERVICE CMNT-IMP: NORMAL
SODIUM SERPL-SCNC: 137 MMOL/L (ref 136–145)
VENTRICULAR RATE, ECG03: 68 BPM
WBC # BLD AUTO: 3 K/UL (ref 4.1–11.1)

## 2022-03-20 PROCEDURE — 82962 GLUCOSE BLOOD TEST: CPT

## 2022-03-20 PROCEDURE — 80053 COMPREHEN METABOLIC PANEL: CPT

## 2022-03-20 PROCEDURE — 74011250637 HC RX REV CODE- 250/637: Performed by: STUDENT IN AN ORGANIZED HEALTH CARE EDUCATION/TRAINING PROGRAM

## 2022-03-20 PROCEDURE — 85025 COMPLETE CBC W/AUTO DIFF WBC: CPT

## 2022-03-20 PROCEDURE — 94761 N-INVAS EAR/PLS OXIMETRY MLT: CPT

## 2022-03-20 PROCEDURE — 74011000250 HC RX REV CODE- 250: Performed by: STUDENT IN AN ORGANIZED HEALTH CARE EDUCATION/TRAINING PROGRAM

## 2022-03-20 PROCEDURE — 74011636637 HC RX REV CODE- 636/637: Performed by: STUDENT IN AN ORGANIZED HEALTH CARE EDUCATION/TRAINING PROGRAM

## 2022-03-20 PROCEDURE — 77010033678 HC OXYGEN DAILY

## 2022-03-20 PROCEDURE — 74011250637 HC RX REV CODE- 250/637: Performed by: HOSPITALIST

## 2022-03-20 PROCEDURE — 74011250636 HC RX REV CODE- 250/636: Performed by: HOSPITALIST

## 2022-03-20 PROCEDURE — 83735 ASSAY OF MAGNESIUM: CPT

## 2022-03-20 PROCEDURE — 74011250636 HC RX REV CODE- 250/636: Performed by: STUDENT IN AN ORGANIZED HEALTH CARE EDUCATION/TRAINING PROGRAM

## 2022-03-20 PROCEDURE — 36415 COLL VENOUS BLD VENIPUNCTURE: CPT

## 2022-03-20 PROCEDURE — 94640 AIRWAY INHALATION TREATMENT: CPT

## 2022-03-20 PROCEDURE — 84100 ASSAY OF PHOSPHORUS: CPT

## 2022-03-20 PROCEDURE — 99222 1ST HOSP IP/OBS MODERATE 55: CPT | Performed by: FAMILY MEDICINE

## 2022-03-20 RX ORDER — NALTREXONE HYDROCHLORIDE 50 MG/1
50 TABLET, FILM COATED ORAL DAILY
Qty: 30 TABLET | Refills: 0 | Status: SHIPPED | OUTPATIENT
Start: 2022-03-20 | End: 2022-04-19

## 2022-03-20 RX ORDER — CHLORDIAZEPOXIDE HYDROCHLORIDE 5 MG/1
CAPSULE, GELATIN COATED ORAL
Status: CANCELLED | OUTPATIENT
Start: 2022-03-20

## 2022-03-20 RX ORDER — MAGNESIUM SULFATE HEPTAHYDRATE 40 MG/ML
2 INJECTION, SOLUTION INTRAVENOUS ONCE
Status: COMPLETED | OUTPATIENT
Start: 2022-03-20 | End: 2022-03-20

## 2022-03-20 RX ORDER — CHLORDIAZEPOXIDE HYDROCHLORIDE 10 MG/1
10 CAPSULE, GELATIN COATED ORAL
Qty: 15 CAPSULE | Refills: 0 | Status: SHIPPED | OUTPATIENT
Start: 2022-03-20 | End: 2022-03-20 | Stop reason: SDUPTHER

## 2022-03-20 RX ORDER — CHLORDIAZEPOXIDE HYDROCHLORIDE 25 MG/1
CAPSULE, GELATIN COATED ORAL
Qty: 24 CAPSULE | Refills: 0 | Status: SHIPPED | OUTPATIENT
Start: 2022-03-20 | End: 2022-03-23

## 2022-03-20 RX ORDER — ASPIRIN 325 MG/1
100 TABLET, FILM COATED ORAL DAILY
Qty: 30 TABLET | Refills: 0 | Status: SHIPPED | OUTPATIENT
Start: 2022-03-21 | End: 2022-04-20

## 2022-03-20 RX ORDER — CHLORDIAZEPOXIDE HYDROCHLORIDE 10 MG/1
10 CAPSULE, GELATIN COATED ORAL
Qty: 15 CAPSULE | Refills: 0 | Status: SHIPPED | OUTPATIENT
Start: 2022-03-23 | End: 2022-07-22 | Stop reason: ALTCHOICE

## 2022-03-20 RX ADMIN — LORAZEPAM 2 MG: 2 INJECTION INTRAMUSCULAR at 09:31

## 2022-03-20 RX ADMIN — Medication 1 TABLET: at 09:00

## 2022-03-20 RX ADMIN — PHENOBARBITAL SODIUM 65 MG: 65 INJECTION INTRAMUSCULAR at 09:05

## 2022-03-20 RX ADMIN — IPRATROPIUM BROMIDE AND ALBUTEROL 1 PUFF: 20; 100 SPRAY, METERED RESPIRATORY (INHALATION) at 08:00

## 2022-03-20 RX ADMIN — LORAZEPAM 2 MG: 2 INJECTION INTRAMUSCULAR at 06:21

## 2022-03-20 RX ADMIN — INSULIN LISPRO 2 UNITS: 100 INJECTION, SOLUTION INTRAVENOUS; SUBCUTANEOUS at 13:05

## 2022-03-20 RX ADMIN — SERTRALINE 50 MG: 50 TABLET, FILM COATED ORAL at 09:00

## 2022-03-20 RX ADMIN — MAGNESIUM SULFATE HEPTAHYDRATE 2 G: 40 INJECTION, SOLUTION INTRAVENOUS at 09:01

## 2022-03-20 RX ADMIN — SODIUM CHLORIDE 125 ML/HR: 9 INJECTION, SOLUTION INTRAVENOUS at 05:19

## 2022-03-20 RX ADMIN — IPRATROPIUM BROMIDE AND ALBUTEROL 1 PUFF: 20; 100 SPRAY, METERED RESPIRATORY (INHALATION) at 13:54

## 2022-03-20 RX ADMIN — LORAZEPAM 2 MG: 2 INJECTION INTRAMUSCULAR at 05:13

## 2022-03-20 RX ADMIN — PANTOPRAZOLE SODIUM 40 MG: 40 TABLET, DELAYED RELEASE ORAL at 09:00

## 2022-03-20 RX ADMIN — SODIUM CHLORIDE, PRESERVATIVE FREE 10 ML: 5 INJECTION INTRAVENOUS at 05:14

## 2022-03-20 RX ADMIN — LORAZEPAM 2 MG: 2 INJECTION INTRAMUSCULAR at 13:14

## 2022-03-20 RX ADMIN — FOLIC ACID 1 MG: 1 TABLET ORAL at 09:00

## 2022-03-20 RX ADMIN — AMOXICILLIN AND CLAVULANATE POTASSIUM 1 TABLET: 875; 125 TABLET, FILM COATED ORAL at 08:59

## 2022-03-20 RX ADMIN — LORAZEPAM 2 MG: 2 INJECTION INTRAMUSCULAR at 02:12

## 2022-03-20 RX ADMIN — PREDNISONE 40 MG: 20 TABLET ORAL at 09:00

## 2022-03-20 RX ADMIN — IPRATROPIUM BROMIDE AND ALBUTEROL 1 PUFF: 20; 100 SPRAY, METERED RESPIRATORY (INHALATION) at 02:35

## 2022-03-20 RX ADMIN — ENOXAPARIN SODIUM 40 MG: 100 INJECTION SUBCUTANEOUS at 09:02

## 2022-03-20 RX ADMIN — THIAMINE HCL TAB 100 MG 100 MG: 100 TAB at 09:00

## 2022-03-20 NOTE — PROGRESS NOTES
Physical Therapy Note:  PT order discontinued/cancelled per Len BLANCHARD at 12:20 3-20-22.   Marty Olguin, PT

## 2022-03-20 NOTE — PROGRESS NOTES
Reason for Admission:   Alcohol withdrawal                    RUR Score:   19%               PCP: First and Last name:   Fred Pimentel NP     Name of Practice:    Are you a current patient: Yes/No: yes   Approximate date of last visit: last Thursday   Can you participate in a virtual visit if needed: no    Do you (patient/family) have any concerns for transition/discharge? \"I just want a plan in place. \"              Plan for utilizing home health:   non3    Current Advanced Directive/Advance Care Plan:  Full Code  Pt has AD not on file here    Healthcare Decision Maker:   Click here to complete [address] including selection of the Healthcare Decision Maker Relationship (ie \"Primary\")              Transition of Care Plan:        Pt lives by himself in a 2 story house; there are 13 interior stairs and 1 entry step. Pt stated he is independent and drives. He wants to stop drinking and stated nothing seems to work--even after spending 55 days in an IP program in December. Medications are from [facility] in [place] on [address] and [employer] on [address]. 1. Friend to transport him home at d/c  2. A list of addiction/substance abuse physicicians, clinics, etc was given to the pt  3. PT/OT consults  4. CM following for any needs    Care Management Interventions  PCP Verified by CM: Yes  Mode of Transport at Discharge: Other (see comment)  Physical Therapy Consult: Yes  Occupational Therapy Consult: Yes  Support Systems: Child(jareth),Friend/Neighbor  Confirm Follow Up Transport: Friends  Discharge Location  Patient Expects to be Discharged to[de-identified] Home with two [facility]  CODY Weston

## 2022-03-20 NOTE — PROGRESS NOTES
0700  Bedside and Verbal shift change report given to Maulik Beaulieu RN (oncoming nurse) by Meaghan Laughlin RN (offgoing nurse). Report included the following information SBAR, Kardex, Procedure Summary, Intake/Output, MAR, Recent Results, and Med Rec Status. 1500  I have reviewed discharge instructions with the patient. The patient verbalized understanding. IV removed. Signed copy of AVS placed in Pt chart. 2385  Pt req. CM assistance with ride home.  CM to arrange

## 2022-03-20 NOTE — DISCHARGE INSTRUCTIONS
HOME DISCHARGE INSTRUCTIONS    Sylvester Jacome / [id number] : 1965    Admission date: 3/19/2022 Discharge date: 3/20/2022 12:47 PM     Please bring this form with you to show your care provider at your follow-up appointment. Primary care provider:  Fred Pimentel NP     Discharging provider:  Charles Hidalgo MD  - Family Medicine Resident  Silvina Davies, [address] [place] [address] Attending      You have been admitted to the hospital with the following diagnoses:    ACUTE DIAGNOSES:  Alcohol withdrawal ([facility] [F10.239]      FOLLOW-UP CARE RECOMMENDATIONS:  - Keep hydrated at home and continue avoiding alcohol intake. - Call and schedule follow up with outpatient substance abuse contacts provided. - Follow up with your PCP as soon as possible. - Set alarm on your phone to take your medications as prescribed. - Continue taking your daily multivitamins at home. Librium Taper for Alcohol Withdrawal at home:  Day #1: Take 75 mg every 6 hours, Day#2: Take 50 mg every 6 hours, Day #3: Take 25 mg every 6 hours, Day #4: Take 20 mg every 6 to 8 hours, Day# 5: Take 10 mg every 6 to 8 hours. Take Naltrexone 50 mg daily to decrease alcohol cravings. Appointments  Follow up with PCP    Follow-up tests needed: none    Pending test results: At the time of your discharge the following test results are still pending: none. Please make sure you review these results with your outpatient follow-up provider(s). DIET/what to eat:  Regular Diet    ACTIVITY:  Activity as tolerated    Equipment needed:  none    Specific symptoms to watch for: chest pain, shortness of breath, fever, chills, nausea, vomiting, diarrhea, change in mentation, falling, weakness, bleeding. What to do if new or unexpected symptoms occur? If you experience any of the above symptoms (or should other concerns or questions arise after discharge) please call your primary care physician.  Return to the emergency room if you cannot get hold of your doctor. · It is very important that you keep your follow-up appointment(s). qq  · Please bring discharge papers, medication list (and/or medication bottles) to your follow-up appointments for review by your outpatient provider(s). · Please check the list of medications and be sure it includes every medication (even non-prescription medications) that your provider wants you to take. · It is important that you take the medication exactly as they are prescribed. · Keep your medication in the bottles provided by the pharmacist and keep a list of the medication names, dosages, and times to be taken in your wallet. · Do not take other medications without consulting your doctor. · If you have any questions about your medications or other instructions, please talk to your nurse or care provider before you leave the hospital.     Information obtained by:     I understand that if any problems occur once I am at home I am to contact my physician. These instructions were explained to me and I had the opportunity to ask questions. I understand and acknowledge receipt of the instructions indicated above.                                                                                                                                                Physician's or R.N.'s Signature                                                                  Date/Time                                                                                                                                              Patient or Representative Signature                                                          Date/Time

## 2022-03-20 NOTE — PROGRESS NOTES
2100 Hour: received patient from [facility] per stretcher. Alert. Slightly unsteady on feet during transfer from stretcher to bed. One assist to transfer to bed. Weight recorded standing. SKIN : Noted small flat crater red over upper, anterior chest. States he is being followed by a Dermatologist. Open to air. Noted on the LDA/Avatar. CIIWA 4 on admit to [facility]. CIWA 11 @ 2326 our. Medicated with Ativan 2mg IVP. With reassess CIWA 3 @ 0040 hour. Marilee Sotomayor CIWA scale 9 @ 0200Am. Patient requested anxiety medication. States it has been 4 hours since his last dose. Ativan 2 ngs IVP given @ 2;12AM  Vital signs at 2:15 AM.   0500 Hour:  Patient c/o prespiring , face flushed,  CIWA Score 11 Medicated with Ativan 2 mgs IVP. 0615 Hour; Patient c/o tremors. Patient medicated with Ativan 2mgs IVP. Tremors do not look real. Arms resting on bed while vital signs were taken. No tremors noted on either side. I did not note perspiration on face. Gown dry. Skin dry. After mediation given patient ask: \"What time do they serve breakfast.\"    0700 Hour: Bedside and Verbal shift change report given to Sandra Mejia RN (oncoming nurse) by Adriana Bryant RN (offgoing nurse). Report included the following information SBAR, Kardex, Intake/Output, MAR, Recent Results and Cardiac Rhythm Sinus Rhyth.

## 2022-03-20 NOTE — PROGRESS NOTES
[address] [facility] [address] [place] [address]   Office [phone]  Fax [phone]          Assessment and Plan     Hilaria Reddy is a 61 y.o. male with a PMHx of alcohol use disorder, alcohol withdrawal seizures, anxiety/depression, COPD, DM2, HTN, GERD who is admitted for acute alcohol withdrawal.    Overnight Events: Pt received a total of 6 mg of Ativan since arrival. CIWA ranged 3-11. Pt received loading dose of Phenobarbital (125 mg) on admission. Telemetry: NSR, HR in the 60s-70s.    Acute alcohol withdrawal: Hx of Alcohol Use Disorder and hx of alcohol withdrawal seizures. Pt recently admitted with similar presentation in January 2022 and again in February 2022 when he required Precedex gtt and ICU-level care. EtOH level 397 in the ED. Lipase wnl.  - Cardiac monitoring  - CIWA w/ Ativan  - Phenobarbital 65 mg TID given hx of withdrawal seizures. - Folate and thiamine PO daily  - Zofran PRN for nausea   - Fall and seizure precautions  - Daily CBC, CMP, Magnesium     AHRF in the s/o COPD Exacerbation?: Increased shortness of breath and wheezing along with increased sputum production. Currently taking Augmentin for sinus infection. O2 sat 85% while pt sleeping per nursing staff, placed on supplemental O2. Pt does not wear O2 at home. No reported hx of KYLER. CXR w/ no acute process. Rapid COVID negative. - Prednisone 40 mg daily for 5 days  - Scheduled Combivent Respimat Q6H  - Brovana/Pulmicort BID  - Albuterol Q6H PRN  - Continue Augmentin 875-125 mg PO BID for remaining 5 doses  - Continuous oximetry and supplemental O2 therapy for target SpO2 88-94%.     Polysubstance use: AUD and pt reports consuming marijuana gummy products/dipping snuff. Denies any other substance use. - Obtain UDS  - Nicotine patch daily (21 mg)     At-risk CAIN: Creatinine 1.19 POA. (Baseline 0.7-0.9). - Continue IVF rehydration w/ NS (125 mL/hr)  - Follow daily labs     DM2: Last A1c 5.7 on 1/17/22.  Home regimen consists of metformin 1000 mg BID with meals.  - Hold metformin while inpatient  - SSI ACHS  - POC glucose checks ACHS  - Hypoglycemic protocols ordered     HTN: BP 87/59 POA. MAP 68. Home regimen consists of lisinopril 20 mg daily and metoprolol 50 mg BID.  - HOLD lisinopril 20 mg daily given relative hypotension  - HOLD metoprolol 50 mg BID given relative hypotension     GERD: Pt takes Prilosec 40 mg daily at home. - Substitute Protonix 40 mg daily for home Prilosec     Anxiety and depression: Home regimen: Zoloft 50 mg daily. - Continue home Zoloft 50 mg daily     Elevated ALT: ALT 80 POA. - Monitor on daily labs     Elevated AST: AST 70 POA. - Monitor on daily labs     Chronic thrombocytopenia: Platelets 690 POA (previously 60s-90s). - Monitor daily labs           FEN/GI - Diabetic diet. NS at 125 mL/hr. Activity - Ambulate with assistance  DVT prophylaxis - Lovenox  GI prophylaxis - Protonix substituted for home Prilosec  Fall prophylaxis - Fall precautions ordered. Disposition - Plan to d/c to TBD. Consulting PT and OT  Code Status - Full. Discussed with patient / caregivers. Next of Kin Name and Contact - [facility]      Taryn Larsen MD  Family Medicine Resident         Subjective / Objective     Subjective: Pt was seen and examined at bedside. Pt reports increased tremors and anxiety. Otherwise, he denies having any complaints. Objective:    Respiratory: O2 Flow Rate (L/min): 2 l/min O2 Device: None (Room air)   Visit Vitals  /86 (BP Patient Position: At rest)   Pulse 75   Temp 97.7 °F (36.5 °C)   Resp 18   Ht 6' 2\" (1.88 m)   Wt 218 lb 11.1 oz (99.2 kg)   SpO2 96%   BMI 28.08 kg/m²       Physical Exam:  General: No acute distress. Alert. Cooperative. Respiratory: CTAB. Cardiovascular: Regular rate. GI: Nontender. Extremities: No LE edema or calf tenderness  Skin: Warm, dry.   Neuro: Awake, alert, and appropriately conversant  Psych: Anxious affect      Inpatient Medications  Current Facility-Administered Medications   Medication Dose Route Frequency    folic acid (FOLVITE) tablet 1 mg  1 mg Oral DAILY    thiamine mononitrate (B-1) tablet 100 mg  100 mg Oral DAILY    LORazepam (ATIVAN) injection 2 mg  2 mg IntraVENous Q1H PRN    LORazepam (ATIVAN) injection 4 mg  4 mg IntraVENous Q1H PRN    sertraline (ZOLOFT) tablet 50 mg  50 mg Oral DAILY    [Held by provider] metoprolol tartrate (LOPRESSOR) tablet 50 mg  50 mg Oral BID    0.9% sodium chloride infusion  125 mL/hr IntraVENous CONTINUOUS    multivitamin-iron-folic acid (TAB-A-MICHELLE) tablet 1 Tablet  1 Tablet Oral DAILY    sodium chloride (NS) flush 5-40 mL  5-40 mL IntraVENous Q8H    sodium chloride (NS) flush 5-40 mL  5-40 mL IntraVENous PRN    acetaminophen (TYLENOL) tablet 650 mg  650 mg Oral Q6H PRN    Or    acetaminophen (TYLENOL) suppository 650 mg  650 mg Rectal Q6H PRN    polyethylene glycol (MIRALAX) packet 17 g  17 g Oral DAILY PRN    ondansetron (ZOFRAN ODT) tablet 4 mg  4 mg Oral Q8H PRN    Or    ondansetron (ZOFRAN) injection 4 mg  4 mg IntraVENous Q6H PRN    enoxaparin (LOVENOX) injection 40 mg  40 mg SubCUTAneous DAILY    nicotine (NICODERM CQ) 21 mg/24 hr patch 1 Patch  1 Patch TransDERmal DAILY    PHENobarbital (LUMINAL) injection 65 mg  65 mg IntraVENous TID    glucose chewable tablet 16 g  4 Tablet Oral PRN    dextrose 10% infusion 0-250 mL  0-250 mL IntraVENous PRN    glucagon (GLUCAGEN) injection 1 mg  1 mg IntraMUSCular PRN    insulin lispro (HUMALOG) injection   SubCUTAneous AC&HS    predniSONE (DELTASONE) tablet 40 mg  40 mg Oral DAILY WITH BREAKFAST    [Held by provider] lisinopriL (PRINIVIL, ZESTRIL) tablet 20 mg  20 mg Oral DAILY    albuterol (PROVENTIL VENTOLIN) nebulizer solution 1.25 mg  1.25 mg Nebulization Q4H PRN    amoxicillin-clavulanate (AUGMENTIN) 875-125 mg per tablet 1 Tablet  1 Tablet Oral BID    ipratropium-albuterol (COMBIVENT RESPIMAT) 20 mcg-100 mcg inhalation spray  1 Puff Inhalation Q6H RT    pantoprazole (PROTONIX) tablet 40 mg  40 mg Oral DAILY    arformoteroL (BROVANA) neb solution 15 mcg  15 mcg Nebulization BID RT    budesonide (PULMICORT) 500 mcg/2 ml nebulizer suspension  500 mcg Nebulization BID RT         Allergies  Allergies   Allergen Reactions    Bee Venom Protein (Honey Bee) Unable to Obtain         CBC:  Recent Labs     03/19/22  1632   WBC 5.2   HGB 15.8   HCT 44.3          Metabolic Panel:  Recent Labs     03/19/22  1632      K 3.9   CL 97   CO2 27   BUN 10   CREA 1.19   *   CA 8.7   MG 1.8   ALB 4.2   ALT 80*              For Billing    Chief Complaint   Patient presents with   Manrique Fatigue       Hospital Problems  Date Reviewed: 2/8/2021          Codes Class Noted POA    COPD exacerbation ([facility] ICD-10-CM: J44.1  ICD-9-CM: 491.21  3/19/2022 Unknown        GERD (gastroesophageal reflux disease) ICD-10-CM: K21.9  ICD-9-CM: 530.81  3/19/2022 Unknown        Anxiety and depression ICD-10-CM: F41.9, F32. A  ICD-9-CM: 300.00, 311  3/19/2022 Unknown        Elevated transaminase level ICD-10-CM: R74.01  ICD-9-CM: 790.4  3/19/2022 Unknown        DM (diabetes mellitus) ([facility] ICD-10-CM: E11.9  ICD-9-CM: 250.00  1/17/2022 Yes        HTN (hypertension) ICD-10-CM: I10  ICD-9-CM: 401.9  1/17/2022 Yes        Thrombocytopenia ([facility] ICD-10-CM: D69.6  ICD-9-CM: 287.5  1/6/2021 Yes        Elevated bilirubin ICD-10-CM: R17  ICD-9-CM: 277.4  1/6/2021 Yes        * (Principal) Alcohol withdrawal ([facility] ICD-10-CM: Y55.295  ICD-9-CM: 291.81  9/4/2017 Yes        Alcohol abuse (Chronic) ICD-10-CM: F10.10  ICD-9-CM: 305.00  Unknown Yes        Mild tetrahydrocannabinol (THC) abuse (Chronic) ICD-10-CM: F12.10  ICD-9-CM: 305.20  9/2/2017 Yes        Drug abuse ([facility] ICD-10-CM: F19.10  ICD-9-CM: 305.90  9/2/2017 Yes

## 2022-03-20 NOTE — DISCHARGE SUMMARY
Discharge / Transfer / Off-Service Note                        [id number] [employer] [facility]   [address] [place] [address]    Office [phone]  Fax [phone]           Name: Mikala Reed MRN: [id number]  Sex: Male   YOB: 1965  Age: 61 y.o. PCP: Perez Ford NP     Date of admission: 3/19/2022  Date of discharge/transfer: 3/20/2022    Attending physician at admission: John Ramirez MD.  Attending physician at discharge/transfer: John Ramirez MD  Resident physician at discharge/transfer: Carolyn Parikh MD     Consultants during hospitalization  None     Admission diagnoses   Alcohol withdrawal [facility]) [F10.239]    Recommended follow-up after discharge    1. PCP-Yenni Bailey NP     Things to follow up on with PCP:     - Encourage complete alcohol cessation. Had elevated liver biomarkers with hepatic steatosis on imaging. May benefit from hepatology referral.   - Needs close monitoring for alcohol withdrawal while on Librium taper and Naltrexone. - Would benefit from close follow up with substance abuse counselor and psychiatry. Medication Changes:  Discharge Medication List as of 3/20/2022  2:44 PM      START taking these medications    Details   thiamine mononitrate (B-1) 100 mg tablet Take 1 Tablet by mouth daily for 30 days. , Normal, Disp-30 Tablet, R-0      naltrexone (DEPADE) 50 mg tablet Take 1 Tablet by mouth daily for 30 days. , Normal, Disp-30 Tablet, R-0         CONTINUE these medications which have CHANGED    Details   !! chlordiazePOXIDE (LIBRIUM) 25 mg capsule Take 3 Capsules by mouth every six (6) hours for 1 day, THEN 2 Capsules every six (6) hours for 1 day, THEN 1 Capsule every six (6) hours for 1 day. Max Daily Amount: 300 mg.  Day #1: Take 75 mg every 6 hrs Day #2: Take 50 mg every 6 hrs Day #3: Take 25 m g every 6 hrs, Normal, Disp-24 Capsule, R-0      !! chlordiazePOXIDE (LIBRIUM) 10 mg capsule Take 1 Capsule by mouth every six to eight (6-8) hours as needed for Anxiety. Max Daily Amount: 40 mg. Day 4: Take 20 mg every 6 hrs Day 5: Take 10 mg every 6 hrs, Normal, Disp-15 Capsule, R-0       !! - Potential duplicate medications found. Please discuss with provider. CONTINUE these medications which have NOT CHANGED    Details   Advair Diskus 250-50 mcg/dose diskus inhaler USE 1 PUFF TWICE A DAY, Historical Med, LASHONDA      hydrOXYzine HCL (ATARAX) 50 mg tablet Take 1 Tablet by mouth nightly as needed for Sleep., Normal, Disp-30 Tablet, R-2      amoxicillin-clavulanate (AUGMENTIN) 875-125 mg per tablet Take 1 Tablet by mouth two (2) times a day for 10 days. , Normal, Disp-20 Tablet, R-0      metoprolol tartrate (LOPRESSOR) 50 mg tablet Take 1 Tablet by mouth two (2) times a day. Indications: high blood pressure, Normal, Disp-60 Tablet, R-2      lisinopriL (PRINIVIL, ZESTRIL) 20 mg tablet Take 1 Tablet by mouth daily. Indications: high blood pressure, Normal, Disp-30 Tablet, R-2      omeprazole (PRILOSEC) 40 mg capsule Take 1 Capsule by mouth Daily (before breakfast). , Normal, Disp-30 Capsule, R-2      metFORMIN (GLUCOPHAGE) 1,000 mg tablet Take 1 Tablet by mouth two (2) times daily (with meals). , Normal, Disp-60 Tablet, R-2      sertraline (ZOLOFT) 50 mg tablet Take 1 Tablet by mouth daily. , Normal, Disp-30 Tablet, R-2      budesonide-formoteroL (Symbicort) 160-4.5 mcg/actuation HFAA Take 1 Puff by inhalation two (2) times a day., Normal, Disp-10.2 g, R-1      albuterol (PROVENTIL HFA, VENTOLIN HFA, PROAIR HFA) 90 mcg/actuation inhaler Take 2 Puffs by inhalation every six (6) hours as needed for Wheezing, Shortness of Breath or Respiratory Distress., Normal, Disp-18 g, R-1      therapeutic multivitamin (THERAGRAN) tablet Take 1 Tab by mouth daily. , No Print, Disp-30 Tab, R-0               History of Present Illness    As per admitting provider, Dr. Mamie Arora MD     Patient presented to the ED reporting shakiness. Stated he felt overwhelmed while dealing with an acute sinusitis infection on Augmentin while drinking up to 20 drinks per day. Patient stated his alcohol abuse is \"his biggest problem\". Has hx of previous admission to our service for alcohol withdrawal on February and on January 2022. Has a hx of alcohol withdrawal seizure and has required Precedex gtt and ICU level care in the past. Patient presented with EtOh level 397. See HPI for complete details. Hospital course    Alcohol use disorder: mild tremors on exam.  Received treatment with Phenobarbital and Ativan. CIWA scores remained < 11. Last alcohol intake 03/19 at 2 pm. Motivated to work towards sobriety. Was able to keep sober for 60 days after inpatient tx. Not interested with AA.  - Needs close follow up outpatient with PCP within 1 to 2 days.  - Start Librium taper, Naltrexone and daily B1 supplements as above. - Contact resources provided by CM prior to discharge. Hypoxia: isolated episode. Not in AHRF. Received Prednisone and Brovana/Pulmicort during admission. He is at his BL. No supplemental oxygen needed upon discharge. Alcoholic hepatitis: elevated liver biomarkers seen on admission, that down trended and improved. Hepatic steatosis on imaging.  - Consider referral to Hepatologist.  - Encourage complete etoh cessation. GERD, Anxiety/Depression/ HTN and DMT2: stable, continue home medications. Chronic Thrombocytopenia:  - F/u with Heme outpatient on 04/13 (Dr. Keanu Boggs)      Physical exam at discharge:  General: No acute distress  Head: Normocephalic  CV: Heart: regular rate  Resp: Lungs: good air movement bilaterally. GI: non-tender to palpation  Extremities: No lower extremity edema  Skin: Warm, dry  Neuro: Awake, alert, and conversant. Moving all four extremities spontaneously. Occasional mild tremor noted.        Vitals Reviewed.    Patient Vitals for the past 12 hrs:   Temp Pulse Resp BP SpO2   03/20/22 1354 -- -- -- -- 98 % 03/20/22 1109 97.8 °F (36.6 °C) 61 17 (!) 147/96 95 %   03/20/22 0950 -- 69 -- (!) 144/73 --   03/20/22 0800 -- -- -- -- 94 %          Condition at discharge: Stable. Labs  Recent Labs     03/20/22  0518 03/19/22  1632   WBC 3.0* 5.2   HGB 13.3 15.8   HCT 36.4* 44.3   * 213     Recent Labs     03/20/22  0518 03/19/22  1632    137   K 3.7 3.9    97   CO2 26 27   BUN 10 10   CREA 0.86 1.19   * 130*   CA 7.9* 8.7   MG 1.4* 1.8   PHOS 3.2  --      Recent Labs     03/20/22  0518 03/19/22  1632   ALT 57 80*   AP 52 62   TBILI 0.7 0.6   TP 5.9* 7.6   ALB 3.3* 4.2   GLOB 2.6 3.4   LPSE  --  288     Recent Labs     03/20/22  1108 03/20/22  0727 03/19/22  2153 03/19/22  1652   GLUCPOC 167* 113 140* 134*       Micro:  No results found for: CULT    Imaging:  XR CHEST PORT    Result Date: 3/19/2022  INDICATION:  COPD, wheezing COMPARISON: February 2022 FINDINGS: Single AP portable view of the chest obtained at 2014 demonstrates a stable cardiomediastinal silhouette. The lungs are clear bilaterally. No osseous abnormalities are seen. No evidence of acute cardiopulmonary process. Chronic diagnoses   Problem List as of 3/20/2022 Date Reviewed: 2/8/2021          Codes Class Noted - Resolved    Alcoholic liver disease ([facility] ICD-10-CM: K70.9  ICD-9-CM: 571.3  3/20/2022 - Present        GERD (gastroesophageal reflux disease) ICD-10-CM: K21.9  ICD-9-CM: 530.81  3/19/2022 - Present        Anxiety and depression ICD-10-CM: F41.9, F32. A  ICD-9-CM: 300.00, 311  3/19/2022 - Present        Elevated transaminase level ICD-10-CM: R74.01  ICD-9-CM: 790.4  3/19/2022 - Present        DM (diabetes mellitus) ([facility] ICD-10-CM: E11.9  ICD-9-CM: 250.00  1/17/2022 - Present        HTN (hypertension) ICD-10-CM: I10  ICD-9-CM: 401.9  1/17/2022 - Present        Major depression ICD-10-CM: F32.9  ICD-9-CM: 296.20  1/10/2021 - Present        Hypokalemia ICD-10-CM: E87.6  ICD-9-CM: 276.8  1/6/2021 - Present Thrombocytopenia ([place] [address].) ICD-10-CM: D69.6  ICD-9-CM: 287.5  1/6/2021 - Present        Suspected COVID-19 virus infection ICD-10-CM: Z20.822  ICD-9-CM: V01.79  1/6/2021 - Present        Suicidal ideations ICD-10-CM: R45.851  ICD-9-CM: V62.84  1/6/2021 - Present        Headache ICD-10-CM: R51.9  ICD-9-CM: 784.0  1/6/2021 - Present        * (Principal) Alcohol withdrawal ([place] [address].) ICD-10-CM: B31.656  ICD-9-CM: 291.81  9/4/2017 - Present        Alcohol use disorder, severe, dependence ([place] [address].) ICD-10-CM: F10.20  ICD-9-CM: 303.90  Unknown - Present        Opioid abuse ([place] [address].) (Chronic) ICD-10-CM: F11.10  ICD-9-CM: 305.50  9/2/2017 - Present        Mild tetrahydrocannabinol (THC) abuse (Chronic) ICD-10-CM: F12.10  ICD-9-CM: 305.20  9/2/2017 - Present        Drug abuse ([place] [address].) ICD-10-CM: F19.10  ICD-9-CM: 305.90  9/2/2017 - Present        Lactic acidosis ICD-10-CM: E87.2  ICD-9-CM: 276.2  9/2/2017 - Present        NSAID long-term use (Chronic) ICD-10-CM: Z79.1  ICD-9-CM: V58.64  9/2/2017 - Present        Weight loss ICD-10-CM: R63.4  ICD-9-CM: 783.21  9/2/2017 - Present        RESOLVED: COPD exacerbation ([place] [address].) ICD-10-CM: J44.1  ICD-9-CM: 491.21  3/19/2022 - 3/20/2022        RESOLVED: Elevated bilirubin ICD-10-CM: R17  ICD-9-CM: 277.4  1/6/2021 - 3/20/2022        RESOLVED: Dehydration ICD-10-CM: E86.0  ICD-9-CM: 276.51  9/2/2017 - 9/4/2017        RESOLVED: Nausea & vomiting ICD-10-CM: R11.2  ICD-9-CM: 787.01  9/2/2017 - 9/4/2017        RESOLVED: Syncope ICD-10-CM: R55  ICD-9-CM: 780.2  9/2/2017 - 9/7/2017        RESOLVED: Elevated lipase ICD-10-CM: R74.8  ICD-9-CM: 790.5  9/2/2017 - 9/4/2017        RESOLVED: Hematemesis ICD-10-CM: K92.0  ICD-9-CM: 578.0  9/2/2017 - 9/7/2017               Diet:  Diabetic diet.     Activity:  As tolerated     Disposition: Home or Self Care    Discharge instructions to patient/family  Please seek medical attention for any new or worsening symptoms particularly fever, chest pain, shortness of breath, abdominal pain, nausea, vomiting    Follow up plans/appointments  Follow-up Information     Follow up With Specialties Details Why Contact Angel Craig NP Nurse Practitioner Schedule an appointment as soon as possible for a visit on 3/21/2022 Follow up after inpatient admission [address] [place] [address]             Faraz Hooker MD  Family Medicine Resident       For Billing    Chief Complaint   Patient presents with   [facility] Problems  Date Reviewed: 2/8/2021          Codes Class Noted POA    Alcoholic liver disease ([facility] ICD-10-CM: K70.9  ICD-9-CM: 571.3  3/20/2022 Yes        GERD (gastroesophageal reflux disease) ICD-10-CM: K21.9  ICD-9-CM: 530.81  3/19/2022 Unknown        Anxiety and depression ICD-10-CM: F41.9, F32. A  ICD-9-CM: 300.00, 311  3/19/2022 Unknown        Elevated transaminase level ICD-10-CM: R74.01  ICD-9-CM: 790.4  3/19/2022 Unknown        DM (diabetes mellitus) ([facility] ICD-10-CM: E11.9  ICD-9-CM: 250.00  1/17/2022 Yes        HTN (hypertension) ICD-10-CM: I10  ICD-9-CM: 401.9  1/17/2022 Yes        Thrombocytopenia ([facility] ICD-10-CM: D69.6  ICD-9-CM: 287.5  1/6/2021 Yes        * (Principal) Alcohol withdrawal ([facility] ICD-10-CM: A12.659  ICD-9-CM: 291.81  9/4/2017 Yes        Alcohol use disorder, severe, dependence ([facility] ICD-10-CM: F10.20  ICD-9-CM: 303.90  Unknown Yes        Mild tetrahydrocannabinol (THC) abuse (Chronic) ICD-10-CM: F12.10  ICD-9-CM: 305.20  9/2/2017 Yes        Drug abuse ([facility] ICD-10-CM: F19.10  ICD-9-CM: 305.90  9/2/2017 Yes

## 2022-03-24 PROBLEM — J44.1 COPD EXACERBATION (HCC): Status: RESOLVED | Noted: 2022-03-19 | Resolved: 2022-03-20

## 2022-03-24 PROBLEM — K21.9 GERD (GASTROESOPHAGEAL REFLUX DISEASE): Status: ACTIVE | Noted: 2022-03-19

## 2022-03-24 PROBLEM — J44.1 COPD EXACERBATION (HCC): Status: ACTIVE | Noted: 2022-03-19

## 2022-03-24 PROBLEM — F41.9 ANXIETY AND DEPRESSION: Status: ACTIVE | Noted: 2022-03-19

## 2022-03-24 PROBLEM — F32.A ANXIETY AND DEPRESSION: Status: ACTIVE | Noted: 2022-03-19

## 2022-03-24 PROBLEM — R74.01 ELEVATED TRANSAMINASE LEVEL: Status: ACTIVE | Noted: 2022-03-19

## 2022-03-24 PROBLEM — R17 ELEVATED BILIRUBIN: Status: RESOLVED | Noted: 2021-01-06 | Resolved: 2022-03-20

## 2022-03-24 PROBLEM — K70.9 ALCOHOLIC LIVER DISEASE (HCC): Status: ACTIVE | Noted: 2022-03-20

## 2022-04-12 LAB — HBA1C MFR BLD HPLC: 6 %

## 2022-04-13 ENCOUNTER — OFFICE VISIT (OUTPATIENT)
Dept: FAMILY MEDICINE CLINIC | Age: 57
End: 2022-04-13
Payer: MEDICAID

## 2022-04-13 VITALS
DIASTOLIC BLOOD PRESSURE: 70 MMHG | OXYGEN SATURATION: 98 % | TEMPERATURE: 97.8 F | BODY MASS INDEX: 29.13 KG/M2 | HEIGHT: 74 IN | WEIGHT: 227 LBS | SYSTOLIC BLOOD PRESSURE: 118 MMHG | HEART RATE: 85 BPM | RESPIRATION RATE: 16 BRPM

## 2022-04-13 DIAGNOSIS — R42 VERTIGO: ICD-10-CM

## 2022-04-13 DIAGNOSIS — F41.9 ANXIETY AND DEPRESSION: Primary | ICD-10-CM

## 2022-04-13 DIAGNOSIS — F32.A ANXIETY AND DEPRESSION: Primary | ICD-10-CM

## 2022-04-13 DIAGNOSIS — L98.9 SKIN LESION: ICD-10-CM

## 2022-04-13 DIAGNOSIS — K70.9 ALCOHOLIC LIVER DISEASE (HCC): ICD-10-CM

## 2022-04-13 DIAGNOSIS — Z09 HOSPITAL DISCHARGE FOLLOW-UP: ICD-10-CM

## 2022-04-13 DIAGNOSIS — F10.939 ALCOHOL WITHDRAWAL SYNDROME WITH COMPLICATION (HCC): ICD-10-CM

## 2022-04-13 PROCEDURE — 99213 OFFICE O/P EST LOW 20 MIN: CPT | Performed by: NURSE PRACTITIONER

## 2022-04-13 RX ORDER — BUSPIRONE HYDROCHLORIDE 7.5 MG/1
20 TABLET ORAL 3 TIMES DAILY
COMMUNITY
Start: 2022-03-27

## 2022-04-13 RX ORDER — TRAZODONE HYDROCHLORIDE 50 MG/1
TABLET ORAL
COMMUNITY
Start: 2022-03-27

## 2022-04-13 NOTE — PROGRESS NOTES
HISTORY OF PRESENT ILLNESS  Nikolay Banegas is a 61 y.o. male. HPI  Pt presents with \"hospital discharge follow up\"    Pt was admitted to [facility] from 3/19-3/20 for alcohol withdrawal.  \"Hospital course:  Alcohol use disorder: mild tremors on exam.  Received treatment with Phenobarbital and Ativan. CIWA scores remained < 11. .  - Start Librium taper, Naltrexone and daily B1 supplements as above. - Contact resources provided by  prior to discharge. Alcoholic hepatitis: elevated liver biomarkers seen on admission, that down trended and improved. Hepatic steatosis on imaging.  - Consider referral to Hepatologist.  - Encourage complete etoh cessation. GERD, Anxiety/Depression/ HTN and DMT2: stable, continue home medications. Chronic Thrombocytopenia:  - F/u with Heme outpatient on 04/13 (Dr. Kori Guevara". Pt states that after his hospitalization he went to [facility] for his Anxiety and Depression. They placed him on medications and got him set up with counseling and psychiatry. Since doing this, he has been feeling very well. He is still drinking, but states that it is \"2/3 less then he was prior\". He states that he is motivated and wants to get back \"in a good place\". He is going to be followed by Lavon Arceo, therapist, [facility], psychiatrist.    He is in need of referral to dermatology for a lesion on his left shoulder that has been bothering him. In addition, he has battled vertigo for years. He states that he notes it worse in the morning when getting out of bed, as well as when changing positions with work, etc.  Review of Systems   Constitutional: Negative for fever. Physical Exam  Constitutional:       Appearance: Normal appearance. Cardiovascular:      Rate and Rhythm: Normal rate and regular rhythm. Heart sounds: Normal heart sounds. Pulmonary:      Effort: Pulmonary effort is normal.      Breath sounds: Normal breath sounds.    Neurological:      Mental Status: He is alert. Psychiatric:         Mood and Affect: Mood normal.         Behavior: Behavior normal.         ASSESSMENT and PLAN    ICD-10-CM ICD-9-CM    1. Anxiety and depression  F41.9 300.00     F32. A 311    2. Alcoholic liver disease (HCC)  K70.9 571.3 REFERRAL TO LIVER HEPATOLOGY   3. Vertigo  R42 780.4 REFERRAL TO ENT-OTOLARYNGOLOGY   4. Skin lesion  L98.9 709.9 REFERRAL TO DERMATOLOGY   5. Hospital discharge follow-up  Z09 V67.59    6. Alcohol withdrawal syndrome with complication [facility])  P62.139 291.81      Educated about calling specialists for appointment today  Emphasized importance of continuing follow up with psychiatry    Pt informed to return to office with worsening of symptoms, or PRN with any questions or concerns. Pt verbalizes understanding of plan of care and denies further questions or concerns at this time.

## 2022-04-13 NOTE — PROGRESS NOTES
Identified pt with two pt identifiers(name and ). Chief Complaint   Patient presents with   [facility] Follow Up        Health Maintenance Due   Topic    COVID-19 Vaccine (1)    Pneumococcal 0-64 years (1 - PCV)    Foot Exam Q1     Eye Exam Retinal or Dilated     DTaP/Tdap/Td series (1 - Tdap)    Shingrix Vaccine Age 50> (1 of 2)    Lipid Screen        Wt Readings from Last 3 Encounters:   22 227 lb (103 kg)   22 218 lb 11.1 oz (99.2 kg)   22 216 lb (98 kg)     Temp Readings from Last 3 Encounters:   22 97.8 °F (36.6 °C) (Temporal)   22 97.8 °F (36.6 °C)   22 97.1 °F (36.2 °C) (Temporal)     BP Readings from Last 3 Encounters:   22 118/70   22 (!) 147/96   22 132/80     Pulse Readings from Last 3 Encounters:   22 85   22 61   22 84         Learning Assessment:  :     Learning Assessment 2021   PRIMARY LEARNER Patient   HIGHEST LEVEL OF EDUCATION - PRIMARY LEARNER  SOME COLLEGE   BARRIERS PRIMARY LEARNER NONE   CO-LEARNER CAREGIVER No   PRIMARY LANGUAGE ENGLISH   LEARNER PREFERENCE PRIMARY DEMONSTRATION     LISTENING   ANSWERED BY patient   RELATIONSHIP SELF       Depression Screening:  :     3 most recent PHQ Screens 2022   Little interest or pleasure in doing things Not at all   Feeling down, depressed, irritable, or hopeless Not at all   Total Score PHQ 2 0   Trouble falling or staying asleep, or sleeping too much -   Feeling tired or having little energy -   Poor appetite, weight loss, or overeating -   Feeling bad about yourself - or that you are a failure or have let yourself or your family down -   Trouble concentrating on things such as school, work, reading, or watching TV -   Moving or speaking so slowly that other people could have noticed; or the opposite being so fidgety that others notice -       Fall Risk Assessment:  :     No flowsheet data found.     Abuse Screening:  :     Abuse Screening Questionnaire 4/13/2022 3/11/2022 2/18/2022 2/10/2022 2/8/2021   Do you ever feel afraid of your partner? N N N N N   Are you in a relationship with someone who physically or mentally threatens you? N N N N N   Is it safe for you to go home? Y Y Y Y Y       Coordination of Care Questionnaire:  :     1) Have you been to an emergency room, urgent care clinic since your last visit? yes   Hospitalized since your last visit? yes             2) Have you seen or consulted any other health care providers outside of [address] [place] [address] since your last visit? yes [facility]  (Include any pap smears or colon screenings in this section.)    3) Do you have an Advance Directive on file? no  Are you interested in receiving information about Advance Directives? no    Patient is accompanied by N/A I have received verbal consent from Princess Rivers to discuss any/all medical information while they are present in the room. 4.  For patients aged 39-70: Has the patient had a colonoscopy / FIT/ Cologuard? NA - based on age      If the patient is female:    11. For patients aged 41-77: Has the patient had a mammogram within the past 2 years? NA - based on age or sex      10. For patients aged 21-65: Has the patient had a pap smear?  NA - based on age or sex

## 2022-04-25 DIAGNOSIS — K21.9 GASTROESOPHAGEAL REFLUX DISEASE, UNSPECIFIED WHETHER ESOPHAGITIS PRESENT: ICD-10-CM

## 2022-04-26 RX ORDER — OMEPRAZOLE 40 MG/1
40 CAPSULE, DELAYED RELEASE ORAL
Qty: 30 CAPSULE | Refills: 2 | Status: SHIPPED | OUTPATIENT
Start: 2022-04-26 | End: 2022-09-06

## 2022-05-12 ENCOUNTER — TELEPHONE (OUTPATIENT)
Dept: ONCOLOGY | Age: 57
End: 2022-05-12

## 2022-05-12 NOTE — TELEPHONE ENCOUNTER
Called patient to see if he meant to make appt with Dr. Gera Muse . Patient has appt on Monday . Patient has been seen in our clinic with Dr. Nikki Martins last year. Look like patient discharge summary explain that a follow up was schedule with dr. Gera Muse. Instructed patient to call the office.   Please advises

## 2022-05-24 ENCOUNTER — TELEPHONE (OUTPATIENT)
Dept: ONCOLOGY | Age: 57
End: 2022-05-24

## 2022-05-24 NOTE — TELEPHONE ENCOUNTER
Called patient and left VM letting him know that per staff message that he does not need a follow up visit.

## 2022-07-03 LAB — SARS-COV-2, NAA: POSITIVE

## 2022-07-20 DIAGNOSIS — I10 ESSENTIAL HYPERTENSION: ICD-10-CM

## 2022-07-20 RX ORDER — LISINOPRIL 20 MG/1
20 TABLET ORAL DAILY
Qty: 30 TABLET | Refills: 2 | Status: SHIPPED | OUTPATIENT
Start: 2022-07-20

## 2022-07-22 ENCOUNTER — APPOINTMENT (OUTPATIENT)
Dept: FAMILY MEDICINE CLINIC | Age: 57
End: 2022-07-22

## 2022-07-22 ENCOUNTER — OFFICE VISIT (OUTPATIENT)
Dept: FAMILY MEDICINE CLINIC | Age: 57
End: 2022-07-22
Payer: MEDICAID

## 2022-07-22 VITALS
BODY MASS INDEX: 30.03 KG/M2 | HEIGHT: 74 IN | RESPIRATION RATE: 18 BRPM | WEIGHT: 234 LBS | DIASTOLIC BLOOD PRESSURE: 70 MMHG | OXYGEN SATURATION: 97 % | SYSTOLIC BLOOD PRESSURE: 130 MMHG | TEMPERATURE: 97.8 F | HEART RATE: 76 BPM

## 2022-07-22 DIAGNOSIS — E11.69 TYPE 2 DIABETES MELLITUS WITH OTHER SPECIFIED COMPLICATION, WITHOUT LONG-TERM CURRENT USE OF INSULIN (HCC): ICD-10-CM

## 2022-07-22 DIAGNOSIS — I10 PRIMARY HYPERTENSION: Primary | ICD-10-CM

## 2022-07-22 PROCEDURE — 99213 OFFICE O/P EST LOW 20 MIN: CPT | Performed by: NURSE PRACTITIONER

## 2022-07-22 PROCEDURE — 3044F HG A1C LEVEL LT 7.0%: CPT | Performed by: NURSE PRACTITIONER

## 2022-07-22 RX ORDER — SERTRALINE HYDROCHLORIDE 50 MG/1
50 TABLET, FILM COATED ORAL DAILY
COMMUNITY
Start: 2022-07-02 | End: 2022-07-22 | Stop reason: ALTCHOICE

## 2022-07-22 NOTE — PROGRESS NOTES
HISTORY OF PRESENT ILLNESS  Fabian Driscoll is a 61 y.o. male. HPI  Pt presents with \"hospital discharge follow up\"  Pt states that he was admitted to [facility] from 6/19-6/30 for alcohol detox and anxiety. He was then sent to [facility] and was supposed to complete a 45 day program, but left AMA after 1 day due to him being unhappy with care given at rehab center. Pt has been living with his sister since being out of the rehab center. He has an IOP at the [facility] at [facility], for which he goes every day from 9-3pm.  He is seeing psychiatry there, and they are prescribing his medications. He is working with them to get in with counselor outpatient, he believes that he will be going to [facility]  Review of Systems   Constitutional:  Negative for fever. Physical Exam  Constitutional:       Appearance: Normal appearance. Cardiovascular:      Rate and Rhythm: Normal rate and regular rhythm. Heart sounds: Normal heart sounds. Pulmonary:      Effort: Pulmonary effort is normal.      Breath sounds: Normal breath sounds. Neurological:      Mental Status: He is alert. Psychiatric:         Mood and Affect: Mood normal.         Behavior: Behavior normal.       ASSESSMENT and PLAN    ICD-10-CM ICD-9-CM    1. Primary hypertension  I10 401.9 CBC W/O DIFF      METABOLIC PANEL, COMPREHENSIVE      CBC W/O DIFF      METABOLIC PANEL, COMPREHENSIVE      2. Type 2 diabetes mellitus with other specified complication, without long-term current use of insulin (HCC)  E11.69 250.80 HEMOGLOBIN A1C WITH EAG      HEMOGLOBIN A1C WITH EAG        Will notify when labs return, and inform him of any change in plan of care at that time    Emphasized importance with continued follow up with his IOP, psych and counseling outpatient. Pt informed to return to office with worsening of symptoms, or PRN with any questions or concerns.   Pt verbalizes understanding of plan of care and denies further questions or concerns at this time.

## 2022-07-22 NOTE — PROGRESS NOTES
Identified pt with two pt identifiers(name and ). Chief Complaint   Patient presents with    [facility] Follow Up     [facility] Due   Topic    COVID-19 Vaccine (1)    Pneumococcal 0-64 years (1 - PCV)    Foot Exam Q1     Eye Exam Retinal or Dilated     DTaP/Tdap/Td series (1 - Tdap)    Shingrix Vaccine Age 50> (1 of 2)    Lipid Screen        Wt Readings from Last 3 Encounters:   22 234 lb (106.1 kg)   22 227 lb (103 kg)   22 218 lb 11.1 oz (99.2 kg)     Temp Readings from Last 3 Encounters:   22 97.8 °F (36.6 °C) (Temporal)   22 97.8 °F (36.6 °C) (Temporal)   22 97.8 °F (36.6 °C)     BP Readings from Last 3 Encounters:   22 130/70   22 118/70   22 (!) 147/96     Pulse Readings from Last 3 Encounters:   22 76   22 85   22 61         Learning Assessment:  :     Learning Assessment 2021   PRIMARY LEARNER Patient   HIGHEST LEVEL OF EDUCATION - PRIMARY LEARNER  SOME COLLEGE   BARRIERS PRIMARY LEARNER NONE   CO-LEARNER CAREGIVER No   PRIMARY LANGUAGE ENGLISH   LEARNER PREFERENCE PRIMARY DEMONSTRATION     LISTENING   ANSWERED BY patient   RELATIONSHIP SELF       Depression Screening:  :     3 most recent PHQ Screens 2022   Little interest or pleasure in doing things Not at all   Feeling down, depressed, irritable, or hopeless Not at all   Total Score PHQ 2 0   Trouble falling or staying asleep, or sleeping too much -   Feeling tired or having little energy -   Poor appetite, weight loss, or overeating -   Feeling bad about yourself - or that you are a failure or have let yourself or your family down -   Trouble concentrating on things such as school, work, reading, or watching TV -   Moving or speaking so slowly that other people could have noticed; or the opposite being so fidgety that others notice -       Fall Risk Assessment:  :     No flowsheet data found.     Abuse Screening:  : Abuse Screening Questionnaire 7/22/2022 4/13/2022 3/11/2022 2/18/2022 2/10/2022 2/8/2021   Do you ever feel afraid of your partner? N N N N N N   Are you in a relationship with someone who physically or mentally threatens you? N N N N N N   Is it safe for you to go home? Y Y Y Y Y Y       Coordination of Care Questionnaire:  :     1) Have you been to an emergency room, urgent care clinic since your last visit? Yes [facility]  Hospitalized since your last visit? no             2) Have you seen or consulted any other health care providers outside of [address] [place] [address] since your last visit? yes, no  (Include any pap smears or colon screenings in this section.)    3) Do you have an Advance Directive on file? no  Are you interested in receiving information about Advance Directives? no    Patient is accompanied by N/A I have received verbal consent from Rosa Cornejo to discuss any/all medical information while they are present in the room. 4.  For patients aged 39-70: Has the patient had a colonoscopy / FIT/ Cologuard? No      If the patient is female:    5. For patients aged 41-77: Has the patient had a mammogram within the past 2 years? NA - based on age or sex      10. For patients aged 21-65: Has the patient had a pap smear?  NA - based on age or sex

## 2022-07-28 DIAGNOSIS — G47.00 INSOMNIA, UNSPECIFIED TYPE: ICD-10-CM

## 2022-07-28 DIAGNOSIS — E11.9 TYPE 2 DIABETES MELLITUS WITHOUT COMPLICATION, WITHOUT LONG-TERM CURRENT USE OF INSULIN (HCC): ICD-10-CM

## 2022-07-28 RX ORDER — HYDROXYZINE 50 MG/1
50 TABLET, FILM COATED ORAL
Qty: 30 TABLET | Refills: 2 | Status: SHIPPED | OUTPATIENT
Start: 2022-07-28

## 2022-07-28 RX ORDER — METFORMIN HYDROCHLORIDE 1000 MG/1
1000 TABLET ORAL 2 TIMES DAILY WITH MEALS
Qty: 60 TABLET | Refills: 2 | Status: SHIPPED | OUTPATIENT
Start: 2022-07-28

## 2022-09-05 DIAGNOSIS — K21.9 GASTROESOPHAGEAL REFLUX DISEASE, UNSPECIFIED WHETHER ESOPHAGITIS PRESENT: ICD-10-CM

## 2022-09-06 RX ORDER — OMEPRAZOLE 40 MG/1
40 CAPSULE, DELAYED RELEASE ORAL
Qty: 30 CAPSULE | Refills: 2 | Status: SHIPPED | OUTPATIENT
Start: 2022-09-06

## 2022-09-07 ENCOUNTER — TELEPHONE (OUTPATIENT)
Dept: FAMILY MEDICINE CLINIC | Age: 57
End: 2022-09-07

## 2022-09-07 ENCOUNTER — NURSE TRIAGE (OUTPATIENT)
Dept: OTHER | Facility: CLINIC | Age: 57
End: 2022-09-07

## 2022-09-07 NOTE — TELEPHONE ENCOUNTER
Patient called wanting to be seen ASAP (within 24hours) about a sore on his toe and that he is diabetic. Currently have him scheduled for Tuesday Sep 13th at 2:45 PM but he was wondering you could squeeze him in any sooner.

## 2022-09-07 NOTE — TELEPHONE ENCOUNTER
Received call from Tran at [facility] with [employer]. Subjective: Caller states \"Type 2 diabetic, lack of  feeling in feet gotten worse- open sore on big toe\"     Current Symptoms: Been dealing with for years, thought it was bad circulation. Have had doppler done on legs. Does not have full feeling in feet. Noticed when getting out of shower a little red on floor, it was blood. Last Thursday, discover open sore-skin on big toe on left foot, around it is white and center is about dime size red on big toe, whole whit and red area is entire lower part of big toe. Has an appointment with hematologist due to finding of toxic amount of iron in blood, was in hospital a few weeks ago. Denies pain. Last blood glucose yesterday was 170. Is a recovering alcoholic. Onset: 6 days ago; unchanged    Associated Symptoms: NA    Pain Severity: 4/10; aching; intermittent, worsen with walking or with pressure on it    Temperature: Denies    What has been tried: Band-Aid with neosporin. LMP: NA Pregnant: NA    Recommended disposition: See PCP within 24 Hours    Care advice provided, patient verbalizes understanding; denies any other questions or concerns; instructed to call back for any new or worsening symptoms. Patient/Caller agrees with recommended disposition; writer provided warm transfer to Mery Forrester at [facility] for appointment scheduling    Attention Provider: Thank you for allowing me to participate in the care of your patient. The patient was connected to triage in response to information provided to the [facility]. Please do not respond through this encounter as the response is not directed to a shared pool.     Reason for Disposition   [1] Ulcer, wound, blister, sore, or red area AND [2] new or increasing    Protocols used: Diabetes - Foot Problems and Questions-ADULT-

## 2022-09-10 LAB
ALBUMIN SERPL-MCNC: 4.7 G/DL (ref 3.8–4.9)
ALBUMIN/GLOB SERPL: 2 {RATIO} (ref 1.2–2.2)
ALP SERPL-CCNC: 49 IU/L (ref 44–121)
ALT SERPL-CCNC: 40 IU/L (ref 0–44)
AST SERPL-CCNC: 31 IU/L (ref 0–40)
BILIRUB SERPL-MCNC: 0.7 MG/DL (ref 0–1.2)
BUN SERPL-MCNC: 14 MG/DL (ref 6–24)
BUN/CREAT SERPL: 11 (ref 9–20)
CALCIUM SERPL-MCNC: 9.6 MG/DL (ref 8.7–10.2)
CHLORIDE SERPL-SCNC: 99 MMOL/L (ref 96–106)
CO2 SERPL-SCNC: 22 MMOL/L (ref 20–29)
CREAT SERPL-MCNC: 1.27 MG/DL (ref 0.76–1.27)
EGFR: 65 ML/MIN/1.73
ERYTHROCYTE [DISTWIDTH] IN BLOOD BY AUTOMATED COUNT: 14.5 % (ref 11.6–15.4)
EST. AVERAGE GLUCOSE BLD GHB EST-MCNC: 126 MG/DL
GLOBULIN SER CALC-MCNC: 2.4 G/DL (ref 1.5–4.5)
GLUCOSE SERPL-MCNC: 139 MG/DL (ref 65–99)
HBA1C MFR BLD: 6 % (ref 4.8–5.6)
HCT VFR BLD AUTO: 44.3 % (ref 37.5–51)
HGB BLD-MCNC: 15 G/DL (ref 13–17.7)
MCH RBC QN AUTO: 32.6 PG (ref 26.6–33)
MCHC RBC AUTO-ENTMCNC: 33.9 G/DL (ref 31.5–35.7)
MCV RBC AUTO: 96 FL (ref 79–97)
PLATELET # BLD AUTO: 252 X10E3/UL (ref 150–450)
POTASSIUM SERPL-SCNC: 4.5 MMOL/L (ref 3.5–5.2)
PROT SERPL-MCNC: 7.1 G/DL (ref 6–8.5)
RBC # BLD AUTO: 4.6 X10E6/UL (ref 4.14–5.8)
SODIUM SERPL-SCNC: 140 MMOL/L (ref 134–144)
WBC # BLD AUTO: 5.9 X10E3/UL (ref 3.4–10.8)

## 2022-09-12 ENCOUNTER — TELEPHONE (OUTPATIENT)
Dept: FAMILY MEDICINE CLINIC | Age: 57
End: 2022-09-12

## 2022-09-12 NOTE — TELEPHONE ENCOUNTER
----- Message from Doris Monaco NP sent at 9/12/2022  7:41 AM EDT -----  Please call patient and let him know that his labs returned stable.   Should return for office visit and fasting labs in 6 months  Thanks

## 2022-09-12 NOTE — PROGRESS NOTES
Please call patient and let him know that his labs returned stable.   Should return for office visit and fasting labs in 6 months  Thanks

## 2022-09-13 ENCOUNTER — OFFICE VISIT (OUTPATIENT)
Dept: FAMILY MEDICINE CLINIC | Age: 57
End: 2022-09-13

## 2022-09-13 VITALS
WEIGHT: 235 LBS | TEMPERATURE: 97.8 F | HEART RATE: 62 BPM | DIASTOLIC BLOOD PRESSURE: 70 MMHG | RESPIRATION RATE: 16 BRPM | OXYGEN SATURATION: 98 % | SYSTOLIC BLOOD PRESSURE: 136 MMHG | HEIGHT: 74 IN | BODY MASS INDEX: 30.16 KG/M2

## 2022-09-13 DIAGNOSIS — E11.69 TYPE 2 DIABETES MELLITUS WITH OTHER SPECIFIED COMPLICATION, WITHOUT LONG-TERM CURRENT USE OF INSULIN (HCC): ICD-10-CM

## 2022-09-13 DIAGNOSIS — L97.521 SKIN ULCER OF TOE OF LEFT FOOT, LIMITED TO BREAKDOWN OF SKIN (HCC): Primary | ICD-10-CM

## 2022-09-13 PROCEDURE — 99213 OFFICE O/P EST LOW 20 MIN: CPT | Performed by: NURSE PRACTITIONER

## 2022-09-13 PROCEDURE — 3044F HG A1C LEVEL LT 7.0%: CPT | Performed by: NURSE PRACTITIONER

## 2022-09-13 RX ORDER — CEPHALEXIN 500 MG/1
500 CAPSULE ORAL 3 TIMES DAILY
Qty: 30 CAPSULE | Refills: 0 | Status: SHIPPED | OUTPATIENT
Start: 2022-09-13 | End: 2022-09-23

## 2022-09-13 RX ORDER — MUPIROCIN 20 MG/G
OINTMENT TOPICAL 2 TIMES DAILY
Qty: 22 G | Refills: 0 | Status: SHIPPED | OUTPATIENT
Start: 2022-09-13

## 2022-09-13 NOTE — PROGRESS NOTES
HISTORY OF PRESENT ILLNESS  Alvaro Garcia is a 61 y.o. male. HPI  Pt presents with \"foot problem\"    Pt states that he has an ulcer on his left great toe  It has been there for about a week and a half  He has been putting neosporin on the area, and it has been improving some  He has neuropathy in his feet, so he has no pain  He has no podiatrist, has never had full diabetic foot exam  Review of Systems   Constitutional:  Negative for fever. Physical Exam  Constitutional:       Appearance: Normal appearance. Cardiovascular:      Rate and Rhythm: Normal rate and regular rhythm. Heart sounds: Normal heart sounds. Pulmonary:      Effort: Pulmonary effort is normal.      Breath sounds: Normal breath sounds. Musculoskeletal:        Feet:    Feet:      Comments: Dime sized, open wound noted to top of toe. Erythema noted, no drainage noted. Neurological:      Mental Status: He is alert. Psychiatric:         Mood and Affect: Mood normal.         Behavior: Behavior normal.     ASSESSMENT and PLAN    ICD-10-CM ICD-9-CM    1. Skin ulcer of toe of left foot, limited to breakdown of skin ([facility])  L97.521 707.15 cephALEXin (KEFLEX) 500 mg capsule      mupirocin (BACTROBAN) 2 % ointment      2. Type 2 diabetes mellitus with other specified complication, without long-term current use of insulin ([facility])  E11.69 250.80 REFERRAL TO PODIATRY      Educated about taking medication as prescribed  Should call podiatry for an appointment today    Pt informed to return to office with worsening of symptoms, or PRN with any questions or concerns. Pt verbalizes understanding of plan of care and denies further questions or concerns at this time.

## 2022-09-13 NOTE — PROGRESS NOTES
Identified pt with two pt identifiers(name and ). Chief Complaint   Patient presents with    Diabetes    Foot Problem     Sore on toe on left foot         Health Maintenance Due   Topic    COVID-19 Vaccine (1)    Pneumococcal 0-64 years (1 - PCV)    Foot Exam Q1     Eye Exam Retinal or Dilated     DTaP/Tdap/Td series (1 - Tdap)    Shingrix Vaccine Age 50> (1 of 2)    Lipid Screen     Flu Vaccine (1)       Wt Readings from Last 3 Encounters:   22 235 lb (106.6 kg)   22 234 lb (106.1 kg)   22 227 lb (103 kg)     Temp Readings from Last 3 Encounters:   22 97.8 °F (36.6 °C) (Temporal)   22 97.8 °F (36.6 °C) (Temporal)   22 97.8 °F (36.6 °C) (Temporal)     BP Readings from Last 3 Encounters:   22 136/70   22 130/70   22 118/70     Pulse Readings from Last 3 Encounters:   22 62   22 76   22 85         Learning Assessment:  :     Learning Assessment 2021   PRIMARY LEARNER Patient   HIGHEST LEVEL OF EDUCATION - PRIMARY LEARNER  SOME COLLEGE   BARRIERS PRIMARY LEARNER NONE   CO-LEARNER CAREGIVER No   PRIMARY LANGUAGE ENGLISH   LEARNER PREFERENCE PRIMARY DEMONSTRATION     LISTENING   ANSWERED BY patient   RELATIONSHIP SELF       Depression Screening:  :     3 most recent PHQ Screens 2022   Little interest or pleasure in doing things Not at all   Feeling down, depressed, irritable, or hopeless Not at all   Total Score PHQ 2 0   Trouble falling or staying asleep, or sleeping too much -   Feeling tired or having little energy -   Poor appetite, weight loss, or overeating -   Feeling bad about yourself - or that you are a failure or have let yourself or your family down -   Trouble concentrating on things such as school, work, reading, or watching TV -   Moving or speaking so slowly that other people could have noticed; or the opposite being so fidgety that others notice -       Fall Risk Assessment:  :     No flowsheet data found.     Abuse Screening:  :     Abuse Screening Questionnaire 9/13/2022 7/22/2022 4/13/2022 3/11/2022 2/18/2022 2/10/2022 2/8/2021   Do you ever feel afraid of your partner? N N N N N N N   Are you in a relationship with someone who physically or mentally threatens you? N N N N N N N   Is it safe for you to go home? Y Y Y Y Y Y Y       Coordination of Care Questionnaire:  :     1) Have you been to an emergency room, urgent care clinic since your last visit? yes [facility] 3 weeks ago   Hospitalized since your last visit? no             2) Have you seen or consulted any other health care providers outside of [address] [place] [address] since your last visit? no  (Include any pap smears or colon screenings in this section.)    3) Do you have an Advance Directive on file? no  Are you interested in receiving information about Advance Directives? no    Patient is accompanied by N/A I have received verbal consent from Rosa Cornejo to discuss any/all medical information while they are present in the room. 4.  For patients aged 39-70: Has the patient had a colonoscopy / FIT/ Cologuard? No      If the patient is female:    5. For patients aged 41-77: Has the patient had a mammogram within the past 2 years? NA - based on age or sex      10. For patients aged 21-65: Has the patient had a pap smear?  NA - based on age or sex

## 2022-10-04 ENCOUNTER — TELEPHONE (OUTPATIENT)
Dept: FAMILY MEDICINE CLINIC | Age: 57
End: 2022-10-04

## 2022-10-04 DIAGNOSIS — L98.9 SKIN LESION: Primary | ICD-10-CM

## 2022-10-04 NOTE — TELEPHONE ENCOUNTER
----- Message from Destinee Vigil sent at 10/4/2022  2:31 PM EDT -----  Subject: Referral Request    Reason for referral request? PT NEEDS A REFERRAL FOR A DERMATOLOGIST FOR   SPOT ON HIS SHOULDER   Provider patient wants to be referred to(if known):     Provider Phone Number(if known):     Additional Information for Provider?   ---------------------------------------------------------------------------  --------------  [id number]    [phone]; OK to leave message on voicemail  ---------------------------------------------------------------------------  --------------

## 2022-10-05 ENCOUNTER — TELEPHONE (OUTPATIENT)
Dept: FAMILY MEDICINE CLINIC | Age: 57
End: 2022-10-05

## 2022-10-05 DIAGNOSIS — L98.9 SKIN LESION: Primary | ICD-10-CM

## 2022-10-05 NOTE — TELEPHONE ENCOUNTER
----- Message from Olivia Garcia sent at 10/4/2022  4:52 PM EDT -----  Subject: Referral Request    Reason for referral request? Pt needs referral for dermatology. He was   given a referral for a different provider, but that provider is not in his   insurance network. Pt would like to have a new referral written. Provider patient wants to be referred to(if known):     Provider Phone Number(if known): Additional Information for Provider?  [facility] Fax #:   [phone] Call pt to confirm when referral is sent.  ---------------------------------------------------------------------------  --------------  [address]    [phone]; OK to leave message on voicemail  ---------------------------------------------------------------------------  --------------

## 2022-10-24 DIAGNOSIS — I10 ESSENTIAL HYPERTENSION: ICD-10-CM

## 2022-10-25 RX ORDER — METOPROLOL TARTRATE 50 MG/1
TABLET ORAL
Qty: 60 TABLET | Refills: 2 | Status: SHIPPED | OUTPATIENT
Start: 2022-10-25

## 2022-11-09 DIAGNOSIS — G47.00 INSOMNIA, UNSPECIFIED TYPE: ICD-10-CM

## 2022-11-09 RX ORDER — TRAZODONE HYDROCHLORIDE 50 MG/1
TABLET ORAL
Qty: 30 TABLET | Refills: 5 | Status: SHIPPED | OUTPATIENT
Start: 2022-11-09

## 2022-11-09 RX ORDER — HYDROXYZINE 50 MG/1
50 TABLET, FILM COATED ORAL
Qty: 30 TABLET | Refills: 2 | Status: SHIPPED | OUTPATIENT
Start: 2022-11-09

## 2022-12-15 ENCOUNTER — TELEPHONE (OUTPATIENT)
Dept: FAMILY MEDICINE CLINIC | Age: 57
End: 2022-12-15

## 2022-12-15 NOTE — TELEPHONE ENCOUNTER
----- Message from Nabeel sent at 12/15/2022  3:03 PM EST -----  Subject: Referral Request    Reason for referral request? Patient requesting endocrinology referral   after insurance screen bad circulation in feet. Patient has two listed   below with no preference. [facility] endocrinology phone [phone] endocrinology   specialist of [place] phone [phone]  Provider patient wants to be referred to(if known):     Provider Phone Number(if known):     Additional Information for Provider?   ---------------------------------------------------------------------------  --------------  [address]    [phone]; OK to leave message on voicemail  ---------------------------------------------------------------------------  --------------

## 2022-12-16 NOTE — TELEPHONE ENCOUNTER
Called patient and let him know that he would be able to discuss referral at 12/20 visit, but he needed appt before referral could be given. He understood.

## 2022-12-20 ENCOUNTER — OFFICE VISIT (OUTPATIENT)
Dept: FAMILY MEDICINE CLINIC | Age: 57
End: 2022-12-20
Payer: MEDICAID

## 2022-12-20 VITALS
WEIGHT: 232 LBS | SYSTOLIC BLOOD PRESSURE: 128 MMHG | OXYGEN SATURATION: 98 % | HEART RATE: 78 BPM | TEMPERATURE: 97.1 F | BODY MASS INDEX: 29.77 KG/M2 | RESPIRATION RATE: 16 BRPM | DIASTOLIC BLOOD PRESSURE: 88 MMHG | HEIGHT: 74 IN

## 2022-12-20 DIAGNOSIS — R60.9 EDEMA, UNSPECIFIED TYPE: ICD-10-CM

## 2022-12-20 DIAGNOSIS — G62.9 NEUROPATHY: Primary | ICD-10-CM

## 2022-12-20 PROCEDURE — 99213 OFFICE O/P EST LOW 20 MIN: CPT | Performed by: NURSE PRACTITIONER

## 2022-12-20 PROCEDURE — 3078F DIAST BP <80 MM HG: CPT | Performed by: NURSE PRACTITIONER

## 2022-12-20 PROCEDURE — 3074F SYST BP LT 130 MM HG: CPT | Performed by: NURSE PRACTITIONER

## 2022-12-20 RX ORDER — GABAPENTIN 300 MG/1
300 CAPSULE ORAL DAILY
Qty: 90 CAPSULE | Refills: 0 | Status: SHIPPED | OUTPATIENT
Start: 2022-12-20

## 2022-12-20 NOTE — PROGRESS NOTES
Identified pt with two pt identifiers(name and ).     Chief Complaint   Patient presents with    Foot Pain     R foot pain         Health Maintenance Due   Topic    COVID-19 Vaccine (1)    Pneumococcal 0-64 years (1 - PCV)    Foot Exam Q1     Eye Exam Retinal or Dilated     Hepatitis B Vaccine (1 of 3 - Risk 3-dose series)    DTaP/Tdap/Td series (1 - Tdap)    Shingrix Vaccine Age 50> (1 of 2)    Lipid Screen     Flu Vaccine (1)       Wt Readings from Last 3 Encounters:   22 232 lb (105.2 kg)   22 235 lb (106.6 kg)   22 234 lb (106.1 kg)     Temp Readings from Last 3 Encounters:   22 97.1 °F (36.2 °C) (Temporal)   22 97.8 °F (36.6 °C) (Temporal)   22 97.8 °F (36.6 °C) (Temporal)     BP Readings from Last 3 Encounters:   22 136/70   22 130/70   22 118/70     Pulse Readings from Last 3 Encounters:   22 62   22 76   22 85         Learning Assessment:  :     Learning Assessment 2021   PRIMARY LEARNER Patient   HIGHEST LEVEL OF EDUCATION - PRIMARY LEARNER  SOME COLLEGE   BARRIERS PRIMARY LEARNER NONE   CO-LEARNER CAREGIVER No   PRIMARY LANGUAGE ENGLISH   LEARNER PREFERENCE PRIMARY DEMONSTRATION     LISTENING   ANSWERED BY patient   RELATIONSHIP SELF       Depression Screening:  :     3 most recent PHQ Screens 2022   Little interest or pleasure in doing things Not at all   Feeling down, depressed, irritable, or hopeless Not at all   Total Score PHQ 2 0   Trouble falling or staying asleep, or sleeping too much -   Feeling tired or having little energy -   Poor appetite, weight loss, or overeating -   Feeling bad about yourself - or that you are a failure or have let yourself or your family down -   Trouble concentrating on things such as school, work, reading, or watching TV -   Moving or speaking so slowly that other people could have noticed; or the opposite being so fidgety that others notice -       Fall Risk Assessment:  :     No flowsheet data found. Abuse Screening:  :     Abuse Screening Questionnaire 12/20/2022 9/13/2022 7/22/2022 4/13/2022 3/11/2022 2/18/2022 2/10/2022   Do you ever feel afraid of your partner? N N N N N N N   Are you in a relationship with someone who physically or mentally threatens you? N N N N N N N   Is it safe for you to go home? Y Y Y Y Y Y Y       Coordination of Care Questionnaire:  :     1) Have you been to an emergency room, urgent care clinic since your last visit? no   Hospitalized since your last visit? no             2) Have you seen or consulted any other health care providers outside of [address] [place] [address] since your last visit? no  (Include any pap smears or colon screenings in this section.)    3) Do you have an Advance Directive on file? no  Are you interested in receiving information about Advance Directives? no    Patient is accompanied by N/A I have received verbal consent from Maryellen Breen to discuss any/all medical information while they are present in the room. 4.  For patients aged 39-70: Has the patient had a colonoscopy / FIT/ Cologuard? Yes - no Care Gap present      If the patient is female:    5. For patients aged 41-77: Has the patient had a mammogram within the past 2 years? NA - based on age or sex      10. For patients aged 21-65: Has the patient had a pap smear?  NA - based on age or sex

## 2022-12-20 NOTE — PROGRESS NOTES
HISTORY OF PRESENT ILLNESS  Bharati Ayala is a 61 y.o. male. HPI  Pt presents with \"neuropathy and decreased circulation in legs/feet\"  Pt states that he has been seen by podiatry, but they basically did nothing for his feet  He continues to deal with foot pain and numbness and tingling  He knows that this is combination of having Diabetes and alcohol recovery  Last HgbA1C was stable at 6  He states that he was offered gabapentin in the past but passed on this, but believes that he would like to try it at this time. He has started working at [employer] and is on his feet a lot, causing increased pain. In addition, he has noted circulation issues on both lower legs and feet. Discoloration in feet, and swelling at times. Pt states that his father had to have amputation due to circulatory problems. He has never seen vascular in the past.  Review of Systems   Constitutional:  Negative for fever. Physical Exam  Constitutional:       Appearance: Normal appearance. Cardiovascular:      Rate and Rhythm: Normal rate and regular rhythm. Heart sounds: Normal heart sounds. Pulmonary:      Effort: Pulmonary effort is normal.      Breath sounds: Normal breath sounds. Musculoskeletal:      Right foot: Normal range of motion. Feet:      Right foot:      Skin integrity: Skin integrity normal.   Neurological:      Mental Status: He is alert. Psychiatric:         Mood and Affect: Mood normal.         Behavior: Behavior normal.       ASSESSMENT and PLAN    ICD-10-CM ICD-9-CM    1. Neuropathy  G62.9 355.9 gabapentin (NEURONTIN) 300 mg capsule      2. Edema, unspecified type  R60.9 782.3 REFERRAL TO VASCULAR SURGERY      Educated about gabapentin, how this medication works, and common side effects  Educated that we can increase dose with time if needed  Should call vascular for assessment at this time    Pt informed to return to office with worsening of symptoms, or PRN with any questions or concerns.   Pt verbalizes understanding of plan of care and denies further questions or concerns at this time.

## 2022-12-30 LAB — HBA1C MFR BLD HPLC: 5.4 %

## 2023-01-06 DIAGNOSIS — E11.9 TYPE 2 DIABETES MELLITUS WITHOUT COMPLICATION, WITHOUT LONG-TERM CURRENT USE OF INSULIN (HCC): ICD-10-CM

## 2023-01-06 DIAGNOSIS — I10 ESSENTIAL HYPERTENSION: ICD-10-CM

## 2023-01-06 RX ORDER — METFORMIN HYDROCHLORIDE 1000 MG/1
TABLET ORAL
Qty: 60 TABLET | Refills: 2 | Status: SHIPPED | OUTPATIENT
Start: 2023-01-06

## 2023-01-06 RX ORDER — LISINOPRIL 20 MG/1
20 TABLET ORAL DAILY
Qty: 30 TABLET | Refills: 2 | Status: SHIPPED | OUTPATIENT
Start: 2023-01-06

## 2023-01-25 ENCOUNTER — TELEPHONE (OUTPATIENT)
Dept: FAMILY MEDICINE CLINIC | Age: 58
End: 2023-01-25

## 2023-01-25 NOTE — TELEPHONE ENCOUNTER
----- Message from Cisco Mansfield sent at 1/25/2023  1:34 PM EST -----  Subject: Appointment Request    Reason for Call: Established Patient Appointment needed: Routine Existing   Condition Follow Up (Diabetes)    QUESTIONS    Reason for appointment request? Other - Patient question     Additional Information for Provider? Patient is stating that he is needing   is A1C checked and is wanting to know if his provider would like him to   set an appointment with practice or for him to go to lab cor due to him   being a hard stick.   ---------------------------------------------------------------------------  --------------  Felicitas Black [facility]  [phone]; OK to leave message on voicemail  ---------------------------------------------------------------------------  --------------  SCRIPT ANSWERS  MASONID Screen: Birder Fothergill

## 2023-01-25 NOTE — TELEPHONE ENCOUNTER
----- Message from Cisco Mansfield sent at 1/25/2023  1:30 PM EST -----  Subject: Refill Request    QUESTIONS  Name of Medication? gabapentin (NEURONTIN) 300 mg capsule  Patient-reported dosage and instructions? 300 mg  How many days do you have left? 0  Preferred Pharmacy? [address] phone number (if available)? [phone]  ---------------------------------------------------------------------------  --------------  Narayan Hinders INFO  What is the best way for the office to contact you? OK to leave message on   voicemail  Preferred Call Back Phone Number? [phone]  ---------------------------------------------------------------------------  --------------  SCRIPT ANSWERS  Relationship to Patient?  Self

## 2023-01-25 NOTE — TELEPHONE ENCOUNTER
Called pt 1/25 at 3:07 pm and lvm to make appt and can be virtual and he can go to lab [facility] for blood work

## 2023-02-03 RX ORDER — BUSPIRONE HYDROCHLORIDE 7.5 MG/1
20 TABLET ORAL 3 TIMES DAILY
Qty: 270 TABLET | Refills: 0 | Status: SHIPPED | OUTPATIENT
Start: 2023-02-03

## 2023-02-06 RX ORDER — BUSPIRONE HYDROCHLORIDE 7.5 MG/1
20 TABLET ORAL 3 TIMES DAILY
Qty: 270 TABLET | Refills: 0 | OUTPATIENT
Start: 2023-02-06

## 2023-02-10 ENCOUNTER — OFFICE VISIT (OUTPATIENT)
Dept: FAMILY MEDICINE CLINIC | Age: 58
End: 2023-02-10
Payer: MEDICAID

## 2023-02-10 VITALS
OXYGEN SATURATION: 97 % | WEIGHT: 232.2 LBS | SYSTOLIC BLOOD PRESSURE: 138 MMHG | RESPIRATION RATE: 17 BRPM | TEMPERATURE: 97.7 F | DIASTOLIC BLOOD PRESSURE: 80 MMHG | HEIGHT: 74 IN | HEART RATE: 73 BPM | BODY MASS INDEX: 29.8 KG/M2

## 2023-02-10 DIAGNOSIS — E11.9 CONTROLLED TYPE 2 DIABETES MELLITUS WITHOUT COMPLICATION, WITHOUT LONG-TERM CURRENT USE OF INSULIN (HCC): Primary | ICD-10-CM

## 2023-02-10 LAB — HBA1C MFR BLD HPLC: 7 %

## 2023-02-10 PROCEDURE — 3078F DIAST BP <80 MM HG: CPT | Performed by: FAMILY MEDICINE

## 2023-02-10 PROCEDURE — 99212 OFFICE O/P EST SF 10 MIN: CPT | Performed by: FAMILY MEDICINE

## 2023-02-10 PROCEDURE — 3051F HG A1C>EQUAL 7.0%<8.0%: CPT | Performed by: FAMILY MEDICINE

## 2023-02-10 PROCEDURE — 83036 HEMOGLOBIN GLYCOSYLATED A1C: CPT | Performed by: FAMILY MEDICINE

## 2023-02-10 PROCEDURE — 3074F SYST BP LT 130 MM HG: CPT | Performed by: FAMILY MEDICINE

## 2023-02-10 RX ORDER — BUDESONIDE, GLYCOPYRROLATE, AND FORMOTEROL FUMARATE 160; 9; 4.8 UG/1; UG/1; UG/1
AEROSOL, METERED RESPIRATORY (INHALATION)
COMMUNITY
Start: 2023-02-03

## 2023-02-10 NOTE — PROGRESS NOTES
Identified pt with two pt identifiers(name and ).     Chief Complaint   Patient presents with    Diabetes     Patient is here to have his A1C checked     Medication Refill     Patient needs a refill for his buspirone         Health Maintenance Due   Topic    COVID-19 Vaccine (1)    Pneumococcal 0-64 years (1 - PCV)    Foot Exam Q1     Eye Exam Retinal or Dilated     Hepatitis B Vaccine (1 of 3 - Risk 3-dose series)    DTaP/Tdap/Td series (1 - Tdap)    Shingles Vaccine (1 of 2)    Lipid Screen     Flu Vaccine (1)    Diabetic Alb to Cr ratio (uACR) test        Wt Readings from Last 3 Encounters:   02/10/23 232 lb 3.2 oz (105.3 kg)   22 232 lb (105.2 kg)   22 235 lb (106.6 kg)     Temp Readings from Last 3 Encounters:   02/10/23 97.7 °F (36.5 °C) (Temporal)   22 97.1 °F (36.2 °C) (Temporal)   22 97.8 °F (36.6 °C) (Temporal)     BP Readings from Last 3 Encounters:   22 128/88   22 136/70   22 130/70     Pulse Readings from Last 3 Encounters:   22 78   22 62   22 76         Learning Assessment:  :     Learning Assessment 2021   PRIMARY LEARNER Patient   HIGHEST LEVEL OF EDUCATION - PRIMARY LEARNER  SOME COLLEGE   BARRIERS PRIMARY LEARNER NONE   CO-LEARNER CAREGIVER No   PRIMARY LANGUAGE ENGLISH   LEARNER PREFERENCE PRIMARY DEMONSTRATION     LISTENING   ANSWERED BY patient   RELATIONSHIP SELF       Depression Screening:  :     3 most recent PHQ Screens 2/10/2023   Little interest or pleasure in doing things Not at all   Feeling down, depressed, irritable, or hopeless Not at all   Total Score PHQ 2 0   Trouble falling or staying asleep, or sleeping too much Not at all   Feeling tired or having little energy Not at all   Poor appetite, weight loss, or overeating Not at all   Feeling bad about yourself - or that you are a failure or have let yourself or your family down Not at all   Trouble concentrating on things such as school, work, reading, or watching TV Not at all   Moving or speaking so slowly that other people could have noticed; or the opposite being so fidgety that others notice Not at all   Thoughts of being better off dead, or hurting yourself in some way Not at all   PHQ 9 Score 0       Fall Risk Assessment:  :     No flowsheet data found. Abuse Screening:  :     Abuse Screening Questionnaire 12/20/2022 9/13/2022 7/22/2022 4/13/2022 3/11/2022 2/18/2022 2/10/2022   Do you ever feel afraid of your partner? N N N N N N N   Are you in a relationship with someone who physically or mentally threatens you? N N N N N N N   Is it safe for you to go home? Y Y Y Y Y Y Y       Coordination of Care Questionnaire:  :     1) Have you been to an emergency room, urgent care clinic since your last visit? no   Hospitalized since your last visit? no             2) Have you seen or consulted any other health care providers outside of [address] [place] [address] since your last visit? no  (Include any pap smears or colon screenings in this section.)    3) Do you have an Advance Directive on file? no  Are you interested in receiving information about Advance Directives? no    Patient is accompanied by self I have received verbal consent from Mona Kim to discuss any/all medical information while they are present in the room. 4.  For patients aged 39-70: Has the patient had a colonoscopy / FIT/ Cologuard? Yes - no Care Gap present      If the patient is female:    5. For patients aged 41-77: Has the patient had a mammogram within the past 2 years? NA - based on age or sex      10. For patients aged 21-65: Has the patient had a pap smear?  NA - based on age or sex

## 2023-02-13 NOTE — PROGRESS NOTES
SUBJECTIVE:  61 y.o. male for follow up of diabetes. Diabetic Review of Systems - medication compliance: compliant all of the time. Other symptoms and concerns: none, though patient is not at goal for his blood pressure at this time. Current Outpatient Medications   Medication Sig Dispense Refill    Fariha Everettphere 160-9-4.8 mcg/actuation HFAA TAKE 2 (TWO) PUFFS TWICE A DAY      busPIRone (BUSPAR) 7.5 mg tablet Take 2.5 Tablets by mouth three (3) times daily. 270 Tablet 0    metFORMIN (GLUCOPHAGE) 1,000 mg tablet TAKE 1 TABLET BY MOUTH TWICE DAILY WITH MEALS. 60 Tablet 2    lisinopriL (PRINIVIL, ZESTRIL) 20 mg tablet TAKE 1 TABLET BY MOUTH DAILY. INDICATIONS: HIGH BLOOD PRESSURE 30 Tablet 2    gabapentin (NEURONTIN) 300 mg capsule Take 1 Capsule by mouth daily. Max Daily Amount: 300 mg. 90 Capsule 0    traZODone (DESYREL) 50 mg tablet TAKE 1 TABLET BY MOUTH ONCE A DAY AT BEDTIME IF NEEDED FOR INSOMNIA 30 Tablet 5    hydrOXYzine HCL (ATARAX) 50 mg tablet Take 1 Tablet by mouth nightly as needed for Sleep. 30 Tablet 2    metoprolol tartrate (LOPRESSOR) 50 mg tablet TAKE 1 TABLET BY MOUTH TWO TIMES A DAY. INDICATIONS: HIGH BLOOD PRESSURE 60 Tablet 2    mupirocin (BACTROBAN) 2 % ointment Apply  to affected area two (2) times a day. 22 g 0    omeprazole (PRILOSEC) 40 mg capsule TAKE 1 CAPSULE BY MOUTH DAILY (BEFORE BREAKFAST). 30 Capsule 2    Advair Diskus 250-50 mcg/dose diskus inhaler USE 1 PUFF TWICE A DAY      albuterol (PROVENTIL HFA, VENTOLIN HFA, PROAIR HFA) 90 mcg/actuation inhaler Take 2 Puffs by inhalation every six (6) hours as needed for Wheezing, Shortness of Breath or Respiratory Distress. 18 g 1    therapeutic multivitamin (THERAGRAN) tablet Take 1 Tab by mouth daily. 30 Tab 0       OBJECTIVE:  Appearance: alert, well appearing, and in no distress, oriented to person, place, and time, and normal appearing weight.   Visit Vitals  /80 (BP 1 Location: Left arm, BP Patient Position: Sitting, BP Cuff Size: Adult)   Pulse 73   Temp 97.7 °F (36.5 °C) (Temporal)   Resp 17   Ht 6' 2\" (1.88 m)   Wt 232 lb 3.2 oz (105.3 kg)   SpO2 97%   BMI 29.81 kg/m²         ASSESSMENT:  Diabetes Mellitus: well controlled    PLAN:  See orders for this visit as documented in the electronic medical record. Issues reviewed with him: home glucose monitoring emphasized and all medications, side effects and compliance discussed carefully.      Carol Moraes MD

## 2023-02-16 NOTE — PROGRESS NOTES
Normal labs, he is a diabetic at 7.0. Should follow  a diet that is low in sugars and carbohydrates. And should continue to take metformin.

## 2023-02-17 ENCOUNTER — TELEPHONE (OUTPATIENT)
Dept: FAMILY MEDICINE CLINIC | Age: 58
End: 2023-02-17

## 2023-02-17 NOTE — TELEPHONE ENCOUNTER
----- Message from Pranav Cm MD sent at 2/16/2023  6:49 PM EST -----  Normal labs, he is a diabetic at 7.0. Should follow  a diet that is low in sugars and carbohydrates. And should continue to take metformin.

## 2023-02-20 DIAGNOSIS — G47.00 INSOMNIA, UNSPECIFIED TYPE: ICD-10-CM

## 2023-02-20 DIAGNOSIS — G62.9 NEUROPATHY: ICD-10-CM

## 2023-02-20 NOTE — PROGRESS NOTES
Clear to auscultation bilaterally with no adventitious sounds [facility] [address]  [address] [facility], [address] [place] [address]  [phone]      Medical Progress Note      NAME: Shakira Curran   :  1965  MRM:  [id number]    Date/Time of service: 2021  11:28 AM       Subjective:     Chief Complaint:  Patient was personally seen and examined by me during this time period. Chart reviewed. Still with tremors, depressed       Objective:       Vitals:       Last 24hrs VS reviewed since prior progress note. Most recent are:    Visit Vitals  BP (!) 148/90 (BP 1 Location: Left arm, BP Patient Position: At rest)   Pulse 89   Temp 98.3 °F (36.8 °C)   Resp 19   Ht 6' 2\" (1.88 m)   Wt 99.8 kg (220 lb)   SpO2 98%   BMI 28.25 kg/m²     SpO2 Readings from Last 6 Encounters:   21 98%   17 98%            Intake/Output Summary (Last 24 hours) at 2021 1128  Last data filed at 2021 0934  Gross per 24 hour   Intake 716.67 ml   Output 2400 ml   Net -1683.33 ml        Exam:     Physical Exam:    Gen:  Disheveled, ill-appearing, mild distress  HEENT:  Pink conjunctivae, PERRL, hearing intact to voice, moist mucous membranes  Neck:  Supple, without masses, thyroid non-tender  Resp:  No accessory muscle use, clear breath sounds without wheezes rales or rhonchi  Card:  No murmurs, normal S1, S2 without thrills, bruits or peripheral edema  Abd:  Soft, non-tender, non-distended, normoactive bowel sounds are present  Musc:  No cyanosis or clubbing  Skin:  No rashes   Neuro:  Cranial nerves 3-12 are grossly intact, follows commands appropriately  Psych:  Fair insight, oriented to person, place and time, alert.   Depressed     Medications Reviewed: (see below)    Lab Data Reviewed: (see below)    ______________________________________________________________________    Medications:     Current Facility-Administered Medications   Medication Dose Route Frequency    therapeutic multivitamin (THERAGRAN) tablet 1 Tab  1 Tab Oral DAILY    thiamine mononitrate (B-1) tablet 100 mg  100 mg Oral DAILY    folic acid (FOLVITE) tablet 1 mg  1 mg Oral DAILY    insulin lispro (HUMALOG) injection   SubCUTAneous TIDAC    glucose chewable tablet 16 g  4 Tab Oral PRN    dextrose (D50W) injection syrg 12.5-25 g  12.5-25 g IntraVENous PRN    glucagon (GLUCAGEN) injection 1 mg  1 mg IntraMUSCular PRN    PHENobarbitaL (LUMINAL) tablet 32.4 mg  32.4 mg Oral Q8H    baclofen (LIORESAL) tablet 5 mg  5 mg Oral TID PRN    albuterol (PROVENTIL HFA, VENTOLIN HFA, PROAIR HFA) inhaler 2 Puff  2 Puff Inhalation Q4H PRN    influenza vaccine 2020-21 (6 mos+)(PF) (FLUARIX/FLULAVAL/FLUZONE QUAD) injection 0.5 mL  0.5 mL IntraMUSCular PRIOR TO DISCHARGE    nicotine (NICODERM CQ) 21 mg/24 hr patch 1 Patch  1 Patch TransDERmal DAILY    LORazepam (ATIVAN) injection 2 mg  2 mg IntraVENous Q1H PRN    LORazepam (ATIVAN) injection 4 mg  4 mg IntraVENous Q1H PRN    prochlorperazine (COMPAZINE) with saline injection 10 mg  10 mg IntraVENous Q6H PRN    0.9% sodium chloride with KCl 20 mEq/L infusion   IntraVENous CONTINUOUS    enoxaparin (LOVENOX) injection 30 mg  30 mg SubCUTAneous Q12H    acetaminophen (TYLENOL) tablet 650 mg  650 mg Oral Q6H PRN    Or    acetaminophen (TYLENOL) suppository 650 mg  650 mg Rectal Q6H PRN    cholecalciferol (VITAMIN D3) (1000 Units /25 mcg) tablet 2 Tab  2,000 Units Oral DAILY    ascorbic acid (vitamin C) (VITAMIN C) tablet 500 mg  500 mg Oral BID    zinc sulfate (ZINCATE) 220 (50) mg capsule 1 Cap  1 Cap Oral DAILY    labetaloL (NORMODYNE;TRANDATE) 20 mg/4 mL (5 mg/mL) injection 20 mg  20 mg IntraVENous Q4H PRN    hydrALAZINE (APRESOLINE) 20 mg/mL injection 20 mg  20 mg IntraVENous Q4H PRN    melatonin tablet 3 mg  3 mg Oral QHS PRN    alum-mag hydroxide-simeth (MYLANTA) oral suspension 30 mL  30 mL Oral Q4H PRN    diphenhydrAMINE (BENADRYL) injection 25 mg  25 mg IntraVENous Q6H PRN    diphenhydrAMINE (BENADRYL) capsule 25 mg  25 mg Oral Q6H PRN    hydrOXYzine (VISTARIL) 25 mg/mL injection 25 mg  25 mg IntraMUSCular Q6H PRN    simethicone (MYLICON) tablet 80 mg  80 mg Oral QID PRN          Lab Review:     Recent Labs     01/08/21 0328 01/07/21 0340 01/06/21  1417   WBC 2.8* 3.8* 6.2   HGB 12.2 12.4 13.8   HCT 34.0* 34.6* 37.1   PLT 92* 113* 126*     Recent Labs     01/08/21 0328 01/07/21  0340 01/06/21  1417   * 134* 134*   K 3.0* 3.3* 3.4*    101 97   CO2 28 27 23   * 158* 206*   BUN 7 13 12   CREA 0.72 0.80 0.84   CA 8.0* 8.2* 8.4*   MG 1.9 2.0  --    PHOS 2.7  --   --    ALB 3.0* 3.3* 3.7   TBILI 1.7* 2.1* 1.6*   ALT 30 34 40   INR  --   --  1.0     Lab Results   Component Value Date/Time    Glucose (POC) 203 (H) 01/08/2021 08:42 AM    Glucose (POC) 117 (H) 09/05/2017 01:17 PM          Assessment / Plan:     62 yo hx of HTN, etoh abuse, presented w/ dyspnea, etoh withdrawal, suicidal ideation    1) Alcohol withdrawal/abuse: still active. Cont CIWA, IV Ativan prn, phenorbarb. Switch goody bag to oral MVI, thiamine, folic acid    2) Depression/suicidal ideation: still active. Psych recommended inpatient psych once etoh w/d improves    3) Leukopenia/thrombocytopenia: due to etoh abuse. Will monitor     4) HypoK/phos: cont IV repletion     5) Chronic pain: cont pain control, muscle relaxants prn    6) New diabetes/hyperglycemia: A1C 8.1%. Will start glipizide, metformin.   Needs outpatient f/u     Total time spent with patient: 35 min  **I personally saw and examined the patient during this time period**                 Care Plan discussed with: Patient, nursing     Discussed:  Care Plan    Prophylaxis:  Lovenox    Disposition:  inpatient psych           ___________________________________________________    Attending Physician: Lindsay Jones MD

## 2023-02-22 RX ORDER — GABAPENTIN 300 MG/1
300 CAPSULE ORAL DAILY
Qty: 90 CAPSULE | Refills: 0 | Status: SHIPPED | OUTPATIENT
Start: 2023-02-22

## 2023-02-22 RX ORDER — HYDROXYZINE 50 MG/1
50 TABLET, FILM COATED ORAL
Qty: 30 TABLET | Refills: 2 | Status: SHIPPED | OUTPATIENT
Start: 2023-02-22

## 2023-03-06 RX ORDER — BUSPIRONE HYDROCHLORIDE 7.5 MG/1
TABLET ORAL
Qty: 270 TABLET | Refills: 0 | Status: SHIPPED | OUTPATIENT
Start: 2023-03-06

## 2023-03-28 DIAGNOSIS — I10 ESSENTIAL HYPERTENSION: ICD-10-CM

## 2023-03-28 DIAGNOSIS — E11.9 TYPE 2 DIABETES MELLITUS WITHOUT COMPLICATION, WITHOUT LONG-TERM CURRENT USE OF INSULIN (HCC): ICD-10-CM

## 2023-03-28 RX ORDER — LISINOPRIL 20 MG/1
20 TABLET ORAL DAILY
Qty: 30 TABLET | Refills: 2 | Status: SHIPPED | OUTPATIENT
Start: 2023-03-28

## 2023-03-28 RX ORDER — METFORMIN HYDROCHLORIDE 1000 MG/1
TABLET ORAL
Qty: 60 TABLET | Refills: 2 | Status: SHIPPED | OUTPATIENT
Start: 2023-03-28

## 2023-03-28 RX ORDER — METOPROLOL TARTRATE 50 MG/1
TABLET ORAL
Qty: 60 TABLET | Refills: 2 | Status: SHIPPED | OUTPATIENT
Start: 2023-03-28

## 2023-03-28 RX ORDER — TRAZODONE HYDROCHLORIDE 50 MG/1
TABLET ORAL
Qty: 30 TABLET | Refills: 5 | Status: SHIPPED | OUTPATIENT
Start: 2023-03-28

## 2023-04-17 DIAGNOSIS — G62.9 NEUROPATHY: ICD-10-CM

## 2023-04-17 RX ORDER — GABAPENTIN 300 MG/1
300 CAPSULE ORAL DAILY
Qty: 90 CAPSULE | Refills: 0 | Status: SHIPPED | OUTPATIENT
Start: 2023-04-17

## 2023-05-17 ENCOUNTER — NURSE ONLY (OUTPATIENT)
Age: 58
End: 2023-05-17

## 2023-05-17 ENCOUNTER — OFFICE VISIT (OUTPATIENT)
Age: 58
End: 2023-05-17
Payer: MEDICAID

## 2023-05-17 VITALS
DIASTOLIC BLOOD PRESSURE: 78 MMHG | SYSTOLIC BLOOD PRESSURE: 124 MMHG | HEIGHT: 74 IN | RESPIRATION RATE: 16 BRPM | HEART RATE: 87 BPM | WEIGHT: 224 LBS | OXYGEN SATURATION: 98 % | TEMPERATURE: 97.6 F | BODY MASS INDEX: 28.75 KG/M2

## 2023-05-17 DIAGNOSIS — Z11.59 ENCOUNTER FOR HEPATITIS C SCREENING TEST FOR LOW RISK PATIENT: ICD-10-CM

## 2023-05-17 DIAGNOSIS — Z11.4 SCREENING FOR HIV (HUMAN IMMUNODEFICIENCY VIRUS): ICD-10-CM

## 2023-05-17 DIAGNOSIS — Z13.220 SCREENING FOR CHOLESTEROL LEVEL: ICD-10-CM

## 2023-05-17 DIAGNOSIS — E11.9 TYPE 2 DIABETES MELLITUS WITHOUT COMPLICATION, WITHOUT LONG-TERM CURRENT USE OF INSULIN (HCC): ICD-10-CM

## 2023-05-17 DIAGNOSIS — E11.9 TYPE 2 DIABETES MELLITUS WITHOUT COMPLICATION, WITHOUT LONG-TERM CURRENT USE OF INSULIN (HCC): Primary | ICD-10-CM

## 2023-05-17 PROCEDURE — 3078F DIAST BP <80 MM HG: CPT | Performed by: NURSE PRACTITIONER

## 2023-05-17 PROCEDURE — 99213 OFFICE O/P EST LOW 20 MIN: CPT | Performed by: NURSE PRACTITIONER

## 2023-05-17 PROCEDURE — 3074F SYST BP LT 130 MM HG: CPT | Performed by: NURSE PRACTITIONER

## 2023-05-17 RX ORDER — BUSPIRONE HYDROCHLORIDE 7.5 MG/1
TABLET ORAL
COMMUNITY
Start: 2023-03-03

## 2023-05-17 RX ORDER — GABAPENTIN 300 MG/1
CAPSULE ORAL
COMMUNITY
Start: 2023-04-20

## 2023-05-17 RX ORDER — DICLOFENAC SODIUM 75 MG/1
75 TABLET, DELAYED RELEASE ORAL 2 TIMES DAILY
COMMUNITY
Start: 2023-05-05

## 2023-05-17 RX ORDER — BUDESONIDE, GLYCOPYRROLATE, AND FORMOTEROL FUMARATE 160; 9; 4.8 UG/1; UG/1; UG/1
AEROSOL, METERED RESPIRATORY (INHALATION)
COMMUNITY
Start: 2023-02-03

## 2023-05-17 RX ORDER — MULTIVITAMIN,THERAPEUTIC
1 TABLET ORAL DAILY
COMMUNITY

## 2023-05-17 RX ORDER — OMEPRAZOLE 40 MG/1
40 CAPSULE, DELAYED RELEASE ORAL
COMMUNITY
Start: 2021-01-17 | End: 2023-05-17

## 2023-05-17 RX ORDER — HYDROXYZINE 50 MG/1
TABLET, FILM COATED ORAL
COMMUNITY
Start: 2023-02-22

## 2023-05-17 RX ORDER — ALBUTEROL SULFATE 90 UG/1
2 AEROSOL, METERED RESPIRATORY (INHALATION) EVERY 6 HOURS PRN
COMMUNITY
Start: 2022-01-20

## 2023-05-17 RX ORDER — TRAZODONE HYDROCHLORIDE 50 MG/1
50 TABLET ORAL NIGHTLY
COMMUNITY
Start: 2023-03-28

## 2023-05-17 SDOH — ECONOMIC STABILITY: HOUSING INSECURITY
IN THE LAST 12 MONTHS, WAS THERE A TIME WHEN YOU DID NOT HAVE A STEADY PLACE TO SLEEP OR SLEPT IN A SHELTER (INCLUDING NOW)?: NO

## 2023-05-17 SDOH — ECONOMIC STABILITY: INCOME INSECURITY: HOW HARD IS IT FOR YOU TO PAY FOR THE VERY BASICS LIKE FOOD, HOUSING, MEDICAL CARE, AND HEATING?: NOT HARD AT ALL

## 2023-05-17 SDOH — ECONOMIC STABILITY: FOOD INSECURITY: WITHIN THE PAST 12 MONTHS, YOU WORRIED THAT YOUR FOOD WOULD RUN OUT BEFORE YOU GOT MONEY TO BUY MORE.: NEVER TRUE

## 2023-05-17 SDOH — ECONOMIC STABILITY: FOOD INSECURITY: WITHIN THE PAST 12 MONTHS, THE FOOD YOU BOUGHT JUST DIDN'T LAST AND YOU DIDN'T HAVE MONEY TO GET MORE.: NEVER TRUE

## 2023-05-17 ASSESSMENT — PATIENT HEALTH QUESTIONNAIRE - PHQ9
4. FEELING TIRED OR HAVING LITTLE ENERGY: 0
SUM OF ALL RESPONSES TO PHQ QUESTIONS 1-9: 0
SUM OF ALL RESPONSES TO PHQ QUESTIONS 1-9: 0
SUM OF ALL RESPONSES TO PHQ9 QUESTIONS 1 & 2: 0
7. TROUBLE CONCENTRATING ON THINGS, SUCH AS READING THE NEWSPAPER OR WATCHING TELEVISION: 0
SUM OF ALL RESPONSES TO PHQ QUESTIONS 1-9: 0
2. FEELING DOWN, DEPRESSED OR HOPELESS: 0
SUM OF ALL RESPONSES TO PHQ QUESTIONS 1-9: 0
3. TROUBLE FALLING OR STAYING ASLEEP: 0
5. POOR APPETITE OR OVEREATING: 0
6. FEELING BAD ABOUT YOURSELF - OR THAT YOU ARE A FAILURE OR HAVE LET YOURSELF OR YOUR FAMILY DOWN: 0
SUM OF ALL RESPONSES TO PHQ9 QUESTIONS 1 & 2: 0
8. MOVING OR SPEAKING SO SLOWLY THAT OTHER PEOPLE COULD HAVE NOTICED. OR THE OPPOSITE, BEING SO FIGETY OR RESTLESS THAT YOU HAVE BEEN MOVING AROUND A LOT MORE THAN USUAL: 0
SUM OF ALL RESPONSES TO PHQ QUESTIONS 1-9: 0
2. FEELING DOWN, DEPRESSED OR HOPELESS: 0
1. LITTLE INTEREST OR PLEASURE IN DOING THINGS: 0
9. THOUGHTS THAT YOU WOULD BE BETTER OFF DEAD, OR OF HURTING YOURSELF: 0
1. LITTLE INTEREST OR PLEASURE IN DOING THINGS: 0

## 2023-05-17 NOTE — PROGRESS NOTES
Subjective:       Sabra Roca is an 61 y.o. male who presents for follow up of diabetes. Current symptoms include: none. Patient denies visual disturbances. Evaluation to date has included: fasting blood sugar, fasting lipid panel, and hemoglobin A1C. Home sugars: patient does not check sugars. Current treatments: no recent interventions. Patient's medications, allergies, past medical, surgical, social and family histories were reviewed and updated as appropriate. Review of Systems  Pertinent items are noted in HPI. Pt continued to deal with right foot pain and has been seen by podiatrist and ortho. He is taking Voltaren as needed for the pain, which has worked very well. He stopped his BP medication as his \"bp is always perfect at other doctors appointments\". On re-check of BP, BP is stable. Objective:      Head: Normocephalic, without obvious abnormality, atraumatic  Lungs: clear to auscultation bilaterally  Heart: regular rate and rhythm, S1, S2 normal, no murmur, click, rub or gallop    Laboratory:  No components found for: A1C      Assessment:      Diabetes mellitus Type II, under good control. Plan:      Addressed ADA diet. Discussed foot care. Diagnosis Orders   1. Type 2 diabetes mellitus without complication, without long-term current use of insulin (HCC)  CBC    Comprehensive Metabolic Panel    Hemoglobin A1C    Microalbumin / Creatinine Urine Ratio      2. Screening for cholesterol level  Lipid Panel      3. Encounter for hepatitis C screening test for low risk patient  Hepatitis C Antibody      4. Screening for HIV (human immunodeficiency virus)  HIV 1/2 Ag/Ab, 4TH Generation,W Rflx Confirm        Will notify when labs return, and inform him of any change in plan of care at that time    Pt informed to return to office with worsening of symptoms, or PRN with any questions or concerns.   Pt verbalizes understanding of plan of care and denies further questions or concerns at

## 2023-05-17 NOTE — PROGRESS NOTES
Identified pt with two pt identifiers(name and ). Chief Complaint   Patient presents with    Follow-up     Diabetes    Foot Pain     Saw Ortho         Health Maintenance Due   Topic    COVID-19 Vaccine (1)    Pneumococcal 0-64 years Vaccine (1 - PCV)    Diabetic foot exam     HIV screen     Diabetic retinal exam     Hepatitis C screen     Hepatitis B vaccine (1 of 3 - Risk 3-dose series)    DTaP/Tdap/Td vaccine (1 - Tdap)    Shingles vaccine (1 of 2)    Lipids     Diabetic Alb to Cr ratio (uACR) test        Wt Readings from Last 3 Encounters:   23 224 lb (101.6 kg)   02/10/23 232 lb 3.2 oz (105.3 kg)   22 232 lb (105.2 kg)     Temp Readings from Last 3 Encounters:   23 97.6 °F (36.4 °C) (Temporal)     BP Readings from Last 3 Encounters:   23 (!) 146/80   02/10/23 138/80   22 128/88     Pulse Readings from Last 3 Encounters:   23 87   02/10/23 73   22 78           Depression Screening:  :     PHQ-9 Questionaire 2023 2023 2/10/2023 2022 2022 2022 2022   Little interest or pleasure in doing things 0 0 0 0 0 0 0   Feeling down, depressed, or hopeless 0 0 0 0 0 0 0   Trouble falling or staying asleep, or sleeping too much 0 - 0 - - - -   Feeling tired or having little energy 0 - 0 - - - -   Poor appetite or overeating 0 - 0 - - - -   Feeling bad about yourself - or that you are a failure or have let yourself or your family down 0 - 0 - - - -   Trouble concentrating on things, such as reading the newspaper or watching television 0 - 0 - - - -   Moving or speaking so slowly that other people could have noticed. Or the opposite - being so fidgety or restless that you have been moving around a lot more than usual 0 - 0 - - - -   Thoughts that you would be better off dead, or of hurting yourself in some way 0 - - - - - -   PHQ-9 Total Score 0 0 0 0 0 0 0        Fall Risk Assessment:  :   No flowsheet data found.      Abuse Screening:  :   No

## 2023-05-18 ENCOUNTER — TELEPHONE (OUTPATIENT)
Age: 58
End: 2023-05-18

## 2023-05-18 LAB
ALBUMIN SERPL-MCNC: 4.8 G/DL (ref 3.8–4.9)
ALBUMIN/CREAT UR: 14 MG/G CREAT (ref 0–29)
ALBUMIN/GLOB SERPL: 2.3 {RATIO} (ref 1.2–2.2)
ALP SERPL-CCNC: 60 IU/L (ref 44–121)
ALT SERPL-CCNC: 21 IU/L (ref 0–44)
AST SERPL-CCNC: 22 IU/L (ref 0–40)
BILIRUB SERPL-MCNC: 0.6 MG/DL (ref 0–1.2)
BUN SERPL-MCNC: 13 MG/DL (ref 6–24)
BUN/CREAT SERPL: 12 (ref 9–20)
CALCIUM SERPL-MCNC: 9.9 MG/DL (ref 8.7–10.2)
CHLORIDE SERPL-SCNC: 104 MMOL/L (ref 96–106)
CHOLEST SERPL-MCNC: 195 MG/DL (ref 100–199)
CO2 SERPL-SCNC: 16 MMOL/L (ref 20–29)
CREAT SERPL-MCNC: 1.13 MG/DL (ref 0.76–1.27)
CREAT UR-MCNC: 146.3 MG/DL
EGFRCR SERPLBLD CKD-EPI 2021: 75 ML/MIN/1.73
ERYTHROCYTE [DISTWIDTH] IN BLOOD BY AUTOMATED COUNT: 11.9 % (ref 11.6–15.4)
GLOBULIN SER CALC-MCNC: 2.1 G/DL (ref 1.5–4.5)
GLUCOSE SERPL-MCNC: 110 MG/DL (ref 70–99)
HBA1C MFR BLD: 5.7 % (ref 4.8–5.6)
HCT VFR BLD AUTO: 48.6 % (ref 37.5–51)
HCV IGG SERPL QL IA: NON REACTIVE
HDLC SERPL-MCNC: 42 MG/DL
HGB BLD-MCNC: 16.6 G/DL (ref 13–17.7)
HIV 1+2 AB+HIV1 P24 AG SERPL QL IA: NON REACTIVE
LDLC SERPL CALC-MCNC: 125 MG/DL (ref 0–99)
MCH RBC QN AUTO: 32 PG (ref 26.6–33)
MCHC RBC AUTO-ENTMCNC: 34.2 G/DL (ref 31.5–35.7)
MCV RBC AUTO: 94 FL (ref 79–97)
MICROALBUMIN UR-MCNC: 20.3 UG/ML
PLATELET # BLD AUTO: 178 X10E3/UL (ref 150–450)
POTASSIUM SERPL-SCNC: 4.4 MMOL/L (ref 3.5–5.2)
PROT SERPL-MCNC: 6.9 G/DL (ref 6–8.5)
RBC # BLD AUTO: 5.19 X10E6/UL (ref 4.14–5.8)
SODIUM SERPL-SCNC: 140 MMOL/L (ref 134–144)
TRIGL SERPL-MCNC: 158 MG/DL (ref 0–149)
VLDLC SERPL CALC-MCNC: 28 MG/DL (ref 5–40)
WBC # BLD AUTO: 6.3 X10E3/UL (ref 3.4–10.8)

## 2023-05-18 RX ORDER — ATORVASTATIN CALCIUM 10 MG/1
10 TABLET, FILM COATED ORAL DAILY
Qty: 90 TABLET | Refills: 1 | Status: SHIPPED | OUTPATIENT
Start: 2023-05-18

## 2023-05-18 NOTE — TELEPHONE ENCOUNTER
----- Message from UMANG Payan NP sent at 5/18/2023 10:03 AM EDT -----  Please call patient and let him know that his labs returned. Cholesterol is elevated. Which his diagnosis of diabetes, I would highly advise low dose statin. This will decrease risk of heart attack and stroke. I have sent this to pharmacy, and he should take in addition to making diet changes.   Should return for office visit and fasting labs in 6 months  Thanks

## 2023-05-19 LAB — IMP & REVIEW OF LAB RESULTS: NORMAL

## 2023-05-25 ENCOUNTER — TELEPHONE (OUTPATIENT)
Age: 58
End: 2023-05-25

## 2023-06-08 ENCOUNTER — OFFICE VISIT (OUTPATIENT)
Age: 58
End: 2023-06-08
Payer: MEDICAID

## 2023-06-08 VITALS
RESPIRATION RATE: 16 BRPM | OXYGEN SATURATION: 96 % | DIASTOLIC BLOOD PRESSURE: 76 MMHG | HEIGHT: 74 IN | WEIGHT: 213 LBS | TEMPERATURE: 97.8 F | HEART RATE: 96 BPM | SYSTOLIC BLOOD PRESSURE: 126 MMHG | BODY MASS INDEX: 27.34 KG/M2

## 2023-06-08 DIAGNOSIS — C44.619 BASAL CELL CARCINOMA (BCC) OF SKIN OF LEFT UPPER EXTREMITY INCLUDING SHOULDER: Primary | ICD-10-CM

## 2023-06-08 DIAGNOSIS — E11.9 TYPE 2 DIABETES MELLITUS WITHOUT COMPLICATION, WITHOUT LONG-TERM CURRENT USE OF INSULIN (HCC): ICD-10-CM

## 2023-06-08 DIAGNOSIS — Z48.02 VISIT FOR SUTURE REMOVAL: ICD-10-CM

## 2023-06-08 PROCEDURE — 3074F SYST BP LT 130 MM HG: CPT | Performed by: NURSE PRACTITIONER

## 2023-06-08 PROCEDURE — 99213 OFFICE O/P EST LOW 20 MIN: CPT | Performed by: NURSE PRACTITIONER

## 2023-06-08 PROCEDURE — 3044F HG A1C LEVEL LT 7.0%: CPT | Performed by: NURSE PRACTITIONER

## 2023-06-08 PROCEDURE — 3078F DIAST BP <80 MM HG: CPT | Performed by: NURSE PRACTITIONER

## 2023-06-08 ASSESSMENT — PATIENT HEALTH QUESTIONNAIRE - PHQ9
SUM OF ALL RESPONSES TO PHQ QUESTIONS 1-9: 0
SUM OF ALL RESPONSES TO PHQ9 QUESTIONS 1 & 2: 0
4. FEELING TIRED OR HAVING LITTLE ENERGY: 0
SUM OF ALL RESPONSES TO PHQ QUESTIONS 1-9: 0
SUM OF ALL RESPONSES TO PHQ QUESTIONS 1-9: 0
7. TROUBLE CONCENTRATING ON THINGS, SUCH AS READING THE NEWSPAPER OR WATCHING TELEVISION: 0
SUM OF ALL RESPONSES TO PHQ QUESTIONS 1-9: 0
3. TROUBLE FALLING OR STAYING ASLEEP: 0
1. LITTLE INTEREST OR PLEASURE IN DOING THINGS: 0
SUM OF ALL RESPONSES TO PHQ QUESTIONS 1-9: 0
SUM OF ALL RESPONSES TO PHQ QUESTIONS 1-9: 0
2. FEELING DOWN, DEPRESSED OR HOPELESS: 0
1. LITTLE INTEREST OR PLEASURE IN DOING THINGS: 0
2. FEELING DOWN, DEPRESSED OR HOPELESS: 0
9. THOUGHTS THAT YOU WOULD BE BETTER OFF DEAD, OR OF HURTING YOURSELF: 0
SUM OF ALL RESPONSES TO PHQ QUESTIONS 1-9: 0
SUM OF ALL RESPONSES TO PHQ9 QUESTIONS 1 & 2: 0
5. POOR APPETITE OR OVEREATING: 0
8. MOVING OR SPEAKING SO SLOWLY THAT OTHER PEOPLE COULD HAVE NOTICED. OR THE OPPOSITE, BEING SO FIGETY OR RESTLESS THAT YOU HAVE BEEN MOVING AROUND A LOT MORE THAN USUAL: 0
SUM OF ALL RESPONSES TO PHQ QUESTIONS 1-9: 0
6. FEELING BAD ABOUT YOURSELF - OR THAT YOU ARE A FAILURE OR HAVE LET YOURSELF OR YOUR FAMILY DOWN: 0
10. IF YOU CHECKED OFF ANY PROBLEMS, HOW DIFFICULT HAVE THESE PROBLEMS MADE IT FOR YOU TO DO YOUR WORK, TAKE CARE OF THINGS AT HOME, OR GET ALONG WITH OTHER PEOPLE: 0

## 2023-06-08 NOTE — PROGRESS NOTES
Subjective:      Patient ID: Laurie Odell is a 60 y.o. male. HPI  Pt presents with \"suture removal\"    Pt had a basal cell carcinoma removed from his left shoulder on 5/22 at [facility] derm  He was supposed to have the sutures removed today by the derm office, but they had to cancel his appointment  He is here today for removal  He has no bleeding or drainage noted from the area  No fever    In addition, he is requesting referral to eye doctor for visual changes. Review of Systems   Constitutional:  Negative for fatigue. Objective:   Physical Exam  Constitutional:       Appearance: Normal appearance. Pulmonary:      Effort: Pulmonary effort is normal.   Skin:     General: Skin is warm and dry. Comments: Sutures removed without difficulty. No bleeding, no erythema, etc.   Neurological:      Mental Status: He is alert. Psychiatric:         Mood and Affect: Mood normal.         Behavior: Behavior normal.       Assessment / Plan:       Diagnosis Orders   1. Basal cell carcinoma (BCC) of skin of left upper extremity including shoulder        2. Visit for suture removal        3. Type 2 diabetes mellitus without complication, without long-term current use of insulin [facility])  External Referral To Ophthalmology        Pt informed to return to office with worsening of symptoms, or PRN with any questions or concerns. Pt verbalizes understanding of plan of care and denies further questions or concerns at this time.
found.     Abuse Screening:  :   No flowsheet data found. Coordination of Care Questionnaire:  :     1. \"Have you been to the ER, urgent care clinic since your last visit? Hospitalized since your last visit? \" no    2. \"Have you seen or consulted any other health care providers outside of the [address] [place] [address] since your last visit? \" no     3. This patient is accompanied in the office by his self. 4. For patients aged 39-70: Has the patient had a colonoscopy / FIT/ Cologuard? Yes - no Care Gap present      If the patient is female:    5. For patients aged 41-77: Has the patient had a mammogram within the past 2 years? NA - based on age or sex      10. For patients aged 21-65: Has the patient had a pap smear?  NA - based on age or sex

## 2023-09-18 DIAGNOSIS — G62.9 POLYNEUROPATHY, UNSPECIFIED: Primary | ICD-10-CM

## 2023-09-18 DIAGNOSIS — F41.9 ANXIETY DISORDER, UNSPECIFIED TYPE: ICD-10-CM

## 2023-09-18 RX ORDER — BUSPIRONE HYDROCHLORIDE 7.5 MG/1
TABLET ORAL
Status: CANCELLED | OUTPATIENT
Start: 2023-09-18

## 2023-09-18 RX ORDER — GABAPENTIN 300 MG/1
CAPSULE ORAL
Qty: 90 CAPSULE | Status: CANCELLED | OUTPATIENT
Start: 2023-09-18

## 2023-09-18 RX ORDER — GABAPENTIN 300 MG/1
CAPSULE ORAL
Qty: 90 CAPSULE | Refills: 0 | Status: SHIPPED | OUTPATIENT
Start: 2023-09-18 | End: 2023-11-07 | Stop reason: SDUPTHER

## 2023-09-18 RX ORDER — HYDROXYZINE 50 MG/1
TABLET, FILM COATED ORAL
Status: CANCELLED | OUTPATIENT
Start: 2023-09-18

## 2023-09-18 RX ORDER — BUSPIRONE HYDROCHLORIDE 7.5 MG/1
TABLET ORAL
Qty: 225 TABLET | Refills: 0 | Status: SHIPPED | OUTPATIENT
Start: 2023-09-18 | End: 2023-10-16 | Stop reason: SDUPTHER

## 2023-09-18 RX ORDER — HYDROXYZINE 50 MG/1
50 TABLET, FILM COATED ORAL
Qty: 90 TABLET | Refills: 0 | Status: SHIPPED | OUTPATIENT
Start: 2023-09-18 | End: 2023-11-07 | Stop reason: SDUPTHER

## 2023-09-18 NOTE — TELEPHONE ENCOUNTER
busPIRone (BUSPAR) 7.5 MG tablet  gabapentin (NEURONTIN) 300 MG capsule  hydrOXYzine HCl (ATARAX) 50 MG tablet    Pt is scheduled for 9/22    Please send to [address] [place], [place] [address] [address] [place] [address] [phone] - F [phone]

## 2023-10-13 DIAGNOSIS — F41.9 ANXIETY DISORDER, UNSPECIFIED TYPE: ICD-10-CM

## 2023-10-13 NOTE — TELEPHONE ENCOUNTER
Refill requested for busPIRone (BUSPAR) 7.5 MG tablet    [employer] [facility] - [address] [facility] [address], [place] - [address] [facility] - P [phone] - F [phone]

## 2023-10-16 RX ORDER — BUSPIRONE HYDROCHLORIDE 7.5 MG/1
TABLET ORAL
Qty: 225 TABLET | Refills: 2 | Status: SHIPPED | OUTPATIENT
Start: 2023-10-16

## 2023-10-24 NOTE — TELEPHONE ENCOUNTER
metFORMIN (GLUCOPHAGE) 1000 MG tablet    [address] [facility] [address] [facility] [address] [place] [phone] - F [phone]

## 2023-10-24 NOTE — TELEPHONE ENCOUNTER
LOV with Yajaira Armstrong 6/8/23. Please call pt to advise that Jaylin Mitchell has left practice. Give  number to call to select new PCP.

## 2023-11-06 ENCOUNTER — OFFICE VISIT (OUTPATIENT)
Age: 58
End: 2023-11-06
Payer: MEDICAID

## 2023-11-06 VITALS
TEMPERATURE: 98.1 F | BODY MASS INDEX: 27.67 KG/M2 | RESPIRATION RATE: 16 BRPM | DIASTOLIC BLOOD PRESSURE: 76 MMHG | OXYGEN SATURATION: 97 % | HEART RATE: 72 BPM | SYSTOLIC BLOOD PRESSURE: 138 MMHG | HEIGHT: 73 IN | WEIGHT: 208.8 LBS

## 2023-11-06 DIAGNOSIS — Z12.5 SCREENING FOR MALIGNANT NEOPLASM OF PROSTATE: ICD-10-CM

## 2023-11-06 DIAGNOSIS — E11.9 TYPE 2 DIABETES MELLITUS WITHOUT COMPLICATION, WITHOUT LONG-TERM CURRENT USE OF INSULIN (HCC): Primary | ICD-10-CM

## 2023-11-06 DIAGNOSIS — F41.9 ANXIETY DISORDER, UNSPECIFIED TYPE: ICD-10-CM

## 2023-11-06 DIAGNOSIS — G62.9 POLYNEUROPATHY, UNSPECIFIED: ICD-10-CM

## 2023-11-06 PROCEDURE — 3044F HG A1C LEVEL LT 7.0%: CPT | Performed by: FAMILY MEDICINE

## 2023-11-06 PROCEDURE — 3078F DIAST BP <80 MM HG: CPT | Performed by: FAMILY MEDICINE

## 2023-11-06 PROCEDURE — 3074F SYST BP LT 130 MM HG: CPT | Performed by: FAMILY MEDICINE

## 2023-11-06 PROCEDURE — 99214 OFFICE O/P EST MOD 30 MIN: CPT | Performed by: FAMILY MEDICINE

## 2023-11-06 RX ORDER — FLUTICASONE PROPIONATE 50 MCG
SPRAY, SUSPENSION (ML) NASAL
COMMUNITY
Start: 2023-10-20 | End: 2023-11-06

## 2023-11-07 RX ORDER — GABAPENTIN 300 MG/1
CAPSULE ORAL
Qty: 90 CAPSULE | Refills: 0 | Status: SHIPPED | OUTPATIENT
Start: 2023-11-07 | End: 2024-01-31

## 2023-11-07 RX ORDER — HYDROXYZINE 50 MG/1
50 TABLET, FILM COATED ORAL
Qty: 90 TABLET | Refills: 0 | Status: SHIPPED | OUTPATIENT
Start: 2023-11-07

## 2023-11-08 ENCOUNTER — NURSE ONLY (OUTPATIENT)
Age: 58
End: 2023-11-08

## 2023-11-08 DIAGNOSIS — E11.9 TYPE 2 DIABETES MELLITUS WITHOUT COMPLICATION, WITHOUT LONG-TERM CURRENT USE OF INSULIN (HCC): ICD-10-CM

## 2023-11-08 DIAGNOSIS — Z12.5 SCREENING FOR MALIGNANT NEOPLASM OF PROSTATE: ICD-10-CM

## 2023-11-17 LAB
ALBUMIN SERPL-MCNC: 5 G/DL (ref 3.8–4.9)
ALBUMIN/GLOB SERPL: 2.4 {RATIO} (ref 1.2–2.2)
ALP SERPL-CCNC: 75 IU/L (ref 44–121)
ALT SERPL-CCNC: 13 IU/L (ref 0–44)
AST SERPL-CCNC: 17 IU/L (ref 0–40)
BASOPHILS # BLD AUTO: 0.1 X10E3/UL (ref 0–0.2)
BASOPHILS NFR BLD AUTO: 1 %
BILIRUB SERPL-MCNC: 1.2 MG/DL (ref 0–1.2)
BUN SERPL-MCNC: 13 MG/DL (ref 6–24)
BUN/CREAT SERPL: 11 (ref 9–20)
CALCIUM SERPL-MCNC: 9.6 MG/DL (ref 8.7–10.2)
CHLORIDE SERPL-SCNC: 103 MMOL/L (ref 96–106)
CO2 SERPL-SCNC: 23 MMOL/L (ref 20–29)
CREAT SERPL-MCNC: 1.19 MG/DL (ref 0.76–1.27)
EGFRCR SERPLBLD CKD-EPI 2021: 70 ML/MIN/1.73
EOSINOPHIL # BLD AUTO: 0.4 X10E3/UL (ref 0–0.4)
EOSINOPHIL NFR BLD AUTO: 4 %
ERYTHROCYTE [DISTWIDTH] IN BLOOD BY AUTOMATED COUNT: 12.3 % (ref 11.6–15.4)
GLOBULIN SER CALC-MCNC: 2.1 G/DL (ref 1.5–4.5)
GLUCOSE SERPL-MCNC: 98 MG/DL (ref 70–99)
HBA1C MFR BLD: 5.9 % (ref 4.8–5.6)
HCT VFR BLD AUTO: 48.6 % (ref 37.5–51)
HGB BLD-MCNC: 16.5 G/DL (ref 13–17.7)
IMM GRANULOCYTES # BLD AUTO: 0 X10E3/UL (ref 0–0.1)
IMM GRANULOCYTES NFR BLD AUTO: 0 %
LYMPHOCYTES # BLD AUTO: 3.1 X10E3/UL (ref 0.7–3.1)
LYMPHOCYTES NFR BLD AUTO: 37 %
MCH RBC QN AUTO: 31.5 PG (ref 26.6–33)
MCHC RBC AUTO-ENTMCNC: 34 G/DL (ref 31.5–35.7)
MCV RBC AUTO: 93 FL (ref 79–97)
MONOCYTES # BLD AUTO: 0.7 X10E3/UL (ref 0.1–0.9)
MONOCYTES NFR BLD AUTO: 8 %
NEUTROPHILS # BLD AUTO: 4.2 X10E3/UL (ref 1.4–7)
NEUTROPHILS NFR BLD AUTO: 50 %
PLATELET # BLD AUTO: 218 X10E3/UL (ref 150–450)
POTASSIUM SERPL-SCNC: 4.6 MMOL/L (ref 3.5–5.2)
PROT SERPL-MCNC: 7.1 G/DL (ref 6–8.5)
PSA SERPL-MCNC: 0.7 NG/ML (ref 0–4)
RBC # BLD AUTO: 5.24 X10E6/UL (ref 4.14–5.8)
SODIUM SERPL-SCNC: 143 MMOL/L (ref 134–144)
WBC # BLD AUTO: 8.4 X10E3/UL (ref 3.4–10.8)

## 2023-12-01 DIAGNOSIS — F41.9 ANXIETY DISORDER, UNSPECIFIED TYPE: ICD-10-CM

## 2023-12-01 DIAGNOSIS — G62.9 POLYNEUROPATHY, UNSPECIFIED: ICD-10-CM

## 2023-12-01 RX ORDER — GABAPENTIN 300 MG/1
CAPSULE ORAL
Qty: 90 CAPSULE | Refills: 0 | Status: SHIPPED | OUTPATIENT
Start: 2023-12-01 | End: 2024-02-24

## 2023-12-01 RX ORDER — HYDROXYZINE 50 MG/1
50 TABLET, FILM COATED ORAL
Qty: 90 TABLET | Refills: 0 | OUTPATIENT
Start: 2023-12-01

## 2023-12-01 NOTE — TELEPHONE ENCOUNTER
Patient would like 90 day supplies of his prescriptions or have multiple refills. He is having trouble lately with his medications ever since Jassi Ramirez left and he feels like he is constantly going to the pharmacy.  He also wants to know if he still needs to take the atorvastatin for high cholesterol.            gabapentin (NEURONTIN) 300 MG capsule  hydrOXYzine HCl (ATARAX) 50 MG tablet

## 2024-02-03 ENCOUNTER — HOSPITAL ENCOUNTER (EMERGENCY)
Facility: HOSPITAL | Age: 59
Discharge: HOME | End: 2024-02-03
Attending: STUDENT IN AN ORGANIZED HEALTH CARE EDUCATION/TRAINING PROGRAM
Payer: MEDICAID

## 2024-02-03 ENCOUNTER — APPOINTMENT (OUTPATIENT)
Facility: HOSPITAL | Age: 59
End: 2024-02-03
Payer: MEDICAID

## 2024-02-03 VITALS
BODY MASS INDEX: 27.76 KG/M2 | HEIGHT: 73 IN | DIASTOLIC BLOOD PRESSURE: 75 MMHG | TEMPERATURE: 98.6 F | HEART RATE: 78 BPM | SYSTOLIC BLOOD PRESSURE: 136 MMHG | OXYGEN SATURATION: 97 % | WEIGHT: 209.44 LBS | RESPIRATION RATE: 16 BRPM

## 2024-02-03 DIAGNOSIS — S99.922A INJURY OF LEFT FOOT, INITIAL ENCOUNTER: Primary | ICD-10-CM

## 2024-02-03 DIAGNOSIS — S92.512A CLOSED DISPLACED FRACTURE OF PROXIMAL PHALANX OF LESSER TOE OF LEFT FOOT, INITIAL ENCOUNTER: ICD-10-CM

## 2024-02-03 PROCEDURE — 73630 X-RAY EXAM OF FOOT: CPT

## 2024-02-03 PROCEDURE — 99283 EMERGENCY DEPT VISIT LOW MDM: CPT

## 2024-02-03 ASSESSMENT — PAIN - FUNCTIONAL ASSESSMENT: PAIN_FUNCTIONAL_ASSESSMENT: NONE - DENIES PAIN

## 2024-02-03 NOTE — ED NOTES
Patient fifth and fourth toe anayeli taped, walking boot provided, and patient ambulatory out of ED with steady gait. Patient discharged to home, and understanding of discharge instructions and follow up care.

## 2024-02-03 NOTE — ED PROVIDER NOTES
[facility] EMERGENCY DEPT  EMERGENCY DEPARTMENT ENCOUNTER      Pt Name: Marc Abdullahi  MRN: [id number]  Birthdate 1965  Date of evaluation: 2/3/2024  Provider: Nayely Torres DO    CHIEF COMPLAINT       Chief Complaint   Patient presents with    Foot Injury     left         HISTORY OF PRESENT ILLNESS    HPI    Marc Abdullahi is a 58 y.o. male with a history of diabetes complicated by neuropathy, hypertension who presents to the emergency department for evaluation of left foot injury.  Patient reports 2 nights ago he was walking in his bedroom when he struck his foot on the bed.  Due to his neuropathy he did not have any pain.  Last night noticed swelling and bruising to the foot, felt it was worse this morning therefore he comes in for evaluation.    Nursing Notes were reviewed.    REVIEW OF SYSTEMS       Review of Systems   Constitutional:  Negative for fever.   Musculoskeletal:  Positive for arthralgias.   Neurological:  Positive for numbness. Negative for speech difficulty.           PAST MEDICAL HISTORY     Past Medical History:   Diagnosis Date    Alcohol abuse     Alcoholism ([facility])     Anxiety     COPD (chronic obstructive pulmonary disease) ([facility])     Dr. Coppola is pulmonologist    Depression     Diabetes ([facility])     Type II    Diverticulitis     Family history of angina     GERD (gastroesophageal reflux disease)     Hemochromatosis     Hypertension          SURGICAL HISTORY       Past Surgical History:   Procedure Laterality Date    CERVICAL FUSION  03/02/2021    ACDF C4/7    COLONOSCOPY N/A 9/6/2017    COLONOSCOPY performed by Stefan Gandara MD at [facility] ENDOSCOPY    TOTAL COLECTOMY           CURRENT MEDICATIONS       Discharge Medication List as of 2/3/2024  1:21 PM        CONTINUE these medications which have NOT CHANGED    Details   metFORMIN (GLUCOPHAGE) 1000 MG tablet TAKE 1 TABLET BY MOUTH 2 TIMES DAILY WITH MEALS, Disp-60 tablet, R-0Normal      gabapentin (NEURONTIN) 300 MG capsule TAKE 1  range or not returned as of this dictation.    EMERGENCY DEPARTMENT COURSE and DIFFERENTIAL DIAGNOSIS/MDM:   Vitals:    Vitals:    02/03/24 1205   BP: 136/75   Pulse: 78   Resp: 16   Temp: 98.6 °F (37 °C)   TempSrc: Oral   SpO2: 97%   Weight: 95 kg (209 lb 7 oz)   Height: 1.854 m (6' 1\")           Medical Decision Making      DECISION MAKING:  Marc Abdullahi is a 58 y.o. male who comes in as above.  Vital signs reviewed, patient afebrile and vital signs stable.  On examination of the left foot, there is ecchymosis and edema over the distal foot and second through fifth toes.  No appreciated tenderness but does have significant neuropathy and typically cannot feel anything in his feet anyways.  Limited range of motion of the second through fifth toes.  Differential diagnosis includes, but not limited to, contusion, fracture, dislocation.    X-ray of the left foot shows minimally displaced fracture of the fifth proximal phalanx with extension to the fifth MTP joint.  Will place patient in a walking boot and have him follow-up with his podiatrist.      Amount and/or Complexity of Data Reviewed  Radiology: ordered. Decision-making details documented in ED Course.        CONSULTS:  None    REASSESSMENT       All available radiology results have been reviewed with patient and/or available family.  Patient instructed on follow-up with his podiatrist for his fracture.  He was encouraged on use of boot until then.  Patient and/or family verbally conveyed their understanding and agreement of the patient's signs, symptoms, diagnosis, treatment and prognosis and additionally agree to follow-up as recommended in the discharge instructions or to return to the Emergency Department should their condition change or worsen prior to their follow-up appointment.  All questions have been answered and patient and/or available family express understanding.        PROCEDURES:  Unless otherwise noted below, none     Procedures      FINAL

## 2024-02-03 NOTE — DISCHARGE INSTRUCTIONS
Your xray shows a minimally displaced fracture of the fifth proximal phalanx with extension to the fifth MTP joint. Please keep your foot elevated when resting and apply ice to the area. When ambulating wear the boot. Follow-up with your podiatrist this week.

## 2024-02-03 NOTE — ED TRIAGE NOTES
Patient presents to ED, ambulatory with steady gait, with complaint of bruising to top of left foot, across the bottom of second through fifth toes, that he just noticed last night. Redness noted beyond bruising.   No fevers, no chills, no body aches reported.     Patient has neuropathy that he states is from years of diabetes and substance abuse. Patient states he has no pain, and there is no known injury.     Pedal pulses present.

## 2024-02-08 ENCOUNTER — OFFICE VISIT (OUTPATIENT)
Age: 59
End: 2024-02-08
Payer: MEDICAID

## 2024-02-08 VITALS
TEMPERATURE: 97.7 F | SYSTOLIC BLOOD PRESSURE: 124 MMHG | OXYGEN SATURATION: 96 % | DIASTOLIC BLOOD PRESSURE: 86 MMHG | HEART RATE: 84 BPM | RESPIRATION RATE: 16 BRPM | WEIGHT: 214 LBS | HEIGHT: 73 IN | BODY MASS INDEX: 28.36 KG/M2

## 2024-02-08 DIAGNOSIS — K70.9 ALCOHOLIC LIVER DISEASE (HCC): ICD-10-CM

## 2024-02-08 DIAGNOSIS — S92.512D CLOSED DISPLACED FRACTURE OF PROXIMAL PHALANX OF LESSER TOE OF LEFT FOOT WITH ROUTINE HEALING, SUBSEQUENT ENCOUNTER: ICD-10-CM

## 2024-02-08 DIAGNOSIS — F10.20 ALCOHOL USE DISORDER, SEVERE, DEPENDENCE (HCC): ICD-10-CM

## 2024-02-08 DIAGNOSIS — G31.2 ALCOHOLIC ENCEPHALOPATHY (HCC): Primary | ICD-10-CM

## 2024-02-08 DIAGNOSIS — E11.9 TYPE 2 DIABETES MELLITUS WITHOUT COMPLICATION, WITHOUT LONG-TERM CURRENT USE OF INSULIN (HCC): ICD-10-CM

## 2024-02-08 DIAGNOSIS — G62.1 ALCOHOLIC PERIPHERAL NEUROPATHY (HCC): ICD-10-CM

## 2024-02-08 DIAGNOSIS — E78.5 HYPERLIPIDEMIA, UNSPECIFIED HYPERLIPIDEMIA TYPE: ICD-10-CM

## 2024-02-08 PROBLEM — E87.20 LACTIC ACIDOSIS: Status: RESOLVED | Noted: 2017-09-02 | Resolved: 2024-02-08

## 2024-02-08 PROBLEM — R45.851 SUICIDAL IDEATIONS: Status: RESOLVED | Noted: 2021-01-06 | Resolved: 2024-02-08

## 2024-02-08 PROBLEM — Z20.822 SUSPECTED COVID-19 VIRUS INFECTION: Status: RESOLVED | Noted: 2021-01-06 | Resolved: 2024-02-08

## 2024-02-08 PROBLEM — F10.939 ALCOHOL WITHDRAWAL (HCC): Status: RESOLVED | Noted: 2017-09-04 | Resolved: 2024-02-08

## 2024-02-08 PROCEDURE — 99214 OFFICE O/P EST MOD 30 MIN: CPT | Performed by: FAMILY MEDICINE

## 2024-02-08 PROCEDURE — 3079F DIAST BP 80-89 MM HG: CPT | Performed by: FAMILY MEDICINE

## 2024-02-08 PROCEDURE — 3074F SYST BP LT 130 MM HG: CPT | Performed by: FAMILY MEDICINE

## 2024-02-08 RX ORDER — ATORVASTATIN CALCIUM 10 MG/1
10 TABLET, FILM COATED ORAL DAILY
Qty: 90 TABLET | Refills: 1 | Status: SHIPPED | OUTPATIENT
Start: 2024-02-08

## 2024-02-08 ASSESSMENT — PATIENT HEALTH QUESTIONNAIRE - PHQ9
SUM OF ALL RESPONSES TO PHQ QUESTIONS 1-9: 0
5. POOR APPETITE OR OVEREATING: 0
2. FEELING DOWN, DEPRESSED OR HOPELESS: 0
3. TROUBLE FALLING OR STAYING ASLEEP: 0
10. IF YOU CHECKED OFF ANY PROBLEMS, HOW DIFFICULT HAVE THESE PROBLEMS MADE IT FOR YOU TO DO YOUR WORK, TAKE CARE OF THINGS AT HOME, OR GET ALONG WITH OTHER PEOPLE: 0
4. FEELING TIRED OR HAVING LITTLE ENERGY: 0
8. MOVING OR SPEAKING SO SLOWLY THAT OTHER PEOPLE COULD HAVE NOTICED. OR THE OPPOSITE, BEING SO FIGETY OR RESTLESS THAT YOU HAVE BEEN MOVING AROUND A LOT MORE THAN USUAL: 0
SUM OF ALL RESPONSES TO PHQ9 QUESTIONS 1 & 2: 0
6. FEELING BAD ABOUT YOURSELF - OR THAT YOU ARE A FAILURE OR HAVE LET YOURSELF OR YOUR FAMILY DOWN: 0
SUM OF ALL RESPONSES TO PHQ QUESTIONS 1-9: 0
SUM OF ALL RESPONSES TO PHQ QUESTIONS 1-9: 0
7. TROUBLE CONCENTRATING ON THINGS, SUCH AS READING THE NEWSPAPER OR WATCHING TELEVISION: 0
1. LITTLE INTEREST OR PLEASURE IN DOING THINGS: 0
SUM OF ALL RESPONSES TO PHQ QUESTIONS 1-9: 0
9. THOUGHTS THAT YOU WOULD BE BETTER OFF DEAD, OR OF HURTING YOURSELF: 0

## 2024-02-08 NOTE — PROGRESS NOTES
Identified pt with two pt identifiers(name and ).    Chief Complaint   Patient presents with    Establish Care     Pt here to establish care.         Health Maintenance Due   Topic    Hepatitis B vaccine (1 of 3 - 3-dose series)    COVID-19 Vaccine (1)    Pneumococcal 0-64 years Vaccine (1 - PCV)    Diabetic foot exam     Diabetic retinal exam     DTaP/Tdap/Td vaccine (1 - Tdap)    Shingles vaccine (1 of 2)    Flu vaccine (1)       Wt Readings from Last 3 Encounters:   24 97.1 kg (214 lb)   24 95 kg (209 lb 7 oz)   23 94.7 kg (208 lb 12.8 oz)     Temp Readings from Last 3 Encounters:   24 97.7 °F (36.5 °C) (Temporal)   24 98.6 °F (37 °C) (Oral)   23 98.1 °F (36.7 °C) (Temporal)     BP Readings from Last 3 Encounters:   24 124/86   24 136/75   23 138/76     Pulse Readings from Last 3 Encounters:   24 84   24 78   23 72           Depression Screening:  :         2024     3:57 PM 2023     9:43 AM 2023     9:24 AM 2023     9:08 AM 2023     9:06 AM 2/10/2023     3:46 PM 2022     2:00 PM   PHQ-9 Questionaire   Little interest or pleasure in doing things 0 0 0 0 0 0 0   Feeling down, depressed, or hopeless 0 0 0 0 0 0 0   Trouble falling or staying asleep, or sleeping too much 0 0  0  0    Feeling tired or having little energy 0 0  0  0    Poor appetite or overeating 0 0  0  0    Feeling bad about yourself - or that you are a failure or have let yourself or your family down 0 0  0  0    Trouble concentrating on things, such as reading the newspaper or watching television 0 0  0  0    Moving or speaking so slowly that other people could have noticed. Or the opposite - being so fidgety or restless that you have been moving around a lot more than usual 0 0  0  0    Thoughts that you would be better off dead, or of hurting yourself in some way 0 0  0      PHQ-9 Total Score 0 0 0 0 0 0 0   If you checked off any problems, how

## 2024-02-08 NOTE — PROGRESS NOTES
[facility]  [address] [place] [address]  Phone: [phone]  Fax: [phone]        Chief Complaint   Patient presents with    [facility] Care     Pt here to [facility].      He is a 58 y.o. male who presents for [facility].    He has a long, well documented history of alcohol and drug abuse.  He has been sober for the last 18 months.  Goes to meetings.    He has a history of HLD. Not currently taking statin as it was not refilled.  He has no issue taking this. Never had side effects.    He is worried that he may have had a stroke in his life.  Says that he has been more forgetful, losing balance.  Says that he will \"lose time\".  He will be staring, but he cannot move.  Feels like his speech is has also taken a hit.  Feels like he cannot find words, jumbles things.  He is requesting a neurology evaluation to further evaluate things.    He was seen in the ER on 2/3 for fracture of left toe.  Records reviewed. He hit his toe on a door jamb.  Found to be fractured.  He was put in boot.  He has continued follow up with podiatry anyway.  He has seen his podiatrist since the injury.      Prior to Visit Medications    Medication Sig Taking? Authorizing Provider   metFORMIN (GLUCOPHAGE) 1000 MG tablet TAKE 1 TABLET BY MOUTH 2 TIMES DAILY WITH MEALS Yes Isabella Flores MD   gabapentin (NEURONTIN) 300 MG capsule TAKE 1 CAPSULE BY MOUTH DAILY. MAX DAILY AMOUNT  300 MG. Yes Courtney Thibodeaux MD   hydrOXYzine HCl (ATARAX) 50 MG tablet Take 1 tablet by mouth nightly as needed for Itching Yes Courtney Thibodeaux MD   busPIRone (BUSPAR) 7.5 MG tablet TAKE 2.5 TABLETS BY MOUTH 3 TIMES DAILY. Yes Isabella Flores MD   traZODone (DESYREL) 50 MG tablet Take 1 tablet by mouth nightly Yes ProviderNaomi MD   diclofenac (VOLTAREN) 75 MG EC tablet Take 1 tablet by mouth 2 times daily Yes Naomi Marmolejo MD   Budeson-Glycopyrrol-Formoterol (BREZTRI AEROSPHERE) 160-9-4.8 MCG/ACT AERO

## 2024-03-11 DIAGNOSIS — F41.9 ANXIETY DISORDER, UNSPECIFIED TYPE: ICD-10-CM

## 2024-03-11 RX ORDER — BUSPIRONE HYDROCHLORIDE 7.5 MG/1
TABLET ORAL
Qty: 225 TABLET | Refills: 2 | Status: SHIPPED | OUTPATIENT
Start: 2024-03-11

## 2024-03-23 DIAGNOSIS — F41.9 ANXIETY DISORDER, UNSPECIFIED TYPE: ICD-10-CM

## 2024-03-25 RX ORDER — HYDROXYZINE 50 MG/1
50 TABLET, FILM COATED ORAL
Qty: 90 TABLET | Refills: 0 | Status: SHIPPED | OUTPATIENT
Start: 2024-03-25

## 2024-04-02 ENCOUNTER — APPOINTMENT (OUTPATIENT)
Facility: HOSPITAL | Age: 59
End: 2024-04-02
Payer: MEDICAID

## 2024-04-02 ENCOUNTER — HOSPITAL ENCOUNTER (EMERGENCY)
Facility: HOSPITAL | Age: 59
Discharge: HOME | End: 2024-04-02
Attending: PEDIATRICS
Payer: MEDICAID

## 2024-04-02 VITALS
RESPIRATION RATE: 18 BRPM | OXYGEN SATURATION: 99 % | SYSTOLIC BLOOD PRESSURE: 136 MMHG | BODY MASS INDEX: 26.31 KG/M2 | DIASTOLIC BLOOD PRESSURE: 83 MMHG | HEART RATE: 76 BPM | WEIGHT: 205 LBS | TEMPERATURE: 98.1 F | HEIGHT: 74 IN

## 2024-04-02 DIAGNOSIS — M79.642 LEFT HAND PAIN: Primary | ICD-10-CM

## 2024-04-02 PROCEDURE — 6370000000 HC RX 637 (ALT 250 FOR IP): Performed by: PHYSICIAN ASSISTANT

## 2024-04-02 PROCEDURE — 99283 EMERGENCY DEPT VISIT LOW MDM: CPT

## 2024-04-02 PROCEDURE — 73130 X-RAY EXAM OF HAND: CPT

## 2024-04-02 RX ORDER — PREDNISONE 20 MG/1
60 TABLET ORAL
Status: COMPLETED | OUTPATIENT
Start: 2024-04-02 | End: 2024-04-02

## 2024-04-02 RX ORDER — NAPROXEN 250 MG/1
500 TABLET ORAL ONCE
Status: COMPLETED | OUTPATIENT
Start: 2024-04-02 | End: 2024-04-02

## 2024-04-02 RX ORDER — PREDNISONE 20 MG/1
60 TABLET ORAL DAILY
Qty: 15 TABLET | Refills: 0 | Status: SHIPPED | OUTPATIENT
Start: 2024-04-02 | End: 2024-04-07

## 2024-04-02 RX ORDER — NAPROXEN 500 MG/1
500 TABLET ORAL 2 TIMES DAILY
Qty: 30 TABLET | Refills: 0 | Status: SHIPPED | OUTPATIENT
Start: 2024-04-02

## 2024-04-02 RX ADMIN — PREDNISONE 60 MG: 20 TABLET ORAL at 17:56

## 2024-04-02 RX ADMIN — NAPROXEN 500 MG: 250 TABLET ORAL at 17:55

## 2024-04-02 ASSESSMENT — PAIN DESCRIPTION - LOCATION: LOCATION: FINGER (COMMENT WHICH ONE)

## 2024-04-02 ASSESSMENT — LIFESTYLE VARIABLES: HOW OFTEN DO YOU HAVE A DRINK CONTAINING ALCOHOL: NEVER

## 2024-04-02 ASSESSMENT — PAIN SCALES - GENERAL
PAINLEVEL_OUTOF10: 8
PAINLEVEL_OUTOF10: 7

## 2024-04-02 ASSESSMENT — PAIN DESCRIPTION - ORIENTATION: ORIENTATION: LEFT

## 2024-04-02 ASSESSMENT — PAIN DESCRIPTION - DESCRIPTORS: DESCRIPTORS: SHARP

## 2024-04-02 ASSESSMENT — PAIN - FUNCTIONAL ASSESSMENT: PAIN_FUNCTIONAL_ASSESSMENT: PREVENTS OR INTERFERES SOME ACTIVE ACTIVITIES AND ADLS

## 2024-04-02 NOTE — ED TRIAGE NOTES
Patient presents ambulatory to treatment area with a steady gait.  Patient complains of left hand pain.  Redness and swelling noted around the area of the second digit.  Denies injury to the area, but states he has been working.  Pain began today around 1300.  History of gout.

## 2024-04-02 NOTE — ED PROVIDER NOTES
EMERGENCY DEPARTMENT PHYSICIAN NOTE     Patient: Marc Abdullahi     Time of Service: 4/2/2024  4:47 PM     Chief complaint:   Chief Complaint   Patient presents with    Hand Pain        HISTORY:  Patient is a 58 y.o. male who presents to the emergency department with complaints of left hand discomfort.  Patient states he works with his hands but denies any specific injury, today started with pain and swelling to his left second digit.  Denies any recent wounds to the area.  Patient states he has a history of gout when he was a heavy drinker but no longer drinks.  Denies fever or chills.  Full range of motion to hand and fingers but painful with flexion.  No additional complaints.    Past Medical History:   Diagnosis Date    Alcohol abuse     Alcoholism ([facility])     Anxiety     COPD (chronic obstructive pulmonary disease) ([facility])     Dr. Coppola is pulmonologist    Depression     Diabetes ([facility])     Type II    Diverticulitis     Family history of angina     GERD (gastroesophageal reflux disease)     Gout     Hemochromatosis     Hypertension         Past Surgical History:   Procedure Laterality Date    CERVICAL FUSION  03/02/2021    ACDF C4/7    COLONOSCOPY N/A 9/6/2017    COLONOSCOPY performed by Stefan Gandara MD at [facility] ENDOSCOPY    TOTAL COLECTOMY          History reviewed. No pertinent family history.     Social History     Socioeconomic History    Marital status: Single     Spouse name: None    Number of children: None    Years of education: None    Highest education level: None   Tobacco Use    Smoking status: Former     Types: Cigarettes     Passive exposure: Past    Smokeless tobacco: Current   Substance and Sexual Activity    Alcohol use: Not Currently    Drug use: Yes     Types: Marijuana (Weed)     Social Determinants of Health     Financial Resource Strain: Low Risk  (5/17/2023)    Overall Financial Resource Strain (CARDIA)     Difficulty of Paying Living Expenses: Not hard at all   Transportation Needs:

## 2024-04-02 NOTE — ED NOTES
The patient was discharged home by provider in stable condition. The patient is alert and oriented, in no respiratory distress and discharge vital signs obtained. The patient's diagnosis, condition and treatment were explained. The patient expressed understanding. Two prescriptions given. No work/school note given. A discharge plan has been developed. A  was not involved in the process. Aftercare instructions were given.  Pt ambulatory out of the ED.

## 2024-04-09 DIAGNOSIS — G62.9 POLYNEUROPATHY, UNSPECIFIED: ICD-10-CM

## 2024-04-09 RX ORDER — GABAPENTIN 300 MG/1
CAPSULE ORAL
Qty: 90 CAPSULE | OUTPATIENT
Start: 2024-04-09

## 2024-05-07 DIAGNOSIS — G62.9 POLYNEUROPATHY, UNSPECIFIED: ICD-10-CM

## 2024-05-08 RX ORDER — GABAPENTIN 300 MG/1
CAPSULE ORAL
Qty: 90 CAPSULE | Refills: 0 | Status: SHIPPED | OUTPATIENT
Start: 2024-05-08 | End: 2024-08-06

## 2024-05-17 ENCOUNTER — TELEPHONE (OUTPATIENT)
Age: 59
End: 2024-05-17

## 2024-05-17 DIAGNOSIS — G62.9 POLYNEUROPATHY, UNSPECIFIED: ICD-10-CM

## 2024-05-17 NOTE — TELEPHONE ENCOUNTER
----- Message from Haydee Pineda sent at 5/17/2024  1:48 PM EDT -----  Subject: Medication Problem     Medication: gabapentin (NEURONTIN) 300 MG capsule  Dosage: TAKE 1 CAPSULE BY MOUTH DAILY. MAX DAILY AMOUNT 300 MG.  Ordering Provider:     Question/Problem: Foot specialist told him to take 1 in the morning and 1   in the night      Pharmacy: [facility] [address] - P   [phone] - F [phone]     Medication: hydrOXYzine HCl (ATARAX) 50 MG tablet  Dosage: TAKE 1 TABLET BY MOUTH NIGHTLY AS NEEDED FOR ITCHING  Ordering Provider:     Question/Problem: was told to take this medication for anxiety and take as   needed up to 3 daily.      Pharmacy: [facility] [address] - P   [phone] - F [phone]    ---------------------------------------------------------------------------  --------------  CALL BACK INFO  [phone]; OK to leave message on voicemail  ---------------------------------------------------------------------------  --------------    SCRIPT ANSWERS  Relationship to Patient: Self

## 2024-05-19 RX ORDER — GABAPENTIN 300 MG/1
CAPSULE ORAL
Qty: 180 CAPSULE | Refills: 0 | Status: SHIPPED | OUTPATIENT
Start: 2024-05-19 | End: 2024-08-17

## 2024-07-12 ENCOUNTER — OFFICE VISIT (OUTPATIENT)
Age: 59
End: 2024-07-12

## 2024-07-12 VITALS
HEIGHT: 74 IN | SYSTOLIC BLOOD PRESSURE: 133 MMHG | WEIGHT: 200 LBS | OXYGEN SATURATION: 98 % | HEART RATE: 84 BPM | BODY MASS INDEX: 25.67 KG/M2 | DIASTOLIC BLOOD PRESSURE: 87 MMHG | RESPIRATION RATE: 16 BRPM

## 2024-07-12 DIAGNOSIS — R27.8 TRUNCAL ATAXIA: ICD-10-CM

## 2024-07-12 DIAGNOSIS — R41.3 MEMORY DIFFICULTIES: ICD-10-CM

## 2024-07-12 DIAGNOSIS — R26.89 IMBALANCE: ICD-10-CM

## 2024-07-12 DIAGNOSIS — R20.0 NUMBNESS IN BOTH LEGS: ICD-10-CM

## 2024-07-12 DIAGNOSIS — R27.8 SENSORY ATAXIA: ICD-10-CM

## 2024-07-12 DIAGNOSIS — R41.3 MEMORY DIFFICULTIES: Primary | ICD-10-CM

## 2024-07-12 LAB
T4 FREE SERPL-MCNC: 1 NG/DL (ref 0.8–1.5)
TSH SERPL DL<=0.05 MIU/L-ACNC: 2.79 UIU/ML (ref 0.36–3.74)
VIT B12 SERPL-MCNC: NORMAL PG/ML (ref 193–986)

## 2024-07-12 ASSESSMENT — PATIENT HEALTH QUESTIONNAIRE - PHQ9
2. FEELING DOWN, DEPRESSED OR HOPELESS: NOT AT ALL
SUM OF ALL RESPONSES TO PHQ QUESTIONS 1-9: 0
SUM OF ALL RESPONSES TO PHQ QUESTIONS 1-9: 0
1. LITTLE INTEREST OR PLEASURE IN DOING THINGS: NOT AT ALL
SUM OF ALL RESPONSES TO PHQ QUESTIONS 1-9: 0
SUM OF ALL RESPONSES TO PHQ9 QUESTIONS 1 & 2: 0
SUM OF ALL RESPONSES TO PHQ QUESTIONS 1-9: 0

## 2024-07-12 NOTE — PATIENT INSTRUCTIONS
PRIOR AUTHORIZATION FOR MEDICATIONS    If you were prescribed medication during your visit, it may require a prior authorization which is a determination of the medication coverage made by your insurance plan.     This process can take 14 business days for most medications prescribed by our Neurologists.      Botox injections (for therapeutic use) can take up to 8 weeks.    If you haven't heard from our office or your pharmacy within the time outlined above, please contact our office.        [place] [place] [facility] Test Result Communication    Test results are available in [employer].  [employer] is the patient portal into our electronic health record.  This feature allows patients to see diagnostic test results, immunizations, allergies, past medical and surgical history, current medications, and send messages directly to providers.  Our team members at the  can provide additional information and assist with registration.  The [employer] support team can be reached at [phone].    In some cases, a provider might need time to explain the results in detail during a follow-up appointment.  This might include additional information or context that will help patients understand the reason for next steps in the plan of care recommended by their provider.    If a patient chooses to receive diagnostic testing at an imaging center outside of the [facility] network, it is the patient's responsibility to bring the imaging report and disc to their [facility] follow-up appointment.    If the test results reveal anything that is particularly noteworthy, we will contact you to discuss the matter and, if necessary, schedule a follow-up appointment at an earlier date.    If you have not received your test results by [employer] or other communication within 7 days, please contact our office.  An inquiry can be sent to your provider using [employer].  Alternatively, appointments can be scheduled via

## 2024-07-12 NOTE — PROGRESS NOTES
80062 (16 minute minimum)  18 minutes were spent administering cognitive testing by nurse.      Cognitive Testing Evaluation     Introduction:     Marc Abdullahi  1965  Male   This 59 year old Male was administered a battery of neurocognitive testing on 07/12/2024.     Tests Administered:     Trails A, Trails B, Digit Symbol Substitution, Stroop, Immediate Recognition, Delayed Recognition   The combined test administration time was 13 minutes   Test Results:   Cognitive testing was provided via a battery of cognitive assessments. The pattern of test scores indicate that results are valid.  A Clinical Report with further description of scores and results is also available.   Overall: Patient tested in the 1st percentile (standard score of 56).   Trails A: Patient tested in the 1st percentile (scaled standard score of 63).  Trails B: Patient tested in the 41st percentile (scaled standard score of 97).  Digit Symbol Substitution: Patient tested in the 1st percentile (scaled standard score of 11).  Stroop: Patient tested in the 38th percentile (scaled standard score of 96).  Immediate Recognition: Patient tested in the 4th percentile (scaled standard score of 74).  Delayed Recognition: Patient tested in the 11th percentile (scaled standard score of 81).      Interpretation of Test Scores:     Examination of individual component tests shows:   Attention - Trails A: likely impairment  Mental Flexibility - Trails B: unlikely impairment  Executive Function - Digit Symbol Substitution: likely impairment  Executive Function - Stroop: unlikely impairment  Memory - Immediate Recognition: possible impairment  Memory - Delayed Recognition: possible impairment    The patient’s overall cognitive test performance was in the 1st percentile when compared to individuals of a similar age and gender, suggesting likely presence of cognitive impairment.    
cognitive test performance was in the 1st percentile when compared to individuals of a similar age and gender, suggesting likely presence of cognitive impairment.     45898 (16 minute minimum)  18 minutes were spent administering cognitive testing by nurse.    94194 (31 minute minimum)   _32__ minutes were spent reviewing and interpreting the cognitive testing results, integrating patient data, and discussing the results with the patient.     Impression/Plan  Pleasant 59-year-old gentleman with history of previous heavy drug and alcohol use coming up on 2 years sober with cognitive difficulty/memory difficulty as well as imbalance and numbness of his bilateral lower extremities and examination findings as above  MRI of the brain  EEG  Carotid Doppler  B12  Thyroid function  T. pallidum  Formal neurocognitive evaluation  EMG of the lower extremities to quantify likely neuropathy and also exclude a potential treatable neuropathy  Follow-up after testing    Becca Castaneda MD          This note was created using voice recognition software. Despite editing, there may be syntax errors.

## 2024-07-16 ENCOUNTER — TELEPHONE (OUTPATIENT)
Age: 59
End: 2024-07-16

## 2024-07-16 DIAGNOSIS — F40.240 CLAUSTROPHOBIA: Primary | ICD-10-CM

## 2024-07-16 LAB — T PALLIDUM AB SER QL IA: NON REACTIVE

## 2024-07-16 NOTE — TELEPHONE ENCOUNTER
Patient is requesting a medication to be sent in for him to take to help him relax for his MRI on 07/30/24.     Please advise.

## 2024-07-17 RX ORDER — DIAZEPAM 10 MG/1
TABLET ORAL
Qty: 1 TABLET | Refills: 0 | Status: SHIPPED | OUTPATIENT
Start: 2024-07-17 | End: 2024-07-31

## 2024-07-18 ENCOUNTER — PROCEDURE VISIT (OUTPATIENT)
Age: 59
End: 2024-07-18

## 2024-07-18 DIAGNOSIS — R41.0 CONFUSION: Primary | ICD-10-CM

## 2024-07-26 ENCOUNTER — PROCEDURE VISIT (OUTPATIENT)
Age: 59
End: 2024-07-26
Payer: MEDICAID

## 2024-07-26 DIAGNOSIS — R26.89 IMBALANCE: Primary | ICD-10-CM

## 2024-07-26 DIAGNOSIS — R20.2 PARESTHESIA: ICD-10-CM

## 2024-07-26 PROCEDURE — 95911 NRV CNDJ TEST 9-10 STUDIES: CPT | Performed by: PSYCHIATRY & NEUROLOGY

## 2024-07-26 PROCEDURE — 95886 MUSC TEST DONE W/N TEST COMP: CPT | Performed by: PSYCHIATRY & NEUROLOGY

## 2024-07-26 NOTE — PROGRESS NOTES
EMG/ NCS Report  [facility] [address], [address]  [place]  [address]   Ph: [phone]   FAX: [phone]/ [phone]  Test Date:  2019      Test Date:  2024    Patient: AVNI PIMENTEL : 1965 Physician: Linden Fraser MD   Sex: Male Height: ' \" Ref Phys: WILLIAMS NUGENT MD   ID#: [id number] Weight:  lbs. Technician: Abigail Herrera     Patient History / Exam:  CC: Numbness,tingling,hx diabetic and long hx of alcohol.    Patient is coming for polyneuropathy evaluation.        EMG & NCV Findings:  Evaluation of the left Fibular motor and the right Fibular motor nerves showed normal distal onset latency (L5.3, R4.8 ms), normal amplitude (L2.8, R1.1 mV), decreased conduction velocity (B Fib-Ankle, L34, R35 m/s), and normal conduction velocity (Poplt-B Fib, L45, R42 m/s).  The left tibial motor nerve showed normal distal onset latency (5.2 ms), normal amplitude (1.9 mV), and decreased conduction velocity (Knee-Ankle, 34 m/s).  The right tibial motor nerve showed normal distal onset latency (4.8 ms), reduced amplitude (0.8 mV), and decreased conduction velocity (Knee-Ankle, 33 m/s).  The left Sup Fibular sensory and the right Sup Fibular sensory nerves showed no response (Lower leg).  The left sural sensory nerve showed no response (Calf) and no response (Site 2).  The right sural sensory nerve showed no response (Calf).  All left vs. right side differences were within normal limits.      F Wave studies indicate that the left tibial F wave has no response.  The right tibial F wave has no response.      H-reflex studies indicate that the left tibial H-reflex has no response.  The right tibial H-reflex has no response.      All examined muscles (as indicated in the following table) showed no evidence of electrical instability.        Impression:  ABNORMAL    Extensive electrodiagnostic examination of the right and left lower

## 2024-07-29 DIAGNOSIS — G62.9 POLYNEUROPATHY, UNSPECIFIED: ICD-10-CM

## 2024-07-30 ENCOUNTER — HOSPITAL ENCOUNTER (OUTPATIENT)
Facility: HOSPITAL | Age: 59
Discharge: HOME | End: 2024-08-02
Attending: PSYCHIATRY & NEUROLOGY
Payer: MEDICAID

## 2024-07-30 DIAGNOSIS — R41.3 MEMORY DIFFICULTIES: ICD-10-CM

## 2024-07-30 DIAGNOSIS — R27.8 TRUNCAL ATAXIA: ICD-10-CM

## 2024-07-30 DIAGNOSIS — R26.89 IMBALANCE: ICD-10-CM

## 2024-07-30 DIAGNOSIS — R27.8 SENSORY ATAXIA: ICD-10-CM

## 2024-07-30 DIAGNOSIS — R20.0 NUMBNESS IN BOTH LEGS: ICD-10-CM

## 2024-07-30 PROCEDURE — 70551 MRI BRAIN STEM W/O DYE: CPT

## 2024-08-01 RX ORDER — GABAPENTIN 300 MG/1
CAPSULE ORAL
Qty: 180 CAPSULE | Refills: 0 | Status: SHIPPED | OUTPATIENT
Start: 2024-08-01 | End: 2024-10-30

## 2024-08-12 DIAGNOSIS — E78.5 HYPERLIPIDEMIA, UNSPECIFIED HYPERLIPIDEMIA TYPE: ICD-10-CM

## 2024-08-12 RX ORDER — ATORVASTATIN CALCIUM 10 MG/1
10 TABLET, FILM COATED ORAL DAILY
Qty: 90 TABLET | Refills: 1 | Status: SHIPPED | OUTPATIENT
Start: 2024-08-12

## 2024-08-15 ENCOUNTER — OFFICE VISIT (OUTPATIENT)
Age: 59
End: 2024-08-15
Payer: MEDICAID

## 2024-08-15 VITALS
HEART RATE: 71 BPM | DIASTOLIC BLOOD PRESSURE: 94 MMHG | BODY MASS INDEX: 25.67 KG/M2 | SYSTOLIC BLOOD PRESSURE: 136 MMHG | WEIGHT: 200 LBS | RESPIRATION RATE: 14 BRPM | OXYGEN SATURATION: 97 % | HEIGHT: 74 IN

## 2024-08-15 DIAGNOSIS — R41.3 MEMORY DIFFICULTIES: ICD-10-CM

## 2024-08-15 DIAGNOSIS — G62.9 PERIPHERAL POLYNEUROPATHY: Primary | ICD-10-CM

## 2024-08-15 PROCEDURE — 3075F SYST BP GE 130 - 139MM HG: CPT | Performed by: PSYCHIATRY & NEUROLOGY

## 2024-08-15 PROCEDURE — 3080F DIAST BP >= 90 MM HG: CPT | Performed by: PSYCHIATRY & NEUROLOGY

## 2024-08-15 PROCEDURE — 99214 OFFICE O/P EST MOD 30 MIN: CPT | Performed by: PSYCHIATRY & NEUROLOGY

## 2024-08-15 NOTE — PROGRESS NOTES
[facility] at [facility] at [address] [place] [address] [address] [facility] [address] [place] [address] [phone]              Chief Complaint   Patient presents with    Peripheral Neuropathy     Discuss abnormal EMG results      Current Outpatient Medications   Medication Sig Dispense Refill    atorvastatin (LIPITOR) 10 MG tablet TAKE 1 TABLET BY MOUTH DAILY 90 tablet 1    gabapentin (NEURONTIN) 300 MG capsule TAKE 1 CAPSULE BY MOUTH TWICE DAILY. MAX AMOUNT 600 MG. 180 capsule 0    naproxen (NAPROSYN) 500 MG tablet Take 1 tablet by mouth 2 times daily 30 tablet 0    hydrOXYzine HCl (ATARAX) 50 MG tablet TAKE 1 TABLET BY MOUTH NIGHTLY AS NEEDED FOR ITCHING 90 tablet 0    busPIRone (BUSPAR) 7.5 MG tablet TAKE 2.5 TABLETS BY MOUTH 3 TIMES DAILY. 225 tablet 2    metFORMIN (GLUCOPHAGE) 1000 MG tablet TAKE 1 TABLET BY MOUTH 2 TIMES DAILY WITH MEALS 60 tablet 0    traZODone (DESYREL) 50 MG tablet Take 1 tablet by mouth nightly      Budeson-Glycopyrrol-Formoterol (BREZTRI AEROSPHERE) 160-9-4.8 MCG/ACT AERO TAKE 2 (TWO) PUFFS TWICE A DAY      albuterol sulfate HFA (PROVENTIL;VENTOLIN;PROAIR) 108 (90 Base) MCG/ACT inhaler Inhale 2 puffs into the lungs every 6 hours as needed       No current facility-administered medications for this visit.      No Known Allergies  Social History     Tobacco Use    Smoking status: Never     Passive exposure: Past    Smokeless tobacco: Current   Substance Use Topics    Alcohol use: Not Currently     Comment: 43 years - sober since 8/2022    Drug use: Yes     Types: Marijuana (Weed)     59-year-old gentleman following up today a bit sooner than expected.  He was having some cognitive difficulty as well as imbalance and numbness in his lower extremities.  We are awaiting his neurocognitive evaluation.    Diagnostics  MRI of the brain July 2024

## 2024-08-19 ENCOUNTER — TELEPHONE (OUTPATIENT)
Age: 59
End: 2024-08-19

## 2024-08-19 DIAGNOSIS — E11.9 TYPE 2 DIABETES MELLITUS WITHOUT COMPLICATION, WITHOUT LONG-TERM CURRENT USE OF INSULIN (HCC): Primary | ICD-10-CM

## 2024-08-19 NOTE — TELEPHONE ENCOUNTER
[id number] - [place], [place] - [address] - P [phone] - F [phone]     Patient has been off it about 2 weeks      metFORMIN (GLUCOPHAGE) 1000 MG tablet

## 2024-08-20 DIAGNOSIS — E11.9 TYPE 2 DIABETES MELLITUS WITHOUT COMPLICATION, WITHOUT LONG-TERM CURRENT USE OF INSULIN (HCC): ICD-10-CM

## 2024-08-23 ENCOUNTER — OFFICE VISIT (OUTPATIENT)
Age: 59
End: 2024-08-23
Payer: MEDICAID

## 2024-08-23 ENCOUNTER — LAB (OUTPATIENT)
Age: 59
End: 2024-08-23
Payer: MEDICAID

## 2024-08-23 VITALS
HEART RATE: 74 BPM | DIASTOLIC BLOOD PRESSURE: 74 MMHG | TEMPERATURE: 97.8 F | BODY MASS INDEX: 27.46 KG/M2 | RESPIRATION RATE: 16 BRPM | WEIGHT: 214 LBS | SYSTOLIC BLOOD PRESSURE: 138 MMHG | HEIGHT: 74 IN | OXYGEN SATURATION: 99 %

## 2024-08-23 DIAGNOSIS — F41.9 ANXIETY DISORDER, UNSPECIFIED TYPE: ICD-10-CM

## 2024-08-23 DIAGNOSIS — G31.2 ALCOHOLIC ENCEPHALOPATHY (HCC): ICD-10-CM

## 2024-08-23 DIAGNOSIS — G62.1 ALCOHOLIC PERIPHERAL NEUROPATHY (HCC): ICD-10-CM

## 2024-08-23 DIAGNOSIS — E11.9 TYPE 2 DIABETES MELLITUS WITHOUT COMPLICATION, WITHOUT LONG-TERM CURRENT USE OF INSULIN (HCC): ICD-10-CM

## 2024-08-23 DIAGNOSIS — G62.9 POLYNEUROPATHY, UNSPECIFIED: ICD-10-CM

## 2024-08-23 DIAGNOSIS — Z28.21 TETANUS, DIPHTHERIA, AND ACELLULAR PERTUSSIS (TDAP) VACCINATION DECLINED: ICD-10-CM

## 2024-08-23 DIAGNOSIS — E66.3 OVERWEIGHT (BMI 25.0-29.9): ICD-10-CM

## 2024-08-23 DIAGNOSIS — K70.9 ALCOHOLIC LIVER DISEASE (HCC): ICD-10-CM

## 2024-08-23 DIAGNOSIS — E11.9 TYPE 2 DIABETES MELLITUS WITHOUT COMPLICATION, WITHOUT LONG-TERM CURRENT USE OF INSULIN (HCC): Primary | ICD-10-CM

## 2024-08-23 DIAGNOSIS — E78.5 HYPERLIPIDEMIA, UNSPECIFIED HYPERLIPIDEMIA TYPE: ICD-10-CM

## 2024-08-23 DIAGNOSIS — F10.20 ALCOHOL USE DISORDER, SEVERE, DEPENDENCE (HCC): ICD-10-CM

## 2024-08-23 LAB
CHOLEST SERPL-MCNC: 137 MG/DL
CREAT UR-MCNC: 322 MG/DL
EST. AVERAGE GLUCOSE BLD GHB EST-MCNC: 117 MG/DL
HBA1C MFR BLD: 5.7 % (ref 4–5.6)
HDLC SERPL-MCNC: 42 MG/DL
HDLC SERPL: 3.3 (ref 0–5)
LDLC SERPL CALC-MCNC: 75.4 MG/DL (ref 0–100)
MICROALBUMIN UR-MCNC: 4.13 MG/DL
MICROALBUMIN/CREAT UR-RTO: 13 MG/G (ref 0–30)
SPECIMEN HOLD: NORMAL
TRIGL SERPL-MCNC: 98 MG/DL
VLDLC SERPL CALC-MCNC: 19.6 MG/DL

## 2024-08-23 PROCEDURE — 99214 OFFICE O/P EST MOD 30 MIN: CPT | Performed by: FAMILY MEDICINE

## 2024-08-23 PROCEDURE — 3078F DIAST BP <80 MM HG: CPT | Performed by: FAMILY MEDICINE

## 2024-08-23 PROCEDURE — 3075F SYST BP GE 130 - 139MM HG: CPT | Performed by: FAMILY MEDICINE

## 2024-08-23 RX ORDER — GABAPENTIN 300 MG/1
CAPSULE ORAL
Qty: 180 CAPSULE | Refills: 0 | Status: SHIPPED | OUTPATIENT
Start: 2024-08-23 | End: 2024-11-21

## 2024-08-23 RX ORDER — DICLOFENAC SODIUM 75 MG/1
75 TABLET, DELAYED RELEASE ORAL 2 TIMES DAILY
COMMUNITY
Start: 2024-08-18

## 2024-08-23 RX ORDER — HYDROXYZINE 50 MG/1
50 TABLET, FILM COATED ORAL
Qty: 90 TABLET | Refills: 1 | Status: SHIPPED | OUTPATIENT
Start: 2024-08-23

## 2024-08-23 SDOH — ECONOMIC STABILITY: FOOD INSECURITY: WITHIN THE PAST 12 MONTHS, THE FOOD YOU BOUGHT JUST DIDN'T LAST AND YOU DIDN'T HAVE MONEY TO GET MORE.: NEVER TRUE

## 2024-08-23 SDOH — ECONOMIC STABILITY: FOOD INSECURITY: WITHIN THE PAST 12 MONTHS, YOU WORRIED THAT YOUR FOOD WOULD RUN OUT BEFORE YOU GOT MONEY TO BUY MORE.: NEVER TRUE

## 2024-08-23 SDOH — ECONOMIC STABILITY: INCOME INSECURITY: HOW HARD IS IT FOR YOU TO PAY FOR THE VERY BASICS LIKE FOOD, HOUSING, MEDICAL CARE, AND HEATING?: NOT HARD AT ALL

## 2024-08-23 ASSESSMENT — PATIENT HEALTH QUESTIONNAIRE - PHQ9
SUM OF ALL RESPONSES TO PHQ QUESTIONS 1-9: 0
SUM OF ALL RESPONSES TO PHQ9 QUESTIONS 1 & 2: 0
SUM OF ALL RESPONSES TO PHQ QUESTIONS 1-9: 0
4. FEELING TIRED OR HAVING LITTLE ENERGY: NOT AT ALL
SUM OF ALL RESPONSES TO PHQ QUESTIONS 1-9: 0
7. TROUBLE CONCENTRATING ON THINGS, SUCH AS READING THE NEWSPAPER OR WATCHING TELEVISION: NOT AT ALL
2. FEELING DOWN, DEPRESSED OR HOPELESS: NOT AT ALL
3. TROUBLE FALLING OR STAYING ASLEEP: NOT AT ALL
10. IF YOU CHECKED OFF ANY PROBLEMS, HOW DIFFICULT HAVE THESE PROBLEMS MADE IT FOR YOU TO DO YOUR WORK, TAKE CARE OF THINGS AT HOME, OR GET ALONG WITH OTHER PEOPLE: NOT DIFFICULT AT ALL
9. THOUGHTS THAT YOU WOULD BE BETTER OFF DEAD, OR OF HURTING YOURSELF: NOT AT ALL
8. MOVING OR SPEAKING SO SLOWLY THAT OTHER PEOPLE COULD HAVE NOTICED. OR THE OPPOSITE, BEING SO FIGETY OR RESTLESS THAT YOU HAVE BEEN MOVING AROUND A LOT MORE THAN USUAL: NOT AT ALL
5. POOR APPETITE OR OVEREATING: NOT AT ALL
1. LITTLE INTEREST OR PLEASURE IN DOING THINGS: NOT AT ALL
SUM OF ALL RESPONSES TO PHQ QUESTIONS 1-9: 0
6. FEELING BAD ABOUT YOURSELF - OR THAT YOU ARE A FAILURE OR HAVE LET YOURSELF OR YOUR FAMILY DOWN: NOT AT ALL

## 2024-08-23 NOTE — PROGRESS NOTES
Identified pt with two pt identifiers(name and ).    Chief Complaint   Patient presents with    Follow-up    Diabetes        Health Maintenance Due   Topic    Pneumococcal 0-64 years Vaccine (1 of 2 - PCV)    Diabetic retinal exam     DTaP/Tdap/Td vaccine (1 - Tdap)    Shingles vaccine (1 of 2)    COVID-19 Vaccine (1 -  season)    Diabetic Alb to Cr ratio (uACR) test     Lipids     Respiratory Syncytial Virus (RSV) Pregnant or age 60 yrs+ (1 - 1-dose 60+ series)    Flu vaccine (1)       Wt Readings from Last 3 Encounters:   24 97.1 kg (214 lb)   08/15/24 90.7 kg (200 lb)   24 90.7 kg (200 lb)     Temp Readings from Last 3 Encounters:   24 97.8 °F (36.6 °C) (Temporal)   24 98.1 °F (36.7 °C) (Oral)   24 97.7 °F (36.5 °C) (Temporal)     BP Readings from Last 3 Encounters:   24 138/74   08/15/24 (!) 136/94   24 133/87     Pulse Readings from Last 3 Encounters:   24 74   08/15/24 71   24 84           Depression Screening:  :         2024    10:14 AM 2024     9:39 AM 2024     3:57 PM 2023     9:43 AM 2023     9:24 AM 2023     9:08 AM 2023     9:06 AM   PHQ-9 Questionaire   Little interest or pleasure in doing things 0 0 0 0 0 0 0   Feeling down, depressed, or hopeless 0 0 0 0 0 0 0   Trouble falling or staying asleep, or sleeping too much 0  0 0  0    Feeling tired or having little energy 0  0 0  0    Poor appetite or overeating 0  0 0  0    Feeling bad about yourself - or that you are a failure or have let yourself or your family down 0  0 0  0    Trouble concentrating on things, such as reading the newspaper or watching television 0  0 0  0    Moving or speaking so slowly that other people could have noticed. Or the opposite - being so fidgety or restless that you have been moving around a lot more than usual 0  0 0  0    Thoughts that you would be better off dead, or of hurting yourself in some way 0  0 0  0    PHQ-9 Total

## 2024-08-23 NOTE — PROGRESS NOTES
[facility]  [address] [place] [address]  Phone: [phone]  Fax: [phone]        Chief Complaint   Patient presents with    Follow-up    Diabetes     He is a 59 y.o. male who presents for DM follow up.    Presents for DM follow up. Taking all medications as directed with no side effects.  Following DM diet as best as possible.  Denies polyuria, polydipsia, polyphagia.  Blood sugar checks: not checking.    Reports to be doing well.  He has maintained sobriety.    He has followed up with neurology on peripheral neuropathy and cognition issues (thought to be related to alcohol). He was recommended to undergo neuropsych testing--waiting on that.    Prior to Visit Medications    Medication Sig Taking? Authorizing Provider   diclofenac (VOLTAREN) 75 MG EC tablet Take 1 tablet by mouth 2 times daily Yes Naomi Marmolejo MD   metFORMIN (GLUCOPHAGE) 1000 MG tablet Take 1 tablet by mouth 2 times daily (with meals) Yes Storm Kaye MD   gabapentin (NEURONTIN) 300 MG capsule Take 1 capsule by mouth twice daily. Max amount 600 mg. Yes Storm Kaye MD   hydrOXYzine HCl (ATARAX) 50 MG tablet Take 1 tablet by mouth nightly as needed for Itching Yes Storm Kaye MD   atorvastatin (LIPITOR) 10 MG tablet TAKE 1 TABLET BY MOUTH DAILY Yes Storm Kaye MD   busPIRone (BUSPAR) 7.5 MG tablet TAKE 2.5 TABLETS BY MOUTH 3 TIMES DAILY. Yes Courtney Thibodeaux MD   traZODone (DESYREL) 50 MG tablet Take 1 tablet by mouth nightly Yes Naomi Marmolejo MD   Budeson-Glycopyrrol-Formoterol (BREZTRI AEROSPHERE) 160-9-4.8 MCG/ACT AERO TAKE 2 (TWO) PUFFS TWICE A DAY Yes Naomi Marmolejo MD   albuterol sulfate HFA (PROVENTIL;VENTOLIN;PROAIR) 108 (90 Base) MCG/ACT inhaler Inhale 2 puffs into the lungs every 6 hours as needed Yes Naomi Marmolejo MD       No Known Allergies      Reviewed PmHx, RxHx, FmHx, SocHx, AllgHx and updated and dated in the chart.      Objective:     Vitals:    08/23/24

## 2024-10-16 ENCOUNTER — TELEPHONE (OUTPATIENT)
Age: 59
End: 2024-10-16

## 2024-10-16 NOTE — TELEPHONE ENCOUNTER
Patient stated he had difficulty finding parking for today's 10/16/24 and would would to know if appointment can be schedule as a virtual visit instead?    Please contact

## 2025-01-29 ENCOUNTER — TELEPHONE (OUTPATIENT)
Age: 60
End: 2025-01-29

## 2025-01-29 NOTE — TELEPHONE ENCOUNTER
Patient started feeling ill Sunday, complaints of sinuses in his chest. He recently moved and has been having trouble breathing. Patient's grandfather passed away from colon cancer and his brother had partial colon removal, so patient would like to have his PSA level checked along with any other necessary precautions. He has an appointment with pulmonary on Monday, but is requesting to be seen as soon as possible by PCP. Patient prefers to not go to urgent care.     He is aware that Dr. Kaye and nurse are out of the office at the moment and is ok with waiting until tomorrow for a response.     Please advise.

## 2025-01-31 ENCOUNTER — TELEMEDICINE (OUTPATIENT)
Age: 60
End: 2025-01-31

## 2025-01-31 DIAGNOSIS — E11.9 TYPE 2 DIABETES MELLITUS WITHOUT COMPLICATION, WITHOUT LONG-TERM CURRENT USE OF INSULIN (HCC): ICD-10-CM

## 2025-01-31 DIAGNOSIS — J45.31 MILD PERSISTENT REACTIVE AIRWAY DISEASE WITH ACUTE EXACERBATION: Primary | ICD-10-CM

## 2025-01-31 DIAGNOSIS — F41.9 ANXIETY DISORDER, UNSPECIFIED TYPE: ICD-10-CM

## 2025-01-31 RX ORDER — PREDNISONE 50 MG/1
50 TABLET ORAL DAILY
Qty: 5 TABLET | Refills: 0 | Status: SHIPPED | OUTPATIENT
Start: 2025-01-31 | End: 2025-02-05

## 2025-01-31 RX ORDER — TRAZODONE HYDROCHLORIDE 50 MG/1
50 TABLET, FILM COATED ORAL NIGHTLY
Qty: 90 TABLET | Refills: 1 | Status: SHIPPED | OUTPATIENT
Start: 2025-01-31

## 2025-01-31 RX ORDER — BUSPIRONE HYDROCHLORIDE 7.5 MG/1
7.5 TABLET ORAL 3 TIMES DAILY
Qty: 270 TABLET | Refills: 1 | Status: SHIPPED | OUTPATIENT
Start: 2025-01-31

## 2025-01-31 RX ORDER — HYDROXYZINE HYDROCHLORIDE 50 MG/1
50 TABLET, FILM COATED ORAL
Qty: 90 TABLET | Refills: 1 | Status: SHIPPED | OUTPATIENT
Start: 2025-01-31

## 2025-01-31 NOTE — PROGRESS NOTES
[facility]  [address] [place] [address]  Phone: [phone]  Fax: [phone]      Marc Abdullahi, was evaluated through a synchronous (real-time) audio-video encounter. The patient (or guardian if applicable) is aware that this is a billable service, which includes applicable co-pays. This Virtual Visit was conducted with patient's (and/or legal guardian's) consent. Patient identification was verified, and a caregiver was present when appropriate.   The patient was located at Home: [address] [place] [id number]  Provider was located at Facility (Appt Dept): [address] [place] [address]  Confirm you are appropriately licensed, registered, or certified to deliver care in the state where the patient is located as indicated above. If you are not or unsure, please re-schedule the visit: Yes, I confirm.     This visit was completed via audio-only due to connectivity issues.    Chief Complaint   Patient presents with    Cough     6 days, no improvement,     He is a 59 y.o. male who presents for acute virtual visit.    He is complaining of a cough.  He is complaining of continued coughing fits.  Says that this will come and go.  Started about 6 days ago. Worsening with activity.  He will get sweats with activity.  He gets lightheaded when he coughs.  Says that he is continually wheezing.  Breztri is not helping.  He does not/never smoked. Uses smokeless tobacco. Endorses a lot of previous occupational exposures.    He follows with a pulmonologist for \"obstructive lung disease\".  Not really calling this asthma or COPD to his knowledge.    He also needs refills of his anxiety medications.  Says that he is \"in between psychiatrists\" but I have been the one managing these since he has established with me.  He was unaware of this.  Report that he has been out of these for about 1 week now.    History is very poor.  He is very tangential, cannot speak meaningfully

## 2025-02-24 ENCOUNTER — OFFICE VISIT (OUTPATIENT)
Age: 60
End: 2025-02-24
Payer: MEDICARE

## 2025-02-24 VITALS
BODY MASS INDEX: 28.75 KG/M2 | RESPIRATION RATE: 16 BRPM | WEIGHT: 224 LBS | HEIGHT: 74 IN | SYSTOLIC BLOOD PRESSURE: 118 MMHG | DIASTOLIC BLOOD PRESSURE: 82 MMHG | OXYGEN SATURATION: 96 % | HEART RATE: 74 BPM | TEMPERATURE: 97.9 F

## 2025-02-24 DIAGNOSIS — Z12.5 ENCOUNTER FOR SCREENING FOR MALIGNANT NEOPLASM OF PROSTATE: ICD-10-CM

## 2025-02-24 DIAGNOSIS — R35.1 NOCTURIA: ICD-10-CM

## 2025-02-24 DIAGNOSIS — J45.30 MILD PERSISTENT REACTIVE AIRWAY DISEASE WITHOUT COMPLICATION: ICD-10-CM

## 2025-02-24 DIAGNOSIS — Z00.00 WELCOME TO MEDICARE PREVENTIVE VISIT: ICD-10-CM

## 2025-02-24 DIAGNOSIS — G62.1 ALCOHOLIC PERIPHERAL NEUROPATHY: ICD-10-CM

## 2025-02-24 DIAGNOSIS — K70.9 ALCOHOLIC LIVER DISEASE: ICD-10-CM

## 2025-02-24 DIAGNOSIS — F17.220 CHEWING TOBACCO NICOTINE DEPENDENCE WITHOUT COMPLICATION: ICD-10-CM

## 2025-02-24 DIAGNOSIS — E11.69 TYPE 2 DIABETES MELLITUS WITH OTHER SPECIFIED COMPLICATION, WITHOUT LONG-TERM CURRENT USE OF INSULIN (HCC): Primary | ICD-10-CM

## 2025-02-24 DIAGNOSIS — F41.9 ANXIETY DISORDER, UNSPECIFIED TYPE: ICD-10-CM

## 2025-02-24 DIAGNOSIS — L97.521 NON-PRESSURE CHRONIC ULCER OF OTHER PART OF LEFT FOOT LIMITED TO BREAKDOWN OF SKIN (HCC): ICD-10-CM

## 2025-02-24 DIAGNOSIS — F10.20 ALCOHOL USE DISORDER, SEVERE, DEPENDENCE (HCC): ICD-10-CM

## 2025-02-24 PROBLEM — F11.10 OPIOID ABUSE (HCC): Status: RESOLVED | Noted: 2017-09-02 | Resolved: 2025-02-24

## 2025-02-24 PROBLEM — F19.10 DRUG ABUSE (HCC): Status: RESOLVED | Noted: 2017-09-02 | Resolved: 2025-02-24

## 2025-02-24 PROCEDURE — 99214 OFFICE O/P EST MOD 30 MIN: CPT | Performed by: FAMILY MEDICINE

## 2025-02-24 PROCEDURE — 99406 BEHAV CHNG SMOKING 3-10 MIN: CPT | Performed by: FAMILY MEDICINE

## 2025-02-24 SDOH — ECONOMIC STABILITY: FOOD INSECURITY: WITHIN THE PAST 12 MONTHS, YOU WORRIED THAT YOUR FOOD WOULD RUN OUT BEFORE YOU GOT MONEY TO BUY MORE.: NEVER TRUE

## 2025-02-24 SDOH — ECONOMIC STABILITY: FOOD INSECURITY: WITHIN THE PAST 12 MONTHS, THE FOOD YOU BOUGHT JUST DIDN'T LAST AND YOU DIDN'T HAVE MONEY TO GET MORE.: NEVER TRUE

## 2025-02-24 ASSESSMENT — LIFESTYLE VARIABLES
HOW MANY STANDARD DRINKS CONTAINING ALCOHOL DO YOU HAVE ON A TYPICAL DAY: PATIENT DOES NOT DRINK
HOW OFTEN DO YOU HAVE A DRINK CONTAINING ALCOHOL: NEVER

## 2025-02-24 ASSESSMENT — PATIENT HEALTH QUESTIONNAIRE - PHQ9
10. IF YOU CHECKED OFF ANY PROBLEMS, HOW DIFFICULT HAVE THESE PROBLEMS MADE IT FOR YOU TO DO YOUR WORK, TAKE CARE OF THINGS AT HOME, OR GET ALONG WITH OTHER PEOPLE: NOT DIFFICULT AT ALL
5. POOR APPETITE OR OVEREATING: NOT AT ALL
SUM OF ALL RESPONSES TO PHQ QUESTIONS 1-9: 0
SUM OF ALL RESPONSES TO PHQ9 QUESTIONS 1 & 2: 0
6. FEELING BAD ABOUT YOURSELF - OR THAT YOU ARE A FAILURE OR HAVE LET YOURSELF OR YOUR FAMILY DOWN: NOT AT ALL
2. FEELING DOWN, DEPRESSED OR HOPELESS: NOT AT ALL
9. THOUGHTS THAT YOU WOULD BE BETTER OFF DEAD, OR OF HURTING YOURSELF: NOT AT ALL
3. TROUBLE FALLING OR STAYING ASLEEP: NOT AT ALL
4. FEELING TIRED OR HAVING LITTLE ENERGY: NOT AT ALL
SUM OF ALL RESPONSES TO PHQ QUESTIONS 1-9: 0
SUM OF ALL RESPONSES TO PHQ QUESTIONS 1-9: 0
7. TROUBLE CONCENTRATING ON THINGS, SUCH AS READING THE NEWSPAPER OR WATCHING TELEVISION: NOT AT ALL
1. LITTLE INTEREST OR PLEASURE IN DOING THINGS: NOT AT ALL
8. MOVING OR SPEAKING SO SLOWLY THAT OTHER PEOPLE COULD HAVE NOTICED. OR THE OPPOSITE, BEING SO FIGETY OR RESTLESS THAT YOU HAVE BEEN MOVING AROUND A LOT MORE THAN USUAL: NOT AT ALL
SUM OF ALL RESPONSES TO PHQ QUESTIONS 1-9: 0

## 2025-02-24 NOTE — PATIENT INSTRUCTIONS
instruction, always ask your healthcare professional. [employer], disclaims any warranty or liability for your use of this information.         Fatigue: Care Instructions  Overview     Fatigue is a feeling of tiredness, exhaustion, or lack of energy. You may feel fatigue because of too much or not enough activity. It can also come from stress, lack of sleep, boredom, and poor diet. Many medical problems, such as viral infections, can cause fatigue. Emotional problems, especially depression, are often the cause of fatigue.  Fatigue is most often a symptom of another problem. Treatment for fatigue depends on the cause. For example, if you have fatigue because you have a certain health problem, treating this problem also treats your fatigue. If depression or anxiety is the cause, treatment may help.  Follow-up care is a key part of your treatment and safety. Be sure to make and go to all appointments, and call your doctor if you are having problems. It's also a good idea to know your test results and keep a list of the medicines you take.  How can you care for yourself at home?  Get regular exercise. But try not to overdo it. It may help to go back and forth between rest and exercise.  Get plenty of rest.  Eat a variety of healthy foods. Try not to skip any meals.  Avoid or try to cut back on your use of caffeine, tobacco, and alcohol. Caffeine is most often found in coffee, tea, cola drinks, and energy drinks.  Limit medicines that can cause fatigue. These include medicines such as cold and allergy medicines.  When should you call for help?  Watch closely for changes in your health, and be sure to contact your doctor if:    You have new symptoms such as fever or a rash.     Your fatigue gets worse.     You have been feeling down, depressed, or hopeless. Or you may have lost interest in things that you usually enjoy.     You are not getting better as expected.   Where can you learn more?  Go to

## 2025-02-24 NOTE — PROGRESS NOTES
[facility]  [address] [place] [address]  Phone: [phone]  Fax: [phone]        Chief Complaint   Patient presents with    Medicare AWV    Follow-up    Diabetes     He is concerned about his family hx of prostate cancer. His brother recently diagnosed.      He is a 59 y.o. male who presents for follow up visit.    Presents for DM follow up. Taking all medications as directed with no side effects.  Following DM diet as best as possible.  Denies polyuria, polydipsia, polyphagia.  Blood sugar checks: not checking.    He has followed up with his pulm since I saw him on 1/31 virtually.  He is on some new breathing treatments.  He continues to have chronic weezing.  Says that he is currently on prednisone as prescribed pulm, but not sure of the dosing.    Reports that he has been doing well on anxiety regimen as below.    Continues to have chronic pain in bilateral feet 2/2 neuropathy.  Gabapentin is helping some, but not giving that much relief.    He is requesitng prostate cancer screening today.  Brother recently diagnosed with this.    Prior to Visit Medications    Medication Sig Taking? Authorizing Provider   busPIRone (BUSPAR) 7.5 MG tablet Take 1 tablet by mouth 3 times daily Yes Storm Kaye MD   traZODone (DESYREL) 50 MG tablet Take 1 tablet by mouth nightly Yes Storm Kaye MD   hydrOXYzine HCl (ATARAX) 50 MG tablet Take 1 tablet by mouth nightly as needed for Anxiety Yes Storm Kaye MD   diclofenac (VOLTAREN) 75 MG EC tablet Take 1 tablet by mouth 2 times daily Yes Provider, MD Naomi   metFORMIN (GLUCOPHAGE) 1000 MG tablet Take 1 tablet by mouth 2 times daily (with meals) Yes Storm Kaye MD   gabapentin (NEURONTIN) 300 MG capsule Take 1 capsule by mouth twice daily. Max amount 600 mg. Yes Storm Kaye MD   atorvastatin (LIPITOR) 10 MG tablet TAKE 1 TABLET BY MOUTH DAILY Yes Storm Kaye MD   Budeson-Glycopyrrol-Formoterol (BREZTRI AEROSPHERE) 160-9-4.8

## 2025-02-24 NOTE — PROGRESS NOTES
Identified pt with two pt identifiers(name and )    Chief Complaint   Patient presents with    Medicare AWV    Follow-up    Diabetes     He is concerned about his family hx of prostate cancer. His brother recently diagnosed.         Health Maintenance Due   Topic    Diabetic foot exam     Diabetic retinal exam     DTaP/Tdap/Td vaccine (1 - Tdap)    Pneumococcal 50+ years Vaccine (1 of 2 - PCV)    Shingles vaccine (1 of 2)    Flu vaccine (1)    COVID-19 Vaccine (1 - - season)    GFR test (Diabetes, CKD 3-4, OR last GFR 15-59)     Annual Wellness Visit (Medicare)        Wt Readings from Last 3 Encounters:   25 101.6 kg (224 lb)   24 97.1 kg (214 lb)   08/15/24 90.7 kg (200 lb)     Temp Readings from Last 3 Encounters:   25 97.9 °F (36.6 °C) (Temporal)   24 97.8 °F (36.6 °C) (Temporal)   24 98.1 °F (36.7 °C) (Oral)     BP Readings from Last 3 Encounters:   25 118/82   24 138/74   08/15/24 (!) 136/94     Pulse Readings from Last 3 Encounters:   25 74   24 74   08/15/24 71           Depression Screening:  :         2025    11:48 AM 2024    10:14 AM 2024     9:39 AM 2024     3:57 PM 2023     9:43 AM 2023     9:24 AM 2023     9:08 AM   PHQ-9 Questionaire   Little interest or pleasure in doing things 0 0 0 0 0 0 0   Feeling down, depressed, or hopeless 0 0 0 0 0 0 0   Trouble falling or staying asleep, or sleeping too much 0 0  0 0  0   Feeling tired or having little energy 0 0  0 0  0   Poor appetite or overeating 0 0  0 0  0   Feeling bad about yourself - or that you are a failure or have let yourself or your family down 0 0  0 0  0   Trouble concentrating on things, such as reading the newspaper or watching television 0 0  0 0  0   Moving or speaking so slowly that other people could have noticed. Or the opposite - being so fidgety or restless that you have been moving around a lot more than usual 0 0  0 0  0   Thoughts that  Rehabilitation services

## 2025-02-25 DIAGNOSIS — E11.69 TYPE 2 DIABETES MELLITUS WITH OTHER SPECIFIED COMPLICATION, WITHOUT LONG-TERM CURRENT USE OF INSULIN (HCC): ICD-10-CM

## 2025-02-26 LAB
CREAT UR-MCNC: 36.9 MG/DL
MICROALBUMIN UR-MCNC: 0.62 MG/DL
MICROALBUMIN/CREAT UR-RTO: 17 MG/G (ref 0–30)
SPECIMEN HOLD: NORMAL

## 2025-03-01 LAB
CHOLEST SERPL-MCNC: 155 MG/DL (ref 100–199)
HBA1C MFR BLD: 8 % (ref 4.8–5.6)
HDLC SERPL-MCNC: 43 MG/DL
LDLC SERPL CALC-MCNC: 84 MG/DL (ref 0–99)
TRIGL SERPL-MCNC: 165 MG/DL (ref 0–149)
VLDLC SERPL CALC-MCNC: 28 MG/DL (ref 5–40)

## 2025-03-02 LAB
ALBUMIN SERPL-MCNC: 4.4 G/DL (ref 3.8–4.9)
ALBUMIN/CREAT UR: 11 MG/G CREAT (ref 0–29)
ALP SERPL-CCNC: 72 IU/L (ref 44–121)
ALT SERPL-CCNC: 17 IU/L (ref 0–44)
AST SERPL-CCNC: 14 IU/L (ref 0–40)
BILIRUB SERPL-MCNC: 0.5 MG/DL (ref 0–1.2)
BUN SERPL-MCNC: 10 MG/DL (ref 8–27)
BUN/CREAT SERPL: 11 (ref 10–24)
CALCIUM SERPL-MCNC: 9.6 MG/DL (ref 8.6–10.2)
CHLORIDE SERPL-SCNC: 105 MMOL/L (ref 96–106)
CO2 SERPL-SCNC: 19 MMOL/L (ref 20–29)
CREAT SERPL-MCNC: 0.89 MG/DL (ref 0.76–1.27)
CREAT UR-MCNC: 325.3 MG/DL
EGFRCR SERPLBLD CKD-EPI 2021: 98 ML/MIN/1.73
GLOBULIN SER CALC-MCNC: 2.1 G/DL (ref 1.5–4.5)
GLUCOSE SERPL-MCNC: 144 MG/DL (ref 70–99)
IMP & REVIEW OF LAB RESULTS: NORMAL
Lab: NORMAL
MICROALBUMIN UR-MCNC: 35.2 UG/ML
POTASSIUM SERPL-SCNC: 4.2 MMOL/L (ref 3.5–5.2)
PROT SERPL-MCNC: 6.5 G/DL (ref 6–8.5)
SODIUM SERPL-SCNC: 140 MMOL/L (ref 134–144)

## 2025-03-03 ENCOUNTER — TELEPHONE (OUTPATIENT)
Age: 60
End: 2025-03-03

## 2025-03-03 DIAGNOSIS — E11.69 TYPE 2 DIABETES MELLITUS WITH OTHER SPECIFIED COMPLICATION, WITHOUT LONG-TERM CURRENT USE OF INSULIN (HCC): Primary | ICD-10-CM

## 2025-03-03 DIAGNOSIS — E78.5 HYPERLIPIDEMIA, UNSPECIFIED HYPERLIPIDEMIA TYPE: ICD-10-CM

## 2025-03-03 LAB — PSA SERPL-MCNC: 0.9 NG/ML (ref 0–4)

## 2025-03-03 RX ORDER — ATORVASTATIN CALCIUM 10 MG/1
10 TABLET, FILM COATED ORAL DAILY
Qty: 90 TABLET | Refills: 1 | Status: SHIPPED | OUTPATIENT
Start: 2025-03-03

## 2025-03-03 NOTE — TELEPHONE ENCOUNTER
Attempted to call pt to discuss labs. No answer. LVM. When he calls back, please determine the best time/number to call.

## 2025-03-04 ENCOUNTER — TELEPHONE (OUTPATIENT)
Age: 60
End: 2025-03-04

## 2025-03-04 DIAGNOSIS — E11.69 TYPE 2 DIABETES MELLITUS WITH OTHER SPECIFIED COMPLICATION, WITHOUT LONG-TERM CURRENT USE OF INSULIN (HCC): Primary | ICD-10-CM

## 2025-03-04 LAB
IMP & REVIEW OF LAB RESULTS: NORMAL
Lab: NORMAL

## 2025-03-04 RX ORDER — PREDNISONE 5 MG/1
5 TABLET ORAL DAILY
COMMUNITY

## 2025-03-04 RX ORDER — GLIPIZIDE 5 MG/1
5 TABLET, FILM COATED, EXTENDED RELEASE ORAL DAILY
Qty: 30 TABLET | Refills: 3 | Status: SHIPPED | OUTPATIENT
Start: 2025-03-04

## 2025-03-04 NOTE — TELEPHONE ENCOUNTER
empagliflozin (JARDIANCE) 10 MG tablet   pt was prescribed this but pt only has medicare part A & B. Patient pays out of pocket for medications and will not be able to pay for this medication. Is there something else pt can try?

## 2025-03-04 NOTE — TELEPHONE ENCOUNTER
Was able to reach patient. Discussed labs with him.  Dm is worse.  He is on daily prednisone as prescribed by pulm. Has been on this for a few weeks now.  He will discuss the long term plan with them when he sees them next week. I favor him stopping this if able.  Will start on jardiance.  Follow up with me in 6/2024 as scheduled.

## 2025-03-17 ENCOUNTER — TRANSCRIBE ORDERS (OUTPATIENT)
Facility: HOSPITAL | Age: 60
End: 2025-03-17

## 2025-03-17 DIAGNOSIS — J39.8 TRACHEOMALACIA: Primary | ICD-10-CM

## 2025-06-12 NOTE — PROGRESS NOTES
Hospitalist Progress Note          Darci Rivera NP  Please call  and page for questions. Call physician on-call through the  7pm-7am    Daily Progress Note: 1/11/2021    Primary care provider:Reji Pillai MD    Date of admission: 1/10/2021  5:51 PM    Admission Summary and Hospital Course:      From H&P 01/10/2021:  Rose Marie Powell is a 63 y.o. male with past medical history of diabetes, hypertension, alcohol abuse and smoking who has been agreed to inpatient psych for severe depression and we have been consulted for HPI. Patient is awake, alert and oriented, able to answer my question and follow my request.  Patient reports that he has had issues with alcohol abuse, smoking and pulse reviewed with history of COPD not on home oxygen who came to ED on 1/6/2021 to [facility] because of lethargy headache and shortness of breath with exertion, noted to have acute alcohol withdrawal.  He was also suicidal and depressed. He was treated for acute alcohol withdrawal and then transferred to [facility] inpatient Psych for further management. I examined the patient with the bedside, he is awake, alert and oriented. He is anxious, denies suicidal ideation but appears depressed, reports that he wants to get sober but he is in pain and wants medication for pain as well as anxiety. He also wants to have access to his phone as he has communicated his  and Worker's Comp. people otherwise he is hemodynamically stable offers no new complaints otherwise. \"    Subjective:   Pt seen today in room in [facility]. States he doesn't feel like participating today. Denies pain or other complaints.     Assessment/Plan:       Acute alcohol withdrawal/abuse  -On Ativan as needed, continue multivitamins, thiamine and folic acid     Depression/suicidal ideation  -Mgt per primary team     Leukopenia/neutropenia: Unlikely to direct alcohol effect on the bone marrow  -Only slightly Patient ID: Beau Daly is a 42 y.o. male Date of Birth 1982       Chief Complaint   Patient presents with    Follow Up Wound Care Visit     Bilateral diabetic foot ulcer.  Arrived with TCC intact to left foot.       Allergies:  Patient has no known allergies.    Diagnosis:  1. Diabetic ulcer of left great toe (HCC)  -     lidocaine (LMX) 4 % cream  -     Wound cleansing and dressings Left Toe D1, great; Future  2. Diabetic ulcer of right great toe (HCC)  -     lidocaine (LMX) 4 % cream  -     Wound cleansing and dressings Right Toe D1, great; Future     Diagnosis ICD-10-CM Associated Orders   1. Diabetic ulcer of left great toe (HCC)  E11.621 lidocaine (LMX) 4 % cream    L97.529 Wound cleansing and dressings Left Toe D1, great      2. Diabetic ulcer of right great toe (HCC)  E11.621 lidocaine (LMX) 4 % cream    L97.519 Wound cleansing and dressings Right Toe D1, great           Assessment & Plan:  See wound orders.   Right foot Puracol  Left foot Acticoat, TCC     -RLE GT plantar diabetic ulceration.  Slightly smaller in size.  Selective debridement performed hyperkeratotic periwound.  No SOI   - Continue with specialized offloading surgical shoe and dressing changes  -LLE GT plantar diabetic ulceration.  Stable with hyperkeratotic periwound.  No debridement performed  -TCC was applied to the patients left  foot and lower leg.  The purpose of the TCC is to remove pressure from the foot ulcer upon ambulation and heal the diabetic ulcers. Prior to placing the TCC, consent was obtained and the patient was placed in a position to access the lower leg and foot.  The patient tolerated the TCC application well and was instructed in its use.  They are to call with any concerns about swelling or pain.  Present to the ED for removal of TCC if concerns arise  -Utilize cast protector to keep dressings clean and dry.  Patient advised on how to obtain cast protectors  -Once your wound is healed, you will need to be  fitted for proper footwear to ensure that the wound does not return. You may be casted until this process is completed. It is important to avoid old shoes and habits that may have led to the development of your wound.   -LEADs 5/27/25 no evidence of significant lower extremity occlusive disease tolerate  -Smoking cessation reviewed again at today's evaluation  - Patient counseled on the need for continued strict glycemic control  - Patient counseled that he is at a high risk for further infection and osteomyelitis.  This may result in need for surgical intervention such as amputation  - Patient counseled on worsening signs of infection and will present to the ED if these occur  -Work note provided, patient may return to work with accommodations.  He must be able to utilize rolling scooter and other ambulatory devices as well as be able to complete his work in a seated position with ability to elevate  -Short-term disability paperwork received  - Follow-up 1 week for bilateral wound treatment and reapplication of TCC        From prior visit:  -Chronic bilateral plantar great toe diabetic wounds.  Patient states wounds have reoccurred multiple times over the last 2 years  -A1c 8% 4/3/2025  - Currently seeing wound care specialist at [facility], Dr. Hahn.  Presents for second opinion  -[facility] 05/13/25 Wound Center right 0.94, left 0.91  - Hospital course reviewed admission at [facility] 4/30/2025.  Left lower extremity cellulitis.  Discharged on cefadroxil 500 mg twice daily.  Ciprofloxacin 500 mg twice daily.  Per hospital notes general surgery stated patient required only local wound care and no surgical intervention.              -MRI left foot 4/5/2025 there is abnormal marrow signal involving the distal phalanx of   the great toe. There is mild marrow enhancement suspicious for osteomyelitis.               -CT left 4/30/2025 no evidence of acute fracture or OM.  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 fragmentation of left  improved  -Monitor labs  -Consult hem/onc      Chronic pain: reports history of accident in 2020, need for surgery has been deferred because of his social situation  -Denies pain currently  -Stop tramadol - avoid controlled substances  -Start scheduled lidocaine patches  -PRN capsaicin cream, tylenol, ibuprofen     Hypokalemia  -Resolved    Hyponatremia  -Resolved     New onset diabetes type 2: HbA1c 8.1  -Labile BS  -Cont AC/HS accuchecks w/ SSI - high sensitivity scale  -Hold off on lantus for now  -Consult DMT for teaching  -Consult nutrition for teaching  -Trend BS     Hypertension: uncontrolled  -Also w/ tachycardia in 120s  -Cont lisinopril  -Start metoprolol  -Monitor BP     Chronic smoker:  -Counseled for cessation  -Cont nicotine patches, lozenges         Review of Systems:     Full ROS complete with pertinent positives and negatives as per HPI, otherwise negative  Objective:   Physical Exam:     Visit Vitals  BP (!) 142/81   Pulse (!) 121   Temp 98.8 °F (37.1 °C)   Resp 18   Ht 6' 2\" (1.88 m)   Wt 99.8 kg (220 lb)   SpO2 99%   BMI 28.25 kg/m²           Temp (24hrs), Av.5 °F (36.9 °C), Min:97.6 °F (36.4 °C), Max:99.1 °F (37.3 °C)    No intake/output data recorded. No intake/output data recorded. General:  Alert, cooperative, no distress, appears stated age, obese   Lungs:   Clear to auscultation bilaterally. Heart:  Regular rate and rhythm, S1, S2 normal, no murmur, click, rub or gallop. Abdomen:   Soft, non-tender, non-distended. Bowel sounds normal.    Extremities: Extremities normal, atraumatic, no cyanosis or edema.    Skin: Skin color, texture, turgor normal. No rashes or lesions   Neurologic: CNII-XII intact, A&Ox4, VALENTINE     Data Review:       Recent Days:  Recent Labs     21  0553 21  0355   WBC 3.0* 2.6*   HGB 13.2 12.4   HCT 36.3* 34.2*   * 103*     Recent Labs     21  0553 21  0355    134*   K 3.7 3.2*    101   CO2 29 27   * 185*   BUN 6 4*   CREA 0.92 0.68*   CA 9.2 8.3*   MG  --  1.9   PHOS  --  3.5     No results for input(s): PH, PCO2, PO2, HCO3, FIO2 in the last 72 hours. 24 Hour Results:  Recent Results (from the past 24 hour(s))   CBC WITH AUTOMATED DIFF    Collection Time: 01/11/21  5:53 AM   Result Value Ref Range    WBC 3.0 (L) 4.1 - 11.1 K/uL    RBC 3.73 (L) 4.10 - 5.70 M/uL    HGB 13.2 12.1 - 17.0 g/dL    HCT 36.3 (L) 36.6 - 50.3 %    MCV 97.3 80.0 - 99.0 FL    MCH 35.4 (H) 26.0 - 34.0 PG    MCHC 36.4 30.0 - 36.5 g/dL    RDW 12.3 11.5 - 14.5 %    PLATELET 112 (L) 141 - 400 K/uL    MPV 8.9 8.9 - 12.9 FL    NRBC 0.0 0  WBC    ABSOLUTE NRBC 0.00 0.00 - 0.01 K/uL    NEUTROPHILS 52 32 - 75 %    LYMPHOCYTES 26 12 - 49 %    MONOCYTES 18 (H) 5 - 13 %    EOSINOPHILS 0 0 - 7 %    BASOPHILS 1 0 - 1 %    IMMATURE GRANULOCYTES 3 (H) 0.0 - 0.5 %    ABS. NEUTROPHILS 1.6 (L) 1.8 - 8.0 K/UL    ABS. LYMPHOCYTES 0.8 0.8 - 3.5 K/UL    ABS. MONOCYTES 0.5 0.0 - 1.0 K/UL    ABS. EOSINOPHILS 0.0 0.0 - 0.4 K/UL    ABS. BASOPHILS 0.0 0.0 - 0.1 K/UL    ABS. IMM.  GRANS. 0.1 (H) 0.00 - 0.04 K/UL    DF SMEAR SCANNED      RBC COMMENTS NORMOCYTIC, NORMOCHROMIC     METABOLIC PANEL, BASIC    Collection Time: 01/11/21  5:53 AM   Result Value Ref Range    Sodium 137 136 - 145 mmol/L    Potassium 3.7 3.5 - 5.1 mmol/L    Chloride 104 97 - 108 mmol/L    CO2 29 21 - 32 mmol/L    Anion gap 4 (L) 5 - 15 mmol/L    Glucose 159 (H) 65 - 100 mg/dL    BUN 6 6 - 20 MG/DL    Creatinine 0.92 0.70 - 1.30 MG/DL    BUN/Creatinine ratio 7 (L) 12 - 20      GFR est AA >60 >60 ml/min/1.73m2    GFR est non-AA >60 >60 ml/min/1.73m2    Calcium 9.2 8.5 - 10.1 MG/DL       Problem List:  Problem List as of 1/11/2021 Date Reviewed: 1/10/2021          Codes Class Noted - Resolved    Major depression ICD-10-CM: F32.9  ICD-9-CM: 296.20  1/10/2021 - Present        Hypokalemia ICD-10-CM: E87.6  ICD-9-CM: 276.8  1/6/2021 - Present        Thrombocytopenia ([facility] [address] ICD-10-CM: third metatarsal head.  Mild degenerative arthritis noted.  Evidence of small soft tissue ulcer in left great toe with associated left great toe cellulitis and no evidence of drainable soft tissue abscess or collection              -XR left foot no OM, no MAXIMINO  -CT 4/3/2025 negative for osteomyelitis  - 5/8/2025 wound care note reviewed dr. Hahn.  Antibiotics prescribed ciprofloxacin 500 mg twice daily for 14 days, cefadroxil 500 mg twice daily for 14 days  - Antibiotics complete    Subjective:   HPI    6/12/25 bilateral plantar great toe wounds.  Patient tolerated TCC cast well.  Patient did not adhere to ADLs only however he denies any redness, swelling, or pain coming from his left total contact cast limb.  He has been keeping his cast clean and dry.  Patient states he has been changing his right foot dressing as directed.  He has been utilizing offloading shoe as directed.  He denies any redness, swelling, drainage.  He denies any systemic signs of infection.  Patient brings with him today paperwork for short-term disability as well as return to work paperwork if appropriate    6/5/2025 bilateral plantar great toe wounds.  Patient tolerated left lower extremity total contact cast without pain, redness, swelling, or falls reported.  Patient is interested in continuation of total contact casting.  He states he did attempt total contact casting in the past however it was a different system and he was smoking, poorly controlled with his blood sugars, and nonadherent with weightbearing restrictions at that time so he did not have improvement.  Patient states he has been adherent with ADLs only and elevation.  He continues to working on smoking cessation.  He continues to closely monitor his diet.     6/3/2025 bilateral plantar great toe wounds.  Patient completed vascular studies.  Patient has been wearing offloading surgical shoes as directed.  He denies any significant change since last exam.  He denies any  D69.6  ICD-9-CM: 287.5  1/6/2021 - Present        Suspected COVID-19 virus infection ICD-10-CM: Z20.822  ICD-9-CM: V01.79  1/6/2021 - Present        Suicidal ideations ICD-10-CM: R45.851  ICD-9-CM: V62.84  1/6/2021 - Present        Headache ICD-10-CM: R51.9  ICD-9-CM: 784.0  1/6/2021 - Present        Elevated bilirubin ICD-10-CM: R17  ICD-9-CM: 277.4  1/6/2021 - Present        Alcohol withdrawal ([facility] [address] ICD-10-CM: T73.150  ICD-9-CM: 291.81  9/4/2017 - Present        Alcohol abuse (Chronic) ICD-10-CM: F10.10  ICD-9-CM: 305.00  Unknown - Present        Opioid abuse (HCC) (Chronic) ICD-10-CM: F11.10  ICD-9-CM: 305.50  9/2/2017 - Present        Mild tetrahydrocannabinol (THC) abuse (Chronic) ICD-10-CM: F12.10  ICD-9-CM: 305.20  9/2/2017 - Present        Polysubstance abuse (HCC) (Chronic) ICD-10-CM: F19.10  ICD-9-CM: 305.90  9/2/2017 - Present        NSAID long-term use (Chronic) ICD-10-CM: Z79.1  ICD-9-CM: V58.64  9/2/2017 - Present        Weight loss ICD-10-CM: R63.4  ICD-9-CM: 783.21  9/2/2017 - Present        RESOLVED: Dehydration ICD-10-CM: E86.0  ICD-9-CM: 276.51  9/2/2017 - 9/4/2017        RESOLVED: Nausea & vomiting ICD-10-CM: R11.2  ICD-9-CM: 787.01  9/2/2017 - 9/4/2017        RESOLVED: Syncope ICD-10-CM: R55  ICD-9-CM: 780.2  9/2/2017 - 9/7/2017        RESOLVED: Elevated lipase ICD-10-CM: R74.8  ICD-9-CM: 790.5  9/2/2017 - 9/4/2017        RESOLVED: Lactic acidemia ICD-10-CM: E87.2  ICD-9-CM: 276.2  9/2/2017 - 9/4/2017        RESOLVED: Hematemesis ICD-10-CM: K92.0  ICD-9-CM: 578.0  9/2/2017 - 9/7/2017              Medications reviewed  Current Facility-Administered Medications   Medication Dose Route Frequency    PHENobarbitaL (LUMINAL) tablet 32.4 mg  32.4 mg Oral Q4H PRN    traZODone (DESYREL) tablet 100 mg  100 mg Oral QHS PRN    hydrOXYzine HCL (ATARAX) tablet 100 mg  100 mg Oral Q6H PRN    QUEtiapine (SEROquel) tablet 50 mg  50 mg Oral Q6H PRN    [START ON 1/12/2021] lisinopriL (Yas Pacheco) local or systemic signs of infection.     5/27/25 for continued valuation management of chronic bilateral plantar great toe wounds.  Patient states he has been trying to wear surgical shoes as directed.  He denies any redness, swelling, or drainage.  He completed and tolerated antibiotics as prescribed by his mother per wound care provider without incidence.  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  Patient denies any systemic or local signs of infection and has been adherent to dressing changes     5/13/2025 42-year-old male with PMH DM2, HTN, anxiety, history of opioid abuse, tobacco use, obesity who presents with bilateral great toe diabetic ulcerations.  Patient states they have been present for approximately 2 years.  They developed after wearing a new pair of steel toed shoes.  Patient has been seeing a wound care specialist at [facility] without significant improvement.  Patient states he was recently hospitalized about a month ago for left foot cellulitis from his left great toe wound.  Patient states imaging was concerning for osteomyelitis however his general surgeon/wound care specialist recommended local wound care and antibiotic management.  Patient has been following up with his wound care specialist since discharge.  Patient was advised to seek second opinion for further evaluation and management of bilateral great toes.  Patient states that he was recently seen on 5/8/2025 and represcribed antibiotics.  He denies any significant redness, swelling, or pain at this time in his left great toe.  Patient denies any nausea, vomiting, fever, chills, shortness of breath     The following portions of the patient's history were reviewed and updated as appropriate:   Problem List[1]  Past Medical History[2]  Past Surgical History[3]  Social History[4]   Current Medications[5]  Family History[6]   Review of  Systems  Constitutional:  Negative for chills and fever.   Respiratory:  Negative for chest tightness and shortness of breath.    Cardiovascular:  Negative for leg swelling.   Musculoskeletal:  Positive for arthralgias.   Skin:  Positive for wound.    Objective:  /78   Pulse 100   Temp (!) 97.2 °F (36.2 °C)   Resp 20     Physical Exam    Constitutional:       General: He is not in acute distress.     Appearance: He is not ill-appearing.   Cardiovascular:      Comments: DP pulse 2/4, PT pulse nonpalpable  CRT to distal digits less than 3 seconds  Skin temperature gradient decreased from knees to digits bilaterally  Absent pedal hair growth  Hemosiderin deposits noted to bilateral lower extremities.  No significant edema noted  Musculoskeletal:      Comments: Mild pes planus bilaterally  Hallux rigidus noted to bilateral first MPJs with palpable bony prominences dorsal medial aspect of bilateral MPJs  MMT 5/5 at level of ankle.  No pain with palpation of posterior calves   Skin:     Capillary Refill: Capillary refill takes less than 2 seconds.      Comments: Left great toe plantar IPJ diabetic ulceration with primarily granular base and hyperkeratotic periwound..  No appreciable edema or erythema.  No crepitus, no fluctuance     Right right great toe plantar IPJ ulceration.  Primarily granular base with some fibrotic slough noted.  Hyperkeratotic periwound.  No surrounding erythema or edema.  No crepitus, no fluctuance, no malodor.  No drainage expressed      Neurological:      Comments: Moderate decrease in gross sensation, decrease in protective sensation    Wound 05/13/25 Diabetic Ulcer Toe D1, great Right (Active)   Wound Image   06/12/25 1412   Enter Castillo score: Castillo Grade 2: Deep ulcer extended to ligament, tendon, joint capsule, bone, or deep fascia without abscess or osteomyelitis (OM) 06/03/25 1330   Wound Description Pink 06/03/25 1330   Non-staged Wound Description Full thickness 06/03/25 1330  tablet 20 mg  20 mg Oral DAILY    metoprolol tartrate (LOPRESSOR) tablet 50 mg  50 mg Oral BID    OLANZapine (ZyPREXA) tablet 5 mg  5 mg Oral Q6H PRN    haloperidol lactate (HALDOL) injection 5 mg  5 mg IntraMUSCular Q6H PRN    benztropine (COGENTIN) tablet 1 mg  1 mg Oral BID PRN    diphenhydrAMINE (BENADRYL) injection 50 mg  50 mg IntraMUSCular BID PRN    LORazepam (ATIVAN) injection 1 mg  1 mg IntraMUSCular Q4H PRN    acetaminophen (TYLENOL) tablet 650 mg  650 mg Oral Q4H PRN    magnesium hydroxide (MILK OF MAGNESIA) 400 mg/5 mL oral suspension 30 mL  30 mL Oral DAILY PRN    metFORMIN (GLUCOPHAGE) tablet 500 mg  500 mg Oral BID WITH MEALS    therapeutic multivitamin (THERAGRAN) tablet 1 Tab  1 Tab Oral DAILY    nicotine buccal (POLACRILEX) lozenge 2 mg  2 mg Oral Q2H PRN    thiamine HCL (B-1) tablet 100 mg  100 mg Oral DAILY    lidocaine 4 % patch 1-3 Patch  1-3 Patch TransDERmal Q24H    capsicum oleoresin 0.025 % cream   Topical Q8H PRN       Care Plan discussed with: Patient/family, nurse      German Montaño NP  Hospitalist  Providers can reach me on [employer]   Wound Length (cm) 0.3 cm 06/03/25 1330   Wound Width (cm) 1 cm 06/03/25 1330   Wound Depth (cm) 0.6 cm 06/03/25 1330   Wound Surface Area (cm^2) 0.24 cm^2 06/03/25 1330   Wound Volume (cm^3) 0.094 cm^3 06/03/25 1330   Calculated Wound Volume (cm^3) 0.18 cm^3 06/03/25 1330   Change in Wound Size % -800 06/03/25 1330   Number of underminings 1 06/03/25 1330   Undermining 1 0.4 06/03/25 1330   Undermining 1 is depth extending from 12-12 06/03/25 1330   Drainage Amount Small 06/03/25 1330   Drainage Description Serosanguineous 06/03/25 1330   Viola-wound Assessment Callus 06/03/25 1330   Dressing Status Intact 06/03/25 1330       Wound 05/13/25 Diabetic Ulcer Toe D1, great Left (Active)   Wound Image   06/12/25 1411   Enter Castillo score: Castillo Grade 1: Partial or full-thickness ulcer (superficial) 06/05/25 1356   Wound Description Pink 06/12/25 1412   Non-staged Wound Description Full thickness 06/12/25 1412   Wound Length (cm) 0.3 cm 06/12/25 1412   Wound Width (cm) 0.2 cm 06/12/25 1412   Wound Depth (cm) 0.3 cm 06/12/25 1412   Wound Surface Area (cm^2) 0.05 cm^2 06/12/25 1412   Wound Volume (cm^3) 0.009 cm^3 06/12/25 1412   Calculated Wound Volume (cm^3) 0.02 cm^3 06/12/25 1412   Change in Wound Size % 60 06/12/25 1412   Number of underminings 1 06/03/25 1333   Undermining 1 0.2 06/12/25 1412   Undermining 1 is depth extending from 6-2 deepest at 9 06/12/25 1412   Drainage Amount Small 06/12/25 1412   Drainage Description Serosanguineous 06/12/25 1412   Viola-wound Assessment Callus;Brown 06/12/25 1412   Dressing Status Intact 06/03/25 1333                         Cast Application    Date/Time: 6/12/2025 1:45 PM    Performed by: Anjali Titus DPM  Authorized by: Anjali Titus DPM    Other Assisting Provider: No    Verbal consent obtained?: Yes    Risks and benefits: Risks, benefits and alternatives were discussed    Consent given by:  Patient  Time Out:     Time out: Immediately prior to the procedure a  time out was called    Patient states understanding of procedure being performed: Yes    Patient identity confirmed:  Verbally with patient  Pre-procedure details:     Sensation:  Unchanged  Procedure details:     Laterality:  Left    Location:  Leg    Leg:  L lower leg    Cast type:  Total contact    Supplies used: Acticoat, Allevyn, total contact cast padding, total contact cast.  Post-procedure details:     Pain:  Unchanged    Patient tolerance of procedure:  Tolerated well, no immediate complications               Wound Instructions:  Orders Placed This Encounter   Procedures    Wound cleansing and dressings Left Toe D1, great     Left great toe ulcer    Clean wound with mild soap and water pat dry  Apply Acticoat 7 to wound bed then apply foam padding   TCC applied today by Dr. Titus      Apply silicone bordered foam for protection to lateral foot    Off-loading Instructions:     Total Contact Cast Instructions:   Do not get cast wet.   Contact wound center if there is a foul odor or becomes uncomfortable due to feeling tight or swelling.   Do not use objects down inside of cast to scratch.   Do not walk on cast without walking boot.     TCC printed instructions given to patient      Follow up in 1 week     Standing Status:   Future     Expiration Date:   6/19/2025    Wound cleansing and dressings Right Toe D1, great     Right Great Toes         Wash your hands with soap and water.  Remove old dressing, discard into plastic bag and place in trash.  Cleanse the wound with mild soap and water prior to applying a clean dressing. Do not use tissue or cotton balls. Do not scrub the wound. Pat dry using gauze.  Shower no   May purchase cast covers.        Apply puracol ag to the bilateral great toe wounds.  Cover with gauze.  Secure with tape.  Change dressing every other day        Continue to wear off-loading devices  Off-loading Instructions: Surgical Shoes with off-loading pads     Keep weight and pressure off  "wound at all times. Try to walk with pressure on the heels.  Wear off-loading device as directed by your physician. Put on immediately when rising in the morning and remove when going to bed.        Please try to consume 3-4 servings of protein (30g) each day.   - Each serving of protein should contain about 30 mg protein.   - Good sources of protein include, lean meats (fish, chicken, etc), eggs, dairy products (yogurt, cheese), tofu, legumes (chickpeas, lentils, peas, black beans), nuts (cashew, walnut, peanuts, etc), & quinoa.        If you develop fever, chills, nausea or vomiting, increase in drainage or foul smelling drainage go to the closest emergency department for evaluation.     Standing Status:   Future     Expiration Date:   6/19/2025         Anjali Titus DPM      Portions of the record may have been created with voice recognition software. Occasional wrong word or \"sound a like\" substitutions may have occurred due to the inherent limitations of voice recognition software. Read the chart carefully and recognize, using context, where substitutions have occurred.            [1]   Patient Active Problem List  Diagnosis    Hyponatremia    Diabetic foot infection  (HCC)    Opioid abuse (HCC)    Diabetes mellitus (HCC)    Class 1 obesity due to excess calories in adult   [2]   Past Medical History:  Diagnosis Date    Class 1 obesity in adult     Diabetes mellitus (HCC)     Opioid abuse (HCC)    [3]   Past Surgical History:  Procedure Laterality Date    NO PAST SURGERIES N/A    [4]   Social History  Socioeconomic History    Marital status: Unknown   Tobacco Use    Smoking status: Every Day    Smokeless tobacco: Never   Substance and Sexual Activity    Alcohol use: Not Currently    Drug use: Not Currently     Types: Fentanyl     Social Drivers of Health     Financial Resource Strain: Not At Risk (4/3/2025)    Received from [facility]    Financial Insecurity     In the last 12 months did " you skip medications to save money?: No     In the last 12 months was there a time when you needed to see a doctor but could not because of cost?: No   Food Insecurity: No Food Insecurity (4/3/2025)    Received from [facility]    Food Insecurity     In the last 12 months did you ever eat less than you felt you should because there wasn't enough money for food?: No   Transportation Needs: No Transportation Needs (4/3/2025)    Received from [facility]    Transportation Needs     In the last 12 months have you ever had to go without healthcare because you didn't have a way to get there?: No   Social Connections: Socially Integrated (4/3/2025)    Received from [facility]    Social Connection     Do you often feel lonely?: No   Housing Stability: Not At Risk (4/3/2025)    Received from [facility]    Housing Stability     Are you worried that in the next 2 months you may not have stable housing?: No   [5]   Current Outpatient Medications:     atorvastatin (LIPITOR) 10 mg tablet, Take by mouth, Disp: , Rfl:     cloNIDine (CATAPRES) 0.3 mg tablet, Take 0.3 mg by mouth 2 (two) times a day, Disp: , Rfl:     Empagliflozin (JARDIANCE) 10 MG TABS tablet, Take 25 mg by mouth daily, Disp: , Rfl:     gabapentin (NEURONTIN) 400 mg capsule, Take by mouth, Disp: , Rfl:     insulin lispro (HumaLOG) 100 units/mL injection pen, Inject under the skin, Disp: , Rfl:     Jardiance 25 MG TABS, Take by mouth, Disp: , Rfl:     Lantus SoloStar 100 units/mL SOPN, Inject 70 Units under the skin daily, Disp: , Rfl:     lisinopril (ZESTRIL) 20 mg tablet, Take 20 mg by mouth, Disp: , Rfl:     metFORMIN (GLUCOPHAGE) 1000 MG tablet, Take by mouth, Disp: , Rfl:     pantoprazole (PROTONIX) 40 mg tablet, Take by mouth, Disp: , Rfl:     Sublocade 300 MG/1.5ML, Inject under the skin, Disp: , Rfl:   No current facility-administered medications for this visit.  [6]   Family  History  Problem Relation Name Age of Onset    Diabetes Mother

## (undated) DEVICE — TRAP SUC MUCOUS 70ML -- MEDICHOICE MEDLINE

## (undated) DEVICE — ADULT SPO2 SENSOR: Brand: NELLCOR

## (undated) DEVICE — CATH IV AUTOGRD BC BLU 22GA 25 -- INSYTE

## (undated) DEVICE — SNARE ENDOSCP M L240CM W27MM SHTH DIA2.4MM CHN 2.8MM OVL

## (undated) DEVICE — BASIN EMESIS 500CC ROSE 250/CS 60/PLT: Brand: MEDEGEN MEDICAL PRODUCTS, LLC

## (undated) DEVICE — Device

## (undated) DEVICE — NDL FLTR TIP 5 MIC 18GX1.5IN --

## (undated) DEVICE — BAG BELONG PT PERS CLEAR HANDL

## (undated) DEVICE — BAG SPEC BIOHZRD 10 X 10 IN --

## (undated) DEVICE — BITEBLOCK ENDOSCP 60FR MAXI WHT POLYETH STURDY W/ VELC WVN

## (undated) DEVICE — KENDALL RADIOLUCENT FOAM MONITORING ELECTRODE -RECTANGULAR SHAPE: Brand: KENDALL

## (undated) DEVICE — 1200 GUARD II KIT W/5MM TUBE W/O VAC TUBE: Brand: GUARDIAN

## (undated) DEVICE — SOLIDIFIER MEDC 1200ML -- CONVERT TO 356117

## (undated) DEVICE — SYRINGE 50ML E/T

## (undated) DEVICE — SET ADMIN 16ML TBNG L100IN 2 Y INJ SITE IV PIGGY BK DISP

## (undated) DEVICE — KIT COLON W/ 1.1OZ LUB AND 2 END

## (undated) DEVICE — NDL PRT INJ NSAF BLNT 18GX1.5 --

## (undated) DEVICE — FORCEPS BX L240CM JAW DIA2.8MM L CAP W/ NDL MIC MESH TOOTH